# Patient Record
Sex: FEMALE | Race: WHITE
[De-identification: names, ages, dates, MRNs, and addresses within clinical notes are randomized per-mention and may not be internally consistent; named-entity substitution may affect disease eponyms.]

---

## 2017-01-18 ENCOUNTER — APPOINTMENT (OUTPATIENT)
Dept: PULMONOLOGY | Facility: CLINIC | Age: 72
End: 2017-01-18

## 2017-01-18 VITALS
HEART RATE: 58 BPM | HEIGHT: 63 IN | OXYGEN SATURATION: 96 % | SYSTOLIC BLOOD PRESSURE: 120 MMHG | TEMPERATURE: 96.5 F | DIASTOLIC BLOOD PRESSURE: 70 MMHG | BODY MASS INDEX: 19.84 KG/M2 | WEIGHT: 112 LBS

## 2017-01-19 RX ADMIN — OMALIZUMAB 0 MG: 202.5 INJECTION, SOLUTION SUBCUTANEOUS at 00:00

## 2017-02-13 ENCOUNTER — APPOINTMENT (OUTPATIENT)
Dept: PULMONOLOGY | Facility: CLINIC | Age: 72
End: 2017-02-13

## 2017-02-14 ENCOUNTER — APPOINTMENT (OUTPATIENT)
Dept: PULMONOLOGY | Facility: CLINIC | Age: 72
End: 2017-02-14

## 2017-02-14 VITALS
WEIGHT: 112 LBS | BODY MASS INDEX: 19.84 KG/M2 | HEIGHT: 63 IN | OXYGEN SATURATION: 96 % | TEMPERATURE: 97.5 F | HEART RATE: 60 BPM

## 2017-02-14 RX ADMIN — OMALIZUMAB 0 MG: 202.5 INJECTION, SOLUTION SUBCUTANEOUS at 00:00

## 2017-03-15 ENCOUNTER — APPOINTMENT (OUTPATIENT)
Dept: PULMONOLOGY | Facility: CLINIC | Age: 72
End: 2017-03-15

## 2017-03-15 VITALS
DIASTOLIC BLOOD PRESSURE: 80 MMHG | SYSTOLIC BLOOD PRESSURE: 120 MMHG | HEIGHT: 63 IN | TEMPERATURE: 97.5 F | OXYGEN SATURATION: 97 % | WEIGHT: 112 LBS | BODY MASS INDEX: 19.84 KG/M2 | HEART RATE: 57 BPM

## 2017-03-15 RX ORDER — CICLESONIDE 80 UG/1
80 AEROSOL, METERED RESPIRATORY (INHALATION)
Qty: 6.1 | Refills: 11 | Status: ACTIVE | COMMUNITY
Start: 2017-03-15 | End: 1900-01-01

## 2017-03-15 RX ORDER — AZELASTINE HYDROCHLORIDE AND FLUTICASONE PROPIONATE 137; 50 UG/1; UG/1
137-50 SPRAY, METERED NASAL
Qty: 1 | Refills: 5 | Status: ACTIVE | COMMUNITY
Start: 2017-03-15 | End: 1900-01-01

## 2017-03-15 RX ADMIN — OMALIZUMAB 0 MG: 202.5 INJECTION, SOLUTION SUBCUTANEOUS at 00:00

## 2017-03-17 ENCOUNTER — RX RENEWAL (OUTPATIENT)
Age: 72
End: 2017-03-17

## 2017-03-17 RX ORDER — MONTELUKAST 10 MG/1
10 TABLET, FILM COATED ORAL
Qty: 90 | Refills: 3 | Status: ACTIVE | COMMUNITY
Start: 2017-03-17 | End: 1900-01-01

## 2017-04-10 ENCOUNTER — APPOINTMENT (OUTPATIENT)
Dept: PULMONOLOGY | Facility: CLINIC | Age: 72
End: 2017-04-10

## 2017-04-10 VITALS
DIASTOLIC BLOOD PRESSURE: 90 MMHG | BODY MASS INDEX: 19.84 KG/M2 | OXYGEN SATURATION: 100 % | TEMPERATURE: 98.1 F | WEIGHT: 112 LBS | HEART RATE: 56 BPM | HEIGHT: 63 IN | SYSTOLIC BLOOD PRESSURE: 130 MMHG

## 2017-04-11 RX ADMIN — OMALIZUMAB 0 MG: 202.5 INJECTION, SOLUTION SUBCUTANEOUS at 00:00

## 2017-05-04 ENCOUNTER — APPOINTMENT (OUTPATIENT)
Dept: PULMONOLOGY | Facility: CLINIC | Age: 72
End: 2017-05-04

## 2017-05-04 VITALS
WEIGHT: 117 LBS | DIASTOLIC BLOOD PRESSURE: 64 MMHG | TEMPERATURE: 96.5 F | SYSTOLIC BLOOD PRESSURE: 110 MMHG | HEART RATE: 57 BPM | BODY MASS INDEX: 20.73 KG/M2 | HEIGHT: 63 IN | OXYGEN SATURATION: 98 %

## 2017-05-05 RX ADMIN — OMALIZUMAB 0 MG: 202.5 INJECTION, SOLUTION SUBCUTANEOUS at 00:00

## 2017-05-31 ENCOUNTER — APPOINTMENT (OUTPATIENT)
Dept: PULMONOLOGY | Facility: CLINIC | Age: 72
End: 2017-05-31

## 2017-06-01 RX ORDER — OMALIZUMAB 202.5 MG/1.4ML
150 INJECTION, SOLUTION SUBCUTANEOUS
Qty: 1 | Refills: 0 | Status: COMPLETED | OUTPATIENT
Start: 2017-06-01

## 2017-06-01 RX ADMIN — OMALIZUMAB 0 MG: 202.5 INJECTION, SOLUTION SUBCUTANEOUS at 00:00

## 2017-06-27 ENCOUNTER — APPOINTMENT (OUTPATIENT)
Dept: PULMONOLOGY | Facility: CLINIC | Age: 72
End: 2017-06-27

## 2017-06-27 VITALS
HEART RATE: 56 BPM | DIASTOLIC BLOOD PRESSURE: 78 MMHG | SYSTOLIC BLOOD PRESSURE: 118 MMHG | OXYGEN SATURATION: 97 % | BODY MASS INDEX: 20.73 KG/M2 | HEIGHT: 63 IN | TEMPERATURE: 97.8 F | WEIGHT: 117 LBS

## 2017-06-28 RX ORDER — OMALIZUMAB 202.5 MG/1.4ML
150 INJECTION, SOLUTION SUBCUTANEOUS
Qty: 1 | Refills: 0 | Status: COMPLETED | OUTPATIENT
Start: 2017-06-28

## 2017-06-28 RX ADMIN — OMALIZUMAB 0 MG: 202.5 INJECTION, SOLUTION SUBCUTANEOUS at 00:00

## 2017-10-05 ENCOUNTER — APPOINTMENT (OUTPATIENT)
Dept: PULMONOLOGY | Facility: CLINIC | Age: 72
End: 2017-10-05
Payer: MEDICARE

## 2017-10-05 VITALS
DIASTOLIC BLOOD PRESSURE: 80 MMHG | WEIGHT: 117 LBS | SYSTOLIC BLOOD PRESSURE: 127 MMHG | HEIGHT: 63 IN | BODY MASS INDEX: 20.73 KG/M2 | TEMPERATURE: 96.4 F

## 2017-10-05 PROCEDURE — 94010 BREATHING CAPACITY TEST: CPT

## 2017-10-05 PROCEDURE — 99214 OFFICE O/P EST MOD 30 MIN: CPT | Mod: 25

## 2021-01-20 ENCOUNTER — IMMUNIZATION (OUTPATIENT)
Dept: PHARMACY | Facility: CLINIC | Age: 76
End: 2021-01-20
Payer: MEDICARE

## 2021-01-20 DIAGNOSIS — Z23 NEED FOR VACCINATION: Primary | ICD-10-CM

## 2021-02-18 ENCOUNTER — IMMUNIZATION (OUTPATIENT)
Dept: PHARMACY | Facility: CLINIC | Age: 76
End: 2021-02-18
Payer: MEDICARE

## 2021-02-18 DIAGNOSIS — Z23 NEED FOR VACCINATION: Primary | ICD-10-CM

## 2021-03-18 ENCOUNTER — TELEPHONE (OUTPATIENT)
Dept: INFECTIOUS DISEASES | Facility: CLINIC | Age: 76
End: 2021-03-18

## 2021-04-05 ENCOUNTER — OFFICE VISIT (OUTPATIENT)
Dept: OBSTETRICS AND GYNECOLOGY | Facility: CLINIC | Age: 76
End: 2021-04-05
Attending: OBSTETRICS & GYNECOLOGY
Payer: MEDICARE

## 2021-04-05 ENCOUNTER — LAB VISIT (OUTPATIENT)
Dept: LAB | Facility: OTHER | Age: 76
End: 2021-04-05
Attending: OBSTETRICS & GYNECOLOGY
Payer: MEDICARE

## 2021-04-05 VITALS
SYSTOLIC BLOOD PRESSURE: 138 MMHG | DIASTOLIC BLOOD PRESSURE: 72 MMHG | HEIGHT: 64 IN | WEIGHT: 119.63 LBS | BODY MASS INDEX: 20.42 KG/M2

## 2021-04-05 DIAGNOSIS — Z13.21 ENCOUNTER FOR VITAMIN DEFICIENCY SCREENING: ICD-10-CM

## 2021-04-05 DIAGNOSIS — Z78.0 MENOPAUSE: ICD-10-CM

## 2021-04-05 DIAGNOSIS — Z01.419 ENCOUNTER FOR GYNECOLOGICAL EXAMINATION: ICD-10-CM

## 2021-04-05 DIAGNOSIS — Z78.0 MENOPAUSE: Primary | ICD-10-CM

## 2021-04-05 DIAGNOSIS — N39.3 STRESS INCONTINENCE OF URINE: ICD-10-CM

## 2021-04-05 LAB
25(OH)D3+25(OH)D2 SERPL-MCNC: 77 NG/ML (ref 30–96)
DHEA-S SERPL-MCNC: 10.8 UG/DL
ESTRADIOL SERPL-MCNC: 20 PG/ML
PROGEST SERPL-MCNC: 0.3 NG/ML
TESTOST SERPL-MCNC: 20 NG/DL (ref 5–73)
VIT B12 SERPL-MCNC: 1649 PG/ML (ref 210–950)

## 2021-04-05 PROCEDURE — 99999 PR PBB SHADOW E&M-EST. PATIENT-LVL IV: ICD-10-PCS | Mod: PBBFAC,,, | Performed by: OBSTETRICS & GYNECOLOGY

## 2021-04-05 PROCEDURE — 84402 ASSAY OF FREE TESTOSTERONE: CPT | Performed by: OBSTETRICS & GYNECOLOGY

## 2021-04-05 PROCEDURE — 82607 VITAMIN B-12: CPT | Mod: GA | Performed by: OBSTETRICS & GYNECOLOGY

## 2021-04-05 PROCEDURE — 82670 ASSAY OF TOTAL ESTRADIOL: CPT | Performed by: OBSTETRICS & GYNECOLOGY

## 2021-04-05 PROCEDURE — 99214 OFFICE O/P EST MOD 30 MIN: CPT | Mod: PBBFAC | Performed by: OBSTETRICS & GYNECOLOGY

## 2021-04-05 PROCEDURE — G0101 CA SCREEN;PELVIC/BREAST EXAM: HCPCS | Mod: PBBFAC

## 2021-04-05 PROCEDURE — 36415 COLL VENOUS BLD VENIPUNCTURE: CPT | Performed by: OBSTETRICS & GYNECOLOGY

## 2021-04-05 PROCEDURE — 82627 DEHYDROEPIANDROSTERONE: CPT | Performed by: OBSTETRICS & GYNECOLOGY

## 2021-04-05 PROCEDURE — G0101 CA SCREEN;PELVIC/BREAST EXAM: HCPCS | Mod: S$PBB,,, | Performed by: OBSTETRICS & GYNECOLOGY

## 2021-04-05 PROCEDURE — 82306 VITAMIN D 25 HYDROXY: CPT | Mod: GA | Performed by: OBSTETRICS & GYNECOLOGY

## 2021-04-05 PROCEDURE — 99999 PR PBB SHADOW E&M-EST. PATIENT-LVL IV: CPT | Mod: PBBFAC,,, | Performed by: OBSTETRICS & GYNECOLOGY

## 2021-04-05 PROCEDURE — 84144 ASSAY OF PROGESTERONE: CPT | Performed by: OBSTETRICS & GYNECOLOGY

## 2021-04-05 PROCEDURE — G0101 PR CA SCREEN;PELVIC/BREAST EXAM: ICD-10-PCS | Mod: S$PBB,,, | Performed by: OBSTETRICS & GYNECOLOGY

## 2021-04-05 PROCEDURE — 84403 ASSAY OF TOTAL TESTOSTERONE: CPT | Performed by: OBSTETRICS & GYNECOLOGY

## 2021-04-05 RX ORDER — TEMAZEPAM 15 MG/1
CAPSULE ORAL
COMMUNITY
End: 2021-07-22

## 2021-04-05 RX ORDER — VALACYCLOVIR HYDROCHLORIDE 1 G/1
TABLET, FILM COATED ORAL
COMMUNITY

## 2021-04-05 RX ORDER — METHIMAZOLE 10 MG/1
TABLET ORAL
COMMUNITY
End: 2021-04-05

## 2021-04-05 RX ORDER — ROSUVASTATIN CALCIUM 40 MG/1
TABLET, COATED ORAL
COMMUNITY
Start: 2013-01-01 | End: 2021-07-22 | Stop reason: SDUPTHER

## 2021-04-05 RX ORDER — ATORVASTATIN CALCIUM 40 MG/1
40 TABLET, FILM COATED ORAL
COMMUNITY
End: 2021-04-05

## 2021-04-05 RX ORDER — METOPROLOL SUCCINATE 50 MG/1
TABLET, EXTENDED RELEASE ORAL
COMMUNITY
End: 2021-07-22

## 2021-04-05 RX ORDER — HYDROCHLOROTHIAZIDE 25 MG/1
25 TABLET ORAL
COMMUNITY
End: 2021-07-22

## 2021-04-05 RX ORDER — LIFITEGRAST 50 MG/ML
SOLUTION/ DROPS OPHTHALMIC
COMMUNITY
Start: 2021-03-08

## 2021-04-05 RX ORDER — SULFAMETHOXAZOLE AND TRIMETHOPRIM 800; 160 MG/1; MG/1
1 TABLET ORAL 2 TIMES DAILY
COMMUNITY
End: 2021-09-27 | Stop reason: SDUPTHER

## 2021-04-05 RX ORDER — EPINEPHRINE 0.3 MG/.3ML
INJECTION SUBCUTANEOUS
COMMUNITY

## 2021-04-05 RX ORDER — METHIMAZOLE 10 MG/1
0.25 TABLET ORAL
COMMUNITY
End: 2021-04-05

## 2021-04-05 RX ORDER — CETIRIZINE HYDROCHLORIDE, PSEUDOEPHEDRINE HYDROCHLORIDE 5; 120 MG/1; MG/1
TABLET, FILM COATED, EXTENDED RELEASE ORAL
COMMUNITY

## 2021-04-05 RX ORDER — MINERAL OIL
180 ENEMA (ML) RECTAL
COMMUNITY
End: 2021-07-13

## 2021-04-05 RX ORDER — FLUOROMETHOLONE 1 MG/ML
SUSPENSION/ DROPS OPHTHALMIC
COMMUNITY
End: 2021-07-13

## 2021-04-05 RX ORDER — CONJUGATED ESTROGENS 0.62 MG/G
CREAM VAGINAL
COMMUNITY
Start: 2015-01-01 | End: 2021-10-01 | Stop reason: SDUPTHER

## 2021-04-05 RX ORDER — CHOLECALCIFEROL (VITAMIN D3) 50 MCG
2000 TABLET ORAL
COMMUNITY

## 2021-04-05 RX ORDER — POLYETHYLENE GLYCOL 3350 17 G/17G
17 POWDER, FOR SOLUTION ORAL DAILY PRN
COMMUNITY
End: 2022-05-17

## 2021-04-05 RX ORDER — METHIMAZOLE 5 MG/1
TABLET ORAL
COMMUNITY
Start: 2021-01-19 | End: 2021-04-05

## 2021-04-05 RX ORDER — DIAZEPAM 5 MG/1
5 TABLET ORAL EVERY 6 HOURS PRN
COMMUNITY
End: 2021-07-13

## 2021-04-05 RX ORDER — TRAMADOL HYDROCHLORIDE 50 MG/1
50 TABLET ORAL EVERY 6 HOURS PRN
COMMUNITY
End: 2021-07-13

## 2021-04-05 RX ORDER — NITROFURANTOIN 25; 75 MG/1; MG/1
100 CAPSULE ORAL EVERY 12 HOURS
COMMUNITY
Start: 2021-03-01 | End: 2021-07-22

## 2021-04-05 RX ORDER — ATENOLOL 25 MG/1
50 TABLET ORAL
COMMUNITY
End: 2021-04-05

## 2021-04-05 RX ORDER — ZOLPIDEM TARTRATE 6.25 MG/1
TABLET, FILM COATED, EXTENDED RELEASE ORAL
COMMUNITY
End: 2021-07-07 | Stop reason: SDUPTHER

## 2021-04-05 RX ORDER — ALPRAZOLAM 0.5 MG/1
TABLET ORAL
COMMUNITY
Start: 2015-01-01 | End: 2021-09-24 | Stop reason: SDUPTHER

## 2021-04-05 RX ORDER — LOTEPREDNOL ETABONATE 5 MG/G
GEL OPHTHALMIC
COMMUNITY
End: 2021-07-13

## 2021-04-05 RX ORDER — MONTELUKAST SODIUM 10 MG/1
10 TABLET ORAL
COMMUNITY
End: 2021-07-22

## 2021-04-05 RX ORDER — TRETINOIN 0.2 MG/G
CREAM TOPICAL
COMMUNITY
End: 2022-06-24 | Stop reason: SDUPTHER

## 2021-04-06 ENCOUNTER — TELEPHONE (OUTPATIENT)
Dept: UROGYNECOLOGY | Facility: CLINIC | Age: 76
End: 2021-04-06

## 2021-04-06 ENCOUNTER — TELEPHONE (OUTPATIENT)
Dept: OBSTETRICS AND GYNECOLOGY | Facility: CLINIC | Age: 76
End: 2021-04-06

## 2021-04-08 LAB — TESTOST FREE SERPL-MCNC: 0.4 PG/ML

## 2021-04-12 ENCOUNTER — HOSPITAL ENCOUNTER (OUTPATIENT)
Dept: RADIOLOGY | Facility: OTHER | Age: 76
Discharge: HOME | End: 2021-04-12
Attending: OBSTETRICS & GYNECOLOGY
Payer: MEDICARE

## 2021-04-12 DIAGNOSIS — E04.1 THYROID NODULE: Primary | ICD-10-CM

## 2021-04-12 DIAGNOSIS — Z78.0 MENOPAUSE: ICD-10-CM

## 2021-04-12 PROCEDURE — 77080 DEXA BONE DENSITY SPINE HIP: ICD-10-PCS | Mod: 26,,, | Performed by: INTERNAL MEDICINE

## 2021-04-12 PROCEDURE — 77080 DXA BONE DENSITY AXIAL: CPT | Mod: TC

## 2021-04-12 PROCEDURE — 77080 DXA BONE DENSITY AXIAL: CPT | Mod: 26,,, | Performed by: INTERNAL MEDICINE

## 2021-04-13 ENCOUNTER — HOSPITAL ENCOUNTER (OUTPATIENT)
Dept: RADIOLOGY | Facility: OTHER | Age: 76
Discharge: HOME | End: 2021-04-13
Attending: INTERNAL MEDICINE
Payer: MEDICARE

## 2021-04-13 DIAGNOSIS — E04.1 THYROID NODULE: ICD-10-CM

## 2021-04-13 PROCEDURE — 76536 US EXAM OF HEAD AND NECK: CPT | Mod: TC

## 2021-04-13 PROCEDURE — 76536 US THYROID: ICD-10-PCS | Mod: 26,,, | Performed by: RADIOLOGY

## 2021-04-13 PROCEDURE — 76536 US EXAM OF HEAD AND NECK: CPT | Mod: 26,,, | Performed by: RADIOLOGY

## 2021-05-25 DIAGNOSIS — M25.512 LEFT SHOULDER PAIN: Primary | ICD-10-CM

## 2021-05-26 ENCOUNTER — OFFICE VISIT (OUTPATIENT)
Dept: UROGYNECOLOGY | Facility: CLINIC | Age: 76
End: 2021-05-26
Attending: OBSTETRICS & GYNECOLOGY
Payer: MEDICARE

## 2021-05-26 ENCOUNTER — TELEPHONE (OUTPATIENT)
Dept: ORTHOPEDICS | Facility: CLINIC | Age: 76
End: 2021-05-26

## 2021-05-26 VITALS
HEART RATE: 59 BPM | HEIGHT: 64 IN | SYSTOLIC BLOOD PRESSURE: 132 MMHG | WEIGHT: 118.88 LBS | BODY MASS INDEX: 20.29 KG/M2 | DIASTOLIC BLOOD PRESSURE: 63 MMHG

## 2021-05-26 DIAGNOSIS — N39.8 VOIDING DYSFUNCTION: ICD-10-CM

## 2021-05-26 DIAGNOSIS — N81.6 RECTOCELE, FEMALE: ICD-10-CM

## 2021-05-26 DIAGNOSIS — N81.11 CYSTOCELE, MIDLINE: ICD-10-CM

## 2021-05-26 DIAGNOSIS — E05.00 GRAVES DISEASE: ICD-10-CM

## 2021-05-26 DIAGNOSIS — N39.3 STRESS INCONTINENCE OF URINE: ICD-10-CM

## 2021-05-26 DIAGNOSIS — N64.89 RADIAL SCAR OF LEFT BREAST: ICD-10-CM

## 2021-05-26 DIAGNOSIS — N95.2 VAGINAL ATROPHY: ICD-10-CM

## 2021-05-26 DIAGNOSIS — K59.09 CHRONIC CONSTIPATION: ICD-10-CM

## 2021-05-26 DIAGNOSIS — Z12.31 ENCOUNTER FOR SCREENING MAMMOGRAM FOR BREAST CANCER: Primary | ICD-10-CM

## 2021-05-26 DIAGNOSIS — Z12.11 COLON CANCER SCREENING: ICD-10-CM

## 2021-05-26 PROCEDURE — 51701 PR INSERTION OF NON-INDWELLING BLADDER CATHETERIZATION FOR RESIDUAL UR: ICD-10-PCS | Mod: S$PBB,,, | Performed by: OBSTETRICS & GYNECOLOGY

## 2021-05-26 PROCEDURE — 51701 INSERT BLADDER CATHETER: CPT | Mod: PBBFAC | Performed by: OBSTETRICS & GYNECOLOGY

## 2021-05-26 PROCEDURE — 99215 PR OFFICE/OUTPT VISIT, EST, LEVL V, 40-54 MIN: ICD-10-PCS | Mod: S$PBB,25,, | Performed by: OBSTETRICS & GYNECOLOGY

## 2021-05-26 PROCEDURE — 87086 URINE CULTURE/COLONY COUNT: CPT | Performed by: OBSTETRICS & GYNECOLOGY

## 2021-05-26 PROCEDURE — 99215 OFFICE O/P EST HI 40 MIN: CPT | Mod: S$PBB,25,, | Performed by: OBSTETRICS & GYNECOLOGY

## 2021-05-26 PROCEDURE — 99999 PR PBB SHADOW E&M-EST. PATIENT-LVL V: ICD-10-PCS | Mod: PBBFAC,,, | Performed by: OBSTETRICS & GYNECOLOGY

## 2021-05-26 PROCEDURE — 51701 INSERT BLADDER CATHETER: CPT | Mod: S$PBB,,, | Performed by: OBSTETRICS & GYNECOLOGY

## 2021-05-26 PROCEDURE — 99999 PR PBB SHADOW E&M-EST. PATIENT-LVL V: CPT | Mod: PBBFAC,,, | Performed by: OBSTETRICS & GYNECOLOGY

## 2021-05-26 PROCEDURE — 99215 OFFICE O/P EST HI 40 MIN: CPT | Mod: PBBFAC,25 | Performed by: OBSTETRICS & GYNECOLOGY

## 2021-05-27 ENCOUNTER — TELEPHONE (OUTPATIENT)
Dept: UROGYNECOLOGY | Facility: CLINIC | Age: 76
End: 2021-05-27

## 2021-05-28 LAB — BACTERIA UR CULT: NO GROWTH

## 2021-05-30 PROBLEM — K59.09 CHRONIC CONSTIPATION: Status: ACTIVE | Noted: 2021-05-30

## 2021-05-30 PROBLEM — N39.8 VOIDING DYSFUNCTION: Status: ACTIVE | Noted: 2021-05-30

## 2021-05-30 PROBLEM — N95.2 VAGINAL ATROPHY: Status: ACTIVE | Noted: 2021-05-30

## 2021-05-30 PROBLEM — N39.3 STRESS INCONTINENCE OF URINE: Status: ACTIVE | Noted: 2021-05-30

## 2021-05-30 PROBLEM — N81.6 RECTOCELE, FEMALE: Status: ACTIVE | Noted: 2021-05-30

## 2021-05-30 PROBLEM — N81.11 CYSTOCELE, MIDLINE: Status: ACTIVE | Noted: 2021-05-30

## 2021-06-02 ENCOUNTER — PATIENT MESSAGE (OUTPATIENT)
Dept: OBSTETRICS AND GYNECOLOGY | Facility: CLINIC | Age: 76
End: 2021-06-02

## 2021-06-10 ENCOUNTER — HOSPITAL ENCOUNTER (OUTPATIENT)
Dept: RADIOLOGY | Facility: OTHER | Age: 76
Discharge: HOME | End: 2021-06-10
Attending: OBSTETRICS & GYNECOLOGY
Payer: MEDICARE

## 2021-06-10 DIAGNOSIS — Z12.31 ENCOUNTER FOR SCREENING MAMMOGRAM FOR BREAST CANCER: ICD-10-CM

## 2021-06-10 DIAGNOSIS — N64.89 RADIAL SCAR OF LEFT BREAST: ICD-10-CM

## 2021-06-10 PROCEDURE — 77067 SCR MAMMO BI INCL CAD: CPT | Mod: TC

## 2021-06-10 PROCEDURE — 77063 MAMMO DIGITAL SCREENING BILAT WITH TOMO: ICD-10-PCS | Mod: 26,,, | Performed by: RADIOLOGY

## 2021-06-10 PROCEDURE — 77067 SCR MAMMO BI INCL CAD: CPT | Mod: 26,,, | Performed by: RADIOLOGY

## 2021-06-10 PROCEDURE — 76641 US BREAST LEFT COMPLETE: ICD-10-PCS | Mod: 26,LT,, | Performed by: RADIOLOGY

## 2021-06-10 PROCEDURE — 77067 MAMMO DIGITAL SCREENING BILAT WITH TOMO: ICD-10-PCS | Mod: 26,,, | Performed by: RADIOLOGY

## 2021-06-10 PROCEDURE — 77063 BREAST TOMOSYNTHESIS BI: CPT | Mod: 26,,, | Performed by: RADIOLOGY

## 2021-06-10 PROCEDURE — 76641 ULTRASOUND BREAST COMPLETE: CPT | Mod: TC,LT

## 2021-06-10 PROCEDURE — 76641 ULTRASOUND BREAST COMPLETE: CPT | Mod: 26,LT,, | Performed by: RADIOLOGY

## 2021-06-11 ENCOUNTER — TELEPHONE (OUTPATIENT)
Dept: UROGYNECOLOGY | Facility: CLINIC | Age: 76
End: 2021-06-11

## 2021-06-21 ENCOUNTER — OFFICE VISIT (OUTPATIENT)
Dept: OBSTETRICS AND GYNECOLOGY | Facility: CLINIC | Age: 76
End: 2021-06-21
Attending: OBSTETRICS & GYNECOLOGY
Payer: MEDICARE

## 2021-06-21 DIAGNOSIS — Z78.0 MENOPAUSE: Primary | ICD-10-CM

## 2021-06-21 PROCEDURE — 99214 OFFICE O/P EST MOD 30 MIN: CPT | Mod: 95,,, | Performed by: OBSTETRICS & GYNECOLOGY

## 2021-06-21 PROCEDURE — 99214 PR OFFICE/OUTPT VISIT, EST, LEVL IV, 30-39 MIN: ICD-10-PCS | Mod: 95,,, | Performed by: OBSTETRICS & GYNECOLOGY

## 2021-06-21 RX ORDER — PROGESTERONE 100 MG/1
CAPSULE ORAL
Qty: 90 CAPSULE | Refills: 3 | Status: SHIPPED | OUTPATIENT
Start: 2021-06-21 | End: 2021-09-27

## 2021-06-21 RX ORDER — ESTRADIOL 0.5 MG/.5G
1 GEL TOPICAL DAILY
Qty: 30 PACKET | Refills: 11 | Status: SHIPPED | OUTPATIENT
Start: 2021-06-21 | End: 2021-07-22

## 2021-06-22 ENCOUNTER — PATIENT MESSAGE (OUTPATIENT)
Dept: OBSTETRICS AND GYNECOLOGY | Facility: CLINIC | Age: 76
End: 2021-06-22

## 2021-06-22 ENCOUNTER — TELEPHONE (OUTPATIENT)
Dept: UROGYNECOLOGY | Facility: CLINIC | Age: 76
End: 2021-06-22

## 2021-06-22 DIAGNOSIS — N39.3 STRESS INCONTINENCE OF URINE: Primary | ICD-10-CM

## 2021-06-22 DIAGNOSIS — E05.00 GRAVES DISEASE: ICD-10-CM

## 2021-06-22 DIAGNOSIS — Z01.818 PRE-OP EVALUATION: ICD-10-CM

## 2021-06-22 DIAGNOSIS — N81.11 CYSTOCELE, MIDLINE: ICD-10-CM

## 2021-06-22 DIAGNOSIS — N81.6 RECTOCELE, FEMALE: ICD-10-CM

## 2021-06-22 DIAGNOSIS — K59.09 CHRONIC CONSTIPATION: ICD-10-CM

## 2021-06-25 ENCOUNTER — TELEPHONE (OUTPATIENT)
Dept: UROGYNECOLOGY | Facility: CLINIC | Age: 76
End: 2021-06-25

## 2021-06-28 ENCOUNTER — TELEPHONE (OUTPATIENT)
Dept: UROGYNECOLOGY | Facility: CLINIC | Age: 76
End: 2021-06-28

## 2021-06-29 ENCOUNTER — HOSPITAL ENCOUNTER (OUTPATIENT)
Dept: RADIOLOGY | Facility: OTHER | Age: 76
Discharge: HOME | End: 2021-06-29
Attending: PHYSICIAN ASSISTANT
Payer: MEDICARE

## 2021-06-29 DIAGNOSIS — N81.11 CYSTOCELE, MIDLINE: ICD-10-CM

## 2021-06-29 DIAGNOSIS — N81.6 RECTOCELE, FEMALE: ICD-10-CM

## 2021-06-29 DIAGNOSIS — N39.3 STRESS INCONTINENCE OF URINE: ICD-10-CM

## 2021-06-29 DIAGNOSIS — E05.00 GRAVES DISEASE: ICD-10-CM

## 2021-06-29 DIAGNOSIS — K59.09 CHRONIC CONSTIPATION: ICD-10-CM

## 2021-06-29 DIAGNOSIS — Z01.818 PRE-OP EVALUATION: ICD-10-CM

## 2021-06-29 PROCEDURE — 71046 X-RAY EXAM CHEST 2 VIEWS: CPT | Mod: TC,FY

## 2021-06-29 PROCEDURE — 71046 X-RAY EXAM CHEST 2 VIEWS: CPT | Mod: 26,,, | Performed by: RADIOLOGY

## 2021-06-29 PROCEDURE — 71046 XR CHEST PA AND LATERAL PRE-OP: ICD-10-PCS | Mod: 26,,, | Performed by: RADIOLOGY

## 2021-07-06 ENCOUNTER — TELEPHONE (OUTPATIENT)
Dept: INTERNAL MEDICINE | Facility: CLINIC | Age: 76
End: 2021-07-06

## 2021-07-07 ENCOUNTER — PATIENT MESSAGE (OUTPATIENT)
Dept: OBSTETRICS AND GYNECOLOGY | Facility: CLINIC | Age: 76
End: 2021-07-07

## 2021-07-07 ENCOUNTER — TELEPHONE (OUTPATIENT)
Dept: SPORTS MEDICINE | Facility: CLINIC | Age: 76
End: 2021-07-07

## 2021-07-07 DIAGNOSIS — G47.00 INSOMNIA, UNSPECIFIED TYPE: Primary | ICD-10-CM

## 2021-07-07 RX ORDER — ZOLPIDEM TARTRATE 6.25 MG/1
6.25 TABLET, FILM COATED, EXTENDED RELEASE ORAL NIGHTLY PRN
Qty: 30 TABLET | Refills: 0 | Status: SHIPPED | OUTPATIENT
Start: 2021-07-07 | End: 2021-07-22 | Stop reason: SDUPTHER

## 2021-07-08 ENCOUNTER — TELEPHONE (OUTPATIENT)
Dept: INTERNAL MEDICINE | Facility: CLINIC | Age: 76
End: 2021-07-08

## 2021-07-09 ENCOUNTER — PATIENT MESSAGE (OUTPATIENT)
Dept: SPORTS MEDICINE | Facility: CLINIC | Age: 76
End: 2021-07-09

## 2021-07-09 ENCOUNTER — TELEPHONE (OUTPATIENT)
Dept: SPORTS MEDICINE | Facility: CLINIC | Age: 76
End: 2021-07-09

## 2021-07-12 ENCOUNTER — HOSPITAL ENCOUNTER (OUTPATIENT)
Dept: RADIOLOGY | Facility: HOSPITAL | Age: 76
Discharge: HOME | End: 2021-07-12
Attending: PHYSICIAN ASSISTANT
Payer: MEDICARE

## 2021-07-12 ENCOUNTER — OFFICE VISIT (OUTPATIENT)
Dept: SPORTS MEDICINE | Facility: CLINIC | Age: 76
End: 2021-07-12
Payer: MEDICARE

## 2021-07-12 VITALS
HEIGHT: 63 IN | WEIGHT: 118 LBS | SYSTOLIC BLOOD PRESSURE: 138 MMHG | HEART RATE: 56 BPM | DIASTOLIC BLOOD PRESSURE: 74 MMHG | BODY MASS INDEX: 20.91 KG/M2

## 2021-07-12 DIAGNOSIS — M25.512 ACUTE PAIN OF LEFT SHOULDER: ICD-10-CM

## 2021-07-12 DIAGNOSIS — M25.812 SHOULDER IMPINGEMENT, LEFT: ICD-10-CM

## 2021-07-12 DIAGNOSIS — M75.102 ROTATOR CUFF SYNDROME, LEFT: Primary | ICD-10-CM

## 2021-07-12 PROCEDURE — 99213 OFFICE O/P EST LOW 20 MIN: CPT | Mod: PBBFAC,25 | Performed by: PHYSICIAN ASSISTANT

## 2021-07-12 PROCEDURE — 20610 PR DRAIN/INJECT LARGE JOINT/BURSA: ICD-10-PCS | Mod: S$PBB,LT,, | Performed by: PHYSICIAN ASSISTANT

## 2021-07-12 PROCEDURE — 99999 PR PBB SHADOW E&M-EST. PATIENT-LVL III: CPT | Mod: PBBFAC,,, | Performed by: PHYSICIAN ASSISTANT

## 2021-07-12 PROCEDURE — 20610 DRAIN/INJ JOINT/BURSA W/O US: CPT | Mod: PBBFAC | Performed by: PHYSICIAN ASSISTANT

## 2021-07-12 PROCEDURE — 99999 PR PBB SHADOW E&M-EST. PATIENT-LVL III: ICD-10-PCS | Mod: PBBFAC,,, | Performed by: PHYSICIAN ASSISTANT

## 2021-07-12 PROCEDURE — 73030 X-RAY EXAM OF SHOULDER: CPT | Mod: TC,LT

## 2021-07-12 PROCEDURE — 99204 OFFICE O/P NEW MOD 45 MIN: CPT | Mod: 25,S$PBB,, | Performed by: PHYSICIAN ASSISTANT

## 2021-07-12 PROCEDURE — 73030 XR SHOULDER COMPLETE 2 OR MORE VIEWS LEFT: ICD-10-PCS | Mod: 26,LT,, | Performed by: RADIOLOGY

## 2021-07-12 PROCEDURE — 20610 DRAIN/INJ JOINT/BURSA W/O US: CPT | Mod: S$PBB,LT,, | Performed by: PHYSICIAN ASSISTANT

## 2021-07-12 PROCEDURE — 73030 X-RAY EXAM OF SHOULDER: CPT | Mod: 26,LT,, | Performed by: RADIOLOGY

## 2021-07-12 PROCEDURE — 99204 PR OFFICE/OUTPT VISIT, NEW, LEVL IV, 45-59 MIN: ICD-10-PCS | Mod: 25,S$PBB,, | Performed by: PHYSICIAN ASSISTANT

## 2021-07-12 RX ORDER — TRIAMCINOLONE ACETONIDE 40 MG/ML
40 INJECTION, SUSPENSION INTRA-ARTICULAR; INTRAMUSCULAR
Status: COMPLETED | OUTPATIENT
Start: 2021-07-12 | End: 2021-07-12

## 2021-07-12 RX ORDER — BUPIVACAINE HYDROCHLORIDE 2.5 MG/ML
3 INJECTION, SOLUTION INFILTRATION; PERINEURAL
Status: COMPLETED | OUTPATIENT
Start: 2021-07-12 | End: 2021-07-12

## 2021-07-12 RX ADMIN — BUPIVACAINE HYDROCHLORIDE 7.5 MG: 2.5 INJECTION, SOLUTION INFILTRATION; PERINEURAL at 04:07

## 2021-07-12 RX ADMIN — TRIAMCINOLONE ACETONIDE 40 MG: 40 INJECTION, SUSPENSION INTRA-ARTICULAR; INTRAMUSCULAR at 04:07

## 2021-07-13 ENCOUNTER — OFFICE VISIT (OUTPATIENT)
Dept: PODIATRY | Facility: CLINIC | Age: 76
End: 2021-07-13
Payer: MEDICARE

## 2021-07-13 VITALS
HEART RATE: 73 BPM | BODY MASS INDEX: 20.9 KG/M2 | HEIGHT: 63 IN | DIASTOLIC BLOOD PRESSURE: 60 MMHG | SYSTOLIC BLOOD PRESSURE: 127 MMHG | WEIGHT: 117.94 LBS

## 2021-07-13 DIAGNOSIS — Z98.890 HISTORY OF FOOT SURGERY: Primary | ICD-10-CM

## 2021-07-13 DIAGNOSIS — L84 CORN OR CALLUS: ICD-10-CM

## 2021-07-13 DIAGNOSIS — L60.2 LONG TOENAIL: ICD-10-CM

## 2021-07-13 PROCEDURE — 99999 PR PBB SHADOW E&M-EST. PATIENT-LVL V: ICD-10-PCS | Mod: PBBFAC,,, | Performed by: PODIATRIST

## 2021-07-13 PROCEDURE — 99203 OFFICE O/P NEW LOW 30 MIN: CPT | Mod: S$PBB,,, | Performed by: PODIATRIST

## 2021-07-13 PROCEDURE — 99999 PR PBB SHADOW E&M-EST. PATIENT-LVL V: CPT | Mod: PBBFAC,,, | Performed by: PODIATRIST

## 2021-07-13 PROCEDURE — 99215 OFFICE O/P EST HI 40 MIN: CPT | Mod: PBBFAC,PN | Performed by: PODIATRIST

## 2021-07-13 PROCEDURE — 99203 PR OFFICE/OUTPT VISIT, NEW, LEVL III, 30-44 MIN: ICD-10-PCS | Mod: S$PBB,,, | Performed by: PODIATRIST

## 2021-07-13 RX ORDER — UREA 200 MG/G
1 CREAM TOPICAL DAILY
Qty: 75 G | Refills: 10 | Status: SHIPPED | OUTPATIENT
Start: 2021-07-13

## 2021-07-16 ENCOUNTER — CLINICAL SUPPORT (OUTPATIENT)
Dept: REHABILITATION | Facility: OTHER | Age: 76
End: 2021-07-16
Attending: OBSTETRICS & GYNECOLOGY
Payer: MEDICARE

## 2021-07-16 DIAGNOSIS — M75.102 ROTATOR CUFF SYNDROME, LEFT: ICD-10-CM

## 2021-07-16 DIAGNOSIS — M25.512 LEFT SHOULDER PAIN, UNSPECIFIED CHRONICITY: ICD-10-CM

## 2021-07-16 DIAGNOSIS — R29.898 SHOULDER WEAKNESS: ICD-10-CM

## 2021-07-16 DIAGNOSIS — M25.812 SHOULDER IMPINGEMENT, LEFT: ICD-10-CM

## 2021-07-16 PROCEDURE — 97161 PT EVAL LOW COMPLEX 20 MIN: CPT | Mod: PN

## 2021-07-22 ENCOUNTER — CLINICAL SUPPORT (OUTPATIENT)
Dept: REHABILITATION | Facility: OTHER | Age: 76
End: 2021-07-22
Attending: OBSTETRICS & GYNECOLOGY
Payer: MEDICARE

## 2021-07-22 ENCOUNTER — LAB VISIT (OUTPATIENT)
Dept: LAB | Facility: HOSPITAL | Age: 76
End: 2021-07-22
Attending: INTERNAL MEDICINE
Payer: MEDICARE

## 2021-07-22 ENCOUNTER — OFFICE VISIT (OUTPATIENT)
Dept: INTERNAL MEDICINE | Facility: CLINIC | Age: 76
End: 2021-07-22
Payer: MEDICARE

## 2021-07-22 VITALS
RESPIRATION RATE: 18 BRPM | WEIGHT: 115.06 LBS | HEART RATE: 59 BPM | SYSTOLIC BLOOD PRESSURE: 110 MMHG | DIASTOLIC BLOOD PRESSURE: 82 MMHG | BODY MASS INDEX: 20.39 KG/M2 | OXYGEN SATURATION: 97 % | HEIGHT: 63 IN | TEMPERATURE: 98 F

## 2021-07-22 DIAGNOSIS — M62.9 DISORDER OF MUSCLE: ICD-10-CM

## 2021-07-22 DIAGNOSIS — G47.00 INSOMNIA, UNSPECIFIED TYPE: ICD-10-CM

## 2021-07-22 DIAGNOSIS — D64.9 NORMOCYTIC ANEMIA: ICD-10-CM

## 2021-07-22 DIAGNOSIS — N39.3 STRESS INCONTINENCE OF URINE: ICD-10-CM

## 2021-07-22 DIAGNOSIS — Z00.00 ANNUAL PHYSICAL EXAM: Primary | ICD-10-CM

## 2021-07-22 DIAGNOSIS — Z80.0 FAMILY HISTORY OF COLON CANCER: ICD-10-CM

## 2021-07-22 DIAGNOSIS — N39.8 VOIDING DYSFUNCTION: ICD-10-CM

## 2021-07-22 DIAGNOSIS — N18.32 STAGE 3B CHRONIC KIDNEY DISEASE: ICD-10-CM

## 2021-07-22 DIAGNOSIS — N81.89 PELVIC FLOOR WEAKNESS: ICD-10-CM

## 2021-07-22 DIAGNOSIS — I10 ESSENTIAL HYPERTENSION: ICD-10-CM

## 2021-07-22 DIAGNOSIS — Z74.09 DECREASED FUNCTIONAL MOBILITY AND ENDURANCE: ICD-10-CM

## 2021-07-22 DIAGNOSIS — E05.00 GRAVES DISEASE: ICD-10-CM

## 2021-07-22 DIAGNOSIS — Z95.2 HISTORY OF TRANSCATHETER AORTIC VALVE REPLACEMENT (TAVR): ICD-10-CM

## 2021-07-22 DIAGNOSIS — E78.5 HYPERLIPIDEMIA, UNSPECIFIED HYPERLIPIDEMIA TYPE: ICD-10-CM

## 2021-07-22 DIAGNOSIS — G25.0 ESSENTIAL TREMOR: ICD-10-CM

## 2021-07-22 DIAGNOSIS — Z00.00 ANNUAL PHYSICAL EXAM: ICD-10-CM

## 2021-07-22 LAB
ALBUMIN SERPL BCP-MCNC: 4.1 G/DL (ref 3.5–5.2)
ALP SERPL-CCNC: 41 U/L (ref 55–135)
ALT SERPL W/O P-5'-P-CCNC: 16 U/L (ref 10–44)
ANION GAP SERPL CALC-SCNC: 8 MMOL/L (ref 8–16)
AST SERPL-CCNC: 19 U/L (ref 10–40)
BASOPHILS # BLD AUTO: 0.06 K/UL (ref 0–0.2)
BASOPHILS NFR BLD: 0.9 % (ref 0–1.9)
BILIRUB SERPL-MCNC: 0.5 MG/DL (ref 0.1–1)
BUN SERPL-MCNC: 21 MG/DL (ref 8–23)
CALCIUM SERPL-MCNC: 9.6 MG/DL (ref 8.7–10.5)
CHLORIDE SERPL-SCNC: 101 MMOL/L (ref 95–110)
CHOLEST SERPL-MCNC: 177 MG/DL (ref 120–199)
CHOLEST/HDLC SERPL: 2.4 {RATIO} (ref 2–5)
CO2 SERPL-SCNC: 25 MMOL/L (ref 23–29)
CREAT SERPL-MCNC: 1 MG/DL (ref 0.5–1.4)
DIFFERENTIAL METHOD: ABNORMAL
EOSINOPHIL # BLD AUTO: 0.1 K/UL (ref 0–0.5)
EOSINOPHIL NFR BLD: 1.6 % (ref 0–8)
ERYTHROCYTE [DISTWIDTH] IN BLOOD BY AUTOMATED COUNT: 14.1 % (ref 11.5–14.5)
EST. GFR  (AFRICAN AMERICAN): >60 ML/MIN/1.73 M^2
EST. GFR  (NON AFRICAN AMERICAN): 54.9 ML/MIN/1.73 M^2
FERRITIN SERPL-MCNC: 75 NG/ML (ref 20–300)
GLUCOSE SERPL-MCNC: 84 MG/DL (ref 70–110)
HCT VFR BLD AUTO: 37.2 % (ref 37–48.5)
HDLC SERPL-MCNC: 75 MG/DL (ref 40–75)
HDLC SERPL: 42.4 % (ref 20–50)
HGB BLD-MCNC: 11.9 G/DL (ref 12–16)
IMM GRANULOCYTES # BLD AUTO: 0.02 K/UL (ref 0–0.04)
IMM GRANULOCYTES NFR BLD AUTO: 0.3 % (ref 0–0.5)
IRON SERPL-MCNC: 73 UG/DL (ref 30–160)
LDLC SERPL CALC-MCNC: 90.6 MG/DL (ref 63–159)
LYMPHOCYTES # BLD AUTO: 1.1 K/UL (ref 1–4.8)
LYMPHOCYTES NFR BLD: 17.3 % (ref 18–48)
MCH RBC QN AUTO: 29.5 PG (ref 27–31)
MCHC RBC AUTO-ENTMCNC: 32 G/DL (ref 32–36)
MCV RBC AUTO: 92 FL (ref 82–98)
MONOCYTES # BLD AUTO: 0.8 K/UL (ref 0.3–1)
MONOCYTES NFR BLD: 12.5 % (ref 4–15)
NEUTROPHILS # BLD AUTO: 4.3 K/UL (ref 1.8–7.7)
NEUTROPHILS NFR BLD: 67.4 % (ref 38–73)
NONHDLC SERPL-MCNC: 102 MG/DL
NRBC BLD-RTO: 0 /100 WBC
PLATELET # BLD AUTO: 279 K/UL (ref 150–450)
PMV BLD AUTO: 9.6 FL (ref 9.2–12.9)
POTASSIUM SERPL-SCNC: 4.9 MMOL/L (ref 3.5–5.1)
PROT SERPL-MCNC: 7.2 G/DL (ref 6–8.4)
RBC # BLD AUTO: 4.04 M/UL (ref 4–5.4)
SATURATED IRON: 14 % (ref 20–50)
SODIUM SERPL-SCNC: 134 MMOL/L (ref 136–145)
TOTAL IRON BINDING CAPACITY: 524 UG/DL (ref 250–450)
TRANSFERRIN SERPL-MCNC: 354 MG/DL (ref 200–375)
TRIGL SERPL-MCNC: 57 MG/DL (ref 30–150)
WBC # BLD AUTO: 6.4 K/UL (ref 3.9–12.7)

## 2021-07-22 PROCEDURE — 80061 LIPID PANEL: CPT | Performed by: INTERNAL MEDICINE

## 2021-07-22 PROCEDURE — 36415 COLL VENOUS BLD VENIPUNCTURE: CPT | Mod: PO | Performed by: INTERNAL MEDICINE

## 2021-07-22 PROCEDURE — 99205 OFFICE O/P NEW HI 60 MIN: CPT | Mod: S$PBB,,, | Performed by: INTERNAL MEDICINE

## 2021-07-22 PROCEDURE — 99213 OFFICE O/P EST LOW 20 MIN: CPT | Mod: PBBFAC,PO | Performed by: INTERNAL MEDICINE

## 2021-07-22 PROCEDURE — 97110 THERAPEUTIC EXERCISES: CPT

## 2021-07-22 PROCEDURE — 82728 ASSAY OF FERRITIN: CPT | Performed by: INTERNAL MEDICINE

## 2021-07-22 PROCEDURE — 97162 PT EVAL MOD COMPLEX 30 MIN: CPT

## 2021-07-22 PROCEDURE — 80053 COMPREHEN METABOLIC PANEL: CPT | Performed by: INTERNAL MEDICINE

## 2021-07-22 PROCEDURE — 99999 PR PBB SHADOW E&M-EST. PATIENT-LVL III: ICD-10-PCS | Mod: PBBFAC,,, | Performed by: INTERNAL MEDICINE

## 2021-07-22 PROCEDURE — 99205 PR OFFICE/OUTPT VISIT, NEW, LEVL V, 60-74 MIN: ICD-10-PCS | Mod: S$PBB,,, | Performed by: INTERNAL MEDICINE

## 2021-07-22 PROCEDURE — 97530 THERAPEUTIC ACTIVITIES: CPT

## 2021-07-22 PROCEDURE — 83540 ASSAY OF IRON: CPT | Performed by: INTERNAL MEDICINE

## 2021-07-22 PROCEDURE — 99999 PR PBB SHADOW E&M-EST. PATIENT-LVL III: CPT | Mod: PBBFAC,,, | Performed by: INTERNAL MEDICINE

## 2021-07-22 PROCEDURE — 85025 COMPLETE CBC W/AUTO DIFF WBC: CPT | Performed by: INTERNAL MEDICINE

## 2021-07-22 RX ORDER — TRIAMTERENE AND HYDROCHLOROTHIAZIDE 37.5; 25 MG/1; MG/1
1 CAPSULE ORAL
COMMUNITY
End: 2021-10-29 | Stop reason: CLARIF

## 2021-07-22 RX ORDER — ZOLPIDEM TARTRATE 6.25 MG/1
6.25 TABLET, FILM COATED, EXTENDED RELEASE ORAL NIGHTLY PRN
Qty: 30 TABLET | Refills: 2 | Status: SHIPPED | OUTPATIENT
Start: 2021-07-22

## 2021-07-22 RX ORDER — ATENOLOL 100 MG/1
100 TABLET ORAL 2 TIMES DAILY
Qty: 60 TABLET | Refills: 11 | Status: SHIPPED | OUTPATIENT
Start: 2021-07-22 | End: 2022-07-28

## 2021-07-22 RX ORDER — ATENOLOL 50 MG/1
50 TABLET ORAL 2 TIMES DAILY
COMMUNITY
Start: 2021-04-05 | End: 2021-07-22

## 2021-07-22 RX ORDER — ROSUVASTATIN CALCIUM 40 MG/1
TABLET, COATED ORAL
Qty: 90 TABLET | Refills: 1 | Status: SHIPPED | OUTPATIENT
Start: 2021-07-22 | End: 2022-03-15

## 2021-07-23 ENCOUNTER — TELEPHONE (OUTPATIENT)
Dept: INTERNAL MEDICINE | Facility: CLINIC | Age: 76
End: 2021-07-23

## 2021-07-23 PROBLEM — Z74.09 DECREASED FUNCTIONAL MOBILITY AND ENDURANCE: Status: ACTIVE | Noted: 2021-07-23

## 2021-07-23 PROBLEM — M62.9 DISORDER OF MUSCLE: Status: ACTIVE | Noted: 2021-07-23

## 2021-07-23 PROBLEM — Z96.649 S/P HIP REPLACEMENT: Status: ACTIVE | Noted: 2017-03-28

## 2021-07-23 PROBLEM — M54.16 LUMBAR RADICULOPATHY: Status: ACTIVE | Noted: 2021-07-23

## 2021-07-23 PROBLEM — I10 ESSENTIAL HYPERTENSION: Status: ACTIVE | Noted: 2018-07-02

## 2021-07-23 PROBLEM — N81.89 PELVIC FLOOR WEAKNESS: Status: ACTIVE | Noted: 2021-07-23

## 2021-07-23 PROBLEM — G47.00 INSOMNIA: Status: ACTIVE | Noted: 2018-07-02

## 2021-07-23 PROBLEM — G25.0 ESSENTIAL TREMOR: Status: ACTIVE | Noted: 2018-02-28

## 2021-07-27 ENCOUNTER — CLINICAL SUPPORT (OUTPATIENT)
Dept: REHABILITATION | Facility: OTHER | Age: 76
End: 2021-07-27
Payer: MEDICARE

## 2021-07-27 DIAGNOSIS — M25.512 LEFT SHOULDER PAIN, UNSPECIFIED CHRONICITY: Primary | ICD-10-CM

## 2021-07-27 DIAGNOSIS — R29.898 SHOULDER WEAKNESS: ICD-10-CM

## 2021-07-27 PROCEDURE — 97110 THERAPEUTIC EXERCISES: CPT | Mod: PN

## 2021-07-29 ENCOUNTER — CLINICAL SUPPORT (OUTPATIENT)
Dept: REHABILITATION | Facility: OTHER | Age: 76
End: 2021-07-29
Payer: MEDICARE

## 2021-07-29 DIAGNOSIS — R29.898 SHOULDER WEAKNESS: ICD-10-CM

## 2021-07-29 DIAGNOSIS — M25.512 LEFT SHOULDER PAIN, UNSPECIFIED CHRONICITY: ICD-10-CM

## 2021-07-29 PROCEDURE — 97110 THERAPEUTIC EXERCISES: CPT | Mod: PN,CQ

## 2021-08-02 ENCOUNTER — CLINICAL SUPPORT (OUTPATIENT)
Dept: REHABILITATION | Facility: OTHER | Age: 76
End: 2021-08-02
Payer: MEDICARE

## 2021-08-02 DIAGNOSIS — M25.512 LEFT SHOULDER PAIN, UNSPECIFIED CHRONICITY: ICD-10-CM

## 2021-08-02 DIAGNOSIS — R29.898 SHOULDER WEAKNESS: ICD-10-CM

## 2021-08-02 PROCEDURE — 97110 THERAPEUTIC EXERCISES: CPT | Mod: PN

## 2021-08-06 ENCOUNTER — PATIENT MESSAGE (OUTPATIENT)
Dept: INTERNAL MEDICINE | Facility: CLINIC | Age: 76
End: 2021-08-06

## 2021-08-06 DIAGNOSIS — G47.00 INSOMNIA, UNSPECIFIED TYPE: ICD-10-CM

## 2021-08-09 ENCOUNTER — CLINICAL SUPPORT (OUTPATIENT)
Dept: REHABILITATION | Facility: OTHER | Age: 76
End: 2021-08-09
Payer: MEDICARE

## 2021-08-09 DIAGNOSIS — R29.898 SHOULDER WEAKNESS: ICD-10-CM

## 2021-08-09 DIAGNOSIS — M25.512 LEFT SHOULDER PAIN, UNSPECIFIED CHRONICITY: Primary | ICD-10-CM

## 2021-08-09 PROCEDURE — 97110 THERAPEUTIC EXERCISES: CPT | Mod: PN | Performed by: PHYSICAL THERAPIST

## 2021-08-10 ENCOUNTER — TELEPHONE (OUTPATIENT)
Dept: INTERNAL MEDICINE | Facility: CLINIC | Age: 76
End: 2021-08-10

## 2021-08-10 ENCOUNTER — HOSPITAL ENCOUNTER (OUTPATIENT)
Dept: CARDIOLOGY | Facility: HOSPITAL | Age: 76
Discharge: HOME | End: 2021-08-10
Attending: INTERNAL MEDICINE
Payer: MEDICARE

## 2021-08-10 VITALS
BODY MASS INDEX: 20.38 KG/M2 | HEIGHT: 63 IN | WEIGHT: 115 LBS | HEART RATE: 63 BPM | SYSTOLIC BLOOD PRESSURE: 140 MMHG | DIASTOLIC BLOOD PRESSURE: 70 MMHG

## 2021-08-10 DIAGNOSIS — Z95.2 HISTORY OF TRANSCATHETER AORTIC VALVE REPLACEMENT (TAVR): Primary | ICD-10-CM

## 2021-08-10 DIAGNOSIS — Z95.2 HISTORY OF TRANSCATHETER AORTIC VALVE REPLACEMENT (TAVR): ICD-10-CM

## 2021-08-10 LAB
ASCENDING AORTA: 2.95 CM
AV INDEX (PROSTH): 0.47
AV MEAN GRADIENT: 12 MMHG
AV PEAK GRADIENT: 25 MMHG
AV VALVE AREA: 1.66 CM2
AV VELOCITY RATIO: 0.48
BSA FOR ECHO PROCEDURE: 1.52 M2
CV ECHO LV RWT: 0.34 CM
DOP CALC AO PEAK VEL: 2.48 M/S
DOP CALC AO VTI: 57.63 CM
DOP CALC LVOT AREA: 3.5 CM2
DOP CALC LVOT DIAMETER: 2.12 CM
DOP CALC LVOT PEAK VEL: 1.19 M/S
DOP CALC LVOT STROKE VOLUME: 95.79 CM3
DOP CALC RVOT PEAK VEL: 0.81 M/S
DOP CALC RVOT VTI: 17.65 CM
DOP CALCLVOT PEAK VEL VTI: 27.15 CM
E WAVE DECELERATION TIME: 189.59 MSEC
E/A RATIO: 1.89
E/E' RATIO: 30.18 M/S
ECHO LV POSTERIOR WALL: 0.77 CM (ref 0.6–1.1)
EJECTION FRACTION: 60 %
FRACTIONAL SHORTENING: 57 % (ref 28–44)
HR MV ECHO: 56 BPM
INTERVENTRICULAR SEPTUM: 0.82 CM (ref 0.6–1.1)
IVRT: 85.63 MSEC
LA MAJOR: 5.07 CM
LA MINOR: 5.05 CM
LA WIDTH: 3.23 CM
LEFT ATRIUM SIZE: 3.53 CM
LEFT ATRIUM VOLUME INDEX MOD: 41.2 ML/M2
LEFT ATRIUM VOLUME INDEX: 32.1 ML/M2
LEFT ATRIUM VOLUME MOD: 63 CM3
LEFT ATRIUM VOLUME: 49.04 CM3
LEFT INTERNAL DIMENSION IN SYSTOLE: 1.97 CM (ref 2.1–4)
LEFT VENTRICLE DIASTOLIC VOLUME INDEX: 61.24 ML/M2
LEFT VENTRICLE DIASTOLIC VOLUME: 93.69 ML
LEFT VENTRICLE MASS INDEX: 74 G/M2
LEFT VENTRICLE SYSTOLIC VOLUME INDEX: 8 ML/M2
LEFT VENTRICLE SYSTOLIC VOLUME: 12.31 ML
LEFT VENTRICULAR INTERNAL DIMENSION IN DIASTOLE: 4.53 CM (ref 3.5–6)
LEFT VENTRICULAR MASS: 113.97 G
LV LATERAL E/E' RATIO: 23.71 M/S
LV SEPTAL E/E' RATIO: 41.5 M/S
MV A" WAVE DURATION": 18.27 MSEC
MV MEAN GRADIENT: 3 MMHG
MV PEAK A VEL: 0.88 M/S
MV PEAK E VEL: 1.66 M/S
MV PEAK GRADIENT: 10 MMHG
MV STENOSIS PRESSURE HALF TIME: 54.98 MS
MV VALVE AREA P 1/2 METHOD: 4 CM2
PISA TR MAX VEL: 3.15 M/S
PULM VEIN S/D RATIO: 1.15
PV MEAN GRADIENT: 2 MMHG
PV PEAK D VEL: 0.48 M/S
PV PEAK S VEL: 0.55 M/S
PV PEAK VELOCITY: 0.87 CM/S
RA MAJOR: 5.01 CM
RA PRESSURE: 3 MMHG
RA WIDTH: 3.47 CM
RIGHT ATRIAL AREA: 16 CM2
RIGHT VENTRICULAR END-DIASTOLIC DIMENSION: 3.29 CM
SINUS: 2.81 CM
STJ: 2.76 CM
TDI LATERAL: 0.07 M/S
TDI SEPTAL: 0.04 M/S
TDI: 0.06 M/S
TR MAX PG: 40 MMHG
TRICUSPID ANNULAR PLANE SYSTOLIC EXCURSION: 2.22 CM
TV REST PULMONARY ARTERY PRESSURE: 43 MMHG

## 2021-08-10 PROCEDURE — 93306 ECHO (CUPID ONLY): ICD-10-PCS | Mod: 26,,, | Performed by: INTERNAL MEDICINE

## 2021-08-10 PROCEDURE — 93306 TTE W/DOPPLER COMPLETE: CPT | Mod: 26,,, | Performed by: INTERNAL MEDICINE

## 2021-08-10 PROCEDURE — 93306 TTE W/DOPPLER COMPLETE: CPT

## 2021-08-13 ENCOUNTER — TELEPHONE (OUTPATIENT)
Dept: UROGYNECOLOGY | Facility: CLINIC | Age: 76
End: 2021-08-13

## 2021-08-13 ENCOUNTER — LAB VISIT (OUTPATIENT)
Dept: PRIMARY CARE CLINIC | Facility: OTHER | Age: 76
End: 2021-08-13
Payer: MEDICARE

## 2021-08-13 DIAGNOSIS — Z20.822 ENCOUNTER FOR LABORATORY TESTING FOR COVID-19 VIRUS: ICD-10-CM

## 2021-08-13 PROCEDURE — U0003 INFECTIOUS AGENT DETECTION BY NUCLEIC ACID (DNA OR RNA); SEVERE ACUTE RESPIRATORY SYNDROME CORONAVIRUS 2 (SARS-COV-2) (CORONAVIRUS DISEASE [COVID-19]), AMPLIFIED PROBE TECHNIQUE, MAKING USE OF HIGH THROUGHPUT TECHNOLOGIES AS DESCRIBED BY CMS-2020-01-R: HCPCS

## 2021-08-14 LAB
SARS-COV-2 RNA RESP QL NAA+PROBE: NOT DETECTED
SARS-COV-2- CYCLE NUMBER: -1

## 2021-08-15 ENCOUNTER — LAB VISIT (OUTPATIENT)
Dept: PRIMARY CARE CLINIC | Facility: OTHER | Age: 76
End: 2021-08-15
Payer: MEDICARE

## 2021-08-15 DIAGNOSIS — Z20.822 ENCOUNTER FOR LABORATORY TESTING FOR COVID-19 VIRUS: ICD-10-CM

## 2021-08-15 PROCEDURE — U0003 INFECTIOUS AGENT DETECTION BY NUCLEIC ACID (DNA OR RNA); SEVERE ACUTE RESPIRATORY SYNDROME CORONAVIRUS 2 (SARS-COV-2) (CORONAVIRUS DISEASE [COVID-19]), AMPLIFIED PROBE TECHNIQUE, MAKING USE OF HIGH THROUGHPUT TECHNOLOGIES AS DESCRIBED BY CMS-2020-01-R: HCPCS | Performed by: INTERNAL MEDICINE

## 2021-08-16 LAB
SARS-COV-2 RNA RESP QL NAA+PROBE: NOT DETECTED
SARS-COV-2- CYCLE NUMBER: -1

## 2021-08-17 ENCOUNTER — OFFICE VISIT (OUTPATIENT)
Dept: PODIATRY | Facility: CLINIC | Age: 76
End: 2021-08-17
Payer: MEDICARE

## 2021-08-17 ENCOUNTER — CLINICAL SUPPORT (OUTPATIENT)
Dept: REHABILITATION | Facility: OTHER | Age: 76
End: 2021-08-17
Payer: MEDICARE

## 2021-08-17 VITALS
BODY MASS INDEX: 20.39 KG/M2 | WEIGHT: 115.06 LBS | SYSTOLIC BLOOD PRESSURE: 142 MMHG | DIASTOLIC BLOOD PRESSURE: 60 MMHG | HEART RATE: 58 BPM | HEIGHT: 63 IN

## 2021-08-17 DIAGNOSIS — M25.512 LEFT SHOULDER PAIN, UNSPECIFIED CHRONICITY: Primary | ICD-10-CM

## 2021-08-17 DIAGNOSIS — Z98.890 HISTORY OF FOOT SURGERY: ICD-10-CM

## 2021-08-17 DIAGNOSIS — L60.2 LONG TOENAIL: Primary | ICD-10-CM

## 2021-08-17 DIAGNOSIS — R29.898 SHOULDER WEAKNESS: ICD-10-CM

## 2021-08-17 DIAGNOSIS — L84 CORN OR CALLUS: ICD-10-CM

## 2021-08-17 PROCEDURE — 99214 OFFICE O/P EST MOD 30 MIN: CPT | Mod: PBBFAC,PN | Performed by: PODIATRIST

## 2021-08-17 PROCEDURE — 97110 THERAPEUTIC EXERCISES: CPT | Mod: PN | Performed by: PHYSICAL THERAPIST

## 2021-08-17 PROCEDURE — 99213 OFFICE O/P EST LOW 20 MIN: CPT | Mod: S$PBB,,, | Performed by: PODIATRIST

## 2021-08-17 PROCEDURE — 99999 PR PBB SHADOW E&M-EST. PATIENT-LVL IV: ICD-10-PCS | Mod: PBBFAC,,, | Performed by: PODIATRIST

## 2021-08-17 PROCEDURE — 99213 PR OFFICE/OUTPT VISIT, EST, LEVL III, 20-29 MIN: ICD-10-PCS | Mod: S$PBB,,, | Performed by: PODIATRIST

## 2021-08-17 PROCEDURE — 99999 PR PBB SHADOW E&M-EST. PATIENT-LVL IV: CPT | Mod: PBBFAC,,, | Performed by: PODIATRIST

## 2021-08-19 ENCOUNTER — CLINICAL SUPPORT (OUTPATIENT)
Dept: REHABILITATION | Facility: OTHER | Age: 76
End: 2021-08-19
Payer: MEDICARE

## 2021-08-19 DIAGNOSIS — M25.512 LEFT SHOULDER PAIN, UNSPECIFIED CHRONICITY: Primary | ICD-10-CM

## 2021-08-19 DIAGNOSIS — R29.898 SHOULDER WEAKNESS: ICD-10-CM

## 2021-08-19 PROCEDURE — 97110 THERAPEUTIC EXERCISES: CPT | Mod: PN | Performed by: PHYSICAL THERAPIST

## 2021-08-25 ENCOUNTER — CLINICAL SUPPORT (OUTPATIENT)
Dept: REHABILITATION | Facility: OTHER | Age: 76
End: 2021-08-25
Payer: MEDICARE

## 2021-08-25 DIAGNOSIS — M25.512 LEFT SHOULDER PAIN, UNSPECIFIED CHRONICITY: ICD-10-CM

## 2021-08-25 DIAGNOSIS — R29.898 SHOULDER WEAKNESS: ICD-10-CM

## 2021-08-25 PROCEDURE — 97110 THERAPEUTIC EXERCISES: CPT | Mod: PN

## 2021-09-09 ENCOUNTER — DOCUMENTATION ONLY (OUTPATIENT)
Dept: REHABILITATION | Facility: OTHER | Age: 76
End: 2021-09-09

## 2021-09-09 PROBLEM — R29.898 SHOULDER WEAKNESS: Status: RESOLVED | Noted: 2021-07-16 | Resolved: 2021-09-09

## 2021-09-09 PROBLEM — M25.512 LEFT SHOULDER PAIN: Status: RESOLVED | Noted: 2021-07-16 | Resolved: 2021-09-09

## 2021-09-14 ENCOUNTER — IMMUNIZATION (OUTPATIENT)
Dept: INTERNAL MEDICINE | Facility: CLINIC | Age: 76
End: 2021-09-14
Payer: MEDICARE

## 2021-09-14 DIAGNOSIS — Z23 NEED FOR VACCINATION: Primary | ICD-10-CM

## 2021-09-14 PROCEDURE — 0013A COVID-19, MRNA, LNP-S, PF, 100 MCG/0.5 ML DOSE VACCINE: ICD-10-PCS | Mod: CV19,,, | Performed by: INTERNAL MEDICINE

## 2021-09-14 PROCEDURE — 91301 COVID-19, MRNA, LNP-S, PF, 100 MCG/0.5 ML DOSE VACCINE: CPT | Mod: ,,, | Performed by: INTERNAL MEDICINE

## 2021-09-14 PROCEDURE — 91301 COVID-19, MRNA, LNP-S, PF, 100 MCG/0.5 ML DOSE VACCINE: ICD-10-PCS | Mod: ,,, | Performed by: INTERNAL MEDICINE

## 2021-09-14 PROCEDURE — 0013A COVID-19, MRNA, LNP-S, PF, 100 MCG/0.5 ML DOSE VACCINE: CPT | Mod: CV19,,, | Performed by: INTERNAL MEDICINE

## 2021-09-16 ENCOUNTER — CLINICAL SUPPORT (OUTPATIENT)
Dept: REHABILITATION | Facility: OTHER | Age: 76
End: 2021-09-16
Attending: OBSTETRICS & GYNECOLOGY
Payer: MEDICARE

## 2021-09-16 DIAGNOSIS — M62.9 DISORDER OF MUSCLE: ICD-10-CM

## 2021-09-16 DIAGNOSIS — N81.89 PELVIC FLOOR WEAKNESS: Primary | ICD-10-CM

## 2021-09-16 DIAGNOSIS — Z74.09 DECREASED FUNCTIONAL MOBILITY AND ENDURANCE: ICD-10-CM

## 2021-09-16 PROCEDURE — 97110 THERAPEUTIC EXERCISES: CPT

## 2021-09-22 ENCOUNTER — PROCEDURE VISIT (OUTPATIENT)
Dept: UROGYNECOLOGY | Facility: CLINIC | Age: 76
End: 2021-09-22
Payer: MEDICARE

## 2021-09-22 ENCOUNTER — OFFICE VISIT (OUTPATIENT)
Dept: UROGYNECOLOGY | Facility: CLINIC | Age: 76
End: 2021-09-22
Payer: MEDICARE

## 2021-09-22 VITALS
BODY MASS INDEX: 20.32 KG/M2 | DIASTOLIC BLOOD PRESSURE: 78 MMHG | HEIGHT: 64 IN | SYSTOLIC BLOOD PRESSURE: 118 MMHG | WEIGHT: 119.06 LBS

## 2021-09-22 DIAGNOSIS — N39.8 VOIDING DYSFUNCTION: ICD-10-CM

## 2021-09-22 DIAGNOSIS — Z01.818 PRE-OP EXAM: ICD-10-CM

## 2021-09-22 DIAGNOSIS — N39.3 STRESS INCONTINENCE OF URINE: ICD-10-CM

## 2021-09-22 DIAGNOSIS — E05.00 GRAVES DISEASE: ICD-10-CM

## 2021-09-22 DIAGNOSIS — N81.11 CYSTOCELE, MIDLINE: Primary | ICD-10-CM

## 2021-09-22 DIAGNOSIS — Z01.818 PRE-OP EVALUATION: ICD-10-CM

## 2021-09-22 DIAGNOSIS — N81.11 CYSTOCELE, MIDLINE: ICD-10-CM

## 2021-09-22 DIAGNOSIS — N81.6 RECTOCELE, FEMALE: ICD-10-CM

## 2021-09-22 DIAGNOSIS — K59.09 CHRONIC CONSTIPATION: ICD-10-CM

## 2021-09-22 LAB
BILIRUB SERPL-MCNC: NORMAL MG/DL
BLOOD URINE, POC: NORMAL
COLOR, POC UA: YELLOW
GLUCOSE UR QL STRIP: NORMAL
KETONES UR QL STRIP: NORMAL
LEUKOCYTE ESTERASE URINE, POC: NORMAL
NITRITE, POC UA: NORMAL
PH, POC UA: 5
PROTEIN, POC: NORMAL
SPECIFIC GRAVITY, POC UA: 1.01
UROBILINOGEN, POC UA: NORMAL

## 2021-09-22 PROCEDURE — 52000 PR CYSTOURETHROSCOPY: ICD-10-PCS | Mod: S$PBB,59,, | Performed by: OBSTETRICS & GYNECOLOGY

## 2021-09-22 PROCEDURE — 51728 CYSTOMETROGRAM W/VP: CPT | Mod: 26,S$PBB,53, | Performed by: OBSTETRICS & GYNECOLOGY

## 2021-09-22 PROCEDURE — 51784 PR ANAL/URINARY MUSCLE STUDY: ICD-10-PCS | Mod: 26,S$PBB,51, | Performed by: OBSTETRICS & GYNECOLOGY

## 2021-09-22 PROCEDURE — 52000 CYSTOURETHROSCOPY: CPT | Mod: PBBFAC | Performed by: OBSTETRICS & GYNECOLOGY

## 2021-09-22 PROCEDURE — 51797 INTRAABDOMINAL PRESSURE TEST: CPT | Mod: PBBFAC | Performed by: OBSTETRICS & GYNECOLOGY

## 2021-09-22 PROCEDURE — 51784 ANAL/URINARY MUSCLE STUDY: CPT | Mod: PBBFAC | Performed by: OBSTETRICS & GYNECOLOGY

## 2021-09-22 PROCEDURE — 52000 CYSTOURETHROSCOPY: CPT | Mod: S$PBB,59,, | Performed by: OBSTETRICS & GYNECOLOGY

## 2021-09-22 PROCEDURE — 99499 NO LOS: ICD-10-PCS | Mod: S$PBB,,, | Performed by: PHYSICIAN ASSISTANT

## 2021-09-22 PROCEDURE — 51784 ANAL/URINARY MUSCLE STUDY: CPT | Mod: 26,S$PBB,51, | Performed by: OBSTETRICS & GYNECOLOGY

## 2021-09-22 PROCEDURE — 51728 PR COMPLEX CYSTOMETROGRAM VOIDING PRESSURE STUDIES: ICD-10-PCS | Mod: 26,S$PBB,53, | Performed by: OBSTETRICS & GYNECOLOGY

## 2021-09-22 PROCEDURE — 99499 UNLISTED E&M SERVICE: CPT | Mod: S$PBB,,, | Performed by: PHYSICIAN ASSISTANT

## 2021-09-22 PROCEDURE — 51797 PR VOIDING PRESS STUDY INTRA-ABDOMINAL VOID: ICD-10-PCS | Mod: 26,S$PBB,, | Performed by: OBSTETRICS & GYNECOLOGY

## 2021-09-22 PROCEDURE — 51797 INTRAABDOMINAL PRESSURE TEST: CPT | Mod: 26,S$PBB,, | Performed by: OBSTETRICS & GYNECOLOGY

## 2021-09-22 PROCEDURE — 51729 CYSTOMETROGRAM W/VP&UP: CPT | Mod: PBBFAC

## 2021-09-22 RX ORDER — LIDOCAINE HYDROCHLORIDE 20 MG/ML
JELLY TOPICAL ONCE
Status: COMPLETED | OUTPATIENT
Start: 2021-09-22 | End: 2021-09-22

## 2021-09-22 RX ADMIN — LIDOCAINE HYDROCHLORIDE 5 ML: 20 JELLY TOPICAL at 05:09

## 2021-09-23 ENCOUNTER — CLINICAL SUPPORT (OUTPATIENT)
Dept: REHABILITATION | Facility: OTHER | Age: 76
End: 2021-09-23
Attending: OBSTETRICS & GYNECOLOGY
Payer: MEDICARE

## 2021-09-23 DIAGNOSIS — N81.89 PELVIC FLOOR WEAKNESS: Primary | ICD-10-CM

## 2021-09-23 DIAGNOSIS — Z74.09 DECREASED FUNCTIONAL MOBILITY AND ENDURANCE: ICD-10-CM

## 2021-09-23 DIAGNOSIS — M62.9 DISORDER OF MUSCLE: ICD-10-CM

## 2021-09-23 PROCEDURE — 97112 NEUROMUSCULAR REEDUCATION: CPT

## 2021-09-23 PROCEDURE — 97140 MANUAL THERAPY 1/> REGIONS: CPT

## 2021-09-24 ENCOUNTER — PATIENT MESSAGE (OUTPATIENT)
Dept: INTERNAL MEDICINE | Facility: CLINIC | Age: 76
End: 2021-09-24

## 2021-09-24 RX ORDER — ALPRAZOLAM 0.5 MG/1
0.5 TABLET ORAL DAILY PRN
Qty: 30 TABLET | Refills: 0 | Status: SHIPPED | OUTPATIENT
Start: 2021-09-24

## 2021-09-26 ENCOUNTER — TELEPHONE (OUTPATIENT)
Dept: OBSTETRICS AND GYNECOLOGY | Facility: HOSPITAL | Age: 76
End: 2021-09-26

## 2021-09-26 ENCOUNTER — PATIENT MESSAGE (OUTPATIENT)
Dept: SURGERY | Facility: HOSPITAL | Age: 76
End: 2021-09-26

## 2021-09-26 DIAGNOSIS — R30.0 DYSURIA: Primary | ICD-10-CM

## 2021-09-27 ENCOUNTER — LAB VISIT (OUTPATIENT)
Dept: LAB | Facility: HOSPITAL | Age: 76
End: 2021-09-27
Attending: NURSE PRACTITIONER
Payer: MEDICARE

## 2021-09-27 ENCOUNTER — OFFICE VISIT (OUTPATIENT)
Dept: OBSTETRICS AND GYNECOLOGY | Facility: CLINIC | Age: 76
End: 2021-09-27
Attending: OBSTETRICS & GYNECOLOGY
Payer: MEDICARE

## 2021-09-27 ENCOUNTER — OFFICE VISIT (OUTPATIENT)
Dept: GASTROENTEROLOGY | Facility: CLINIC | Age: 76
End: 2021-09-27
Payer: MEDICARE

## 2021-09-27 VITALS
SYSTOLIC BLOOD PRESSURE: 140 MMHG | BODY MASS INDEX: 20.31 KG/M2 | HEART RATE: 60 BPM | HEIGHT: 64 IN | DIASTOLIC BLOOD PRESSURE: 78 MMHG | WEIGHT: 118.94 LBS

## 2021-09-27 VITALS
WEIGHT: 116.88 LBS | HEIGHT: 64 IN | BODY MASS INDEX: 19.96 KG/M2 | SYSTOLIC BLOOD PRESSURE: 130 MMHG | DIASTOLIC BLOOD PRESSURE: 70 MMHG

## 2021-09-27 DIAGNOSIS — R30.0 DYSURIA: Primary | ICD-10-CM

## 2021-09-27 DIAGNOSIS — Z80.0 FAMILY HISTORY OF COLON CANCER: ICD-10-CM

## 2021-09-27 DIAGNOSIS — Z78.0 MENOPAUSE: Primary | ICD-10-CM

## 2021-09-27 DIAGNOSIS — N94.19 DYSPAREUNIA DUE TO MEDICAL CONDITION IN FEMALE: ICD-10-CM

## 2021-09-27 DIAGNOSIS — R30.0 DYSURIA: ICD-10-CM

## 2021-09-27 LAB
BACTERIA #/AREA URNS HPF: ABNORMAL /HPF
BILIRUB UR QL STRIP: NEGATIVE
CLARITY UR: ABNORMAL
COLOR UR: YELLOW
GLUCOSE UR QL STRIP: NEGATIVE
HGB UR QL STRIP: NEGATIVE
KETONES UR QL STRIP: NEGATIVE
LEUKOCYTE ESTERASE UR QL STRIP: ABNORMAL
MICROSCOPIC COMMENT: ABNORMAL
NITRITE UR QL STRIP: POSITIVE
PH UR STRIP: 7 [PH] (ref 5–8)
PROT UR QL STRIP: NEGATIVE
RBC #/AREA URNS HPF: 15 /HPF (ref 0–4)
SP GR UR STRIP: 1.01 (ref 1–1.03)
SQUAMOUS #/AREA URNS HPF: 14 /HPF
URN SPEC COLLECT METH UR: ABNORMAL
UROBILINOGEN UR STRIP-ACNC: NEGATIVE EU/DL
WBC #/AREA URNS HPF: 29 /HPF (ref 0–5)

## 2021-09-27 PROCEDURE — 87088 URINE BACTERIA CULTURE: CPT | Performed by: NURSE PRACTITIONER

## 2021-09-27 PROCEDURE — 87186 SC STD MICRODIL/AGAR DIL: CPT | Performed by: NURSE PRACTITIONER

## 2021-09-27 PROCEDURE — 99214 OFFICE O/P EST MOD 30 MIN: CPT | Mod: PBBFAC | Performed by: INTERNAL MEDICINE

## 2021-09-27 PROCEDURE — 87086 URINE CULTURE/COLONY COUNT: CPT | Performed by: NURSE PRACTITIONER

## 2021-09-27 PROCEDURE — 99213 OFFICE O/P EST LOW 20 MIN: CPT | Mod: PBBFAC,27 | Performed by: OBSTETRICS & GYNECOLOGY

## 2021-09-27 PROCEDURE — 87077 CULTURE AEROBIC IDENTIFY: CPT | Performed by: NURSE PRACTITIONER

## 2021-09-27 PROCEDURE — 99999 PR PBB SHADOW E&M-EST. PATIENT-LVL IV: CPT | Mod: PBBFAC,,, | Performed by: INTERNAL MEDICINE

## 2021-09-27 PROCEDURE — 99203 OFFICE O/P NEW LOW 30 MIN: CPT | Mod: S$PBB,,, | Performed by: INTERNAL MEDICINE

## 2021-09-27 PROCEDURE — 99999 PR PBB SHADOW E&M-EST. PATIENT-LVL IV: ICD-10-PCS | Mod: PBBFAC,,, | Performed by: INTERNAL MEDICINE

## 2021-09-27 PROCEDURE — 99213 OFFICE O/P EST LOW 20 MIN: CPT | Mod: S$PBB,,, | Performed by: OBSTETRICS & GYNECOLOGY

## 2021-09-27 PROCEDURE — 81000 URINALYSIS NONAUTO W/SCOPE: CPT | Performed by: NURSE PRACTITIONER

## 2021-09-27 PROCEDURE — 99213 PR OFFICE/OUTPT VISIT, EST, LEVL III, 20-29 MIN: ICD-10-PCS | Mod: S$PBB,,, | Performed by: OBSTETRICS & GYNECOLOGY

## 2021-09-27 PROCEDURE — 99999 PR PBB SHADOW E&M-EST. PATIENT-LVL III: ICD-10-PCS | Mod: PBBFAC,,, | Performed by: OBSTETRICS & GYNECOLOGY

## 2021-09-27 PROCEDURE — 99999 PR PBB SHADOW E&M-EST. PATIENT-LVL III: CPT | Mod: PBBFAC,,, | Performed by: OBSTETRICS & GYNECOLOGY

## 2021-09-27 PROCEDURE — 99203 PR OFFICE/OUTPT VISIT, NEW, LEVL III, 30-44 MIN: ICD-10-PCS | Mod: S$PBB,,, | Performed by: INTERNAL MEDICINE

## 2021-09-27 RX ORDER — ASPIRIN 81 MG/1
81 TABLET ORAL DAILY
COMMUNITY

## 2021-09-27 RX ORDER — CONJUGATED ESTROGENS 0.62 MG/G
CREAM VAGINAL
Status: CANCELLED | OUTPATIENT
Start: 2021-09-27

## 2021-09-27 RX ORDER — NITROFURANTOIN 25; 75 MG/1; MG/1
100 CAPSULE ORAL 2 TIMES DAILY
Qty: 14 CAPSULE | Refills: 0 | Status: SHIPPED | OUTPATIENT
Start: 2021-09-27 | End: 2022-01-12 | Stop reason: SDUPTHER

## 2021-09-27 RX ORDER — PROGESTERONE 200 MG/1
CAPSULE ORAL
Qty: 90 CAPSULE | Refills: 3 | Status: SHIPPED | OUTPATIENT
Start: 2021-09-27 | End: 2022-05-17 | Stop reason: SDUPTHER

## 2021-09-28 ENCOUNTER — OFFICE VISIT (OUTPATIENT)
Dept: INTERNAL MEDICINE | Facility: CLINIC | Age: 76
End: 2021-09-28
Payer: MEDICARE

## 2021-09-28 ENCOUNTER — LAB VISIT (OUTPATIENT)
Dept: LAB | Facility: HOSPITAL | Age: 76
End: 2021-09-28
Attending: INTERNAL MEDICINE
Payer: MEDICARE

## 2021-09-28 ENCOUNTER — HOSPITAL ENCOUNTER (OUTPATIENT)
Dept: RADIOLOGY | Facility: HOSPITAL | Age: 76
Discharge: HOME | End: 2021-09-28
Attending: INTERNAL MEDICINE
Payer: MEDICARE

## 2021-09-28 VITALS
TEMPERATURE: 98 F | WEIGHT: 117.94 LBS | OXYGEN SATURATION: 96 % | HEIGHT: 64 IN | DIASTOLIC BLOOD PRESSURE: 80 MMHG | HEART RATE: 59 BPM | BODY MASS INDEX: 20.14 KG/M2 | SYSTOLIC BLOOD PRESSURE: 120 MMHG

## 2021-09-28 DIAGNOSIS — G25.0 ESSENTIAL TREMOR: ICD-10-CM

## 2021-09-28 DIAGNOSIS — Z01.818 PREOP EXAM FOR INTERNAL MEDICINE: ICD-10-CM

## 2021-09-28 DIAGNOSIS — N18.31 STAGE 3A CHRONIC KIDNEY DISEASE: ICD-10-CM

## 2021-09-28 DIAGNOSIS — I10 ESSENTIAL HYPERTENSION: ICD-10-CM

## 2021-09-28 DIAGNOSIS — N39.0 URINARY TRACT INFECTION WITHOUT HEMATURIA, SITE UNSPECIFIED: ICD-10-CM

## 2021-09-28 DIAGNOSIS — Z01.818 PREOP EXAM FOR INTERNAL MEDICINE: Primary | ICD-10-CM

## 2021-09-28 LAB
ALBUMIN SERPL BCP-MCNC: 4.2 G/DL (ref 3.5–5.2)
ALP SERPL-CCNC: 39 U/L (ref 55–135)
ALT SERPL W/O P-5'-P-CCNC: 21 U/L (ref 10–44)
ANION GAP SERPL CALC-SCNC: 11 MMOL/L (ref 8–16)
AST SERPL-CCNC: 24 U/L (ref 10–40)
BASOPHILS # BLD AUTO: 0.05 K/UL (ref 0–0.2)
BASOPHILS NFR BLD: 0.9 % (ref 0–1.9)
BILIRUB SERPL-MCNC: 0.3 MG/DL (ref 0.1–1)
BUN SERPL-MCNC: 22 MG/DL (ref 8–23)
CALCIUM SERPL-MCNC: 10.2 MG/DL (ref 8.7–10.5)
CHLORIDE SERPL-SCNC: 102 MMOL/L (ref 95–110)
CO2 SERPL-SCNC: 22 MMOL/L (ref 23–29)
CREAT SERPL-MCNC: 1.1 MG/DL (ref 0.5–1.4)
DIFFERENTIAL METHOD: ABNORMAL
EOSINOPHIL # BLD AUTO: 0.1 K/UL (ref 0–0.5)
EOSINOPHIL NFR BLD: 2.2 % (ref 0–8)
ERYTHROCYTE [DISTWIDTH] IN BLOOD BY AUTOMATED COUNT: 14.1 % (ref 11.5–14.5)
EST. GFR  (AFRICAN AMERICAN): 56.4 ML/MIN/1.73 M^2
EST. GFR  (NON AFRICAN AMERICAN): 48.9 ML/MIN/1.73 M^2
GLUCOSE SERPL-MCNC: 88 MG/DL (ref 70–110)
HCT VFR BLD AUTO: 35.8 % (ref 37–48.5)
HGB BLD-MCNC: 11.9 G/DL (ref 12–16)
IMM GRANULOCYTES # BLD AUTO: 0.01 K/UL (ref 0–0.04)
IMM GRANULOCYTES NFR BLD AUTO: 0.2 % (ref 0–0.5)
LYMPHOCYTES # BLD AUTO: 1.1 K/UL (ref 1–4.8)
LYMPHOCYTES NFR BLD: 18.8 % (ref 18–48)
MCH RBC QN AUTO: 31.2 PG (ref 27–31)
MCHC RBC AUTO-ENTMCNC: 33.2 G/DL (ref 32–36)
MCV RBC AUTO: 94 FL (ref 82–98)
MONOCYTES # BLD AUTO: 0.7 K/UL (ref 0.3–1)
MONOCYTES NFR BLD: 11.5 % (ref 4–15)
NEUTROPHILS # BLD AUTO: 3.9 K/UL (ref 1.8–7.7)
NEUTROPHILS NFR BLD: 66.4 % (ref 38–73)
NRBC BLD-RTO: 0 /100 WBC
PLATELET # BLD AUTO: 287 K/UL (ref 150–450)
PMV BLD AUTO: 9.5 FL (ref 9.2–12.9)
POTASSIUM SERPL-SCNC: 4.9 MMOL/L (ref 3.5–5.1)
PROT SERPL-MCNC: 7.3 G/DL (ref 6–8.4)
RBC # BLD AUTO: 3.82 M/UL (ref 4–5.4)
SODIUM SERPL-SCNC: 135 MMOL/L (ref 136–145)
TSH SERPL DL<=0.005 MIU/L-ACNC: 1.83 UIU/ML (ref 0.4–4)
WBC # BLD AUTO: 5.84 K/UL (ref 3.9–12.7)

## 2021-09-28 PROCEDURE — 99999 PR PBB SHADOW E&M-EST. PATIENT-LVL V: CPT | Mod: PBBFAC,,, | Performed by: INTERNAL MEDICINE

## 2021-09-28 PROCEDURE — 99214 OFFICE O/P EST MOD 30 MIN: CPT | Mod: S$PBB,,, | Performed by: INTERNAL MEDICINE

## 2021-09-28 PROCEDURE — 85025 COMPLETE CBC W/AUTO DIFF WBC: CPT | Performed by: INTERNAL MEDICINE

## 2021-09-28 PROCEDURE — 93010 ELECTROCARDIOGRAM REPORT: CPT | Mod: S$PBB,,, | Performed by: INTERNAL MEDICINE

## 2021-09-28 PROCEDURE — 71046 X-RAY EXAM CHEST 2 VIEWS: CPT | Mod: TC,PO

## 2021-09-28 PROCEDURE — 93005 ELECTROCARDIOGRAM TRACING: CPT | Mod: PBBFAC,PO | Performed by: INTERNAL MEDICINE

## 2021-09-28 PROCEDURE — 84443 ASSAY THYROID STIM HORMONE: CPT | Performed by: INTERNAL MEDICINE

## 2021-09-28 PROCEDURE — 99215 OFFICE O/P EST HI 40 MIN: CPT | Mod: PBBFAC,25,PO | Performed by: INTERNAL MEDICINE

## 2021-09-28 PROCEDURE — 71046 XR CHEST PA AND LATERAL: ICD-10-PCS | Mod: 26,,, | Performed by: RADIOLOGY

## 2021-09-28 PROCEDURE — 71046 X-RAY EXAM CHEST 2 VIEWS: CPT | Mod: 26,,, | Performed by: RADIOLOGY

## 2021-09-28 PROCEDURE — 99214 PR OFFICE/OUTPT VISIT, EST, LEVL IV, 30-39 MIN: ICD-10-PCS | Mod: S$PBB,,, | Performed by: INTERNAL MEDICINE

## 2021-09-28 PROCEDURE — 36415 COLL VENOUS BLD VENIPUNCTURE: CPT | Mod: PO | Performed by: INTERNAL MEDICINE

## 2021-09-28 PROCEDURE — 93010 EKG 12-LEAD: ICD-10-PCS | Mod: S$PBB,,, | Performed by: INTERNAL MEDICINE

## 2021-09-28 PROCEDURE — 80053 COMPREHEN METABOLIC PANEL: CPT | Performed by: INTERNAL MEDICINE

## 2021-09-28 PROCEDURE — 99999 PR PBB SHADOW E&M-EST. PATIENT-LVL V: ICD-10-PCS | Mod: PBBFAC,,, | Performed by: INTERNAL MEDICINE

## 2021-09-29 ENCOUNTER — TELEPHONE (OUTPATIENT)
Dept: UROGYNECOLOGY | Facility: CLINIC | Age: 76
End: 2021-09-29

## 2021-09-29 ENCOUNTER — PATIENT MESSAGE (OUTPATIENT)
Dept: UROGYNECOLOGY | Facility: CLINIC | Age: 76
End: 2021-09-29

## 2021-09-29 DIAGNOSIS — N39.0 ACUTE UTI: Primary | ICD-10-CM

## 2021-09-29 DIAGNOSIS — N30.00 ACUTE CYSTITIS WITHOUT HEMATURIA: Primary | ICD-10-CM

## 2021-09-29 LAB — BACTERIA UR CULT: ABNORMAL

## 2021-09-29 RX ORDER — CEFDINIR 300 MG/1
300 CAPSULE ORAL 2 TIMES DAILY
Qty: 14 CAPSULE | Refills: 0 | Status: SHIPPED | OUTPATIENT
Start: 2021-09-29 | End: 2021-10-06

## 2021-09-29 RX ORDER — LEVOFLOXACIN 500 MG/1
500 TABLET, FILM COATED ORAL DAILY
Qty: 3 TABLET | Refills: 0 | Status: SHIPPED | OUTPATIENT
Start: 2021-09-29 | End: 2021-10-01

## 2021-09-30 ENCOUNTER — CLINICAL SUPPORT (OUTPATIENT)
Dept: REHABILITATION | Facility: OTHER | Age: 76
End: 2021-09-30
Attending: OBSTETRICS & GYNECOLOGY
Payer: MEDICARE

## 2021-09-30 DIAGNOSIS — M62.9 DISORDER OF MUSCLE: ICD-10-CM

## 2021-09-30 DIAGNOSIS — M43.27 FUSION OF SPINE, LUMBOSACRAL REGION: ICD-10-CM

## 2021-09-30 DIAGNOSIS — N81.89 PELVIC FLOOR WEAKNESS: Primary | ICD-10-CM

## 2021-09-30 DIAGNOSIS — Z74.09 DECREASED FUNCTIONAL MOBILITY AND ENDURANCE: ICD-10-CM

## 2021-09-30 PROCEDURE — 97110 THERAPEUTIC EXERCISES: CPT

## 2021-09-30 PROCEDURE — 97112 NEUROMUSCULAR REEDUCATION: CPT

## 2021-10-01 ENCOUNTER — OFFICE VISIT (OUTPATIENT)
Dept: CARDIOLOGY | Facility: CLINIC | Age: 76
End: 2021-10-01
Payer: MEDICARE

## 2021-10-01 VITALS
BODY MASS INDEX: 19.57 KG/M2 | SYSTOLIC BLOOD PRESSURE: 144 MMHG | OXYGEN SATURATION: 99 % | DIASTOLIC BLOOD PRESSURE: 65 MMHG | HEART RATE: 56 BPM | WEIGHT: 114.63 LBS | HEIGHT: 64 IN

## 2021-10-01 DIAGNOSIS — I10 ESSENTIAL HYPERTENSION: ICD-10-CM

## 2021-10-01 DIAGNOSIS — G25.0 ESSENTIAL TREMOR: ICD-10-CM

## 2021-10-01 DIAGNOSIS — Z01.810 PREOPERATIVE CARDIOVASCULAR EXAMINATION: ICD-10-CM

## 2021-10-01 DIAGNOSIS — E05.00 GRAVES DISEASE: ICD-10-CM

## 2021-10-01 DIAGNOSIS — Z95.2 HISTORY OF TRANSCATHETER AORTIC VALVE REPLACEMENT (TAVR): Primary | ICD-10-CM

## 2021-10-01 PROCEDURE — 99214 PR OFFICE/OUTPT VISIT, EST, LEVL IV, 30-39 MIN: ICD-10-PCS | Mod: S$PBB,,, | Performed by: INTERNAL MEDICINE

## 2021-10-01 PROCEDURE — 99215 OFFICE O/P EST HI 40 MIN: CPT | Mod: PBBFAC | Performed by: INTERNAL MEDICINE

## 2021-10-01 PROCEDURE — 99214 OFFICE O/P EST MOD 30 MIN: CPT | Mod: S$PBB,,, | Performed by: INTERNAL MEDICINE

## 2021-10-01 PROCEDURE — 99999 PR PBB SHADOW E&M-EST. PATIENT-LVL V: ICD-10-PCS | Mod: PBBFAC,,, | Performed by: INTERNAL MEDICINE

## 2021-10-01 PROCEDURE — 99999 PR PBB SHADOW E&M-EST. PATIENT-LVL V: CPT | Mod: PBBFAC,,, | Performed by: INTERNAL MEDICINE

## 2021-10-03 ENCOUNTER — PATIENT MESSAGE (OUTPATIENT)
Dept: OBSTETRICS AND GYNECOLOGY | Facility: CLINIC | Age: 76
End: 2021-10-03

## 2021-10-06 ENCOUNTER — OFFICE VISIT (OUTPATIENT)
Dept: PODIATRY | Facility: CLINIC | Age: 76
End: 2021-10-06
Payer: MEDICARE

## 2021-10-06 ENCOUNTER — PATIENT MESSAGE (OUTPATIENT)
Dept: SURGERY | Facility: HOSPITAL | Age: 76
End: 2021-10-06

## 2021-10-06 VITALS — HEIGHT: 64 IN | BODY MASS INDEX: 19.46 KG/M2 | WEIGHT: 114 LBS

## 2021-10-06 DIAGNOSIS — L60.2 LONG TOENAIL: ICD-10-CM

## 2021-10-06 DIAGNOSIS — Z98.890 HISTORY OF FOOT SURGERY: ICD-10-CM

## 2021-10-06 DIAGNOSIS — L84 CORN OR CALLUS: Primary | ICD-10-CM

## 2021-10-06 PROCEDURE — 99999 PR PBB SHADOW E&M-EST. PATIENT-LVL IV: CPT | Mod: PBBFAC,,, | Performed by: PODIATRIST

## 2021-10-06 PROCEDURE — 99499 NO LOS: ICD-10-PCS | Mod: ,,, | Performed by: PODIATRIST

## 2021-10-06 PROCEDURE — 99999 PR PBB SHADOW E&M-EST. PATIENT-LVL IV: ICD-10-PCS | Mod: PBBFAC,,, | Performed by: PODIATRIST

## 2021-10-06 PROCEDURE — 99499 UNLISTED E&M SERVICE: CPT | Mod: ,,, | Performed by: PODIATRIST

## 2021-10-06 PROCEDURE — 17999 UNLISTD PX SKN MUC MEMB SUBQ: CPT | Mod: CSM,,, | Performed by: PODIATRIST

## 2021-10-06 PROCEDURE — 99214 OFFICE O/P EST MOD 30 MIN: CPT | Mod: PBBFAC,PN | Performed by: PODIATRIST

## 2021-10-06 PROCEDURE — 17999 PR NON-COVERED FOOT CARE: ICD-10-PCS | Mod: CSM,,, | Performed by: PODIATRIST

## 2021-10-07 ENCOUNTER — CLINICAL SUPPORT (OUTPATIENT)
Dept: REHABILITATION | Facility: OTHER | Age: 76
End: 2021-10-07
Attending: OBSTETRICS & GYNECOLOGY
Payer: MEDICARE

## 2021-10-07 ENCOUNTER — HOSPITAL ENCOUNTER (OUTPATIENT)
Dept: RADIOLOGY | Facility: OTHER | Age: 76
Discharge: HOME | End: 2021-10-07
Attending: ORTHOPAEDIC SURGERY
Payer: MEDICARE

## 2021-10-07 DIAGNOSIS — M62.9 DISORDER OF MUSCLE: ICD-10-CM

## 2021-10-07 DIAGNOSIS — M43.27 FUSION OF SPINE, LUMBOSACRAL REGION: ICD-10-CM

## 2021-10-07 DIAGNOSIS — N81.89 PELVIC FLOOR WEAKNESS: Primary | ICD-10-CM

## 2021-10-07 DIAGNOSIS — Z74.09 DECREASED FUNCTIONAL MOBILITY AND ENDURANCE: ICD-10-CM

## 2021-10-07 PROCEDURE — 72100 XR LUMBAR SPINE 2 OR 3 VIEWS: ICD-10-PCS | Mod: 26,,, | Performed by: RADIOLOGY

## 2021-10-07 PROCEDURE — 72100 X-RAY EXAM L-S SPINE 2/3 VWS: CPT | Mod: 26,,, | Performed by: RADIOLOGY

## 2021-10-07 PROCEDURE — 97110 THERAPEUTIC EXERCISES: CPT

## 2021-10-07 PROCEDURE — 72100 X-RAY EXAM L-S SPINE 2/3 VWS: CPT | Mod: TC,FY

## 2021-10-14 ENCOUNTER — TELEPHONE (OUTPATIENT)
Dept: UROGYNECOLOGY | Facility: CLINIC | Age: 76
End: 2021-10-14

## 2021-10-14 ENCOUNTER — PATIENT MESSAGE (OUTPATIENT)
Dept: SURGERY | Facility: OTHER | Age: 76
End: 2021-10-14
Payer: MEDICARE

## 2021-10-14 ENCOUNTER — LAB VISIT (OUTPATIENT)
Dept: LAB | Facility: OTHER | Age: 76
End: 2021-10-14
Attending: OBSTETRICS & GYNECOLOGY
Payer: MEDICARE

## 2021-10-14 ENCOUNTER — CLINICAL SUPPORT (OUTPATIENT)
Dept: REHABILITATION | Facility: OTHER | Age: 76
End: 2021-10-14
Attending: OBSTETRICS & GYNECOLOGY
Payer: MEDICARE

## 2021-10-14 DIAGNOSIS — R30.0 DYSURIA: ICD-10-CM

## 2021-10-14 DIAGNOSIS — R30.0 DYSURIA: Primary | ICD-10-CM

## 2021-10-14 DIAGNOSIS — N81.89 PELVIC FLOOR WEAKNESS: Primary | ICD-10-CM

## 2021-10-14 DIAGNOSIS — Z74.09 DECREASED FUNCTIONAL MOBILITY AND ENDURANCE: ICD-10-CM

## 2021-10-14 DIAGNOSIS — M62.9 DISORDER OF MUSCLE: ICD-10-CM

## 2021-10-14 LAB
BILIRUB UR QL STRIP: NEGATIVE
CLARITY UR: CLEAR
COLOR UR: YELLOW
GLUCOSE UR QL STRIP: NEGATIVE
HGB UR QL STRIP: NEGATIVE
KETONES UR QL STRIP: NEGATIVE
LEUKOCYTE ESTERASE UR QL STRIP: NEGATIVE
NITRITE UR QL STRIP: NEGATIVE
PH UR STRIP: 7 [PH] (ref 5–8)
PROT UR QL STRIP: NEGATIVE
SP GR UR STRIP: 1.02 (ref 1–1.03)
URN SPEC COLLECT METH UR: NORMAL
UROBILINOGEN UR STRIP-ACNC: NEGATIVE EU/DL

## 2021-10-14 PROCEDURE — 97110 THERAPEUTIC EXERCISES: CPT

## 2021-10-14 PROCEDURE — 87077 CULTURE AEROBIC IDENTIFY: CPT | Performed by: OBSTETRICS & GYNECOLOGY

## 2021-10-14 PROCEDURE — 87086 URINE CULTURE/COLONY COUNT: CPT | Performed by: OBSTETRICS & GYNECOLOGY

## 2021-10-14 PROCEDURE — 87186 SC STD MICRODIL/AGAR DIL: CPT | Performed by: OBSTETRICS & GYNECOLOGY

## 2021-10-14 PROCEDURE — 87088 URINE BACTERIA CULTURE: CPT | Performed by: OBSTETRICS & GYNECOLOGY

## 2021-10-14 PROCEDURE — 97112 NEUROMUSCULAR REEDUCATION: CPT

## 2021-10-14 PROCEDURE — 81003 URINALYSIS AUTO W/O SCOPE: CPT | Performed by: OBSTETRICS & GYNECOLOGY

## 2021-10-18 ENCOUNTER — TELEPHONE (OUTPATIENT)
Dept: UROGYNECOLOGY | Facility: CLINIC | Age: 76
End: 2021-10-18

## 2021-10-18 DIAGNOSIS — R82.71 BACTERIA IN URINE: Primary | ICD-10-CM

## 2021-10-18 LAB — BACTERIA UR CULT: ABNORMAL

## 2021-10-18 RX ORDER — NITROFURANTOIN (MACROCRYSTALS) 100 MG/1
CAPSULE ORAL
Qty: 30 CAPSULE | Refills: 0 | Status: SHIPPED | OUTPATIENT
Start: 2021-10-18 | End: 2022-02-04 | Stop reason: ALTCHOICE

## 2021-10-21 ENCOUNTER — CLINICAL SUPPORT (OUTPATIENT)
Dept: REHABILITATION | Facility: OTHER | Age: 76
End: 2021-10-21
Attending: INTERNAL MEDICINE
Payer: MEDICARE

## 2021-10-21 DIAGNOSIS — Z74.09 DECREASED FUNCTIONAL MOBILITY AND ENDURANCE: ICD-10-CM

## 2021-10-21 DIAGNOSIS — M62.9 DISORDER OF MUSCLE: ICD-10-CM

## 2021-10-21 DIAGNOSIS — N81.89 PELVIC FLOOR WEAKNESS: Primary | ICD-10-CM

## 2021-10-21 PROCEDURE — 97112 NEUROMUSCULAR REEDUCATION: CPT | Mod: KX

## 2021-10-21 PROCEDURE — 97530 THERAPEUTIC ACTIVITIES: CPT

## 2021-10-26 ENCOUNTER — PATIENT MESSAGE (OUTPATIENT)
Dept: SURGERY | Facility: OTHER | Age: 76
End: 2021-10-26
Payer: MEDICARE

## 2021-10-26 DIAGNOSIS — B37.9 YEAST INFECTION: Primary | ICD-10-CM

## 2021-10-26 RX ORDER — FLUCONAZOLE 150 MG/1
150 TABLET ORAL EVERY OTHER DAY
Qty: 3 TABLET | Refills: 0 | Status: ON HOLD | OUTPATIENT
Start: 2021-10-26 | End: 2021-11-01 | Stop reason: HOSPADM

## 2021-10-29 ENCOUNTER — TELEPHONE (OUTPATIENT)
Dept: UROGYNECOLOGY | Facility: CLINIC | Age: 76
End: 2021-10-29
Payer: MEDICARE

## 2021-10-29 ENCOUNTER — ANESTHESIA EVENT (OUTPATIENT)
Dept: SURGERY | Facility: OTHER | Age: 76
End: 2021-10-29
Payer: MEDICARE

## 2021-10-29 ENCOUNTER — HOSPITAL ENCOUNTER (OUTPATIENT)
Dept: PREADMISSION TESTING | Facility: OTHER | Age: 76
Discharge: HOME | End: 2021-10-29
Attending: OBSTETRICS & GYNECOLOGY
Payer: MEDICARE

## 2021-10-29 VITALS
HEIGHT: 64 IN | HEART RATE: 60 BPM | TEMPERATURE: 98 F | WEIGHT: 114 LBS | OXYGEN SATURATION: 98 % | SYSTOLIC BLOOD PRESSURE: 146 MMHG | DIASTOLIC BLOOD PRESSURE: 65 MMHG | BODY MASS INDEX: 19.46 KG/M2 | RESPIRATION RATE: 16 BRPM

## 2021-10-29 RX ORDER — SODIUM CHLORIDE, SODIUM LACTATE, POTASSIUM CHLORIDE, CALCIUM CHLORIDE 600; 310; 30; 20 MG/100ML; MG/100ML; MG/100ML; MG/100ML
INJECTION, SOLUTION INTRAVENOUS CONTINUOUS
Status: CANCELLED | OUTPATIENT
Start: 2021-10-29

## 2021-10-29 RX ORDER — ACETAMINOPHEN 500 MG
1000 TABLET ORAL
Status: CANCELLED | OUTPATIENT
Start: 2021-10-29 | End: 2021-10-29

## 2021-10-29 RX ORDER — LIDOCAINE HYDROCHLORIDE 10 MG/ML
0.5 INJECTION, SOLUTION EPIDURAL; INFILTRATION; INTRACAUDAL; PERINEURAL ONCE
Status: CANCELLED | OUTPATIENT
Start: 2021-10-29 | End: 2021-10-29

## 2021-11-01 ENCOUNTER — ANESTHESIA (OUTPATIENT)
Dept: SURGERY | Facility: OTHER | Age: 76
End: 2021-11-01
Payer: MEDICARE

## 2021-11-01 ENCOUNTER — HOSPITAL ENCOUNTER (OUTPATIENT)
Facility: OTHER | Age: 76
Discharge: HOME | End: 2021-11-01
Attending: OBSTETRICS & GYNECOLOGY | Admitting: OBSTETRICS & GYNECOLOGY
Payer: MEDICARE

## 2021-11-01 VITALS
OXYGEN SATURATION: 96 % | TEMPERATURE: 98 F | RESPIRATION RATE: 16 BRPM | DIASTOLIC BLOOD PRESSURE: 70 MMHG | BODY MASS INDEX: 19.46 KG/M2 | HEIGHT: 64 IN | WEIGHT: 114 LBS | HEART RATE: 66 BPM | SYSTOLIC BLOOD PRESSURE: 132 MMHG

## 2021-11-01 DIAGNOSIS — N81.6 RECTOCELE, FEMALE: ICD-10-CM

## 2021-11-01 DIAGNOSIS — Z98.890 STATUS POST ANTERIOR COLPORRHAPHY: Primary | ICD-10-CM

## 2021-11-01 DIAGNOSIS — N81.11 CYSTOCELE, MIDLINE: ICD-10-CM

## 2021-11-01 PROCEDURE — 57260 CMBN ANT PST COLPRHY: CPT | Mod: GC,,, | Performed by: OBSTETRICS & GYNECOLOGY

## 2021-11-01 PROCEDURE — 63600175 PHARM REV CODE 636 W HCPCS: Performed by: OBSTETRICS & GYNECOLOGY

## 2021-11-01 PROCEDURE — 71000033 HC RECOVERY, INTIAL HOUR: Performed by: OBSTETRICS & GYNECOLOGY

## 2021-11-01 PROCEDURE — 63600175 PHARM REV CODE 636 W HCPCS: Performed by: ANESTHESIOLOGY

## 2021-11-01 PROCEDURE — 37000009 HC ANESTHESIA EA ADD 15 MINS: Performed by: OBSTETRICS & GYNECOLOGY

## 2021-11-01 PROCEDURE — 25000003 PHARM REV CODE 250: Performed by: OBSTETRICS & GYNECOLOGY

## 2021-11-01 PROCEDURE — 25000003 PHARM REV CODE 250: Performed by: ANESTHESIOLOGY

## 2021-11-01 PROCEDURE — 57288 REPAIR BLADDER DEFECT: CPT | Mod: 51,GC,, | Performed by: OBSTETRICS & GYNECOLOGY

## 2021-11-01 PROCEDURE — 37000008 HC ANESTHESIA 1ST 15 MINUTES: Performed by: OBSTETRICS & GYNECOLOGY

## 2021-11-01 PROCEDURE — 63600175 PHARM REV CODE 636 W HCPCS: Performed by: REGISTERED NURSE

## 2021-11-01 PROCEDURE — 57288 PR SLING OPER STRES INCONTINENCE: ICD-10-PCS | Mod: 51,GC,, | Performed by: OBSTETRICS & GYNECOLOGY

## 2021-11-01 PROCEDURE — 25000003 PHARM REV CODE 250: Performed by: REGISTERED NURSE

## 2021-11-01 PROCEDURE — 71000039 HC RECOVERY, EACH ADD'L HOUR: Performed by: OBSTETRICS & GYNECOLOGY

## 2021-11-01 PROCEDURE — C1771 REP DEV, URINARY, W/SLING: HCPCS | Performed by: OBSTETRICS & GYNECOLOGY

## 2021-11-01 PROCEDURE — 71000015 HC POSTOP RECOV 1ST HR: Performed by: OBSTETRICS & GYNECOLOGY

## 2021-11-01 PROCEDURE — 57260 PR COMBINED ANT/POST COLPORRHAPHY: ICD-10-PCS | Mod: GC,,, | Performed by: OBSTETRICS & GYNECOLOGY

## 2021-11-01 PROCEDURE — P9045 ALBUMIN (HUMAN), 5%, 250 ML: HCPCS | Mod: JG | Performed by: REGISTERED NURSE

## 2021-11-01 PROCEDURE — 36000706: Performed by: OBSTETRICS & GYNECOLOGY

## 2021-11-01 PROCEDURE — 71000016 HC POSTOP RECOV ADDL HR: Performed by: OBSTETRICS & GYNECOLOGY

## 2021-11-01 PROCEDURE — 36000707: Performed by: OBSTETRICS & GYNECOLOGY

## 2021-11-01 DEVICE — SLING FIT ADVANTAGE: Type: IMPLANTABLE DEVICE | Site: URETHRA | Status: FUNCTIONAL

## 2021-11-01 RX ORDER — PROPOFOL 10 MG/ML
VIAL (ML) INTRAVENOUS
Status: DISCONTINUED | OUTPATIENT
Start: 2021-11-01 | End: 2021-11-01

## 2021-11-01 RX ORDER — CIPROFLOXACIN 2 MG/ML
400 INJECTION, SOLUTION INTRAVENOUS
Status: COMPLETED | OUTPATIENT
Start: 2021-11-01 | End: 2021-11-01

## 2021-11-01 RX ORDER — EPHEDRINE SULFATE 50 MG/ML
INJECTION, SOLUTION INTRAVENOUS
Status: DISCONTINUED | OUTPATIENT
Start: 2021-11-01 | End: 2021-11-01

## 2021-11-01 RX ORDER — SODIUM CHLORIDE 0.9 % (FLUSH) 0.9 %
3 SYRINGE (ML) INJECTION
Status: DISCONTINUED | OUTPATIENT
Start: 2021-11-01 | End: 2021-11-01 | Stop reason: HOSPADM

## 2021-11-01 RX ORDER — OXYCODONE HYDROCHLORIDE 5 MG/1
5 TABLET ORAL
Status: DISCONTINUED | OUTPATIENT
Start: 2021-11-01 | End: 2021-11-01 | Stop reason: HOSPADM

## 2021-11-01 RX ORDER — HYDROCODONE BITARTRATE AND ACETAMINOPHEN 5; 325 MG/1; MG/1
1 TABLET ORAL EVERY 4 HOURS PRN
Qty: 10 TABLET | Refills: 0 | Status: SHIPPED | OUTPATIENT
Start: 2021-11-01 | End: 2022-01-12

## 2021-11-01 RX ORDER — LIDOCAINE HYDROCHLORIDE AND EPINEPHRINE 10; 10 MG/ML; UG/ML
INJECTION, SOLUTION INFILTRATION; PERINEURAL
Status: DISCONTINUED | OUTPATIENT
Start: 2021-11-01 | End: 2021-11-01 | Stop reason: HOSPADM

## 2021-11-01 RX ORDER — CLINDAMYCIN PHOSPHATE 900 MG/50ML
900 INJECTION, SOLUTION INTRAVENOUS
Status: COMPLETED | OUTPATIENT
Start: 2021-11-01 | End: 2021-11-01

## 2021-11-01 RX ORDER — LIDOCAINE HYDROCHLORIDE 10 MG/ML
0.5 INJECTION, SOLUTION EPIDURAL; INFILTRATION; INTRACAUDAL; PERINEURAL ONCE
Status: DISCONTINUED | OUTPATIENT
Start: 2021-11-01 | End: 2021-11-01 | Stop reason: HOSPADM

## 2021-11-01 RX ORDER — PROCHLORPERAZINE EDISYLATE 5 MG/ML
5 INJECTION INTRAMUSCULAR; INTRAVENOUS EVERY 30 MIN PRN
Status: DISCONTINUED | OUTPATIENT
Start: 2021-11-01 | End: 2021-11-01 | Stop reason: HOSPADM

## 2021-11-01 RX ORDER — ONDANSETRON 2 MG/ML
INJECTION INTRAMUSCULAR; INTRAVENOUS
Status: DISCONTINUED | OUTPATIENT
Start: 2021-11-01 | End: 2021-11-01

## 2021-11-01 RX ORDER — ALBUMIN HUMAN 50 G/1000ML
SOLUTION INTRAVENOUS CONTINUOUS PRN
Status: DISCONTINUED | OUTPATIENT
Start: 2021-11-01 | End: 2021-11-01

## 2021-11-01 RX ORDER — MUPIROCIN 20 MG/G
OINTMENT TOPICAL
Status: DISCONTINUED | OUTPATIENT
Start: 2021-11-01 | End: 2021-11-01 | Stop reason: HOSPADM

## 2021-11-01 RX ORDER — HEPARIN SODIUM 5000 [USP'U]/ML
5000 INJECTION, SOLUTION INTRAVENOUS; SUBCUTANEOUS
Status: DISCONTINUED | OUTPATIENT
Start: 2021-11-01 | End: 2021-11-01 | Stop reason: HOSPADM

## 2021-11-01 RX ORDER — HYDROMORPHONE HYDROCHLORIDE 2 MG/ML
0.4 INJECTION, SOLUTION INTRAMUSCULAR; INTRAVENOUS; SUBCUTANEOUS EVERY 5 MIN PRN
Status: DISCONTINUED | OUTPATIENT
Start: 2021-11-01 | End: 2021-11-01 | Stop reason: HOSPADM

## 2021-11-01 RX ORDER — DOCUSATE SODIUM 100 MG/1
100 CAPSULE, LIQUID FILLED ORAL 2 TIMES DAILY
Qty: 60 CAPSULE | Refills: 1 | Status: SHIPPED | OUTPATIENT
Start: 2021-11-01 | End: 2022-11-01

## 2021-11-01 RX ORDER — ACETAMINOPHEN 500 MG
1000 TABLET ORAL
Status: COMPLETED | OUTPATIENT
Start: 2021-11-01 | End: 2021-11-01

## 2021-11-01 RX ORDER — SODIUM CHLORIDE, SODIUM LACTATE, POTASSIUM CHLORIDE, CALCIUM CHLORIDE 600; 310; 30; 20 MG/100ML; MG/100ML; MG/100ML; MG/100ML
INJECTION, SOLUTION INTRAVENOUS CONTINUOUS
Status: DISCONTINUED | OUTPATIENT
Start: 2021-11-01 | End: 2021-11-01 | Stop reason: HOSPADM

## 2021-11-01 RX ORDER — FENTANYL CITRATE 50 UG/ML
INJECTION, SOLUTION INTRAMUSCULAR; INTRAVENOUS
Status: DISCONTINUED | OUTPATIENT
Start: 2021-11-01 | End: 2021-11-01

## 2021-11-01 RX ORDER — MEPERIDINE HYDROCHLORIDE 25 MG/ML
12.5 INJECTION INTRAMUSCULAR; INTRAVENOUS; SUBCUTANEOUS ONCE AS NEEDED
Status: DISCONTINUED | OUTPATIENT
Start: 2021-11-01 | End: 2021-11-01 | Stop reason: HOSPADM

## 2021-11-01 RX ORDER — ROCURONIUM BROMIDE 10 MG/ML
INJECTION, SOLUTION INTRAVENOUS
Status: DISCONTINUED | OUTPATIENT
Start: 2021-11-01 | End: 2021-11-01

## 2021-11-01 RX ORDER — ONDANSETRON 2 MG/ML
4 INJECTION INTRAMUSCULAR; INTRAVENOUS ONCE
Status: COMPLETED | OUTPATIENT
Start: 2021-11-01 | End: 2021-11-01

## 2021-11-01 RX ORDER — LIDOCAINE HCL/PF 100 MG/5ML
SYRINGE (ML) INTRAVENOUS
Status: DISCONTINUED | OUTPATIENT
Start: 2021-11-01 | End: 2021-11-01

## 2021-11-01 RX ORDER — IBUPROFEN 600 MG/1
600 TABLET ORAL EVERY 6 HOURS PRN
Qty: 30 TABLET | Refills: 1 | Status: SHIPPED | OUTPATIENT
Start: 2021-11-01 | End: 2022-08-19 | Stop reason: CLARIF

## 2021-11-01 RX ORDER — DEXAMETHASONE SODIUM PHOSPHATE 4 MG/ML
INJECTION, SOLUTION INTRA-ARTICULAR; INTRALESIONAL; INTRAMUSCULAR; INTRAVENOUS; SOFT TISSUE
Status: DISCONTINUED | OUTPATIENT
Start: 2021-11-01 | End: 2021-11-01

## 2021-11-01 RX ADMIN — EPHEDRINE SULFATE 5 MG: 50 INJECTION INTRAVENOUS at 11:11

## 2021-11-01 RX ADMIN — ONDANSETRON HYDROCHLORIDE 4 MG: 2 INJECTION INTRAMUSCULAR; INTRAVENOUS at 12:11

## 2021-11-01 RX ADMIN — ROCURONIUM BROMIDE 50 MG: 10 SOLUTION INTRAVENOUS at 10:11

## 2021-11-01 RX ADMIN — SODIUM CHLORIDE, SODIUM LACTATE, POTASSIUM CHLORIDE, AND CALCIUM CHLORIDE: 600; 310; 30; 20 INJECTION, SOLUTION INTRAVENOUS at 09:11

## 2021-11-01 RX ADMIN — PROCHLORPERAZINE EDISYLATE 5 MG: 5 INJECTION INTRAMUSCULAR; INTRAVENOUS at 01:11

## 2021-11-01 RX ADMIN — SUGAMMADEX 100 MG: 100 INJECTION, SOLUTION INTRAVENOUS at 12:11

## 2021-11-01 RX ADMIN — ROCURONIUM BROMIDE 15 MG: 10 SOLUTION INTRAVENOUS at 11:11

## 2021-11-01 RX ADMIN — ONDANSETRON 4 MG: 2 INJECTION INTRAMUSCULAR; INTRAVENOUS at 01:11

## 2021-11-01 RX ADMIN — ACETAMINOPHEN 1000 MG: 500 TABLET, FILM COATED ORAL at 08:11

## 2021-11-01 RX ADMIN — PHENYLEPHRINE HYDROCHLORIDE 0.3 MCG/KG/MIN: 10 INJECTION INTRAVENOUS at 11:11

## 2021-11-01 RX ADMIN — PROPOFOL 200 MG: 10 INJECTION, EMULSION INTRAVENOUS at 10:11

## 2021-11-01 RX ADMIN — CLINDAMYCIN PHOSPHATE 900 MG: 18 INJECTION, SOLUTION INTRAVENOUS at 10:11

## 2021-11-01 RX ADMIN — DEXAMETHASONE SODIUM PHOSPHATE 4 MG: 4 INJECTION, SOLUTION INTRAMUSCULAR; INTRAVENOUS at 10:11

## 2021-11-01 RX ADMIN — HEPARIN SODIUM 5000 UNITS: 5000 INJECTION INTRAVENOUS; SUBCUTANEOUS at 09:11

## 2021-11-01 RX ADMIN — OXYCODONE 5 MG: 5 TABLET ORAL at 01:11

## 2021-11-01 RX ADMIN — FENTANYL CITRATE 100 MCG: 50 INJECTION, SOLUTION INTRAMUSCULAR; INTRAVENOUS at 10:11

## 2021-11-01 RX ADMIN — MUPIROCIN: 20 OINTMENT TOPICAL at 08:11

## 2021-11-01 RX ADMIN — EPHEDRINE SULFATE 10 MG: 50 INJECTION INTRAVENOUS at 10:11

## 2021-11-01 RX ADMIN — CARBOXYMETHYLCELLULOSE SODIUM 1 DROP: 2.5 SOLUTION/ DROPS OPHTHALMIC at 10:11

## 2021-11-01 RX ADMIN — CIPROFLOXACIN 400 MG: 2 INJECTION, SOLUTION INTRAVENOUS at 10:11

## 2021-11-01 RX ADMIN — ALBUMIN (HUMAN): 12.5 SOLUTION INTRAVENOUS at 10:11

## 2021-11-01 RX ADMIN — EPHEDRINE SULFATE 10 MG: 50 INJECTION INTRAVENOUS at 11:11

## 2021-11-01 RX ADMIN — LIDOCAINE HYDROCHLORIDE 80 MG: 20 INJECTION, SOLUTION INTRAVENOUS at 10:11

## 2021-11-09 ENCOUNTER — PATIENT MESSAGE (OUTPATIENT)
Dept: UROGYNECOLOGY | Facility: CLINIC | Age: 76
End: 2021-11-09
Payer: MEDICARE

## 2021-11-10 ENCOUNTER — PATIENT MESSAGE (OUTPATIENT)
Dept: UROGYNECOLOGY | Facility: CLINIC | Age: 76
End: 2021-11-10
Payer: MEDICARE

## 2021-12-02 ENCOUNTER — TELEPHONE (OUTPATIENT)
Dept: OBSTETRICS AND GYNECOLOGY | Facility: CLINIC | Age: 76
End: 2021-12-02
Payer: MEDICARE

## 2021-12-06 ENCOUNTER — PATIENT MESSAGE (OUTPATIENT)
Dept: ADMINISTRATIVE | Facility: HOSPITAL | Age: 76
End: 2021-12-06
Payer: MEDICARE

## 2021-12-06 ENCOUNTER — PATIENT OUTREACH (OUTPATIENT)
Dept: ADMINISTRATIVE | Facility: HOSPITAL | Age: 76
End: 2021-12-06
Payer: MEDICARE

## 2021-12-11 ENCOUNTER — PATIENT OUTREACH (OUTPATIENT)
Dept: ADMINISTRATIVE | Facility: OTHER | Age: 76
End: 2021-12-11
Payer: MEDICARE

## 2021-12-15 ENCOUNTER — OFFICE VISIT (OUTPATIENT)
Dept: ENDOCRINOLOGY | Facility: CLINIC | Age: 76
End: 2021-12-15
Payer: MEDICARE

## 2021-12-15 ENCOUNTER — OFFICE VISIT (OUTPATIENT)
Dept: UROGYNECOLOGY | Facility: CLINIC | Age: 76
End: 2021-12-15
Payer: MEDICARE

## 2021-12-15 VITALS
BODY MASS INDEX: 19.94 KG/M2 | DIASTOLIC BLOOD PRESSURE: 60 MMHG | HEIGHT: 64 IN | SYSTOLIC BLOOD PRESSURE: 122 MMHG | HEART RATE: 55 BPM | WEIGHT: 116.81 LBS

## 2021-12-15 VITALS
BODY MASS INDEX: 20.26 KG/M2 | SYSTOLIC BLOOD PRESSURE: 115 MMHG | WEIGHT: 118.69 LBS | HEIGHT: 64 IN | OXYGEN SATURATION: 99 % | DIASTOLIC BLOOD PRESSURE: 71 MMHG | HEART RATE: 64 BPM

## 2021-12-15 DIAGNOSIS — N95.2 VAGINAL ATROPHY: ICD-10-CM

## 2021-12-15 DIAGNOSIS — R33.9 INCOMPLETE BLADDER EMPTYING: Primary | ICD-10-CM

## 2021-12-15 DIAGNOSIS — N39.3 STRESS INCONTINENCE OF URINE: ICD-10-CM

## 2021-12-15 DIAGNOSIS — E05.00 GRAVES' DISEASE: ICD-10-CM

## 2021-12-15 DIAGNOSIS — N39.8 VOIDING DYSFUNCTION: ICD-10-CM

## 2021-12-15 PROCEDURE — 99203 OFFICE O/P NEW LOW 30 MIN: CPT | Mod: S$PBB,,, | Performed by: INTERNAL MEDICINE

## 2021-12-15 PROCEDURE — 51701 INSERT BLADDER CATHETER: CPT | Mod: S$PBB,78,, | Performed by: OBSTETRICS & GYNECOLOGY

## 2021-12-15 PROCEDURE — 99999 PR PBB SHADOW E&M-EST. PATIENT-LVL V: ICD-10-PCS | Mod: PBBFAC,,, | Performed by: OBSTETRICS & GYNECOLOGY

## 2021-12-15 PROCEDURE — 51701 PR INSERTION OF NON-INDWELLING BLADDER CATHETERIZATION FOR RESIDUAL UR: ICD-10-PCS | Mod: S$PBB,78,, | Performed by: OBSTETRICS & GYNECOLOGY

## 2021-12-15 PROCEDURE — 99024 POSTOP FOLLOW-UP VISIT: CPT | Mod: POP,,, | Performed by: OBSTETRICS & GYNECOLOGY

## 2021-12-15 PROCEDURE — 99999 PR PBB SHADOW E&M-EST. PATIENT-LVL V: CPT | Mod: PBBFAC,,, | Performed by: OBSTETRICS & GYNECOLOGY

## 2021-12-15 PROCEDURE — 51701 INSERT BLADDER CATHETER: CPT | Mod: PBBFAC | Performed by: OBSTETRICS & GYNECOLOGY

## 2021-12-15 PROCEDURE — 99999 PR PBB SHADOW E&M-EST. PATIENT-LVL IV: CPT | Mod: PBBFAC,,, | Performed by: INTERNAL MEDICINE

## 2021-12-15 PROCEDURE — 99215 OFFICE O/P EST HI 40 MIN: CPT | Mod: PBBFAC,25 | Performed by: OBSTETRICS & GYNECOLOGY

## 2021-12-15 PROCEDURE — 99203 PR OFFICE/OUTPT VISIT, NEW, LEVL III, 30-44 MIN: ICD-10-PCS | Mod: S$PBB,,, | Performed by: INTERNAL MEDICINE

## 2021-12-15 PROCEDURE — 99024 PR POST-OP FOLLOW-UP VISIT: ICD-10-PCS | Mod: POP,,, | Performed by: OBSTETRICS & GYNECOLOGY

## 2021-12-15 PROCEDURE — 99999 PR PBB SHADOW E&M-EST. PATIENT-LVL IV: ICD-10-PCS | Mod: PBBFAC,,, | Performed by: INTERNAL MEDICINE

## 2021-12-15 PROCEDURE — 99214 OFFICE O/P EST MOD 30 MIN: CPT | Mod: PBBFAC,27 | Performed by: INTERNAL MEDICINE

## 2021-12-15 RX ORDER — METHIMAZOLE 5 MG/1
TABLET ORAL
Qty: 90 TABLET | Refills: 1 | Status: SHIPPED | OUTPATIENT
Start: 2021-12-15 | End: 2022-08-19 | Stop reason: SDUPTHER

## 2021-12-19 ENCOUNTER — PATIENT MESSAGE (OUTPATIENT)
Dept: UROGYNECOLOGY | Facility: CLINIC | Age: 76
End: 2021-12-19
Payer: MEDICARE

## 2021-12-19 DIAGNOSIS — R30.0 DYSURIA: Primary | ICD-10-CM

## 2021-12-20 ENCOUNTER — LAB VISIT (OUTPATIENT)
Dept: LAB | Facility: OTHER | Age: 76
End: 2021-12-20
Attending: NURSE PRACTITIONER
Payer: MEDICARE

## 2021-12-20 ENCOUNTER — PATIENT MESSAGE (OUTPATIENT)
Dept: OBSTETRICS AND GYNECOLOGY | Facility: CLINIC | Age: 76
End: 2021-12-20
Payer: MEDICARE

## 2021-12-20 ENCOUNTER — PATIENT MESSAGE (OUTPATIENT)
Dept: GASTROENTEROLOGY | Facility: CLINIC | Age: 76
End: 2021-12-20
Payer: MEDICARE

## 2021-12-20 DIAGNOSIS — R30.0 DYSURIA: ICD-10-CM

## 2021-12-20 LAB
BACTERIA #/AREA URNS HPF: ABNORMAL /HPF
BILIRUB UR QL STRIP: NEGATIVE
CLARITY UR: CLEAR
COLOR UR: YELLOW
GLUCOSE UR QL STRIP: NEGATIVE
HGB UR QL STRIP: NEGATIVE
KETONES UR QL STRIP: NEGATIVE
LEUKOCYTE ESTERASE UR QL STRIP: ABNORMAL
MICROSCOPIC COMMENT: ABNORMAL
NITRITE UR QL STRIP: NEGATIVE
PH UR STRIP: >=9 [PH] (ref 5–8)
PROT UR QL STRIP: NEGATIVE
SP GR UR STRIP: <=1.005 (ref 1–1.03)
URN SPEC COLLECT METH UR: ABNORMAL
UROBILINOGEN UR STRIP-ACNC: NEGATIVE EU/DL
WBC #/AREA URNS HPF: 4 /HPF (ref 0–5)

## 2021-12-20 PROCEDURE — 87086 URINE CULTURE/COLONY COUNT: CPT | Performed by: NURSE PRACTITIONER

## 2021-12-20 PROCEDURE — 87077 CULTURE AEROBIC IDENTIFY: CPT | Performed by: NURSE PRACTITIONER

## 2021-12-20 PROCEDURE — 87088 URINE BACTERIA CULTURE: CPT | Performed by: NURSE PRACTITIONER

## 2021-12-20 PROCEDURE — 87186 SC STD MICRODIL/AGAR DIL: CPT | Performed by: NURSE PRACTITIONER

## 2021-12-20 PROCEDURE — 81000 URINALYSIS NONAUTO W/SCOPE: CPT | Performed by: NURSE PRACTITIONER

## 2021-12-20 PROCEDURE — 87184 SC STD DISK METHOD PER PLATE: CPT | Performed by: NURSE PRACTITIONER

## 2021-12-20 RX ORDER — NITROFURANTOIN 25; 75 MG/1; MG/1
100 CAPSULE ORAL 2 TIMES DAILY
Qty: 14 CAPSULE | Refills: 0 | Status: SHIPPED | OUTPATIENT
Start: 2021-12-20 | End: 2022-01-14 | Stop reason: ALTCHOICE

## 2021-12-22 ENCOUNTER — TELEPHONE (OUTPATIENT)
Dept: UROGYNECOLOGY | Facility: CLINIC | Age: 76
End: 2021-12-22
Payer: MEDICARE

## 2021-12-23 ENCOUNTER — PATIENT MESSAGE (OUTPATIENT)
Dept: UROGYNECOLOGY | Facility: CLINIC | Age: 76
End: 2021-12-23
Payer: MEDICARE

## 2021-12-23 DIAGNOSIS — N39.0 ACUTE UTI: Primary | ICD-10-CM

## 2021-12-23 RX ORDER — CEFDINIR 300 MG/1
300 CAPSULE ORAL 2 TIMES DAILY
Qty: 14 CAPSULE | Refills: 0 | Status: SHIPPED | OUTPATIENT
Start: 2021-12-23 | End: 2021-12-30

## 2021-12-24 LAB — BACTERIA UR CULT: ABNORMAL

## 2021-12-26 ENCOUNTER — PATIENT MESSAGE (OUTPATIENT)
Dept: UROGYNECOLOGY | Facility: CLINIC | Age: 76
End: 2021-12-26
Payer: MEDICARE

## 2021-12-27 ENCOUNTER — PATIENT MESSAGE (OUTPATIENT)
Dept: UROGYNECOLOGY | Facility: CLINIC | Age: 76
End: 2021-12-27
Payer: MEDICARE

## 2022-01-05 ENCOUNTER — LAB VISIT (OUTPATIENT)
Dept: LAB | Facility: OTHER | Age: 77
End: 2022-01-05
Attending: OBSTETRICS & GYNECOLOGY
Payer: MEDICARE

## 2022-01-05 ENCOUNTER — OFFICE VISIT (OUTPATIENT)
Dept: PODIATRY | Facility: CLINIC | Age: 77
End: 2022-01-05
Payer: MEDICARE

## 2022-01-05 VITALS — HEIGHT: 64 IN | WEIGHT: 118 LBS | BODY MASS INDEX: 20.14 KG/M2

## 2022-01-05 DIAGNOSIS — Z78.0 MENOPAUSE: ICD-10-CM

## 2022-01-05 DIAGNOSIS — L84 CORN OR CALLUS: ICD-10-CM

## 2022-01-05 DIAGNOSIS — M21.619 BUNION: ICD-10-CM

## 2022-01-05 DIAGNOSIS — M79.675 PAIN OF TOE OF LEFT FOOT: Primary | ICD-10-CM

## 2022-01-05 DIAGNOSIS — M77.41 METATARSALGIA OF BOTH FEET: ICD-10-CM

## 2022-01-05 DIAGNOSIS — M77.42 METATARSALGIA OF BOTH FEET: ICD-10-CM

## 2022-01-05 LAB — TESTOST SERPL-MCNC: 49 NG/DL (ref 5–73)

## 2022-01-05 PROCEDURE — 99213 PR OFFICE/OUTPT VISIT, EST, LEVL III, 20-29 MIN: ICD-10-PCS | Mod: 25,S$PBB,, | Performed by: PODIATRIST

## 2022-01-05 PROCEDURE — 84403 ASSAY OF TOTAL TESTOSTERONE: CPT | Performed by: OBSTETRICS & GYNECOLOGY

## 2022-01-05 PROCEDURE — 17999 PR NON-COVERED FOOT CARE: ICD-10-PCS | Mod: CSM,S$GLB,, | Performed by: PODIATRIST

## 2022-01-05 PROCEDURE — 99213 OFFICE O/P EST LOW 20 MIN: CPT | Mod: 25,S$PBB,, | Performed by: PODIATRIST

## 2022-01-05 PROCEDURE — 99999 PR PBB SHADOW E&M-EST. PATIENT-LVL IV: ICD-10-PCS | Mod: PBBFAC,,, | Performed by: PODIATRIST

## 2022-01-05 PROCEDURE — 17999 UNLISTD PX SKN MUC MEMB SUBQ: CPT | Mod: CSM,S$GLB,, | Performed by: PODIATRIST

## 2022-01-05 PROCEDURE — 84402 ASSAY OF FREE TESTOSTERONE: CPT | Performed by: OBSTETRICS & GYNECOLOGY

## 2022-01-05 PROCEDURE — 99214 OFFICE O/P EST MOD 30 MIN: CPT | Mod: PBBFAC,PN | Performed by: PODIATRIST

## 2022-01-05 PROCEDURE — 99999 PR PBB SHADOW E&M-EST. PATIENT-LVL IV: CPT | Mod: PBBFAC,,, | Performed by: PODIATRIST

## 2022-01-05 NOTE — PROGRESS NOTES
PODIATRY NOTE       PATIENT ID:  Sarah Alejandro is a 76 y.o. female.       CHIEF CONCERN:   Callouses               MEDICAL DECISION MAKING:          Problem List Items Addressed This Visit    None     Visit Diagnoses     Pain of toe of left foot    -  Primary    Relevant Medications    diclofenac sodium 2 % SoPk    Bunion        Metatarsalgia of both feet        Corn or callus                · I counseled the patient on the patient's conditions, their implications and medical management.    Shoe and activity modification as needed for relief.   Patient wants to avoid surgery for left toe pain.  She has seen Dr. Eaton (orthopedics) in the past.    Voltaren gel BID.    Separately, procedure brief for non covered service of callus trimming/debridement.  No known qualifying factors for callus debridement.                 HISTORY OF PRESENT ILLNESS:  Sarah Alejandro is a 76 y.o. female with concerns regarding: painful calluses sub metatarsal heads, chronic.     She has history of bunionectomy.  She has tenderness at the left hallux, at the IPJ.  She does not recall acute trauma to the area.  Does hurt sometimes even when nonweightbearing.          Patient Active Problem List   Diagnosis    Stress incontinence of urine    Voiding dysfunction    Rectocele, female    Chronic constipation    Vaginal atrophy    Graves' disease    Stage 3a chronic kidney disease    Hyperlipidemia    Aortic valve stenosis    Essential tremor    Essential hypertension    Insomnia    S/P TAVR (transcatheter aortic valve replacement)    Lumbar radiculopathy    S/P hip replacement    Pelvic floor weakness    Decreased functional mobility and endurance    Disorder of muscle    Preoperative cardiovascular examination    Status post anterior & posterior colporrhaphy w/ MUS 11/1    Incomplete bladder emptying           Current Outpatient Medications on File Prior to Visit   Medication Sig Dispense Refill     ALPRAZolam (XANAX) 0.5 MG tablet Take 1 tablet (0.5 mg total) by mouth daily as needed (severe anxiety). 30 tablet 0    ASCORBATE CALCIUM, VITAMIN C, ORAL Take 500 mg by mouth.      aspirin (ECOTRIN) 81 MG EC tablet Take 81 mg by mouth once daily.      atenoloL (TENORMIN) 100 MG tablet Take 1 tablet (100 mg total) by mouth 2 (two) times a day. 60 tablet 11    cetirizine-pseudoephedrine 5-120 mg Tb12 cetirizine 5 mg-pseudoephedrine  mg tablet,extended release,12hr   TK 1 T PO BID      cholecalciferol, vitamin D3, (VITAMIN D3) 50 mcg (2,000 unit) Tab Take 2,000 Units by mouth.      conjugated estrogens (PREMARIN) vaginal cream Place 0.5 g vaginally once daily. 1 applicator 5    docusate sodium (COLACE) 100 MG capsule Take 1 capsule (100 mg total) by mouth 2 (two) times daily. 60 capsule 1    EPINEPHrine (EPIPEN) 0.3 mg/0.3 mL AtIn epinephrine 0.3 mg/0.3 mL injection, auto-injector      HYDROcodone-acetaminophen (NORCO) 5-325 mg per tablet Take 1 tablet by mouth every 4 (four) hours as needed for Pain. 10 tablet 0    ibuprofen (ADVIL,MOTRIN) 600 MG tablet Take 1 tablet (600 mg total) by mouth every 6 (six) hours as needed for Pain. 30 tablet 1    MAGNESIUM CARBONATE ORAL Take 1 tablet by mouth.      methIMAzole (TAPAZOLE) 5 MG Tab Take half a tablet daily 90 tablet 1    nitrofurantoin (MACRODANTIN) 100 MG capsule 100 mg PO BID x 7 days, then 100 mg PO daily thereafter. 30 capsule 0    nitrofurantoin, macrocrystal-monohydrate, (MACROBID) 100 MG capsule Take 1 capsule (100 mg total) by mouth 2 (two) times daily. 14 capsule 0    nitrofurantoin, macrocrystal-monohydrate, (MACROBID) 100 MG capsule Take 1 capsule (100 mg total) by mouth 2 (two) times daily. 14 capsule 0    omalizumab (XOLAIR) 75 mg/0.5 mL injection       polyethylene glycol (GLYCOLAX) 17 gram/dose powder Take 17 g by mouth daily as needed.      progesterone (PROMETRIUM) 200 MG capsule Take 1 capsule by mouth 30-60 minutes before bed  "every night 90 capsule 3    rosuvastatin (CRESTOR) 40 MG Tab TAKE 1 TABLET BY MOUTH EVERY DAY 90 tablet 1    tretinoin, emollient, (RENOVA) 0.02 % Crea Renova 0.02 % topical cream   APPLY TO AFFECTED AREA EVERY DAY AT NIGHT *NOT COVERED*      urea 20 % Crea Apply 1 application topically once daily. To dry skin on the feet. 75 g 10    valACYclovir (VALTREX) 1000 MG tablet valacyclovir 1 gram tablet      XIIDRA 5 % Dpet       zolpidem (AMBIEN CR) 6.25 MG CR tablet Take 1 tablet (6.25 mg total) by mouth nightly as needed for Insomnia. 30 tablet 2     No current facility-administered medications on file prior to visit.           Review of patient's allergies indicates:   Allergen Reactions    Penicillins Rash and Hives               ROS:   General ROS: negative for - chills, fever or night sweats  Respiratory ROS: no cough, shortness of breath, or wheezing  Cardiovascular ROS: no chest pain or dyspnea on exertion  Musculoskeletal ROS: positive for - pain in foot - bilateral  Neurological ROS: negative for - impaired coordination/balance and numbness/tingling  Dermatological ROS: negative for pruritus and rash      EXAM:     Vitals:    01/05/22 1151   Weight: 53.5 kg (118 lb)   Height: 5' 4" (1.626 m)        General:  Alert and Oriented x 3;  No acute distress      Bilateral  Lower extremity exam:    Vascular:   Dorsalis Pedis:  present   Posterior Tibial:  present  Capillary refill time:  3 seconds  Temperature of toes cool to touch  Edema:  Trace and non-pitting       Neurological:     Sharp touch:  normal  Light touch: normal  Tinels Sign:  Absent  Mulders Click:   Absent        Dermatological:   Skin: thin, moist and appropriate for age; calluses sub metatarsal heads  Wounds/Ulcers:  Absent  Bruising:  Absent  Erythema:  Absent  Toenails are short and Polished.    Musculoskeletal:   Metatarsophalangeal range of motion:   diminished range of motion  Subtalar joint range of motion: full range of motion  Ankle " joint range of motion:  full range of motion  Bunions:  Present  Hammertoes: Present

## 2022-01-06 ENCOUNTER — PATIENT MESSAGE (OUTPATIENT)
Dept: ENDOSCOPY | Facility: HOSPITAL | Age: 77
End: 2022-01-06
Payer: MEDICARE

## 2022-01-07 LAB — TESTOST FREE SERPL-MCNC: <0.4 PG/ML

## 2022-01-11 ENCOUNTER — PATIENT OUTREACH (OUTPATIENT)
Dept: ADMINISTRATIVE | Facility: OTHER | Age: 77
End: 2022-01-11
Payer: MEDICARE

## 2022-01-12 ENCOUNTER — OFFICE VISIT (OUTPATIENT)
Dept: OBSTETRICS AND GYNECOLOGY | Facility: CLINIC | Age: 77
End: 2022-01-12
Attending: OBSTETRICS & GYNECOLOGY
Payer: MEDICARE

## 2022-01-12 ENCOUNTER — OFFICE VISIT (OUTPATIENT)
Dept: UROGYNECOLOGY | Facility: CLINIC | Age: 77
End: 2022-01-12
Payer: COMMERCIAL

## 2022-01-12 VITALS
DIASTOLIC BLOOD PRESSURE: 73 MMHG | HEART RATE: 54 BPM | WEIGHT: 119.06 LBS | SYSTOLIC BLOOD PRESSURE: 128 MMHG | BODY MASS INDEX: 20.32 KG/M2 | HEIGHT: 64 IN

## 2022-01-12 VITALS
DIASTOLIC BLOOD PRESSURE: 78 MMHG | BODY MASS INDEX: 20.32 KG/M2 | HEIGHT: 64 IN | WEIGHT: 119.06 LBS | SYSTOLIC BLOOD PRESSURE: 120 MMHG

## 2022-01-12 DIAGNOSIS — N95.2 VAGINAL ATROPHY: ICD-10-CM

## 2022-01-12 DIAGNOSIS — Z78.0 MENOPAUSE: Primary | ICD-10-CM

## 2022-01-12 DIAGNOSIS — Z98.890 POST-OPERATIVE STATE: Primary | ICD-10-CM

## 2022-01-12 DIAGNOSIS — R33.9 INCOMPLETE BLADDER EMPTYING: ICD-10-CM

## 2022-01-12 DIAGNOSIS — N39.8 VOIDING DYSFUNCTION: ICD-10-CM

## 2022-01-12 PROCEDURE — 99999 PR PBB SHADOW E&M-EST. PATIENT-LVL IV: CPT | Mod: PBBFAC,,, | Performed by: OBSTETRICS & GYNECOLOGY

## 2022-01-12 PROCEDURE — 1101F PR PT FALLS ASSESS DOC 0-1 FALLS W/OUT INJ PAST YR: ICD-10-PCS | Mod: CPTII,S$GLB,, | Performed by: NURSE PRACTITIONER

## 2022-01-12 PROCEDURE — 99024 PR POST-OP FOLLOW-UP VISIT: ICD-10-PCS | Mod: S$GLB,,, | Performed by: NURSE PRACTITIONER

## 2022-01-12 PROCEDURE — 99214 OFFICE O/P EST MOD 30 MIN: CPT | Mod: PBBFAC | Performed by: OBSTETRICS & GYNECOLOGY

## 2022-01-12 PROCEDURE — 99999 PR PBB SHADOW E&M-EST. PATIENT-LVL III: ICD-10-PCS | Mod: PBBFAC,,, | Performed by: NURSE PRACTITIONER

## 2022-01-12 PROCEDURE — 87086 URINE CULTURE/COLONY COUNT: CPT | Performed by: NURSE PRACTITIONER

## 2022-01-12 PROCEDURE — 99214 PR OFFICE/OUTPT VISIT, EST, LEVL IV, 30-39 MIN: ICD-10-PCS | Mod: 24,S$PBB,, | Performed by: OBSTETRICS & GYNECOLOGY

## 2022-01-12 PROCEDURE — 99024 POSTOP FOLLOW-UP VISIT: CPT | Mod: S$GLB,,, | Performed by: NURSE PRACTITIONER

## 2022-01-12 PROCEDURE — 1126F AMNT PAIN NOTED NONE PRSNT: CPT | Mod: CPTII,S$GLB,, | Performed by: NURSE PRACTITIONER

## 2022-01-12 PROCEDURE — 3288F FALL RISK ASSESSMENT DOCD: CPT | Mod: CPTII,S$GLB,, | Performed by: NURSE PRACTITIONER

## 2022-01-12 PROCEDURE — 1101F PT FALLS ASSESS-DOCD LE1/YR: CPT | Mod: CPTII,S$GLB,, | Performed by: NURSE PRACTITIONER

## 2022-01-12 PROCEDURE — 1159F PR MEDICATION LIST DOCUMENTED IN MEDICAL RECORD: ICD-10-PCS | Mod: CPTII,S$GLB,, | Performed by: NURSE PRACTITIONER

## 2022-01-12 PROCEDURE — 1126F PR PAIN SEVERITY QUANTIFIED, NO PAIN PRESENT: ICD-10-PCS | Mod: CPTII,S$GLB,, | Performed by: NURSE PRACTITIONER

## 2022-01-12 PROCEDURE — 3078F PR MOST RECENT DIASTOLIC BLOOD PRESSURE < 80 MM HG: ICD-10-PCS | Mod: CPTII,S$GLB,, | Performed by: NURSE PRACTITIONER

## 2022-01-12 PROCEDURE — 99999 PR PBB SHADOW E&M-EST. PATIENT-LVL III: CPT | Mod: PBBFAC,,, | Performed by: NURSE PRACTITIONER

## 2022-01-12 PROCEDURE — 1160F RVW MEDS BY RX/DR IN RCRD: CPT | Mod: CPTII,S$GLB,, | Performed by: NURSE PRACTITIONER

## 2022-01-12 PROCEDURE — 99214 OFFICE O/P EST MOD 30 MIN: CPT | Mod: 24,S$PBB,, | Performed by: OBSTETRICS & GYNECOLOGY

## 2022-01-12 PROCEDURE — 1159F MED LIST DOCD IN RCRD: CPT | Mod: CPTII,S$GLB,, | Performed by: NURSE PRACTITIONER

## 2022-01-12 PROCEDURE — 3074F SYST BP LT 130 MM HG: CPT | Mod: CPTII,S$GLB,, | Performed by: NURSE PRACTITIONER

## 2022-01-12 PROCEDURE — 3074F PR MOST RECENT SYSTOLIC BLOOD PRESSURE < 130 MM HG: ICD-10-PCS | Mod: CPTII,S$GLB,, | Performed by: NURSE PRACTITIONER

## 2022-01-12 PROCEDURE — 3288F PR FALLS RISK ASSESSMENT DOCUMENTED: ICD-10-PCS | Mod: CPTII,S$GLB,, | Performed by: NURSE PRACTITIONER

## 2022-01-12 PROCEDURE — 1160F PR REVIEW ALL MEDS BY PRESCRIBER/CLIN PHARMACIST DOCUMENTED: ICD-10-PCS | Mod: CPTII,S$GLB,, | Performed by: NURSE PRACTITIONER

## 2022-01-12 PROCEDURE — 99999 PR PBB SHADOW E&M-EST. PATIENT-LVL IV: ICD-10-PCS | Mod: PBBFAC,,, | Performed by: OBSTETRICS & GYNECOLOGY

## 2022-01-12 PROCEDURE — 3078F DIAST BP <80 MM HG: CPT | Mod: CPTII,S$GLB,, | Performed by: NURSE PRACTITIONER

## 2022-01-12 RX ORDER — SULFAMETHOXAZOLE AND TRIMETHOPRIM 800; 160 MG/1; MG/1
1 TABLET ORAL DAILY
COMMUNITY
Start: 2022-01-07 | End: 2022-02-04 | Stop reason: ALTCHOICE

## 2022-01-12 NOTE — PROGRESS NOTES
"  Urogyn follow up  01/12/2022    [facility] - UROGYNECOLOGY  [address] [id number]    Sarah Alejandro  [id number]  1945      Sarah Alejandro is a 76 y.o. here for a postop check.     Date of Operation: 11/01/2021     Title of Operation:  1)  Anterior repair  2)  Placement of retropubic tension-free midurethral sling, Advantage Fit ([employer])  3)  Posterior repair with perineorrhaphy  4)  Cystourethroscopy     Indications for Surgery:  1)  UI:  (+) QUYNH (walking)  > (--) UUI. Using poise impressa: 2-4 PPD, mostly dry.   (+) pads:2-4/day, usually moderate wetness and 1/ not common at night usually variable wetness.  Daytime frequency: Q 3 hours.  Nocturia: Yes: 1/night.   (--) dysuria,  (--) hematuria,  (--) frequent UTIs.  (--) complete bladder emptying. Pushes, Tries maneuvering. Using bladder gaurds which work well.      Gyn in [place] got recurrent UTI in 2015, some passed blood. In 2015 started on Bactrim. Seen Urologist for Cystoscopy detected no abnormalities. Urethra was "too skinny, was clogging". Went every three weeks for 6-8 months, for a stretching. No longer doing stretching urethra.      2)  POP:  Absent.(--) vaginal bleeding. (--) vaginal discharge. (+) sexually active.  (--) dyspareunia.   (+)  Vaginal dryness.  (+) vaginal estrogen use: 2x/week.   --POP-Q:  Aa -1; Ba -1; C -6; Ap 0; Bp 0 (moderate); D -7.  Genital hiatus 3, perineal body 2, total vaginal length 10-11 (standing).      3)  BM:  (+) constipation/straining.  (--) chronic diarrhea. (--) hematochezia.  (--) fecal incontinence.  (--) fecal smearing/urgency.  (--) complete evacuation.   Antibiotics caused diarrhea, takes Miralax every three-four days to try and completely empty bowels. Have always have trouble with constipation. Hemorrhoids, causing slight bleeding.      Preoperative Diagnosis:  Stress incontinence of urine    Graves disease    Voiding dysfunction    Chronic constipation  "   Cystocele, midline    Rectocele, female    Vaginal atrophy                Concern for major voiding dysfunction              Urodynamic stress incontinence              Decreased urethral coaptation           Postoperative Diagnosis:  Stress incontinence of urine    Graves disease    Voiding dysfunction    Chronic constipation    Cystocele, midline    Rectocele, female    Vaginal atrophy                Concern for major voiding dysfunction              Urodynamic stress incontinence              Decreased urethral coaptation    Issues include:  Patient Active Problem List   Diagnosis    Stress incontinence of urine    Voiding dysfunction    Rectocele, female    Chronic constipation    Vaginal atrophy    Graves' disease    Stage 3a chronic kidney disease    Hyperlipidemia    Aortic valve stenosis    Essential tremor    Essential hypertension    Insomnia    S/P TAVR (transcatheter aortic valve replacement)    Lumbar radiculopathy    S/P hip replacement    Pelvic floor weakness    Decreased functional mobility and endurance    Disorder of muscle    Preoperative cardiovascular examination    Status post anterior & posterior colporrhaphy w/ MUS 11/1    Incomplete bladder emptying     Well-woman:  Pap test: 2/21([facility]), normal per report.  History of abnormal paps: Yes - Around 2013, HPV positive, Colposcopy done clear after three years.  History of STIs:  No  Mammogram: Date of last: 6/10/2021 normal.   Colonoscopy: Date of last: 2018/2019.  Result:  Wasn't clean enough.  Repeat due: 2021.     DEXA:  Date of last: April 12th [facility]  Result: normal. Repeat per GYN.     12/15/2021   light yellow discharge; no VB; no pain; no s/sx POP  Bladder issues: since about 3 weeks postop, has some PV urgency with small 2nd amount; will gently press urethra post-void and release small 2nd volume; only happens 1st AM or if sitting for long time, better later in day;  no baseline U/F; no dysuria; no QUYNH  or obvious UUI  Bowel issues: easier with stool softener BID + miralax nightly; no pain  PVR was 240 mL20 min after voiding  Was taught cic    Changes since last visit:  Has not started pelvic floor PT.  Denies vaginal pain or bleeding. Using vaginal estrogen cream  Feels like she is emptying her bladder well-- PVR was <100 most occasions when she was performing cic at home.  Denies UI.   Denies constipation or straining.       Medications:    Current Outpatient Medications:     ALPRAZolam (XANAX) 0.5 MG tablet, Take 1 tablet (0.5 mg total) by mouth daily as needed (severe anxiety)., Disp: 30 tablet, Rfl: 0    ASCORBATE CALCIUM, VITAMIN C, ORAL, Take 500 mg by mouth., Disp: , Rfl:     aspirin (ECOTRIN) 81 MG EC tablet, Take 81 mg by mouth once daily., Disp: , Rfl:     atenoloL (TENORMIN) 100 MG tablet, Take 1 tablet (100 mg total) by mouth 2 (two) times a day., Disp: 60 tablet, Rfl: 11    cetirizine-pseudoephedrine 5-120 mg Tb12, cetirizine 5 mg-pseudoephedrine  mg tablet,extended release,12hr  TK 1 T PO BID, Disp: , Rfl:     cholecalciferol, vitamin D3, (VITAMIN D3) 50 mcg (2,000 unit) Tab, Take 2,000 Units by mouth., Disp: , Rfl:     CMPD testosterone proprionate 2% in vanicream, Apply topically. [facility], Disp: , Rfl:     COMPOUND HORMONE REPLACEMENT, Take by mouth once daily. ESTROGEN CREAM -- [facility], Disp: , Rfl:     conjugated estrogens (PREMARIN) vaginal cream, Place 0.5 g vaginally once daily., Disp: 1 applicator, Rfl: 5    docusate sodium (COLACE) 100 MG capsule, Take 1 capsule (100 mg total) by mouth 2 (two) times daily., Disp: 60 capsule, Rfl: 1    EPINEPHrine (EPIPEN) 0.3 mg/0.3 mL AtIn, epinephrine 0.3 mg/0.3 mL injection, auto-injector, Disp: , Rfl:     ibuprofen (ADVIL,MOTRIN) 600 MG tablet, Take 1 tablet (600 mg total) by mouth every 6 (six) hours as needed for Pain., Disp: 30 tablet, Rfl: 1    MAGNESIUM CARBONATE ORAL, Take 1 tablet by mouth., Disp: , Rfl:  "    methIMAzole (TAPAZOLE) 5 MG Tab, Take half a tablet daily, Disp: 90 tablet, Rfl: 1    nitrofurantoin (MACRODANTIN) 100 MG capsule, 100 mg PO BID x 7 days, then 100 mg PO daily thereafter., Disp: 30 capsule, Rfl: 0    nitrofurantoin, macrocrystal-monohydrate, (MACROBID) 100 MG capsule, Take 1 capsule (100 mg total) by mouth 2 (two) times daily., Disp: 14 capsule, Rfl: 0    omalizumab (XOLAIR) 75 mg/0.5 mL injection, , Disp: , Rfl:     PENNSAID 20 mg/gram /actuation(2 %) sopm, APPLY 1 APPLICATION TOPICALLY 2 (TWO) TIMES DAILY AS NEEDED.::NOT COVERED::, Disp: 112 g, Rfl: 10    polyethylene glycol (GLYCOLAX) 17 gram/dose powder, Take 17 g by mouth daily as needed., Disp: , Rfl:     progesterone (PROMETRIUM) 200 MG capsule, Take 1 capsule by mouth 30-60 minutes before bed every night, Disp: 90 capsule, Rfl: 3    rosuvastatin (CRESTOR) 40 MG Tab, TAKE 1 TABLET BY MOUTH EVERY DAY, Disp: 90 tablet, Rfl: 1    sulfamethoxazole-trimethoprim 800-160mg (BACTRIM DS) 800-160 mg Tab, Take 1 tablet by mouth once daily., Disp: , Rfl:     tretinoin, emollient, (RENOVA) 0.02 % Crea, Renova 0.02 % topical cream  APPLY TO AFFECTED AREA EVERY DAY AT NIGHT *NOT COVERED*, Disp: , Rfl:     urea 20 % Crea, Apply 1 application topically once daily. To dry skin on the feet., Disp: 75 g, Rfl: 10    valACYclovir (VALTREX) 1000 MG tablet, valacyclovir 1 gram tablet, Disp: , Rfl:     XIIDRA 5 % Dpet, , Disp: , Rfl:     zolpidem (AMBIEN CR) 6.25 MG CR tablet, Take 1 tablet (6.25 mg total) by mouth nightly as needed for Insomnia., Disp: 30 tablet, Rfl: 2    ROS:  As per HPI.      Exam  /73   Pulse (!) 54   Ht 5' 4" (1.626 m)   Wt 54 kg (119 lb 0.8 oz)   BMI 20.43 kg/m²   General: alert and oriented, no acute distress  Respiratory: normal respiratory effort  Abd: soft, non-tender, non-distended    Pelvic  Ext. Genitalia: normal external genitalia. Normal bartholins and skenes glands  Vagina: + mild atrophy. Normal vaginal " mucosa without lesions. No discharge noted.   Non-tender bladder base without palpable mass. Sling incision and A&P incisions still healing, NT. No mesh visible/palpable.   Cervix: no lesions  Uterus:  uterus is normal size, shape, consistency and nontender   Urethra: no masses or tenderness  Urethral meatus: no lesions, caruncle or prolapse.    POP-Q: deferred.  No POP with valsalva.     kegel 1/5-- valsalva first    PVR  310-- has trouble relaxing in restroom      Impression  1. Post-operative state     2. Incomplete bladder emptying  Urine culture   3. Vaginal atrophy     4. Voiding dysfunction       We reviewed the above issues and discussed options for short-term versus long-term management of her problems.   Plan:   1. Postop state: well healed. No lifting > 50 pounds without help   2. History of constipation:  a. Try to minimize straining.   b. Continue daily stool softener 2x/day + daily miralax  3. Incomplete bladder emptying:  a. DO NOT SQUEEZE OR DO ANY KEGELS/STRENGTHENING EXERCISES.   b. RELAX WHEN YOU URINATE--PULL OUT ONLY RELAXATION PT EXERCISES AND PRACTICE.   c. Urine C&S sent to make sure  No UTI.   4. Will repeat PVR next week without you giving a urine specimen.   5. Perform cic twice daily and record volumes      40 minutes were spent in face to face time with this patient  90 % of this time was spent in counseling and/or coordination of care     Anne-Marie Pack NP  [facility]  Division of Female Pelvic Medicine and Reconstructive Surgery  Department of Obstetrics & Gynecology

## 2022-01-12 NOTE — PATIENT INSTRUCTIONS
1. Postop state: well healed. No lifting > 50 pounds without help   2. History of constipation:  a. Try to minimize straining.   b. Continue daily stool softener 2x/day + daily miralax  3. Incomplete bladder emptying:  a. DO NOT SQUEEZE OR DO ANY KEGELS/STRENGTHENING EXERCISES.   b. RELAX WHEN YOU URINATE--PULL OUT ONLY RELAXATION PT EXERCISES AND PRACTICE.   c. Urine C&S sent to make sure  No UTI.   4. Will repeat PVR next week without you giving a urine specimen.   5. Perform cic twice daily and record volumes

## 2022-01-12 NOTE — PROGRESS NOTES
Subjective:      Sarah Alejandro is a 76 y.o. female who is here for follow-up of hormone replacement therapy.  At her last visit on 9/27/21, she was on biest cream estriol/estradiol; 4 mg/4mg/ml 1/4 ML once daily  The patient was sleeping better and hot flashes were better at night.  She had the following side effects: none.  Patient denied post-menopausal vaginal bleeding. The patient is sexually active.     PLAN on 9/27/21:  Increase:  Biest cream estriol/estradiol; 4 mg/4mg/ml 1/4 ml to BID  Progesterone to 200 mg orally QPM  Continue:  Testosterone cream 2%.     She is currently taking the above.  No night sweats, sleep issues, or hot flashes.  She had the following side effects: none.  Patient denies post-menopausal vaginal bleeding. The patient is sexually active.     Lab Visit on 01/05/2022   Component Date Value Ref Range Status    Testosterone, Free 01/05/2022 <0.4  pg/mL Final    Testosterone, Total 01/05/2022 49  5 - 73 ng/dL Final   Lab Visit on 12/20/2021   Component Date Value Ref Range Status    Urine Culture, Routine 12/20/2021 *  Final                    Value:PROTEUS MIRABILIS  >100,000 cfu/ml  No other significant isolate      Specimen UA 12/20/2021 Urine, Unspecified   Final    Color, UA 12/20/2021 Yellow  Yellow, Straw, Katie Final    Appearance, UA 12/20/2021 Clear  Clear Final    pH, UA 12/20/2021 >=9.0  5.0 - 8.0 Final    Specific Gravity, UA 12/20/2021 <=1.005* 1.005 - 1.030 Final    Protein, UA 12/20/2021 Negative  Negative Final    Glucose, UA 12/20/2021 Negative  Negative Final    Ketones, UA 12/20/2021 Negative  Negative Final    Bilirubin (UA) 12/20/2021 Negative  Negative Final    Occult Blood UA 12/20/2021 Negative  Negative Final    Nitrite, UA 12/20/2021 Negative  Negative Final    Urobilinogen, UA 12/20/2021 Negative  <2.0 EU/dL Final    Leukocytes, UA 12/20/2021 2+* Negative Final    WBC, UA 12/20/2021 4  0 - 5 /hpf Final    Bacteria 12/20/2021 Many*  None-Occ /hpf Final    Microscopic Comment 2021 SEE COMMENT   Final   Lab Visit on 10/14/2021   Component Date Value Ref Range Status    Urine Culture, Routine 10/14/2021 *  Final                    Value:ENTEROCOCCUS FAECALIS  10,000 - 49,999 cfu/ml      Specimen UA 10/14/2021 Urine, Clean Catch   Final    Color, UA 10/14/2021 Yellow  Yellow, Straw, Katie Final    Appearance, UA 10/14/2021 Clear  Clear Final    pH, UA 10/14/2021 7.0  5.0 - 8.0 Final    Specific White, UA 10/14/2021 1.020  1.005 - 1.030 Final    Protein, UA 10/14/2021 Negative  Negative Final    Glucose, UA 10/14/2021 Negative  Negative Final    Ketones, UA 10/14/2021 Negative  Negative Final    Bilirubin (UA) 10/14/2021 Negative  Negative Final    Occult Blood UA 10/14/2021 Negative  Negative Final    Nitrite, UA 10/14/2021 Negative  Negative Final    Urobilinogen, UA 10/14/2021 Negative  <2.0 EU/dL Final    Leukocytes, UA 10/14/2021 Negative  Negative Final       Past Medical History:   Diagnosis Date    Abnormal Pap smear of cervix     Graves disease     History of back surgery     Hormone replacement therapy (HRT)     Hyperlipidemia     Hypertension     Infection of spine     Menopause     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     Graves disease     Past Surgical History:   Procedure Laterality Date    BACK SURGERY      CARDIAC VALVE REPLACEMENT  2016    - aortic valve stenosis    HIP REPLACEMENT ARTHROPLASTY Left 2016    INSERTION OF MIDURETHRAL SLING N/A 2021    Procedure: SLING, MIDURETHRAL;  Surgeon: Hodan Goldberg MD;  Location: [facility];  Service: OB/GYN;  Laterality: N/A;    LUMBAR FUSION       Social History     Tobacco Use    Smoking status: Former Smoker     Quit date:      Years since quittin.0    Smokeless tobacco: Never Used   Substance Use Topics    Alcohol use: Not Currently     Alcohol/week: 2.0 standard drinks     Types: 1 Glasses of wine, 1 Shots of liquor  per week     Comment: weekends     Drug use: Never     Family History   Problem Relation Age of Onset    Colon cancer Father     Leukemia Mother     Hypothyroidism Sister     Breast cancer Neg Hx     Ovarian cancer Neg Hx     Stroke Neg Hx     Diabetes Neg Hx      OB History    Para Term  AB Living   2 1 1   1     SAB IAB Ectopic Multiple Live Births                  # Outcome Date GA Lbr Paolo/2nd Weight Sex Delivery Anes PTL Lv   2 Term 10/22/66   3.062 kg (6 lb 12 oz) M Vag-Spont      1 AB               Obstetric Comments   Menarche 11       Current Outpatient Medications:     ALPRAZolam (XANAX) 0.5 MG tablet, Take 1 tablet (0.5 mg total) by mouth daily as needed (severe anxiety)., Disp: 30 tablet, Rfl: 0    ASCORBATE CALCIUM, VITAMIN C, ORAL, Take 500 mg by mouth., Disp: , Rfl:     aspirin (ECOTRIN) 81 MG EC tablet, Take 81 mg by mouth once daily., Disp: , Rfl:     atenoloL (TENORMIN) 100 MG tablet, Take 1 tablet (100 mg total) by mouth 2 (two) times a day., Disp: 60 tablet, Rfl: 11    cetirizine-pseudoephedrine 5-120 mg Tb12, cetirizine 5 mg-pseudoephedrine  mg tablet,extended release,12hr  TK 1 T PO BID, Disp: , Rfl:     cholecalciferol, vitamin D3, (VITAMIN D3) 50 mcg (2,000 unit) Tab, Take 2,000 Units by mouth., Disp: , Rfl:     CMPD testosterone proprionate 2% in vanicream, Apply topically. [facility], Disp: , Rfl:     COMPOUND HORMONE REPLACEMENT, Take by mouth once daily. ESTROGEN CREAM -- [facility], Disp: , Rfl:     conjugated estrogens (PREMARIN) vaginal cream, Place 0.5 g vaginally once daily., Disp: 1 applicator, Rfl: 5    docusate sodium (COLACE) 100 MG capsule, Take 1 capsule (100 mg total) by mouth 2 (two) times daily., Disp: 60 capsule, Rfl: 1    MAGNESIUM CARBONATE ORAL, Take 1 tablet by mouth., Disp: , Rfl:     methIMAzole (TAPAZOLE) 5 MG Tab, Take half a tablet daily, Disp: 90 tablet, Rfl: 1    nitrofurantoin (MACRODANTIN) 100 MG capsule,  "100 mg PO BID x 7 days, then 100 mg PO daily thereafter., Disp: 30 capsule, Rfl: 0    omalizumab (XOLAIR) 75 mg/0.5 mL injection, , Disp: , Rfl:     PENNSAID 20 mg/gram /actuation(2 %) sopm, APPLY 1 APPLICATION TOPICALLY 2 (TWO) TIMES DAILY AS NEEDED.::NOT COVERED::, Disp: 112 g, Rfl: 10    polyethylene glycol (GLYCOLAX) 17 gram/dose powder, Take 17 g by mouth daily as needed., Disp: , Rfl:     progesterone (PROMETRIUM) 200 MG capsule, Take 1 capsule by mouth 30-60 minutes before bed every night, Disp: 90 capsule, Rfl: 3    rosuvastatin (CRESTOR) 40 MG Tab, TAKE 1 TABLET BY MOUTH EVERY DAY, Disp: 90 tablet, Rfl: 1    tretinoin, emollient, (RENOVA) 0.02 % Crea, Renova 0.02 % topical cream  APPLY TO AFFECTED AREA EVERY DAY AT NIGHT *NOT COVERED*, Disp: , Rfl:     urea 20 % Crea, Apply 1 application topically once daily. To dry skin on the feet., Disp: 75 g, Rfl: 10    valACYclovir (VALTREX) 1000 MG tablet, valacyclovir 1 gram tablet, Disp: , Rfl:     XIIDRA 5 % Dpet, , Disp: , Rfl:     zolpidem (AMBIEN CR) 6.25 MG CR tablet, Take 1 tablet (6.25 mg total) by mouth nightly as needed for Insomnia., Disp: 30 tablet, Rfl: 2    EPINEPHrine (EPIPEN) 0.3 mg/0.3 mL AtIn, epinephrine 0.3 mg/0.3 mL injection, auto-injector, Disp: , Rfl:     ibuprofen (ADVIL,MOTRIN) 600 MG tablet, Take 1 tablet (600 mg total) by mouth every 6 (six) hours as needed for Pain. (Patient not taking: Reported on 1/12/2022), Disp: 30 tablet, Rfl: 1    nitrofurantoin, macrocrystal-monohydrate, (MACROBID) 100 MG capsule, Take 1 capsule (100 mg total) by mouth 2 (two) times daily. (Patient not taking: Reported on 1/12/2022), Disp: 14 capsule, Rfl: 0    sulfamethoxazole-trimethoprim 800-160mg (BACTRIM DS) 800-160 mg Tab, Take 1 tablet by mouth once daily., Disp: , Rfl:     Vitals:    01/12/22 0839   BP: 120/78   Weight: 54 kg (119 lb 0.8 oz)   Height: 5' 4" (1.626 m)   PainSc: 0-No pain     Body mass index is 20.43 kg/m².       Assessment:    " Menopause  -     Testosterone, Free; Future; Expected date: 01/12/2022  -     Testosterone; Future; Expected date: 01/12/2022  -     Estradiol; Future; Expected date: 01/12/2022        Plan:   Risks and benefits of hormone replacement therapy were discussed.  Hormone replacement therapy options, including bioidentical versus non-bioidentical hormones, as well as alternatives discussed.  I reviewed the patient's last free and total testosterone levels.    Continue:  Biest cream estriol/estradiol; 4 mg/4mg/ml 1/4 ml to BID  Progesterone to 200 mg orally QPM  Testosterone cream 2%  Levels drawn at 4 PM in next few months  Follow up in 3 months.  Instructed patient to call if she experiences any side effects or has any questions.

## 2022-01-13 LAB — BACTERIA UR CULT: NO GROWTH

## 2022-01-14 ENCOUNTER — LAB VISIT (OUTPATIENT)
Dept: LAB | Facility: OTHER | Age: 77
End: 2022-01-14
Attending: NURSE PRACTITIONER
Payer: MEDICARE

## 2022-01-14 ENCOUNTER — PATIENT MESSAGE (OUTPATIENT)
Dept: UROGYNECOLOGY | Facility: CLINIC | Age: 77
End: 2022-01-14
Payer: MEDICARE

## 2022-01-14 DIAGNOSIS — R30.0 DYSURIA: Primary | ICD-10-CM

## 2022-01-14 DIAGNOSIS — R30.0 DYSURIA: ICD-10-CM

## 2022-01-14 LAB
BACTERIA #/AREA URNS HPF: ABNORMAL /HPF
BILIRUB UR QL STRIP: NEGATIVE
CLARITY UR: CLEAR
COLOR UR: YELLOW
GLUCOSE UR QL STRIP: NEGATIVE
HGB UR QL STRIP: ABNORMAL
HYALINE CASTS #/AREA URNS LPF: 9 /LPF
KETONES UR QL STRIP: NEGATIVE
LEUKOCYTE ESTERASE UR QL STRIP: ABNORMAL
MICROSCOPIC COMMENT: ABNORMAL
NITRITE UR QL STRIP: NEGATIVE
PH UR STRIP: 7 [PH] (ref 5–8)
PROT UR QL STRIP: ABNORMAL
RBC #/AREA URNS HPF: 13 /HPF (ref 0–4)
SP GR UR STRIP: >=1.03 (ref 1–1.03)
SQUAMOUS #/AREA URNS HPF: 5 /HPF
URN SPEC COLLECT METH UR: ABNORMAL
UROBILINOGEN UR STRIP-ACNC: 1 EU/DL
WBC #/AREA URNS HPF: 60 /HPF (ref 0–5)
WBC CLUMPS URNS QL MICRO: ABNORMAL
YEAST URNS QL MICRO: ABNORMAL

## 2022-01-14 PROCEDURE — 87088 URINE BACTERIA CULTURE: CPT | Performed by: NURSE PRACTITIONER

## 2022-01-14 PROCEDURE — 81000 URINALYSIS NONAUTO W/SCOPE: CPT | Performed by: NURSE PRACTITIONER

## 2022-01-14 PROCEDURE — 87077 CULTURE AEROBIC IDENTIFY: CPT | Performed by: NURSE PRACTITIONER

## 2022-01-14 PROCEDURE — 87186 SC STD MICRODIL/AGAR DIL: CPT | Performed by: NURSE PRACTITIONER

## 2022-01-14 PROCEDURE — 87086 URINE CULTURE/COLONY COUNT: CPT | Performed by: NURSE PRACTITIONER

## 2022-01-14 RX ORDER — NITROFURANTOIN 25; 75 MG/1; MG/1
100 CAPSULE ORAL 2 TIMES DAILY
Qty: 14 CAPSULE | Refills: 0 | Status: SHIPPED | OUTPATIENT
Start: 2022-01-14 | End: 2022-02-04 | Stop reason: ALTCHOICE

## 2022-01-17 LAB — BACTERIA UR CULT: ABNORMAL

## 2022-01-20 ENCOUNTER — CLINICAL SUPPORT (OUTPATIENT)
Dept: REHABILITATION | Facility: OTHER | Age: 77
End: 2022-01-20
Attending: INTERNAL MEDICINE
Payer: MEDICARE

## 2022-01-20 ENCOUNTER — LAB VISIT (OUTPATIENT)
Dept: LAB | Facility: OTHER | Age: 77
End: 2022-01-20
Attending: INTERNAL MEDICINE
Payer: MEDICARE

## 2022-01-20 DIAGNOSIS — E05.00 GRAVES' DISEASE: ICD-10-CM

## 2022-01-20 DIAGNOSIS — Z01.818 PRE-OP TESTING: ICD-10-CM

## 2022-01-20 DIAGNOSIS — Z12.11 SPECIAL SCREENING FOR MALIGNANT NEOPLASMS, COLON: Primary | ICD-10-CM

## 2022-01-20 DIAGNOSIS — M62.89 PELVIC FLOOR DYSFUNCTION: ICD-10-CM

## 2022-01-20 DIAGNOSIS — M62.89 PELVIC FLOOR TENSION: ICD-10-CM

## 2022-01-20 LAB — TSH SERPL DL<=0.005 MIU/L-ACNC: 2.5 UIU/ML (ref 0.4–4)

## 2022-01-20 PROCEDURE — 36415 COLL VENOUS BLD VENIPUNCTURE: CPT | Performed by: INTERNAL MEDICINE

## 2022-01-20 PROCEDURE — 84443 ASSAY THYROID STIM HORMONE: CPT | Performed by: INTERNAL MEDICINE

## 2022-01-20 PROCEDURE — 97162 PT EVAL MOD COMPLEX 30 MIN: CPT

## 2022-01-20 PROCEDURE — 97112 NEUROMUSCULAR REEDUCATION: CPT

## 2022-01-20 RX ORDER — POLYETHYLENE GLYCOL 3350, SODIUM SULFATE ANHYDROUS, SODIUM BICARBONATE, SODIUM CHLORIDE, POTASSIUM CHLORIDE 236; 22.74; 6.74; 5.86; 2.97 G/4L; G/4L; G/4L; G/4L; G/4L
4 POWDER, FOR SOLUTION ORAL ONCE
Qty: 4000 ML | Refills: 0 | Status: SHIPPED | OUTPATIENT
Start: 2022-01-20 | End: 2022-01-20

## 2022-01-20 NOTE — PATIENT INSTRUCTIONS
Home Exercise Program: 1/20/2022       DOUBLE VOIDING - to be done after the initial urine stream has ended to try to get a second urine stream started    1. Sit comfortably on toilet.  2. Do a body scan - make sure your legs, buttocks, and abdominals are relaxed.  3. Take a couple deep, slow breaths to encourage your pelvic floor muscles to DROP (ie. Diaphragmatic Breathing).  4. Tap on your bladder.  5. Change your pelvic position: lean forward, rock your pelvis forward and backward, stand up then sit back down.   6. Wait at least 2 minutes to see if a second urine stream begins.     Do not stop your urine stream or push/strain!    You do not have to do all of these things every single time. Find which ones work best for you.           Pelvic Floor Relaxation Breathing  1. Pretend to blow out trick-birthday candles. As you blow out the candles, you will feel your abdominal wall tighten and your pelvic floor gently bulge/push out. Make sure you are not bearing down by holding your breath but instead continue to exhale and breathe out. You want to keep your air flowing.      2. Gently push out your pelvic floor. Try to breathe out as you do so. Perform 5 pushes 2-3x/day.     3. Perform a quick kegel and quickly relax. Try to leg go of the contraction as quickly as possible. Perform 3 squeeze and quick releases 5x in a row 1-2x/day.     4. Perform diaphragmatic or belly breathing. See if you can feel gentle descent of pelvic floor muscles as you inhale.      Use a mirror to help visualize your pelvic floor motion if needed. You could also try placing your palm against your pelvic floor. With relaxation you should feel the muscles drop down and increase pressure against your hand.

## 2022-01-20 NOTE — PLAN OF CARE
[facility]   Pelvic Health Physical Therapy Initial Evaluation     Date: 1/20/2022   Name: Sarah Alejandro  Clinic Number: [id number]    Therapy Diagnosis:   Encounter Diagnoses   Name Primary?    Pelvic floor tension     Pelvic floor dysfunction      Physician: Hodan Goldberg MD    Physician Orders: PT Eval and Treat   Medical Diagnosis from Referral: N39.3 (ICD-10-CM) - Stress incontinence of urine N39.8 (ICD-10-CM) - Voiding dysfunction  Evaluation Date: 1/20/2022  Authorization Period Expiration: 6/1/2022  Plan of Care Expiration: 3/20/2022  Progress Note Due: 2/20/2022  Visit # / Visits authorized: 1/ 20   FOTO: Issued Visit #: 1/3    Precautions: universal Back surg (2017) from sacrum to T10 from scoliosis and infection osteomyelitis, RTC injuey on the R side    Time In: 9:00  Time Out: 10:00  Total Appointment Time (timed & untimed codes): 60 minutes    SUBJECTIVE     Date of onset/History of current condition - Cinda reports: what the MD wants to do is relaxation techniques and no kegels. Last week when she came to see Anne-Marie they josseline aout 300 ml. Every times she uses the catheter she gets a UTI. Double voids to get everything out and with the catather is it 100 ml or less. They feel everything with the surgery is good, the sling is good, it is not too tight and that it can be solved with PT. Will double void and then use cathater. Biggest problem is in the morning, in the afternoon she feels more empty. She is not feeling like she does not have any emotional stress. Bowel movements, hardly pushing, especially after surgery she was conscious about not pushing. On additaonal colase 2x/day. BM every 2-3 day, it is fast. There may be times where she has to push at the start BM. Since the surgery she cannot put if off for long, no leaking on the way to the bathroom (unless UTI)    History since last visit: slight yellow discharge; no VB; no pain; no s/sx POP  Bladder  issues: since about 3 weeks postop, has some PV urgency with small 2nd amount; will gently press urethra post-void and release small 2nd volume; only happens 1st AM or if sitting for long time, better later in day;  no baseline U/F; no dysuria; no QUYNH or obvious UUI  Bowel issues: easier with stool softener BID + miralax nightly; no pain    Impression  1. Incomplete bladder emptying    2. s/p MUS, A&P, cysto     3. Voiding dysfunction    4. Vaginal atrophy           OB/GYN History:   Past Ob History     1   Largest infant weight: 6lbs 12oz  no FAVD. yes episiotomy.  Sexually active? Yes  Pain with vaginal exams, intercourse or tampon use? No  Pleasure with sex? Yes  Ability to achieve orgasm? Yes       Bladder/Bowel History: urinary incontinence, constipation/straining for movement and straining or pushing to empty bladder   Frequency of urination:   Daytime: 3-4           Nighttime: 1   Difficulty initiating urine stream: No   Urine stream: depends, most of the time it is medium to strong, much stronger with cathater   Complete emptying: No   Bladder leakage: No   Frequency of incidents: infrequent    Amount leaked (urine): few drops   Urinary Urgency: Yes, Able to delay the urge for at least 1-15 minute(s).   Frequency of bowel movements: once every 2-3 days   Difficulty initiating BM: No   Quality/Shape of BM: Novice Stool Chart 3-4 last week 1   Does Patient Feel Empty after BM? Depends if she has rebolledo or not   Fiber Supplements or Laxative Use? Yes, takes colase and pill for gas and bloating   Colon leakage: No   Still wears pads but they typically are not wet    Pain: No pain at this time, testosterone rx by dr chinchilla externally and estrogen cream internally      Prior Therapy: Yes, prior to surgery  Social History:  lives with their spouse  Current exercise: Goes to the gym every day, elliptical walking, and ab/core work  Occupation: Was working remotely, still working remotely as  software consulting and accounting  Prior Level of Function: I with ADLs  Current Level of Function: I with ADLs    Types of fluid intake:  Water, diet cola, wine, vodka water  Habitus: thin  Abuse/Neglect: No     Patients goals: Be able to void without cathater     Medical History: Cinda  has a past medical history of Abnormal Pap smear of cervix, Graves disease, History of back surgery, Hormone replacement therapy (HRT), Hyperlipidemia, Hypertension, Infection of spine, Menopause (1996), and 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     Surgical History: Sarah Alejandro  has a past surgical history that includes Cardiac valve replacement (05/2016); Hip replacement arthroplasty (Left, 2016); Back surgery; Lumbar fusion; and Insertion of midurethral sling (N/A, 11/1/2021).    Medications: Sarah has a current medication list which includes the following prescription(s): alprazolam, ascorbate calcium, aspirin, atenolol, cetirizine-pseudoephedrine, cholecalciferol (vitamin d3), testosterone, COMPOUND HORMONE REPLACEMENT, conjugated estrogens, docusate sodium, epinephrine, ibuprofen, magnesium carbonate, methimazole, nitrofurantoin, nitrofurantoin (macrocrystal-monohydrate), omalizumab, pennsaid, polyethylene glycol, progesterone, rosuvastatin, sulfamethoxazole-trimethoprim 800-160mg, renova, urea, valacyclovir, xiidra, and zolpidem.    Allergies:   Review of patient's allergies indicates:   Allergen Reactions    Penicillins Rash and Hives        OBJECTIVE     See EMR under MEDIA for written consent provided 1/20/2022  Chaperone: consents signed    ORTHO SCREEN  Posture in sitting: WNL  Posture in standing:  R shoulder elevated, L hip elevated, C curve to the L  Pelvic alignment: no sign of deviations noted in supine   SI Joint Palpation: Denies tenderness to SI joint palpation bilaterally.    Adductor Palpation: WNL    ABDOMINAL WALL ASSESSMENT  Palpation: denies tenderness      BREATHING MECHANICS  ASSESSMENT   Thorax Assessment During Quiet Respiration: WNL excursion of ribcage and WNL excursion of abdominal wall  Thorax Assessment During Deep Respiration: Asymmetrical     VAGINAL PELVIC FLOOR EXAM    EXTERNAL ASSESSMENT  Introitus: WNL  Skin condition: redness noted  Scarring: episiotomy scar noted   Sensation: WNL   Pain: none  Voluntary contraction: visible lift  Voluntary relaxation: visible drop  Involuntary contraction: visible lift  Bearing down: visible lift  Perineal descent: absent  Comments:       INTERNAL ASSESSMENT  Pain: none   Sensation: able to localized pressure appropriately   Vaginal vault: roomy   Muscle Bulk: atrophy   Muscle Power: 1/5  Muscle Endurance: NT  # Reps To Fatigue: NT    Fast Contractions in 10 seconds: NT     Quality of contraction: slow rise and slow relaxation   Specificity: WNL     Comments: decreased relaxation, sometimes is bautista when she does not realize it        Limitation/Restriction for FOTO Urinary Retention Survey    Therapist reviewed FOTO scores for Sarah Alejandro on 1/20/2022.   FOTO documents entered into [employer] - see Media section.    Limitation Score: 33%       TREATMENT     Total Treatment time (time-based codes) separate from Evaluation: 10 minutes       Neuromuscular Re-education to improve Coordination, Control and Down training for 10 minutes including:   pelvic floor relaxation/bulging training and diaphragmatic breathing        PATIENT EDUCATION AND HOME EXERCISES     Education provided:   general anatomy/physiology of urinary/ bowel  system and benefits of treatment were discussed with the patient. Additionally, bladder irritants, anatomy/physiology of pelvic floor, bladder retraining, diaphragmatic breathing, double voiding techniques, proper bearing down techniques, fluid intake/dietary modifications and behavior modifications were reviewed.     Written Home Exercises provided: yes.  Exercises were reviewed and Cinda was able to  demonstrate them prior to the end of the session. Cinda demonstrated fair  understanding of the education provided. See EMR under Patient Instructions for exercises provided during therapy sessions.    ASSESSMENT     Sarah is a 76 y.o. female referred to outpatient Physical Therapy with a medical diagnosis of Stress incontinence of urine, Voiding dysfunction. Pt presents with difficulty voiding primarily in the morning and since surgery has to catheterize herself. She feels somewhat better and more empty in the PM. With assessment she has some difficulty with relaxation and is squeezing at times she thinks she is relaxing. Plan to work on relaxation    Patient prognosis is Good.   Patient will benefit from skilled outpatient Physical Therapy to address the deficits stated above and in the chart below, provide patient/family education, and to maximize patient's level of independence.     Plan of care discussed with patient: Yes  Patient's spiritual, cultural and educational needs considered and patient is agreeable to the plan of care and goals as stated below:     Anticipated Barriers for therapy: none  Medical Necessity is demonstrated by the following:     History  Co-morbidities and personal factors that may impact the plan of care Co-morbidities   Graves disease, History of back surgery, Hyperlipidemia, and Hypertension, Cardiac valve replacement (05/2016); Hip replacement arthroplasty (Left, 2016);      Personal Factors  age  social background       moderate   Examination  Body structures and functions, activity limitations and participation restrictions that may impact the plan of care Body Regions/Systems/Functions:  altered posture, pelvic asymmetry, poor knowledge of body mechanics and posture, poor trunk stability, decreased pelvic muscle strength, decreased endurance of the pelvic muscles, decreased phasic ability of the pelvic muscles, poor quality of pelvic muscle contraction, poor coordination of pelvic  floor muscles during ADL's leading to urinary or fecal leakage, dysfunctional voiding and dysfunctional defecation      Activity Limitations:  full bladder emptying and incontinence with ADLs     Participation Restrictions:  all ADLs/iADLs uninterrupted by urinary incontinence/urgency/frequency, social activities with friends/family, relationship with spouse/partner, regularly having a comfortable BM and exercise restrictions due to incontinence      Activity limitations:   Learning and applying knowledge  No deficits     General Tasks and Commands  No deficits     Communication  No deficits     Mobility  No deficits     Self care  No deficits     Domestic Life  No deficits     Interactions/Relationships  No deficits     Life Areas  No deficits     Community and Social Life  No deficits          moderate   Clinical Presentation stable and uncomplicated low   Decision Making/ Complexity Score: moderate      Goals:    Short Term Goals: 4 weeks   I with initial HEP  Pt will verbalize improved awareness of PFM activity as palpated by PT in order to improve activity involvement with HEP.  Pt will be independent with double voiding techniques 100% of the time to ensure full bladder emptying and decrease pt's risk of infection.   Pt to demonstrate proper positioning on commode with breathing techniques to increase relaxation to enable pt to feel empty after urination.      Long Term Goals: 8 weeks     Pt to be discharged with home plan for carry over after discharge.   Pt able to urinate completely without the use of cathater    PLAN     Plan of care Certification: 1/20/2022 to 3/20/2022.    Outpatient Physical Therapy 1 time(s) every 1-2 week(s) for 8 weeks to include the following interventions: Electrical Stimulation NEMS/TENS, Manual Therapy, Moist Heat/ Ice, Neuromuscular Re-ed, Patient Education, Self Care, Therapeutic Activities, Therapeutic Exercise and Ultrasound.     Mabel Bland, PT      I CERTIFY THE NEED FOR  THESE SERVICES FURNISHED UNDER THIS PLAN OF TREATMENT AND WHILE UNDER MY CARE   Physician's comments:     Physician's Signature: ___________________________________________________

## 2022-01-27 ENCOUNTER — CLINICAL SUPPORT (OUTPATIENT)
Dept: REHABILITATION | Facility: OTHER | Age: 77
End: 2022-01-27
Attending: OBSTETRICS & GYNECOLOGY
Payer: MEDICARE

## 2022-01-27 DIAGNOSIS — M62.89 PELVIC FLOOR TENSION: Primary | ICD-10-CM

## 2022-01-27 PROCEDURE — 97112 NEUROMUSCULAR REEDUCATION: CPT

## 2022-01-27 PROCEDURE — 97110 THERAPEUTIC EXERCISES: CPT

## 2022-01-27 NOTE — PROGRESS NOTES
Pelvic Health Physical Therapy   Treatment Note     Name: Sarah Alejandro  Clinic Number: [id number]    Therapy Diagnosis:   Encounter Diagnosis   Name Primary?    Pelvic floor tension Yes     Physician: Hodan Goldberg MD    Visit Date: 1/27/2022    Physician Orders: PT Eval and Treat   Medical Diagnosis from Referral: N39.3 (ICD-10-CM) - Stress incontinence of urine N39.8 (ICD-10-CM) - Voiding dysfunction  Evaluation Date: 1/20/2022  Authorization Period Expiration: 6/1/2022  Plan of Care Expiration: 3/20/2022  Progress Note Due: 2/20/2022  Visit # / Visits authorized: 1/ 20   FOTO: Issued Visit #: 1/3  Cancelled Visits: **  No Show Visits: **    Precautions: universal Back surg (2017) from sacrum to T10 from scoliosis and infection osteomyelitis, RTC injuey on the R side     Time In: 9:05  Time Out: 9:50  Total Appointment Time (timed & untimed codes): 45 minutes    Subjective     Pt reports: No suprises this week. She did not have a lot of time this morning. She feels like she is emptying most mornings. She will do douple void, go to the bathroom, walk around and do something and go the the bathroom again. She felt yesterday she was going every 2-3 hours. Had 4-5 glasses of water and 2 cups of diet coke. No longer using cathater. She would like next week to try and see how much more she voids after uninating. Anne-Marie wants her to come back in 3 months. States when she is taking the antiboitics she can empty better. The only leaking at night if she waits too long. She had more problem holding urine in after the surgery      She was compliant with home exercise program.  Response to previous treatment: good  Functional change: Not using cathater    Pain: na/10  Location:  none     Objective     Cinda received therapeutic exercises to develop  ROM for 30 minutes including:   Knees to chest  Butterfly  Piriformis  Cross body/glute med  Standing hip flexor stretch  Supported deep squat      Cinda participated  in neuromuscular re-education activities to develop Coordination, Control and Down training for 10 minutes including:   Sitting breathing with feet supported   Education on down training      Home Exercises Provided and Patient Education Provided     Education provided:   - anatomy/physiology of pelvic floor, posture/body mechanices, diaphragmatic breathing, double voiding techniques and proper bearing down techniques  Discussed progression of plan of care with patient; educated pt in activity modification; reviewed HEP with pt. Pt demonstrated and verbalized understanding of all instruction and was not provided with a handout of HEP (see Patient Instructions).      Written Home Exercises Provided: Patient instructed to cont prior HEP.  Exercises were reviewed and Cinda was able to demonstrate them prior to the end of the session.  Cinda demonstrated good  understanding of the education provided.     See EMR under Patient Instructions for exercises provided 1/27/2022.    Assessment     Cinda was able to complete exercises with the addition of stretches to increase mobility at the pelvic floor, encouraged to continue with breathing with stretches and worked into deep squat to promote lengthening of the pelvic floor  Cinda Is progressing well towards her goals.   Pt prognosis is Good.     Pt will continue to benefit from skilled outpatient physical therapy to address the deficits listed in the problem list box on initial evaluation, provide pt/family education and to maximize pt's level of independence in the home and community environment.     Pt's spiritual, cultural and educational needs considered and pt agreeable to plan of care and goals.     Anticipated barriers to physical therapy: none    Goals:     Short Term Goals: 4 weeks   I with initial HEP  Pt will verbalize improved awareness of PFM activity as palpated by PT in order to improve activity involvement with HEP.  Pt will be independent with double voiding  techniques 100% of the time to ensure full bladder emptying and decrease pt's risk of infection.   Pt to demonstrate proper positioning on commode with breathing techniques to increase relaxation to enable pt to feel empty after urination.        Long Term Goals: 8 weeks      Pt to be discharged with home plan for carry over after discharge.   Pt able to urinate completely without the use of cathater       Plan     Plan of care Certification: 1/20/2022 to 3/20/2022.     Outpatient Physical Therapy 1 time(s) every 1-2 week(s) for 8 weeks     Mabel Bland, PT

## 2022-02-03 ENCOUNTER — PATIENT MESSAGE (OUTPATIENT)
Dept: UROGYNECOLOGY | Facility: CLINIC | Age: 77
End: 2022-02-03
Payer: MEDICARE

## 2022-02-03 DIAGNOSIS — R30.0 DYSURIA: Primary | ICD-10-CM

## 2022-02-04 ENCOUNTER — PATIENT MESSAGE (OUTPATIENT)
Dept: ENDOCRINOLOGY | Facility: CLINIC | Age: 77
End: 2022-02-04
Payer: MEDICARE

## 2022-02-04 ENCOUNTER — LAB VISIT (OUTPATIENT)
Dept: LAB | Facility: OTHER | Age: 77
End: 2022-02-04
Attending: INTERNAL MEDICINE
Payer: MEDICARE

## 2022-02-04 DIAGNOSIS — E05.00 GRAVES' DISEASE: ICD-10-CM

## 2022-02-04 DIAGNOSIS — R30.0 DYSURIA: Primary | ICD-10-CM

## 2022-02-04 DIAGNOSIS — E05.00 GRAVES' DISEASE: Primary | ICD-10-CM

## 2022-02-04 LAB — TSH SERPL DL<=0.005 MIU/L-ACNC: 3.26 UIU/ML (ref 0.4–4)

## 2022-02-04 PROCEDURE — 36415 COLL VENOUS BLD VENIPUNCTURE: CPT | Performed by: INTERNAL MEDICINE

## 2022-02-04 PROCEDURE — 84443 ASSAY THYROID STIM HORMONE: CPT | Performed by: INTERNAL MEDICINE

## 2022-02-04 RX ORDER — NITROFURANTOIN 25; 75 MG/1; MG/1
100 CAPSULE ORAL 2 TIMES DAILY
Qty: 14 CAPSULE | Refills: 0 | Status: SHIPPED | OUTPATIENT
Start: 2022-02-04 | End: 2022-04-20

## 2022-02-04 NOTE — TELEPHONE ENCOUNTER
Graves disease   MMI 2.5 mg daily     Results for ANOOP NEELY (MRN [id number]) as of 2/4/2022 12:15   Ref. Range 9/28/2021 12:07   Sodium Latest Ref Range: 136 - 145 mmol/L 135 (L)   Potassium Latest Ref Range: 3.5 - 5.1 mmol/L 4.9   Chloride Latest Ref Range: 95 - 110 mmol/L 102   CO2 Latest Ref Range: 23 - 29 mmol/L 22 (L)   Anion Gap Latest Ref Range: 8 - 16 mmol/L 11   BUN Latest Ref Range: 8 - 23 mg/dL 22   Creatinine Latest Ref Range: 0.5 - 1.4 mg/dL 1.1   eGFR if non African American Latest Ref Range: >60 mL/min/1.73 m^2 48.9 (A)   eGFR if African American Latest Ref Range: >60 mL/min/1.73 m^2 56.4 (A)   Glucose Latest Ref Range: 70 - 110 mg/dL 88   Calcium Latest Ref Range: 8.7 - 10.5 mg/dL 10.2   Alkaline Phosphatase Latest Ref Range: 55 - 135 U/L 39 (L)   PROTEIN TOTAL Latest Ref Range: 6.0 - 8.4 g/dL 7.3   Albumin Latest Ref Range: 3.5 - 5.2 g/dL 4.2   BILIRUBIN TOTAL Latest Ref Range: 0.1 - 1.0 mg/dL 0.3   AST Latest Ref Range: 10 - 40 U/L 24   ALT Latest Ref Range: 10 - 44 U/L 21   Results for ANOOP NEELY (MRN [id number]) as of 2/4/2022 12:15   Ref. Range 1/20/2022 10:45   TSH Latest Ref Range: 0.400 - 4.000 uIU/mL 2.500

## 2022-02-07 ENCOUNTER — PATIENT MESSAGE (OUTPATIENT)
Dept: UROGYNECOLOGY | Facility: CLINIC | Age: 77
End: 2022-02-07
Payer: MEDICARE

## 2022-02-08 DIAGNOSIS — E05.00 GRAVES' DISEASE: Primary | ICD-10-CM

## 2022-02-10 ENCOUNTER — CLINICAL SUPPORT (OUTPATIENT)
Dept: REHABILITATION | Facility: OTHER | Age: 77
End: 2022-02-10
Attending: OBSTETRICS & GYNECOLOGY
Payer: MEDICARE

## 2022-02-10 DIAGNOSIS — M62.89 PELVIC FLOOR TENSION: Primary | ICD-10-CM

## 2022-02-10 PROCEDURE — 97110 THERAPEUTIC EXERCISES: CPT

## 2022-02-10 PROCEDURE — 97112 NEUROMUSCULAR REEDUCATION: CPT

## 2022-02-10 NOTE — PROGRESS NOTES
Pelvic Health Physical Therapy   Treatment Note     Name: Sarah Alejandro  Clinic Number: [id number]    Therapy Diagnosis:   Encounter Diagnosis   Name Primary?    Pelvic floor tension Yes     Physician: Hodan Goldberg MD    Visit Date: 2/10/2022    Physician Orders: PT Eval and Treat   Medical Diagnosis from Referral: N39.3 (ICD-10-CM) - Stress incontinence of urine N39.8 (ICD-10-CM) - Voiding dysfunction  Evaluation Date: 1/20/2022  Authorization Period Expiration: 6/1/2022  Plan of Care Expiration: 3/20/2022  Progress Note Due: 2/20/2022  Visit # / Visits authorized: 1/ 20   FOTO: Issued Visit #: 1/3 (next visit)  Cancelled Visits: **  No Show Visits: **    Precautions: universal Back surg (2017) from sacrum to T10 from scoliosis and infection osteomyelitis, RTC injuey on the R side     Time In: 9:00  Time Out: 9:50  Total Appointment Time (timed & untimed codes): 50 minutes    Subjective     Pt reports: She thinks she is doing a lot better, the week after she was here, that Monday, she wanted to see if she was emptying properly, she urinated and then used the cathater she had less than 100 but more than 50 in the hat. What she has found is that in the morning she goes to the bathroom quite a bit. If she goes at 4 am, she will go again at 6. She is not having a problem at night any more, and is being cautious by wearing depend at night and usually wakes up 100% dry. Increased frequency between 9 am and 1 pm about every 2 hours, then after that it is about every 2.5-3 hrs. When she has the urge to go, she has to go, where before she might have held it for a bit longer. Feels like things are continually changing. She walks and uses elliptical and not leaking. If she leaks at all on the pad it is more because she has got up fro the toilet too fast and there is a drip  ---------  No suprises this week. She did not have a lot of time this morning. She feels like she is emptying most mornings. She will do  "douple void, go to the bathroom, walk around and do something and go the the bathroom again. She felt yesterday she was going every 2-3 hours. Had 4-5 glasses of water and 2 cups of diet coke. No longer using cathater. She would like next week to try and see how much more she voids after uninating. Anne-Marie wants her to come back in 3 months. States when she is taking the antiboitics she can empty better. The only leaking at night if she waits too long. She had more problem holding urine in after the surgery      She was compliant with home exercise program.  Response to previous treatment: good  Functional change: Not using cathater    Pain: na/10  Location:  none     Objective     Cinda received therapeutic exercises to develop  ROM for 30 minutes including:     Piriformis 30"x2  Supported deep squat  Squat on ball  Hip opener, toe touch  Cat cow  Standing hip flexor stretch    Not performed 2/10/2022 :    Knees to chest  Butterfly  Cross body/glute med        Cinda participated in neuromuscular re-education activities to develop Coordination, Control and Down training for 15 minutes including:   Sitting breathing with feet supported   Education on down training  Guided medication 10 min breathing    Home Exercises Provided and Patient Education Provided     Education provided:   - anatomy/physiology of pelvic floor, posture/body mechanices, diaphragmatic breathing, double voiding techniques and proper bearing down techniques  Discussed progression of plan of care with patient; educated pt in activity modification; reviewed HEP with pt. Pt demonstrated and verbalized understanding of all instruction and was not provided with a handout of HEP (see Patient Instructions).      Written Home Exercises Provided: Patient instructed to cont prior HEP.  Exercises were reviewed and Cinda was able to demonstrate them prior to the end of the session.  Cinda demonstrated good  understanding of the education provided.     See EMR " under Patient Instructions for exercises provided 1/27/2022.    Assessment     Cinda was able to resume exercises with the addition of stretches to increase mobility at the pelvic floor, encouraged to continue with breathing with stretches and continued deep squat to promote lengthening of the pelvic floor, not able to get into deep squat, will do better with happy baby. She reports she can feel relaxed when twisting to the L and when doing the squats and the lunge.    Cinda Is progressing well towards her goals.   Pt prognosis is Good.     Pt will continue to benefit from skilled outpatient physical therapy to address the deficits listed in the problem list box on initial evaluation, provide pt/family education and to maximize pt's level of independence in the home and community environment.     Pt's spiritual, cultural and educational needs considered and pt agreeable to plan of care and goals.     Anticipated barriers to physical therapy: none    Goals:     Short Term Goals: 4 weeks   I with initial HEP  Pt will verbalize improved awareness of PFM activity as palpated by PT in order to improve activity involvement with HEP.  Pt will be independent with double voiding techniques 100% of the time to ensure full bladder emptying and decrease pt's risk of infection.   Pt to demonstrate proper positioning on commode with breathing techniques to increase relaxation to enable pt to feel empty after urination.        Long Term Goals: 8 weeks      Pt to be discharged with home plan for carry over after discharge.   Pt able to urinate completely without the use of cathater       Plan     Plan of care Certification: 1/20/2022 to 3/20/2022.     Outpatient Physical Therapy 1 time(s) every 1-2 week(s) for 8 weeks     Mabel Bland, PT

## 2022-02-16 ENCOUNTER — OFFICE VISIT (OUTPATIENT)
Dept: PODIATRY | Facility: CLINIC | Age: 77
End: 2022-02-16
Payer: MEDICARE

## 2022-02-16 VITALS — WEIGHT: 119 LBS | BODY MASS INDEX: 20.32 KG/M2 | HEIGHT: 64 IN

## 2022-02-16 DIAGNOSIS — B35.1 DERMATOPHYTOSIS OF NAIL: Primary | ICD-10-CM

## 2022-02-16 DIAGNOSIS — M77.41 METATARSALGIA OF BOTH FEET: ICD-10-CM

## 2022-02-16 DIAGNOSIS — M20.42 HAMMER TOE OF LEFT FOOT: ICD-10-CM

## 2022-02-16 DIAGNOSIS — M77.42 METATARSALGIA OF BOTH FEET: ICD-10-CM

## 2022-02-16 PROCEDURE — 99999 PR PBB SHADOW E&M-EST. PATIENT-LVL III: ICD-10-PCS | Mod: PBBFAC,,, | Performed by: PODIATRIST

## 2022-02-16 PROCEDURE — 99213 OFFICE O/P EST LOW 20 MIN: CPT | Mod: S$PBB,,, | Performed by: PODIATRIST

## 2022-02-16 PROCEDURE — 99213 PR OFFICE/OUTPT VISIT, EST, LEVL III, 20-29 MIN: ICD-10-PCS | Mod: S$PBB,,, | Performed by: PODIATRIST

## 2022-02-16 PROCEDURE — 99999 PR PBB SHADOW E&M-EST. PATIENT-LVL III: CPT | Mod: PBBFAC,,, | Performed by: PODIATRIST

## 2022-02-16 PROCEDURE — 99213 OFFICE O/P EST LOW 20 MIN: CPT | Mod: PBBFAC,PN | Performed by: PODIATRIST

## 2022-02-16 RX ORDER — CICLOPIROX 7.7 MG/G
GEL TOPICAL DAILY
Qty: 30 G | Refills: 1 | Status: SHIPPED | OUTPATIENT
Start: 2022-02-16

## 2022-02-16 NOTE — PROGRESS NOTES
PODIATRY NOTE       PATIENT ID:  Sarah Alejandro is a 76 y.o. female.       CHIEF CONCERN:   Proc B               MEDICAL DECISION MAKING:          Problem List Items Addressed This Visit    None     Visit Diagnoses     Dermatophytosis of nail    -  Primary    Relevant Medications    ciclopirox 0.77 % Gel    Metatarsalgia of both feet        Hammer toe of left foot                · I counseled the patient on the patient's conditions, their implications and medical management.    Shoe and activity modification as needed for relief.   Patient wants to avoid surgery for left toe pain.  She has seen Dr. Etaon (orthopedics) in the past.    Meds as ordered.  Neosporin to the left 2nd toe and bandaid.  Avoid compression around toe.  Check daily.    Continue urea cream daily.     General nail care measures for abnormal nails include:   Keeping nails trimmed short   Avoiding trauma    Avoiding contact irritants    Keeping nails dry (avoiding wet work)   Avoiding all nail cosmetics                 HISTORY OF PRESENT ILLNESS:  Sarah Alejandro is a 76 y.o. female with concerns regarding: painful calluses sub metatarsal heads, chronic.     She has history of bunionectomy.  She has tenderness at the left hallux, at the IPJ.  She does not recall acute trauma to the area.  Does hurt sometimes even when nonweightbearing.   She has a left 2nd hammertoe.  She is using a bandaid to cover the toe.           Patient Active Problem List   Diagnosis    Stress incontinence of urine    Voiding dysfunction    Rectocele, female    Chronic constipation    Vaginal atrophy    Graves' disease    Stage 3a chronic kidney disease    Hyperlipidemia    Aortic valve stenosis    Essential tremor    Essential hypertension    Insomnia    S/P TAVR (transcatheter aortic valve replacement)    Lumbar radiculopathy    S/P hip replacement    Pelvic floor weakness    Decreased functional mobility and endurance    Disorder of  muscle    Preoperative cardiovascular examination    Status post anterior & posterior colporrhaphy w/ MUS 11/1    Incomplete bladder emptying    Pelvic floor tension    Pelvic floor dysfunction           Current Outpatient Medications on File Prior to Visit   Medication Sig Dispense Refill    ALPRAZolam (XANAX) 0.5 MG tablet Take 1 tablet (0.5 mg total) by mouth daily as needed (severe anxiety). 30 tablet 0    ASCORBATE CALCIUM, VITAMIN C, ORAL Take 500 mg by mouth.      aspirin (ECOTRIN) 81 MG EC tablet Take 81 mg by mouth once daily.      atenoloL (TENORMIN) 100 MG tablet Take 1 tablet (100 mg total) by mouth 2 (two) times a day. 60 tablet 11    cetirizine-pseudoephedrine 5-120 mg Tb12 cetirizine 5 mg-pseudoephedrine  mg tablet,extended release,12hr   TK 1 T PO BID      cholecalciferol, vitamin D3, (VITAMIN D3) 50 mcg (2,000 unit) Tab Take 2,000 Units by mouth.      CMPD testosterone proprionate 2% in vanicream Apply topically. [facility]      COMPOUND HORMONE REPLACEMENT Take by mouth once daily. ESTROGEN CREAM -- [facility]      conjugated estrogens (PREMARIN) vaginal cream Place 0.5 g vaginally once daily. 1 applicator 5    docusate sodium (COLACE) 100 MG capsule Take 1 capsule (100 mg total) by mouth 2 (two) times daily. 60 capsule 1    EPINEPHrine (EPIPEN) 0.3 mg/0.3 mL AtIn epinephrine 0.3 mg/0.3 mL injection, auto-injector      ibuprofen (ADVIL,MOTRIN) 600 MG tablet Take 1 tablet (600 mg total) by mouth every 6 (six) hours as needed for Pain. 30 tablet 1    MAGNESIUM CARBONATE ORAL Take 1 tablet by mouth.      methIMAzole (TAPAZOLE) 5 MG Tab Take half a tablet daily 90 tablet 1    nitrofurantoin, macrocrystal-monohydrate, (MACROBID) 100 MG capsule Take 1 capsule (100 mg total) by mouth 2 (two) times daily. 14 capsule 0    omalizumab (XOLAIR) 75 mg/0.5 mL injection       PENNSAID 20 mg/gram /actuation(2 %) sopm APPLY 1 APPLICATION TOPICALLY 2 (TWO) TIMES DAILY AS  "NEEDED.::NOT COVERED:: 112 g 10    polyethylene glycol (GLYCOLAX) 17 gram/dose powder Take 17 g by mouth daily as needed.      progesterone (PROMETRIUM) 200 MG capsule Take 1 capsule by mouth 30-60 minutes before bed every night 90 capsule 3    rosuvastatin (CRESTOR) 40 MG Tab TAKE 1 TABLET BY MOUTH EVERY DAY 90 tablet 1    tretinoin, emollient, (RENOVA) 0.02 % Crea Renova 0.02 % topical cream   APPLY TO AFFECTED AREA EVERY DAY AT NIGHT *NOT COVERED*      urea 20 % Crea Apply 1 application topically once daily. To dry skin on the feet. 75 g 10    valACYclovir (VALTREX) 1000 MG tablet valacyclovir 1 gram tablet      XIIDRA 5 % Dpet       zolpidem (AMBIEN CR) 6.25 MG CR tablet Take 1 tablet (6.25 mg total) by mouth nightly as needed for Insomnia. 30 tablet 2     No current facility-administered medications on file prior to visit.           Review of patient's allergies indicates:   Allergen Reactions    Penicillins Rash and Hives               ROS:   General ROS: negative for - chills, fever or night sweats  Respiratory ROS: no cough, shortness of breath, or wheezing  Cardiovascular ROS: no chest pain or dyspnea on exertion  Musculoskeletal ROS: positive for - pain in foot - bilateral  Neurological ROS: negative for - impaired coordination/balance and numbness/tingling  Dermatological ROS: negative for pruritus and rash      EXAM:     Vitals:    02/16/22 1009   Weight: 54 kg (119 lb)   Height: 5' 4" (1.626 m)        General:  Alert and Oriented x 3;  No acute distress      Bilateral  Lower extremity exam:    Vascular:   Dorsalis Pedis:  present   Posterior Tibial:  present  Capillary refill time:  3 seconds  Temperature of toes cool to touch  Edema:  Trace and non-pitting       Neurological:     Sharp touch:  normal  Light touch: normal  Tinels Sign:  Absent  Mulders Click:   Absent        Dermatological:   Skin: thin, moist and appropriate for age; calluses sub metatarsal heads  Wounds/Ulcers:  " Absent  Bruising:  Absent  Erythema:  Absent  Toenails are elongated and dystrophic.  Mycotic left hallux nail.    There is a superficial sore in the sulcus of the left 2nd toe due to bandaid irritation.       Musculoskeletal:   Metatarsophalangeal range of motion:   diminished range of motion  Subtalar joint range of motion: full range of motion  Ankle joint range of motion:  full range of motion  Bunions:  Present  Hammertoes: Present

## 2022-02-24 ENCOUNTER — CLINICAL SUPPORT (OUTPATIENT)
Dept: REHABILITATION | Facility: OTHER | Age: 77
End: 2022-02-24
Attending: OBSTETRICS & GYNECOLOGY
Payer: MEDICARE

## 2022-02-24 DIAGNOSIS — M62.89 PELVIC FLOOR TENSION: Primary | ICD-10-CM

## 2022-02-24 PROCEDURE — 97112 NEUROMUSCULAR REEDUCATION: CPT

## 2022-02-24 PROCEDURE — 97110 THERAPEUTIC EXERCISES: CPT

## 2022-02-24 NOTE — PROGRESS NOTES
Pelvic Health Physical Therapy   Treatment Note     Name: Sarah Alejandro  Clinic Number: [id number]    Therapy Diagnosis:   Encounter Diagnosis   Name Primary?    Pelvic floor tension Yes     Physician: Hodan Goldberg MD    Visit Date: 2/24/2022    Physician Orders: PT Eval and Treat   Medical Diagnosis from Referral: N39.3 (ICD-10-CM) - Stress incontinence of urine N39.8 (ICD-10-CM) - Voiding dysfunction  Evaluation Date: 1/20/2022  Authorization Period Expiration: 6/1/2022  Plan of Care Expiration: 3/20/2022  Progress Note Due: 3/24/2022  Visit # / Visits authorized: 4/ 20   FOTO: Issued Visit #: 2/3  Cancelled Visits: **  No Show Visits: **    Precautions: universal Back surg (2017) from sacrum to T10 from scoliosis and infection osteomyelitis, RTC injuey on the R side     Time In: 11:05  Time Out: 12:00  Total Appointment Time (timed & untimed codes): 55 minutes    Subjective     Pt reports: Some days she feels like she is completely empty and other days she doesn't. On the days she doesn't feel empty she will just get up until she has to urge again. There are days where she gets up goes to the RR and goes to the gym and then emptys and those seem most successful. Will leak if she hasn't gone to the bathroom in a while or when into a deep squat. Prior to surgery she didn't leak at night, now that she has the sling, she either doesn't urinate at much or waking up, she is going to the bathroom 2-3 hours. She has been doing the exercises at the gym. In therapy lat visit she   -----  She thinks she is doing a lot better, the week after she was here, that Monday, she wanted to see if she was emptying properly, she urinated and then used the cathater she had less than 100 but more than 50 in the hat. What she has found is that in the morning she goes to the bathroom quite a bit. If she goes at 4 am, she will go again at 6. She is not having a problem at night any more, and is being cautious by wearing depend  "at night and usually wakes up 100% dry. Increased frequency between 9 am and 1 pm about every 2 hours, then after that it is about every 2.5-3 hrs. When she has the urge to go, she has to go, where before she might have held it for a bit longer. Feels like things are continually changing. She walks and uses elliptical and not leaking. If she leaks at all on the pad it is more because she has got up fro the toilet too fast and there is a drip  ---------  No suprises this week. She did not have a lot of time this morning. She feels like she is emptying most mornings. She will do douple void, go to the bathroom, walk around and do something and go the the bathroom again. She felt yesterday she was going every 2-3 hours. Had 4-5 glasses of water and 2 cups of diet coke. No longer using cathater. She would like next week to try and see how much more she voids after uninating. Anne-Marie wants her to come back in 3 months. States when she is taking the antiboitics she can empty better. The only leaking at night if she waits too long. She had more problem holding urine in after the surgery      She was compliant with home exercise program.  Response to previous treatment: good  Functional change: Not using cathater    Pain: na/10  Location:  none     Objective     Cinda received therapeutic exercises to develop  ROM for 40 minutes including:     Piriformis 30"x2  Butterfly  Bethanie pose    quadruped    hip extension 10x2   Hydrant 10x2  Clam with RTB 10x2  Bridge with RTB 10x2      Not performed 2/24/2022 :  Supported deep squat  Squat on ball  Hip opener, toe touch  Cat cow  Standing hip flexor stretch  Knees to chest  Cross body/glute med        Cinda participated in neuromuscular re-education activities to develop Coordination, Control and Down training for 15 minutes including:   Sitting breathing with feet supported   Guided medication 10 min breathing    Home Exercises Provided and Patient Education Provided     Education " provided:   - anatomy/physiology of pelvic floor, posture/body mechanices, diaphragmatic breathing, double voiding techniques and proper bearing down techniques  Discussed progression of plan of care with patient; educated pt in activity modification; reviewed HEP with pt. Pt demonstrated and verbalized understanding of all instruction and was not provided with a handout of HEP (see Patient Instructions).      Written Home Exercises Provided: Patient instructed to cont prior HEP.  Exercises were reviewed and Cinda was able to demonstrate them prior to the end of the session.  Cinda demonstrated good  understanding of the education provided.     See EMR under Patient Instructions for exercises provided 1/27/2022.    Assessment     Cinda was able to complete therapy this visit without complaints of leaking. Completed some strengthening around the hips and glutes, for stability but instructed not to kegel. However because of reporting leaking with squat (did not complete today) she was instructed to kegel with squats when at the gym to prevent leaking. Plan on internal assessment next visit.    Cinda Is progressing well towards her goals.   Pt prognosis is Good.     Pt will continue to benefit from skilled outpatient physical therapy to address the deficits listed in the problem list box on initial evaluation, provide pt/family education and to maximize pt's level of independence in the home and community environment.     Pt's spiritual, cultural and educational needs considered and pt agreeable to plan of care and goals.     Anticipated barriers to physical therapy: none    Goals:     Short Term Goals: 4 weeks   I with initial HEP  Pt will verbalize improved awareness of PFM activity as palpated by PT in order to improve activity involvement with HEP.  Pt will be independent with double voiding techniques 100% of the time to ensure full bladder emptying and decrease pt's risk of infection.   Pt to demonstrate proper  positioning on commode with breathing techniques to increase relaxation to enable pt to feel empty after urination.        Long Term Goals: 8 weeks      Pt to be discharged with home plan for carry over after discharge.   Pt able to urinate completely without the use of cathater       Plan     Plan of care Certification: 1/20/2022 to 3/20/2022.     Outpatient Physical Therapy 1 time(s) every 1-2 week(s) for 8 weeks     Mabel Bland, PT

## 2022-03-08 DIAGNOSIS — Z12.11 SCREEN FOR COLON CANCER: Primary | ICD-10-CM

## 2022-03-08 RX ORDER — POLYETHYLENE GLYCOL 3350, SODIUM SULFATE ANHYDROUS, SODIUM BICARBONATE, SODIUM CHLORIDE, POTASSIUM CHLORIDE 236; 22.74; 6.74; 5.86; 2.97 G/4L; G/4L; G/4L; G/4L; G/4L
8 POWDER, FOR SOLUTION ORAL ONCE
Qty: 8000 ML | Refills: 0 | Status: SHIPPED | OUTPATIENT
Start: 2022-03-08 | End: 2022-03-08

## 2022-03-11 DIAGNOSIS — E78.5 HYPERLIPIDEMIA, UNSPECIFIED HYPERLIPIDEMIA TYPE: ICD-10-CM

## 2022-03-11 NOTE — TELEPHONE ENCOUNTER
No new care gaps identified.  Powered by [employer]. Reference number: [id number].   3/11/2022 12:13:34 AM CST

## 2022-03-15 RX ORDER — ROSUVASTATIN CALCIUM 40 MG/1
TABLET, COATED ORAL
Qty: 90 TABLET | Refills: 1 | Status: SHIPPED | OUTPATIENT
Start: 2022-03-15 | End: 2022-08-31

## 2022-03-15 NOTE — TELEPHONE ENCOUNTER
Refill Authorization Note   Sarah DrummondAgustinaJacky  is requesting a refill authorization.  Brief Assessment and Rationale for Refill:  Approve     Medication Therapy Plan:       Medication Reconciliation Completed: No   Comments:   --->Care Gap information included below if applicable.   Orders Placed This Encounter    rosuvastatin (CRESTOR) 40 MG Tab      Requested Prescriptions   Signed Prescriptions Disp Refills    rosuvastatin (CRESTOR) 40 MG Tab 90 tablet 1     Sig: TAKE 1 TABLET BY MOUTH EVERY DAY       Cardiovascular:  Antilipid - Statins Passed - 3/15/2022  4:00 PM        Passed - Patient is at least 18 years old        Passed - Valid encounter within last 15 months     Recent Visits  Date Type Provider Dept   09/28/21 Office Visit Latrice Davidson MD [facility] Internal Medicine   Showing recent visits within past 720 days and meeting all other requirements  Future Appointments  No visits were found meeting these conditions.  Showing future appointments within next 150 days and meeting all other requirements      Future Appointments              In 2 days Mabel Bland, PT [facility]    In 2 weeks Mabel Bland PT [facility]    In 1 month Mabel Bland PT [facility]    In 1 month Anne-Marie Pack, NP [facility], [facility]    In 1 month Merary Roblero DPM [place] [facility], [place]    In 1 month COVID TESTING, Yarsani PRE-ADMIT [facility] ([place]    In 2 months Aleja Boudreaux MD [facility] - [address]    In 4 months LAB, [facility]                Passed - ALT is 131 or below and within 360 days     ALT   Date Value Ref Range Status   09/28/2021 21 10 - 44 U/L Final   07/22/2021 16 10 - 44 U/L Final   06/10/2021 19 10 - 44 U/L Final              Passed - AST is 119 or below and within 360 days     AST   Date Value Ref Range Status   09/28/2021 24 10 - 40 U/L Final    07/22/2021 19 10 - 40 U/L Final   06/10/2021 22 10 - 40 U/L Final              Passed - Total Cholesterol within 360 days     Lab Results   Component Value Date    CHOL 177 07/22/2021              Passed - LDL within 360 days     LDL Cholesterol   Date Value Ref Range Status   07/22/2021 90.6 63.0 - 159.0 mg/dL Final     Comment:     The National Cholesterol Education Program (NCEP) has set the  following guidelines (reference values) for LDL Cholesterol:  Optimal.......................<130 mg/dL  Borderline High...............130-159 mg/dL  High..........................160-189 mg/dL  Very High.....................>190 mg/dL              Passed - HDL within 360 days     HDL   Date Value Ref Range Status   07/22/2021 75 40 - 75 mg/dL Final     Comment:     The National Cholesterol Education Program (NCEP) has set the  following guidelines (reference values) for HDL Cholesterol:  Low...............<40 mg/dL  Optimal...........>60 mg/dL              Passed - Triglycerides within 360 days     Lab Results   Component Value Date    TRIG 57 07/22/2021                  Appointments  past 12m or future 3m with PCP    Date Provider   Last Visit   9/28/2021 Latrice Davidson MD   Next Visit   Visit date not found Latrice Davidson MD   ED visits in past 90 days: 0     Note composed:4:05 PM 03/15/2022

## 2022-03-17 ENCOUNTER — CLINICAL SUPPORT (OUTPATIENT)
Dept: REHABILITATION | Facility: OTHER | Age: 77
End: 2022-03-17
Attending: NURSE PRACTITIONER
Payer: MEDICARE

## 2022-03-17 DIAGNOSIS — M62.89 PELVIC FLOOR TENSION: Primary | ICD-10-CM

## 2022-03-17 PROCEDURE — 97110 THERAPEUTIC EXERCISES: CPT

## 2022-03-17 PROCEDURE — 97112 NEUROMUSCULAR REEDUCATION: CPT

## 2022-03-17 NOTE — PROGRESS NOTES
Pelvic Health Physical Therapy   Treatment Note     Name: Sarah Alejandro  Clinic Number: [id number]    Therapy Diagnosis:   Encounter Diagnosis   Name Primary?    Pelvic floor tension Yes     Physician: Hodan Goldberg MD    Visit Date: 3/17/2022    Physician Orders: PT Eval and Treat   Medical Diagnosis from Referral: N39.3 (ICD-10-CM) - Stress incontinence of urine N39.8 (ICD-10-CM) - Voiding dysfunction  Evaluation Date: 1/20/2022  Authorization Period Expiration: 12/17./2022  Plan of Care Expiration: 3/20/2022 (sent)  Progress Note Due: 3/24/2022  Visit # / Visits authorized: 5/ 20   FOTO: Issued Visit #: 2/3  Cancelled Visits: **  No Show Visits: **    Precautions: universal Back surg (2017) from sacrum to T10 from scoliosis and infection osteomyelitis, RTC injuey on the R side     Time In: 8:10  Time Out: 8:55  Total Appointment Time (timed & untimed codes): 45 minutes    Subjective     Pt reports: The emptying is feeling good 90-95% of the time. In the morning the stream is pretty strong. The only time she thinks there is somewhat a problem is when she has been sitting for a while completing a task and when she goes to the bathroom it is a little difficult to start. After the surgery she was having issues waking up multiple times a night and when she would wake up in the morning it would be a major leak. Since then she wears a depend at night but still does not leak. She does not really wear a pad anymore, she has liners. She does not know how much she is retaining and does not want to test because she does not want to get an UTI. Sometimes at night she will have a couple of drops on the liner. Bowel movements are back to the way they were. Feels she does not have a problem, every 2-3 days for bowels. Once in a while she had to push  --------  Some days she feels like she is completely empty and other days she doesn't. On the days she doesn't feel empty she will just get up until she has to urge  again. There are days where she gets up goes to the RR and goes to the gym and then emptys and those seem most successful. Will leak if she hasn't gone to the bathroom in a while or when into a deep squat. Prior to surgery she didn't leak at night, now that she has the sling, she either doesn't urinate at much or waking up, she is going to the bathroom 2-3 hours. She has been doing the exercises at the gym. In therapy lat visit she   -----  She thinks she is doing a lot better, the week after she was here, that Monday, she wanted to see if she was emptying properly, she urinated and then used the cathater she had less than 100 but more than 50 in the hat. What she has found is that in the morning she goes to the bathroom quite a bit. If she goes at 4 am, she will go again at 6. She is not having a problem at night any more, and is being cautious by wearing depend at night and usually wakes up 100% dry. Increased frequency between 9 am and 1 pm about every 2 hours, then after that it is about every 2.5-3 hrs. When she has the urge to go, she has to go, where before she might have held it for a bit longer. Feels like things are continually changing. She walks and uses elliptical and not leaking. If she leaks at all on the pad it is more because she has got up fro the toilet too fast and there is a drip  ---------  No suprises this week. She did not have a lot of time this morning. She feels like she is emptying most mornings. She will do douple void, go to the bathroom, walk around and do something and go the the bathroom again. She felt yesterday she was going every 2-3 hours. Had 4-5 glasses of water and 2 cups of diet coke. No longer using cathater. She would like next week to try and see how much more she voids after uninating. Anne-Marie wants her to come back in 3 months. States when she is taking the antiboitics she can empty better. The only leaking at night if she waits too long. She had more problem holding  "urine in after the surgery      She was compliant with home exercise program.  Response to previous treatment: good  Functional change: Not using cathater    Pain: na/10  Location:  none     Objective     Cinda received therapeutic exercises to develop  ROM for 10 minutes including:     Piriformis 30"x2  Butterfly 1 min  Bethanie pose I min  Supported squat      Not performed 3/17/2022 :  Supported deep squat  Squat on ball  Hip opener, toe touch  Cat cow  Standing hip flexor stretch  Knees to chest  Cross body/glute med  quadruped    hip extension 10x2   Hydrant 10x2  Clam with RTB 10x2  Bridge with RTB 10x2      Cinda participated in neuromuscular re-education activities to develop Coordination, Control and Down training for 30 minutes including:   Urge control, and relaxation training for urine and bowel  Education on bowel maneuver, positioning, and breathing.    Home Exercises Provided and Patient Education Provided     Education provided:   - anatomy/physiology of pelvic floor, posture/body mechanices, diaphragmatic breathing, double voiding techniques and proper bearing down techniques  Discussed progression of plan of care with patient; educated pt in activity modification; reviewed HEP with pt. Pt demonstrated and verbalized understanding of all instruction and was not provided with a handout of HEP (see Patient Instructions).      Written Home Exercises Provided: Patient instructed to cont prior HEP.  Exercises were reviewed and Cinda was able to demonstrate them prior to the end of the session.  Cinda demonstrated good  understanding of the education provided.     See EMR under Patient Instructions for exercises provided 1/27/2022.    Assessment     Cinda presents to therapy with decreased complaints of urinary leakage and feeling of retention. Reports that there are times where she goes to the bathroom and there is not a lot that comes out but that is usually when she working on her schedule and feels like she " has not gone to urinate in a while. She is wearing depends at night and liners in the day but has not had any accidents since after the initial surgery. Discussed bowel habits and to continue to keep to stool soft and try to limit pushing, discussed breathing techniques to poke out the belly to help relax the pelvic floor. She is still exercising and reports that she is not feeling leaking with the squats, in the gym or in clinic. Plan to D/C next visit.      Cinda Is progressing well towards her goals.   Pt prognosis is Good.     Pt will continue to benefit from skilled outpatient physical therapy to address the deficits listed in the problem list box on initial evaluation, provide pt/family education and to maximize pt's level of independence in the home and community environment.     Pt's spiritual, cultural and educational needs considered and pt agreeable to plan of care and goals.     Anticipated barriers to physical therapy: none    Goals:     Short Term Goals: 4 weeks   I with initial HEP (MET)  Pt will verbalize improved awareness of PFM activity as palpated by PT in order to improve activity involvement with HEP. (MET)  Pt will be independent with double voiding techniques 100% of the time to ensure full bladder emptying and decrease pt's risk of infection. (MET)  Pt to demonstrate proper positioning on commode with breathing techniques to increase relaxation to enable pt to feel empty after urination. (MET)        Long Term Goals: 8 weeks      Pt to be discharged with home plan for carry over after discharge.   Pt able to urinate completely without the use of cathter (partially met)       Plan     Plan of care Certification: 3/17/2022 to 4/14/2022.     Outpatient Physical Therapy 1 time(s) every 1-2 week(s) for 4 weeks     Mabel Bland, PT

## 2022-03-17 NOTE — PLAN OF CARE
Pelvic Health Physical Therapy   Treatment Note     Name: Sarah Alejandro  Clinic Number: [id number]    Therapy Diagnosis:   Encounter Diagnosis   Name Primary?    Pelvic floor tension Yes     Physician: Hodan Goldberg MD    Visit Date: 3/17/2022    Physician Orders: PT Eval and Treat   Medical Diagnosis from Referral: N39.3 (ICD-10-CM) - Stress incontinence of urine N39.8 (ICD-10-CM) - Voiding dysfunction  Evaluation Date: 1/20/2022  Authorization Period Expiration: 12/17./2022  Plan of Care Expiration: 3/20/2022 (sent)  Progress Note Due: 3/24/2022  Visit # / Visits authorized: 5/ 20   FOTO: Issued Visit #: 2/3  Cancelled Visits: **  No Show Visits: **    Precautions: universal Back surg (2017) from sacrum to T10 from scoliosis and infection osteomyelitis, RTC injuey on the R side     Time In: 8:10  Time Out: 8:55  Total Appointment Time (timed & untimed codes): 45 minutes    Subjective     Pt reports: The emptying is feeling good 90-95% of the time. In the morning the stream is pretty strong. The only time she thinks there is somewhat a problem is when she has been sitting for a while completing a task and when she goes to the bathroom it is a little difficult to start. After the surgery she was having issues waking up multiple times a night and when she would wake up in the morning it would be a major leak. Since then she wears a depend at night but still does not leak. She does not really wear a pad anymore, she has liners. She does not know how much she is retaining and does not want to test because she does not want to get an UTI. Sometimes at night she will have a couple of drops on the liner. Bowel movements are back to the way they were. Feels she does not have a problem, every 2-3 days for bowels. Once in a while she had to push  --------  Some days she feels like she is completely empty and other days she doesn't. On the days she doesn't feel empty she will just get up until she has to urge  again. There are days where she gets up goes to the RR and goes to the gym and then emptys and those seem most successful. Will leak if she hasn't gone to the bathroom in a while or when into a deep squat. Prior to surgery she didn't leak at night, now that she has the sling, she either doesn't urinate at much or waking up, she is going to the bathroom 2-3 hours. She has been doing the exercises at the gym. In therapy lat visit she   -----  She thinks she is doing a lot better, the week after she was here, that Monday, she wanted to see if she was emptying properly, she urinated and then used the cathater she had less than 100 but more than 50 in the hat. What she has found is that in the morning she goes to the bathroom quite a bit. If she goes at 4 am, she will go again at 6. She is not having a problem at night any more, and is being cautious by wearing depend at night and usually wakes up 100% dry. Increased frequency between 9 am and 1 pm about every 2 hours, then after that it is about every 2.5-3 hrs. When she has the urge to go, she has to go, where before she might have held it for a bit longer. Feels like things are continually changing. She walks and uses elliptical and not leaking. If she leaks at all on the pad it is more because she has got up fro the toilet too fast and there is a drip  ---------  No suprises this week. She did not have a lot of time this morning. She feels like she is emptying most mornings. She will do douple void, go to the bathroom, walk around and do something and go the the bathroom again. She felt yesterday she was going every 2-3 hours. Had 4-5 glasses of water and 2 cups of diet coke. No longer using cathater. She would like next week to try and see how much more she voids after uninating. Anne-Marie wants her to come back in 3 months. States when she is taking the antiboitics she can empty better. The only leaking at night if she waits too long. She had more problem holding  "urine in after the surgery      She was compliant with home exercise program.  Response to previous treatment: good  Functional change: Not using cathater    Pain: na/10  Location:  none     Objective     Cinda received therapeutic exercises to develop  ROM for 10 minutes including:     Piriformis 30"x2  Butterfly 1 min  Bethanie pose I min  Supported squat      Not performed 3/17/2022 :  Supported deep squat  Squat on ball  Hip opener, toe touch  Cat cow  Standing hip flexor stretch  Knees to chest  Cross body/glute med  quadruped    hip extension 10x2   Hydrant 10x2  Clam with RTB 10x2  Bridge with RTB 10x2      Cinda participated in neuromuscular re-education activities to develop Coordination, Control and Down training for 30 minutes including:   Urge control, and relaxation training for urine and bowel  Education on bowel maneuver, positioning, and breathing.    Home Exercises Provided and Patient Education Provided     Education provided:   - anatomy/physiology of pelvic floor, posture/body mechanices, diaphragmatic breathing, double voiding techniques and proper bearing down techniques  Discussed progression of plan of care with patient; educated pt in activity modification; reviewed HEP with pt. Pt demonstrated and verbalized understanding of all instruction and was not provided with a handout of HEP (see Patient Instructions).      Written Home Exercises Provided: Patient instructed to cont prior HEP.  Exercises were reviewed and Cinda was able to demonstrate them prior to the end of the session.  Cinda demonstrated good  understanding of the education provided.     See EMR under Patient Instructions for exercises provided 1/27/2022.    Assessment     Cinda presents to therapy with decreased complaints of urinary leakage and feeling of retention. Reports that there are times where she goes to the bathroom and there is not a lot that comes out but that is usually when she working on her schedule and feels like she " has not gone to urinate in a while. She is wearing depends at night and liners in the day but has not had any accidents since after the initial surgery. Discussed bowel habits and to continue to keep to stool soft and try to limit pushing, discussed breathing techniques to poke out the belly to help relax the pelvic floor. She is still exercising and reports that she is not feeling leaking with the squats, in the gym or in clinic. Plan to D/C next visit.      Cinda Is progressing well towards her goals.   Pt prognosis is Good.     Pt will continue to benefit from skilled outpatient physical therapy to address the deficits listed in the problem list box on initial evaluation, provide pt/family education and to maximize pt's level of independence in the home and community environment.     Pt's spiritual, cultural and educational needs considered and pt agreeable to plan of care and goals.     Anticipated barriers to physical therapy: none    Goals:     Short Term Goals: 4 weeks   I with initial HEP (MET)  Pt will verbalize improved awareness of PFM activity as palpated by PT in order to improve activity involvement with HEP. (MET)  Pt will be independent with double voiding techniques 100% of the time to ensure full bladder emptying and decrease pt's risk of infection. (MET)  Pt to demonstrate proper positioning on commode with breathing techniques to increase relaxation to enable pt to feel empty after urination. (MET)        Long Term Goals: 8 weeks      Pt to be discharged with home plan for carry over after discharge.   Pt able to urinate completely without the use of cathter (partially met)       Plan     Plan of care Certification: 3/17/2022 to 4/14/2022.     Outpatient Physical Therapy 1 time(s) every 1-2 week(s) for 4 weeks     Mabel Bland, PT

## 2022-03-31 ENCOUNTER — IMMUNIZATION (OUTPATIENT)
Dept: PHARMACY | Facility: CLINIC | Age: 77
End: 2022-03-31
Payer: MEDICARE

## 2022-03-31 ENCOUNTER — CLINICAL SUPPORT (OUTPATIENT)
Dept: REHABILITATION | Facility: OTHER | Age: 77
End: 2022-03-31
Attending: OBSTETRICS & GYNECOLOGY
Payer: MEDICARE

## 2022-03-31 DIAGNOSIS — M62.89 PELVIC FLOOR TENSION: Primary | ICD-10-CM

## 2022-03-31 DIAGNOSIS — Z23 NEED FOR VACCINATION: Primary | ICD-10-CM

## 2022-03-31 PROCEDURE — 97110 THERAPEUTIC EXERCISES: CPT

## 2022-03-31 PROCEDURE — 97112 NEUROMUSCULAR REEDUCATION: CPT

## 2022-03-31 NOTE — PROGRESS NOTES
"  Pelvic Health Physical Therapy   Treatment Note     Name: Sarah Alejandro  Clinic Number: [id number]    Therapy Diagnosis:   Encounter Diagnosis   Name Primary?    Pelvic floor tension Yes     Physician: Hodan Goldberg MD    Visit Date: 3/31/2022    Physician Orders: PT Eval and Treat   Medical Diagnosis from Referral: N39.3 (ICD-10-CM) - Stress incontinence of urine N39.8 (ICD-10-CM) - Voiding dysfunction  Evaluation Date: 1/20/2022  Authorization Period Expiration: 12/17./2022  Plan of Care Expiration: 12/31/2022  Progress Note Due: 3/24/2022  Visit # / Visits authorized: 6/ 20   FOTO: Issued Visit #: 3/3  Cancelled Visits: **  No Show Visits: **    Precautions: universal Back surg (2017) from sacrum to T10 from scoliosis and infection osteomyelitis, RTC injuey on the R side     Time In: 10:05  Time Out: 11:00  Total Appointment Time (timed & untimed codes): 55 minutes    Subjective     Pt reports: Reprots she has not has any problems, she thinks she is doing better. The only time she feels like she is not totally emptying is the first urination after sleeping, most of the time she can double void and it is better. Not having any leakage during the day. She almost does not need to wear a liner. She had not leaked at night in the past few weeks, will make sure to empty the bladder before falling asleep. She has been doing the stretches at the club/gym and all the exercises. This morning she did the bridges, ab work and stretches and not having any problems    She was compliant with home exercise program.  Response to previous treatment: good  Functional change: Not using cathater    Pain: na/10  Location:  none     Objective     Cinda received therapeutic exercises to develop  ROM for 45 minutes including:     Muscle re assessment of pelvic floor    Piriformis 30"x2  Butterfly 1 min  Bethanie pose I min    Clam with RTB 10x  Bridge with RTB 10x  Cat cow  quadruped    hip extension 10x    Not performed " 3/31/2022 :  Supported deep squat  Squat on ball  Hip opener, toe touch  Standing hip flexor stretch  Knees to chest  Cross body/glute med  Hydrant 10x2  Supported squat    Cinda participated in neuromuscular re-education activities to develop Coordination, Control and Down training for 10 minutes including:   Urge control, and relaxation training for urine and bowel  Education on bowel maneuver, positioning, and breathing.    Home Exercises Provided and Patient Education Provided     Education provided:   - anatomy/physiology of pelvic floor, posture/body mechanices, diaphragmatic breathing, double voiding techniques and proper bearing down techniques  Discussed progression of plan of care with patient; educated pt in activity modification; reviewed HEP with pt. Pt demonstrated and verbalized understanding of all instruction and was not provided with a handout of HEP (see Patient Instructions).      Written Home Exercises Provided: Patient instructed to cont prior HEP.  Exercises were reviewed and Cinda was able to demonstrate them prior to the end of the session.  Cinda demonstrated good  understanding of the education provided.     See EMR under Patient Instructions for exercises provided 1/27/2022.    Assessment     Cinda presents to therapy with no complaints of urinary leakage at night and continues to have feelings of full bladder emptying. She is managing bladder by emptying before bed and only has to wake up one time a night. No longer wearing liners and denies stress incontinence. Internal assessment for muscle control, pt instructed to kegel before coughing and sneezing to protest press ure and advised to breath out of the mouth with bearing down for bowel movements for pressure management. Pt to d/c with exercises.      Cinda Is progressing well towards her goals.   Pt prognosis is Good.     Pt will continue to benefit from skilled outpatient physical therapy to address the deficits listed in the problem list  box on initial evaluation, provide pt/family education and to maximize pt's level of independence in the home and community environment.     Pt's spiritual, cultural and educational needs considered and pt agreeable to plan of care and goals.     Anticipated barriers to physical therapy: none    Goals:     Short Term Goals: 4 weeks   I with initial HEP (MET)  Pt will verbalize improved awareness of PFM activity as palpated by PT in order to improve activity involvement with HEP. (MET)  Pt will be independent with double voiding techniques 100% of the time to ensure full bladder emptying and decrease pt's risk of infection. (MET)  Pt to demonstrate proper positioning on commode with breathing techniques to increase relaxation to enable pt to feel empty after urination. (MET)        Long Term Goals: 8 weeks      Pt to be discharged with home plan for carry over after discharge. (MET  Pt able to urinate completely without the use of cathter (MET)       Plan     D/C to HEP    Mabel Bland, PT

## 2022-04-19 ENCOUNTER — PATIENT OUTREACH (OUTPATIENT)
Dept: ADMINISTRATIVE | Facility: OTHER | Age: 77
End: 2022-04-19
Payer: MEDICARE

## 2022-04-20 ENCOUNTER — OFFICE VISIT (OUTPATIENT)
Dept: UROGYNECOLOGY | Facility: CLINIC | Age: 77
End: 2022-04-20
Payer: MEDICARE

## 2022-04-20 VITALS
BODY MASS INDEX: 20.7 KG/M2 | HEIGHT: 64 IN | SYSTOLIC BLOOD PRESSURE: 120 MMHG | WEIGHT: 121.25 LBS | DIASTOLIC BLOOD PRESSURE: 60 MMHG

## 2022-04-20 DIAGNOSIS — N95.2 VAGINAL ATROPHY: ICD-10-CM

## 2022-04-20 DIAGNOSIS — N39.0 FREQUENT UTI: Primary | ICD-10-CM

## 2022-04-20 PROCEDURE — 51701 INSERT BLADDER CATHETER: CPT | Mod: S$PBB,,, | Performed by: NURSE PRACTITIONER

## 2022-04-20 PROCEDURE — 99213 OFFICE O/P EST LOW 20 MIN: CPT | Mod: 25,S$PBB,, | Performed by: NURSE PRACTITIONER

## 2022-04-20 PROCEDURE — 51701 PR INSERTION OF NON-INDWELLING BLADDER CATHETERIZATION FOR RESIDUAL UR: ICD-10-PCS | Mod: S$PBB,,, | Performed by: NURSE PRACTITIONER

## 2022-04-20 PROCEDURE — 99999 PR PBB SHADOW E&M-EST. PATIENT-LVL V: CPT | Mod: PBBFAC,,, | Performed by: NURSE PRACTITIONER

## 2022-04-20 PROCEDURE — 51701 INSERT BLADDER CATHETER: CPT | Mod: PBBFAC | Performed by: NURSE PRACTITIONER

## 2022-04-20 PROCEDURE — 99215 OFFICE O/P EST HI 40 MIN: CPT | Mod: PBBFAC | Performed by: NURSE PRACTITIONER

## 2022-04-20 PROCEDURE — 99999 PR PBB SHADOW E&M-EST. PATIENT-LVL V: ICD-10-PCS | Mod: PBBFAC,,, | Performed by: NURSE PRACTITIONER

## 2022-04-20 PROCEDURE — 99213 PR OFFICE/OUTPT VISIT, EST, LEVL III, 20-29 MIN: ICD-10-PCS | Mod: 25,S$PBB,, | Performed by: NURSE PRACTITIONER

## 2022-04-20 RX ORDER — NITROFURANTOIN 25; 75 MG/1; MG/1
100 CAPSULE ORAL DAILY PRN
Qty: 30 CAPSULE | Refills: 0 | Status: SHIPPED | OUTPATIENT
Start: 2022-04-20 | End: 2023-08-07

## 2022-04-20 NOTE — PATIENT INSTRUCTIONS
Postop state: well healed. No lifting > 50 pounds without help   History of constipation:  Try to minimize straining.   Continue daily stool softener 2x/day + daily miralax  Incomplete bladder emptying:  DO NOT SQUEEZE OR DO ANY KEGELS/STRENGTHENING EXERCISES.   RELAX WHEN YOU URINATE--PULL OUT ONLY RELAXATION PT EXERCISES AND PRACTICE.   Urine C&S sent to make sure  No UTI.   Standing orders placed for urine culture/ urinalysis  If you have symptoms of uti, please go to nearest [facility] lab to bring a specimen   Perform self catheterization if you are not able to empty your bladder--if you do need to self cath, let us know  If you have to self cath, take one macrobid 100 mg   RTC 11/2022 for follow up

## 2022-04-20 NOTE — PROGRESS NOTES
"    Urogyn follow up  04/20/2022    [facility] - UROGYNECOLOGY  [address] [address]    Sarah Alejandro  [id number]  1945      Sarah Alejandro is a 76 y.o. here for a follow up of incomplete bladder emptying     Date of Operation: 11/01/2021     Title of Operation:  1)  Anterior repair  2)  Placement of retropubic tension-free midurethral sling, Advantage Fit ([employer]))  3)  Posterior repair with perineorrhaphy  4)  Cystourethroscopy     Indications for Surgery:  1)  UI:  (+) QUYNH (walking)  > (--) UUI. Using poise impressa: 2-4 PPD, mostly dry.   (+) pads:2-4/day, usually moderate wetness and 1/ not common at night usually variable wetness.  Daytime frequency: Q 3 hours.  Nocturia: Yes: 1/night.   (--) dysuria,  (--) hematuria,  (--) frequent UTIs.  (--) complete bladder emptying. Pushes, Tries maneuvering. Using bladder gaurds which work well.      Gyn in [place] got recurrent UTI in 2015, some passed blood. In 2015 started on Bactrim. Seen Urologist for Cystoscopy detected no abnormalities. Urethra was "too skinny, was clogging". Went every three weeks for 6-8 months, for a stretching. No longer doing stretching urethra.      2)  POP:  Absent.(--) vaginal bleeding. (--) vaginal discharge. (+) sexually active.  (--) dyspareunia.   (+)  Vaginal dryness.  (+) vaginal estrogen use: 2x/week.   --POP-Q:  Aa -1; Ba -1; C -6; Ap 0; Bp 0 (moderate); D -7.  Genital hiatus 3, perineal body 2, total vaginal length 10-11 (standing).      3)  BM:  (+) constipation/straining.  (--) chronic diarrhea. (--) hematochezia.  (--) fecal incontinence.  (--) fecal smearing/urgency.  (--) complete evacuation.   Antibiotics caused diarrhea, takes Miralax every three-four days to try and completely empty bowels. Have always have trouble with constipation. Hemorrhoids, causing slight bleeding.      Preoperative Diagnosis:  Stress incontinence of urine    Graves disease    Voiding dysfunction  "   Chronic constipation    Cystocele, midline    Rectocele, female    Vaginal atrophy                Concern for major voiding dysfunction              Urodynamic stress incontinence              Decreased urethral coaptation           Postoperative Diagnosis:  Stress incontinence of urine    Graves disease    Voiding dysfunction    Chronic constipation    Cystocele, midline    Rectocele, female    Vaginal atrophy                Concern for major voiding dysfunction              Urodynamic stress incontinence              Decreased urethral coaptation    Issues include:  Patient Active Problem List   Diagnosis    Stress incontinence of urine    Voiding dysfunction    Rectocele, female    Chronic constipation    Vaginal atrophy    Graves' disease    Stage 3a chronic kidney disease    Hyperlipidemia    Aortic valve stenosis    Essential tremor    Essential hypertension    Insomnia    S/P TAVR (transcatheter aortic valve replacement)    Lumbar radiculopathy    S/P hip replacement    Pelvic floor weakness    Decreased functional mobility and endurance    Disorder of muscle    Preoperative cardiovascular examination    Status post anterior & posterior colporrhaphy w/ MUS 11/1    Incomplete bladder emptying    Pelvic floor tension    Pelvic floor dysfunction     Well-woman:  Pap test: 2/21([facility] [place]), normal per report.  History of abnormal paps: Yes - Around 2013, HPV positive, Colposcopy done clear after three years.  History of STIs:  No  Mammogram: Date of last: 6/10/2021 normal.   Colonoscopy: Date of last: 2018/2019.  Result:  Wasn't clean enough.  Repeat due: 2021.     DEXA:  Date of last: April 12th [facility]  Result: normal. Repeat per GYN.     12/15/2021   light yellow discharge; no VB; no pain; no s/sx POP  Bladder issues: since about 3 weeks postop, has some PV urgency with small 2nd amount; will gently press urethra post-void and release small 2nd volume; only happens 1st AM or if  sitting for long time, better later in day;  no baseline U/F; no dysuria; no QUYNH or obvious UUI  Bowel issues: easier with stool softener BID + miralax nightly; no pain  PVR was 240 mL20 min after voiding  Was taught cic    Changes since last visit:  Has gone to pelvic floor PT  Feels like she is emptying her bladder well.  Denies UI.   Denies constipation or straining.       Medications:    Current Outpatient Medications:     ALPRAZolam (XANAX) 0.5 MG tablet, Take 1 tablet (0.5 mg total) by mouth daily as needed (severe anxiety)., Disp: 30 tablet, Rfl: 0    ASCORBATE CALCIUM, VITAMIN C, ORAL, Take 500 mg by mouth., Disp: , Rfl:     aspirin (ECOTRIN) 81 MG EC tablet, Take 81 mg by mouth once daily., Disp: , Rfl:     atenoloL (TENORMIN) 100 MG tablet, Take 1 tablet (100 mg total) by mouth 2 (two) times a day., Disp: 60 tablet, Rfl: 11    cetirizine-pseudoephedrine 5-120 mg Tb12, cetirizine 5 mg-pseudoephedrine  mg tablet,extended release,12hr  TK 1 T PO BID, Disp: , Rfl:     cholecalciferol, vitamin D3, (VITAMIN D3) 50 mcg (2,000 unit) Tab, Take 2,000 Units by mouth., Disp: , Rfl:     ciclopirox 0.77 % Gel, Apply topically once daily. To thickened discolored toenails., Disp: 30 g, Rfl: 1    CMPD testosterone proprionate 2% in vanicream, Apply topically. [facility], Disp: , Rfl:     COMPOUND HORMONE REPLACEMENT, Take by mouth once daily. ESTROGEN CREAM -- [facility], Disp: , Rfl:     conjugated estrogens (PREMARIN) vaginal cream, Place 0.5 g vaginally once daily., Disp: 1 applicator, Rfl: 5    docusate sodium (COLACE) 100 MG capsule, Take 1 capsule (100 mg total) by mouth 2 (two) times daily., Disp: 60 capsule, Rfl: 1    EPINEPHrine (EPIPEN) 0.3 mg/0.3 mL AtIn, epinephrine 0.3 mg/0.3 mL injection, auto-injector, Disp: , Rfl:     ibuprofen (ADVIL,MOTRIN) 600 MG tablet, Take 1 tablet (600 mg total) by mouth every 6 (six) hours as needed for Pain., Disp: 30 tablet, Rfl: 1    MAGNESIUM  "CARBONATE ORAL, Take 1 tablet by mouth., Disp: , Rfl:     methIMAzole (TAPAZOLE) 5 MG Tab, Take half a tablet daily, Disp: 90 tablet, Rfl: 1    omalizumab (XOLAIR) 75 mg/0.5 mL injection, , Disp: , Rfl:     PENNSAID 20 mg/gram /actuation(2 %) sopm, APPLY 1 APPLICATION TOPICALLY 2 (TWO) TIMES DAILY AS NEEDED.::NOT COVERED::, Disp: 112 g, Rfl: 10    polyethylene glycol (GLYCOLAX) 17 gram/dose powder, Take 17 g by mouth daily as needed., Disp: , Rfl:     progesterone (PROMETRIUM) 200 MG capsule, Take 1 capsule by mouth 30-60 minutes before bed every night, Disp: 90 capsule, Rfl: 3    rosuvastatin (CRESTOR) 40 MG Tab, TAKE 1 TABLET BY MOUTH EVERY DAY, Disp: 90 tablet, Rfl: 1    tretinoin, emollient, (RENOVA) 0.02 % Crea, Renova 0.02 % topical cream  APPLY TO AFFECTED AREA EVERY DAY AT NIGHT *NOT COVERED*, Disp: , Rfl:     urea 20 % Crea, Apply 1 application topically once daily. To dry skin on the feet., Disp: 75 g, Rfl: 10    valACYclovir (VALTREX) 1000 MG tablet, valacyclovir 1 gram tablet, Disp: , Rfl:     XIIDRA 5 % Dpet, , Disp: , Rfl:     zolpidem (AMBIEN CR) 6.25 MG CR tablet, Take 1 tablet (6.25 mg total) by mouth nightly as needed for Insomnia., Disp: 30 tablet, Rfl: 2    nitrofurantoin, macrocrystal-monohydrate, (MACROBID) 100 MG capsule, Take 1 capsule (100 mg total) by mouth daily as needed (self cath)., Disp: 30 capsule, Rfl: 0    ROS:  As per HPI.      Exam  /60 (BP Location: Right arm, Patient Position: Sitting, BP Method: Medium (Manual))   Ht 5' 4" (1.626 m)   Wt 55 kg (121 lb 4.1 oz)   BMI 20.81 kg/m²   General: alert and oriented, no acute distress  Respiratory: normal respiratory effort  Abd: soft, non-tender, non-distended    Pelvic  Ext. Genitalia: normal external genitalia. Normal bartholins and skenes glands  Vagina: + mild atrophy. Normal vaginal mucosa without lesions. No discharge noted.   Non-tender bladder base without palpable mass. Sling path nontender.  Sling incision " and A&P incisions still healing, NT. No mesh visible/palpable.   Cervix: no lesions  Uterus:  uterus is normal size, shape, consistency and nontender   Urethra: no masses or tenderness  Urethral meatus: no lesions, caruncle or prolapse.    POP-Q: deferred.  No POP with valsalva.     kegel 2/5    PVR  150 mL      Impression  1. Frequent UTI  Urine culture    Urinalysis    nitrofurantoin, macrocrystal-monohydrate, (MACROBID) 100 MG capsule   2. Vaginal atrophy       We reviewed the above issues and discussed options for short-term versus long-term management of her problems.   Plan:   1. Postop state: well healed. No lifting > 50 pounds without help   2. History of constipation:  a. Try to minimize straining.   b. Continue daily stool softener 2x/day + daily miralax  3. Incomplete bladder emptying:  a. DO NOT SQUEEZE OR DO ANY KEGELS/STRENGTHENING EXERCISES.   b. RELAX WHEN YOU URINATE--PULL OUT ONLY RELAXATION PT EXERCISES AND PRACTICE.   c. Urine C&S sent to make sure  No UTI.   4. Standing orders placed for urine culture/ urinalysis  5. If you have symptoms of uti, please go to nearest [facility] lab to bring a specimen   6. Perform self catheterization if you are not able to empty your bladder--if you do need to self cath, let us know  7. If you have to self cath, take one macrobid 100 mg   8. [facility] 11/2022 for follow up    I spent a total of 20 minutes on the day of the visit.  This includes face to face time and non-face to face time preparing to see the patient (eg, review of tests), obtaining and/or reviewing separately obtained history, documenting clinical information in the electronic or other health record, independently interpreting results and communicating results to the patient/family/caregiver, or care coordinator.       Anne-Marie Pack NP  [facility]  Division of Female Pelvic Medicine and Reconstructive Surgery  Department of Obstetrics & Gynecology

## 2022-04-27 ENCOUNTER — OFFICE VISIT (OUTPATIENT)
Dept: PODIATRY | Facility: CLINIC | Age: 77
End: 2022-04-27
Payer: MEDICARE

## 2022-04-27 VITALS
DIASTOLIC BLOOD PRESSURE: 75 MMHG | WEIGHT: 121 LBS | HEIGHT: 64 IN | BODY MASS INDEX: 20.66 KG/M2 | SYSTOLIC BLOOD PRESSURE: 137 MMHG | HEART RATE: 58 BPM

## 2022-04-27 DIAGNOSIS — Z98.890 HISTORY OF FOOT SURGERY: ICD-10-CM

## 2022-04-27 DIAGNOSIS — M77.41 METATARSALGIA OF BOTH FEET: ICD-10-CM

## 2022-04-27 DIAGNOSIS — L84 CORN OR CALLUS: ICD-10-CM

## 2022-04-27 DIAGNOSIS — M77.42 METATARSALGIA OF BOTH FEET: ICD-10-CM

## 2022-04-27 DIAGNOSIS — Z95.2 S/P TAVR (TRANSCATHETER AORTIC VALVE REPLACEMENT): ICD-10-CM

## 2022-04-27 DIAGNOSIS — N18.31 STAGE 3A CHRONIC KIDNEY DISEASE: Primary | ICD-10-CM

## 2022-04-27 PROCEDURE — 11056 ROUTINE FOOT CARE: ICD-10-PCS | Mod: Q9,S$PBB,, | Performed by: PODIATRIST

## 2022-04-27 PROCEDURE — 99213 PR OFFICE/OUTPT VISIT, EST, LEVL III, 20-29 MIN: ICD-10-PCS | Mod: 25,S$PBB,, | Performed by: PODIATRIST

## 2022-04-27 PROCEDURE — 99999 PR PBB SHADOW E&M-EST. PATIENT-LVL V: ICD-10-PCS | Mod: PBBFAC,,, | Performed by: PODIATRIST

## 2022-04-27 PROCEDURE — 11056 PARNG/CUTG B9 HYPRKR LES 2-4: CPT | Mod: Q9,PBBFAC,PN | Performed by: PODIATRIST

## 2022-04-27 PROCEDURE — 99213 OFFICE O/P EST LOW 20 MIN: CPT | Mod: 25,S$PBB,, | Performed by: PODIATRIST

## 2022-04-27 PROCEDURE — 99215 OFFICE O/P EST HI 40 MIN: CPT | Mod: PBBFAC,PN | Performed by: PODIATRIST

## 2022-04-27 PROCEDURE — 99999 PR PBB SHADOW E&M-EST. PATIENT-LVL V: CPT | Mod: PBBFAC,,, | Performed by: PODIATRIST

## 2022-04-27 NOTE — PROGRESS NOTES
Routine Foot Care    Date/Time: 4/27/2022 10:00 AM  Performed by: Merary Roblero DPM  Authorized by: Merary Roblero DPM     Consent Done?:  Yes (Verbal)  Hyperkeratotic Skin Lesions?: Yes    Number of trimmed lesions:  3  Location(s):  Right 2nd Metatarsal Head, Right 3rd Metatarsal Head and Left 2nd Metatarsal Head    Nail Care Type:  Trim (no nails trimmed)  Patient tolerance:  Patient tolerated the procedure well with no immediate complications     With patient's permission, the toenails mentioned above were reduced and debrided using a nail nipper, removing offending nail and debris.   Utilizing a #15 scalpel, I trimmed the corns and calluses at the above mentioned location.      The patient will continue to monitor the areas daily, inspect the feet, wear protective shoe gear when ambulatory, and moisturizer to maintain skin integrity.        PCP:  Latrice Davidson MD  Last encounter:   9/28/2021;  3/11/2022    Modifier:  Q9

## 2022-04-27 NOTE — PROGRESS NOTES
PODIATRY NOTE       PATIENT ID:  Sarah Alejandro is a 76 y.o. female.       CHIEF CONCERN:   Follow-up (Foot Exam/PCP Latrice Davidson MD/Endo 12/15/21)               MEDICAL DECISION MAKING:          Problem List Items Addressed This Visit     S/P TAVR (transcatheter aortic valve replacement)    Stage 3a chronic kidney disease      Other Visit Diagnoses     Metatarsalgia of both feet    -  Primary    Corn or callus        History of foot surgery                · I counseled the patient on the patient's conditions, their implications and medical management.    Shoe and activity modification as needed for relief.  Continue urea cream daily to calluses.   Metatarsal pads and offloading pads provided to try.   General nail care measures for abnormal nails include:   Keeping nails trimmed short   Avoiding trauma    Avoiding contact irritants    Keeping nails dry (avoiding wet work)   Avoiding all nail cosmetics                 HISTORY OF PRESENT ILLNESS:  Sarah Alejandro is a 76 y.o. female with concerns regarding: painful calluses sub metatarsal heads, chronic.     She has history of bunionectomy.        Patient Active Problem List   Diagnosis    Stress incontinence of urine    Voiding dysfunction    Rectocele, female    Chronic constipation    Vaginal atrophy    Graves' disease    Stage 3a chronic kidney disease    Hyperlipidemia    Aortic valve stenosis    Essential tremor    Essential hypertension    Insomnia    S/P TAVR (transcatheter aortic valve replacement)    Lumbar radiculopathy    S/P hip replacement    Pelvic floor weakness    Decreased functional mobility and endurance    Disorder of muscle    Preoperative cardiovascular examination    Status post anterior & posterior colporrhaphy w/ MUS 11/1    Incomplete bladder emptying    Pelvic floor tension    Pelvic floor dysfunction           Current Outpatient Medications on File Prior to Visit   Medication Sig Dispense  Refill    ALPRAZolam (XANAX) 0.5 MG tablet Take 1 tablet (0.5 mg total) by mouth daily as needed (severe anxiety). 30 tablet 0    ASCORBATE CALCIUM, VITAMIN C, ORAL Take 500 mg by mouth.      aspirin (ECOTRIN) 81 MG EC tablet Take 81 mg by mouth once daily.      atenoloL (TENORMIN) 100 MG tablet Take 1 tablet (100 mg total) by mouth 2 (two) times a day. 60 tablet 11    cetirizine-pseudoephedrine 5-120 mg Tb12 cetirizine 5 mg-pseudoephedrine  mg tablet,extended release,12hr   TK 1 T PO BID      cholecalciferol, vitamin D3, (VITAMIN D3) 50 mcg (2,000 unit) Tab Take 2,000 Units by mouth.      ciclopirox 0.77 % Gel Apply topically once daily. To thickened discolored toenails. 30 g 1    CMPD testosterone proprionate 2% in vanicream Apply topically. [facility]      COMPOUND HORMONE REPLACEMENT Take by mouth once daily. ESTROGEN CREAM -- [facility]      conjugated estrogens (PREMARIN) vaginal cream Place 0.5 g vaginally once daily. 1 applicator 5    docusate sodium (COLACE) 100 MG capsule Take 1 capsule (100 mg total) by mouth 2 (two) times daily. 60 capsule 1    EPINEPHrine (EPIPEN) 0.3 mg/0.3 mL AtIn epinephrine 0.3 mg/0.3 mL injection, auto-injector      ibuprofen (ADVIL,MOTRIN) 600 MG tablet Take 1 tablet (600 mg total) by mouth every 6 (six) hours as needed for Pain. 30 tablet 1    MAGNESIUM CARBONATE ORAL Take 1 tablet by mouth.      methIMAzole (TAPAZOLE) 5 MG Tab Take half a tablet daily 90 tablet 1    nitrofurantoin, macrocrystal-monohydrate, (MACROBID) 100 MG capsule Take 1 capsule (100 mg total) by mouth daily as needed (self cath). 30 capsule 0    omalizumab (XOLAIR) 75 mg/0.5 mL injection       PENNSAID 20 mg/gram /actuation(2 %) sopm APPLY 1 APPLICATION TOPICALLY 2 (TWO) TIMES DAILY AS NEEDED.::NOT COVERED:: 112 g 10    polyethylene glycol (GLYCOLAX) 17 gram/dose powder Take 17 g by mouth daily as needed.      progesterone (PROMETRIUM) 200 MG capsule Take 1 capsule by  "mouth 30-60 minutes before bed every night 90 capsule 3    rosuvastatin (CRESTOR) 40 MG Tab TAKE 1 TABLET BY MOUTH EVERY DAY 90 tablet 1    tretinoin, emollient, (RENOVA) 0.02 % Crea Renova 0.02 % topical cream   APPLY TO AFFECTED AREA EVERY DAY AT NIGHT *NOT COVERED*      urea 20 % Crea Apply 1 application topically once daily. To dry skin on the feet. 75 g 10    valACYclovir (VALTREX) 1000 MG tablet valacyclovir 1 gram tablet      XIIDRA 5 % Dpet       zolpidem (AMBIEN CR) 6.25 MG CR tablet Take 1 tablet (6.25 mg total) by mouth nightly as needed for Insomnia. 30 tablet 2     No current facility-administered medications on file prior to visit.           Review of patient's allergies indicates:   Allergen Reactions    Penicillins Rash and Hives               ROS:   General ROS: negative for - chills, fever or night sweats  Respiratory ROS: no cough, shortness of breath, or wheezing  Cardiovascular ROS: no chest pain or dyspnea on exertion  Musculoskeletal ROS: positive for - pain in foot - bilateral  Neurological ROS: negative for - impaired coordination/balance and numbness/tingling  Dermatological ROS: negative for pruritus and rash      EXAM:     Vitals:    04/27/22 1001   BP: 137/75   Pulse: (!) 58   Weight: 54.9 kg (121 lb)   Height: 5' 4" (1.626 m)        General:  Alert and Oriented x 3;  No acute distress      Bilateral  Lower extremity exam:    Vascular:   Dorsalis Pedis:  present   Posterior Tibial:  present  Capillary refill time:  3 seconds  Temperature of toes cool to touch  Edema:  Trace and non-pitting       Neurological:     Sharp touch:  normal  Light touch: normal  Tinels Sign:  Absent  Mulders Click:   Absent        Dermatological:   Skin: thin, moist and appropriate for age; calluses sub metatarsal heads  Wounds/Ulcers:  Absent  Bruising:  Absent  Erythema:  Absent  Toenails are short and polished.  right 4th toenail thickened and incurvated without paronychia.         Musculoskeletal: "   Metatarsophalangeal range of motion:   diminished range of motion  Subtalar joint range of motion: full range of motion  Ankle joint range of motion:  full range of motion  Bunions:  Present  Hammertoes: Present

## 2022-05-06 ENCOUNTER — PES CALL (OUTPATIENT)
Dept: ADMINISTRATIVE | Facility: CLINIC | Age: 77
End: 2022-05-06
Payer: MEDICARE

## 2022-05-17 ENCOUNTER — OFFICE VISIT (OUTPATIENT)
Dept: OBSTETRICS AND GYNECOLOGY | Facility: CLINIC | Age: 77
End: 2022-05-17
Attending: OBSTETRICS & GYNECOLOGY
Payer: MEDICARE

## 2022-05-17 VITALS
HEIGHT: 64 IN | DIASTOLIC BLOOD PRESSURE: 62 MMHG | SYSTOLIC BLOOD PRESSURE: 108 MMHG | BODY MASS INDEX: 19.57 KG/M2 | WEIGHT: 114.63 LBS

## 2022-05-17 DIAGNOSIS — Z01.419 ENCOUNTER FOR GYNECOLOGICAL EXAMINATION: ICD-10-CM

## 2022-05-17 DIAGNOSIS — Z78.0 MENOPAUSE: Primary | ICD-10-CM

## 2022-05-17 PROCEDURE — 99999 PR PBB SHADOW E&M-EST. PATIENT-LVL IV: ICD-10-PCS | Mod: PBBFAC,,, | Performed by: OBSTETRICS & GYNECOLOGY

## 2022-05-17 PROCEDURE — G0101 CA SCREEN;PELVIC/BREAST EXAM: HCPCS | Mod: S$PBB,,, | Performed by: OBSTETRICS & GYNECOLOGY

## 2022-05-17 PROCEDURE — 99999 PR PBB SHADOW E&M-EST. PATIENT-LVL IV: CPT | Mod: PBBFAC,,, | Performed by: OBSTETRICS & GYNECOLOGY

## 2022-05-17 PROCEDURE — 99214 OFFICE O/P EST MOD 30 MIN: CPT | Mod: PBBFAC,25 | Performed by: OBSTETRICS & GYNECOLOGY

## 2022-05-17 PROCEDURE — G0101 CA SCREEN;PELVIC/BREAST EXAM: HCPCS | Mod: PBBFAC | Performed by: OBSTETRICS & GYNECOLOGY

## 2022-05-17 PROCEDURE — G0101 PR CA SCREEN;PELVIC/BREAST EXAM: ICD-10-PCS | Mod: S$PBB,,, | Performed by: OBSTETRICS & GYNECOLOGY

## 2022-05-17 RX ORDER — PROGESTERONE 200 MG/1
CAPSULE ORAL
Qty: 90 CAPSULE | Refills: 1 | Status: SHIPPED | OUTPATIENT
Start: 2022-05-17 | End: 2023-02-10 | Stop reason: SDUPTHER

## 2022-05-17 RX ORDER — POLYETHYLENE GLYCOL 3350, SODIUM SULFATE ANHYDROUS, SODIUM BICARBONATE, SODIUM CHLORIDE, POTASSIUM CHLORIDE 236; 22.74; 6.74; 5.86; 2.97 G/4L; G/4L; G/4L; G/4L; G/4L
POWDER, FOR SOLUTION ORAL
COMMUNITY
Start: 2022-05-03 | End: 2022-09-27

## 2022-05-17 RX ORDER — SULFAMETHOXAZOLE AND TRIMETHOPRIM 800; 160 MG/1; MG/1
1 TABLET ORAL DAILY
Status: ON HOLD | COMMUNITY
Start: 2022-05-16 | End: 2022-08-23 | Stop reason: HOSPADM

## 2022-05-17 NOTE — PROGRESS NOTES
Subjective:       Patient ID: Sarah Alejandro is a 76 y.o. female.    Chief Complaint:  Well Woman (Pap unknown date, Normal  --  mmg 6-10-21, birads 2  --  dexa 21, normal --  cscope 2018, Normal (3yrs) )      History of Present Illness.  Sarah Alejandro is a 76 y.o. female.  When I saw her 2022, she said she had no night sweats, sleep issues, or hot flashes.  She had the following side effects: none.  Patient denied post-menopausal vaginal bleeding. The patient is sexually active.     Today, she has no breast or urinary symptoms.  She has no postcoital bleeding, pelvic pain or vaginal discharge.      Current HRT Regimen:  Biest cream estriol/estradiol; 4 mg/4mg/ml 1/4 ml to BID  Progesterone to 200 mg orally QPM  Testosterone cream 2%    GYN & OB History  No LMP recorded (lmp unknown). Patient is postmenopausal.   Pap: No result found  Mammogram: 6/10/21 Birads 2  Colonoscopy:  Normal  DEXA: 21 Normal  PCP:  Dr. 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     Graves disease     Past Surgical History:   Procedure Laterality Date    BACK SURGERY      CARDIAC VALVE REPLACEMENT  2016    - aortic valve stenosis    HIP REPLACEMENT ARTHROPLASTY Left 2016    INSERTION OF MIDURETHRAL SLING N/A 2021    Procedure: SLING, MIDURETHRAL;  Surgeon: Hodan Goldberg MD;  Location: [facility];  Service: OB/GYN;  Laterality:  N/A;    LUMBAR FUSION       Family History   Problem Relation Age of Onset    Colon cancer Father     Leukemia Mother     Hypothyroidism Sister     Breast cancer Neg Hx     Ovarian cancer Neg Hx     Stroke Neg Hx     Diabetes Neg Hx      Social History     Tobacco Use    Smoking status: Former Smoker     Quit date:      Years since quittin.3    Smokeless tobacco: Never Used   Substance Use Topics    Alcohol use: Not Currently     Alcohol/week: 2.0 standard drinks     Types: 1 Glasses of wine, 1 Shots of liquor per week     Comment: weekends     Drug use: Never       Current Outpatient Medications:     ALPRAZolam (XANAX) 0.5 MG tablet, Take 1 tablet (0.5 mg total) by mouth daily as needed (severe anxiety)., Disp: 30 tablet, Rfl: 0    ASCORBATE CALCIUM, VITAMIN C, ORAL, Take 500 mg by mouth., Disp: , Rfl:     aspirin (ECOTRIN) 81 MG EC tablet, Take 81 mg by mouth once daily., Disp: , Rfl:     atenoloL (TENORMIN) 100 MG tablet, Take 1 tablet (100 mg total) by mouth 2 (two) times a day., Disp: 60 tablet, Rfl: 11    cetirizine-pseudoephedrine 5-120 mg Tb12, cetirizine 5 mg-pseudoephedrine  mg tablet,extended release,12hr  TK 1 T PO BID, Disp: , Rfl:     cholecalciferol, vitamin D3, (VITAMIN D3) 50 mcg (2,000 unit) Tab, Take 2,000 Units by mouth., Disp: , Rfl:     ciclopirox 0.77 % Gel, Apply topically once daily. To thickened discolored toenails., Disp: 30 g, Rfl: 1    CMPD testosterone proprionate 2% in vanicream, Apply topically. [facility], Disp: , Rfl:     COMPOUND HORMONE REPLACEMENT, Take by mouth once daily. ESTROGEN CREAM -- [facility], Disp: , Rfl:     conjugated estrogens (PREMARIN) vaginal cream, Place 0.5 g vaginally once daily., Disp: 1 applicator, Rfl: 5    docusate sodium (COLACE) 100 MG capsule, Take 1 capsule (100 mg total) by mouth 2 (two) times daily., Disp: 60 capsule, Rfl: 1    EPINEPHrine (EPIPEN) 0.3 mg/0.3 mL AtIn, epinephrine 0.3 mg/0.3 mL  injection, auto-injector, Disp: , Rfl:     MAGNESIUM CARBONATE ORAL, Take 1 tablet by mouth., Disp: , Rfl:     methIMAzole (TAPAZOLE) 5 MG Tab, Take half a tablet daily, Disp: 90 tablet, Rfl: 1    nitrofurantoin, macrocrystal-monohydrate, (MACROBID) 100 MG capsule, Take 1 capsule (100 mg total) by mouth daily as needed (self cath)., Disp: 30 capsule, Rfl: 0    omalizumab (XOLAIR) 75 mg/0.5 mL injection, , Disp: , Rfl:     PENNSAID 20 mg/gram /actuation(2 %) sopm, APPLY 1 APPLICATION TOPICALLY 2 (TWO) TIMES DAILY AS NEEDED.::NOT COVERED::, Disp: 112 g, Rfl: 10    rosuvastatin (CRESTOR) 40 MG Tab, TAKE 1 TABLET BY MOUTH EVERY DAY, Disp: 90 tablet, Rfl: 1    sulfamethoxazole-trimethoprim 800-160mg (BACTRIM DS) 800-160 mg Tab, Take 1 tablet by mouth once daily., Disp: , Rfl:     tretinoin, emollient, (RENOVA) 0.02 % Crea, Renova 0.02 % topical cream  APPLY TO AFFECTED AREA EVERY DAY AT NIGHT *NOT COVERED*, Disp: , Rfl:     urea 20 % Crea, Apply 1 application topically once daily. To dry skin on the feet., Disp: 75 g, Rfl: 10    valACYclovir (VALTREX) 1000 MG tablet, valacyclovir 1 gram tablet, Disp: , Rfl:     XIIDRA 5 % Dpet, , Disp: , Rfl:     zolpidem (AMBIEN CR) 6.25 MG CR tablet, Take 1 tablet (6.25 mg total) by mouth nightly as needed for Insomnia., Disp: 30 tablet, Rfl: 2    ibuprofen (ADVIL,MOTRIN) 600 MG tablet, Take 1 tablet (600 mg total) by mouth every 6 (six) hours as needed for Pain. (Patient not taking: Reported on 5/17/2022), Disp: 30 tablet, Rfl: 1    polyethylene glycol (GOLYTELY,NULYTELY) 236-22.74-6.74 -5.86 gram suspension, Take by mouth., Disp: , Rfl:     progesterone (PROMETRIUM) 200 MG capsule, Take 1 capsule by mouth 30-60 minutes before bed every night, Disp: 90 capsule, Rfl: 1    Review of patient's allergies indicates:   Allergen Reactions    Penicillins Rash and Hives       Review of Systems  Review of Systems   Constitutional: Negative for fatigue.   HENT: Negative for  "trouble swallowing.    Eyes: Negative for visual disturbance.   Respiratory: Negative for cough and shortness of breath.    Cardiovascular: Negative for chest pain.   Gastrointestinal: Negative for abdominal distention, abdominal pain, blood in stool, nausea and vomiting.   Genitourinary: Negative for difficulty urinating, dyspareunia, dysuria, flank pain, frequency, hematuria, pelvic pain, urgency, vaginal bleeding, vaginal discharge and vaginal pain.   Musculoskeletal: Negative for arthralgias.   Skin: Negative for rash.   Neurological: Negative for dizziness and headaches.   Psychiatric/Behavioral: Negative for sleep disturbance. The patient is not nervous/anxious.         Objective:     Vitals:    05/17/22 1144   BP: 108/62   Weight: 52 kg (114 lb 10.2 oz)   Height: 5' 4" (1.626 m)   PainSc: 0-No pain     Body mass index is 19.68 kg/m².    Physical Exam:   Constitutional: She is oriented to person, place, and time. Vital signs are normal. She appears well-developed and well-nourished.    HENT:   Head: Normocephalic.     Neck: No thyromegaly present.     Pulmonary/Chest: Right breast exhibits no mass, no nipple discharge, no skin change, no tenderness and no swelling. Left breast exhibits no mass, no nipple discharge, no skin change, no tenderness and no swelling. Breasts are symmetrical.        Abdominal: Soft. Bowel sounds are normal. She exhibits no distension. There is no abdominal tenderness.     Genitourinary:    Vagina and uterus normal.      Pelvic exam was performed with patient supine.   There is no rash, tenderness, lesion or injury on the right labia. There is no rash, tenderness, lesion or injury on the left labia. Cervix is normal. Right adnexum displays no mass, no tenderness and no fullness. Left adnexum displays no mass, no tenderness and no fullness. No erythema or  no vaginal discharge in the vagina. Cervix exhibits no motion tenderness and no discharge.           Musculoskeletal: Normal range " of motion.      Lymphadenopathy:        Right: No supraclavicular adenopathy present.        Left: No supraclavicular adenopathy present.    Neurological: She is alert and oriented to person, place, and time.    Skin: Skin is warm and dry.    Psychiatric: She has a normal mood and affect.        Assessment/ Plan:     Menopause  -     progesterone (PROMETRIUM) 200 MG capsule; Take 1 capsule by mouth 30-60 minutes before bed every night  Dispense: 90 capsule; Refill: 1    Encounter for gynecological examination        Continue:  Biest cream estriol/estradiol; 4 mg/4mg/ml 1/4 ml to BID  Progesterone to 200 mg orally QPM  Testosterone cream 2%  Self breast exam and mammography discussed  Routine colonoscopy discussed.  Diet and exercise discussed.  Recommend routine bone mineral density testing.  Yearly influenza vaccination discussed.  Follow-up with me in 6 months

## 2022-05-26 ENCOUNTER — PATIENT MESSAGE (OUTPATIENT)
Dept: INTERNAL MEDICINE | Facility: CLINIC | Age: 77
End: 2022-05-26
Payer: MEDICARE

## 2022-05-26 DIAGNOSIS — D64.9 NORMOCYTIC ANEMIA: ICD-10-CM

## 2022-05-26 DIAGNOSIS — N18.31 STAGE 3A CHRONIC KIDNEY DISEASE: ICD-10-CM

## 2022-05-26 DIAGNOSIS — I10 ESSENTIAL HYPERTENSION: ICD-10-CM

## 2022-05-26 DIAGNOSIS — E05.00 GRAVES' DISEASE: ICD-10-CM

## 2022-05-26 DIAGNOSIS — Z00.00 ANNUAL PHYSICAL EXAM: Primary | ICD-10-CM

## 2022-05-26 DIAGNOSIS — R53.83 FATIGUE, UNSPECIFIED TYPE: ICD-10-CM

## 2022-05-26 DIAGNOSIS — G25.0 ESSENTIAL TREMOR: ICD-10-CM

## 2022-05-26 DIAGNOSIS — E78.5 HYPERLIPIDEMIA, UNSPECIFIED HYPERLIPIDEMIA TYPE: ICD-10-CM

## 2022-05-31 ENCOUNTER — LAB VISIT (OUTPATIENT)
Dept: LAB | Facility: OTHER | Age: 77
End: 2022-05-31
Attending: INTERNAL MEDICINE
Payer: MEDICARE

## 2022-05-31 DIAGNOSIS — E78.5 HYPERLIPIDEMIA, UNSPECIFIED HYPERLIPIDEMIA TYPE: ICD-10-CM

## 2022-05-31 DIAGNOSIS — N18.31 STAGE 3A CHRONIC KIDNEY DISEASE: ICD-10-CM

## 2022-05-31 DIAGNOSIS — G25.0 ESSENTIAL TREMOR: ICD-10-CM

## 2022-05-31 DIAGNOSIS — D64.9 NORMOCYTIC ANEMIA: ICD-10-CM

## 2022-05-31 DIAGNOSIS — I10 ESSENTIAL HYPERTENSION: ICD-10-CM

## 2022-05-31 DIAGNOSIS — R53.83 FATIGUE, UNSPECIFIED TYPE: ICD-10-CM

## 2022-05-31 DIAGNOSIS — Z00.00 ANNUAL PHYSICAL EXAM: ICD-10-CM

## 2022-05-31 DIAGNOSIS — E05.00 GRAVES' DISEASE: ICD-10-CM

## 2022-05-31 LAB
25(OH)D3+25(OH)D2 SERPL-MCNC: 69 NG/ML (ref 30–96)
ALBUMIN SERPL BCP-MCNC: 3.9 G/DL (ref 3.5–5.2)
ALP SERPL-CCNC: 39 U/L (ref 55–135)
ALT SERPL W/O P-5'-P-CCNC: 15 U/L (ref 10–44)
ANION GAP SERPL CALC-SCNC: 13 MMOL/L (ref 8–16)
AST SERPL-CCNC: 23 U/L (ref 10–40)
BASOPHILS # BLD AUTO: 0.04 K/UL (ref 0–0.2)
BASOPHILS NFR BLD: 0.8 % (ref 0–1.9)
BILIRUB SERPL-MCNC: 0.4 MG/DL (ref 0.1–1)
BUN SERPL-MCNC: 22 MG/DL (ref 8–23)
CALCIUM SERPL-MCNC: 9.1 MG/DL (ref 8.7–10.5)
CHLORIDE SERPL-SCNC: 105 MMOL/L (ref 95–110)
CHOLEST SERPL-MCNC: 167 MG/DL (ref 120–199)
CHOLEST/HDLC SERPL: 2.2 {RATIO} (ref 2–5)
CO2 SERPL-SCNC: 20 MMOL/L (ref 23–29)
CREAT SERPL-MCNC: 0.9 MG/DL (ref 0.5–1.4)
DIFFERENTIAL METHOD: ABNORMAL
EOSINOPHIL # BLD AUTO: 0.2 K/UL (ref 0–0.5)
EOSINOPHIL NFR BLD: 4.5 % (ref 0–8)
ERYTHROCYTE [DISTWIDTH] IN BLOOD BY AUTOMATED COUNT: 13.5 % (ref 11.5–14.5)
EST. GFR  (AFRICAN AMERICAN): >60 ML/MIN/1.73 M^2
EST. GFR  (NON AFRICAN AMERICAN): >60 ML/MIN/1.73 M^2
ESTIMATED AVG GLUCOSE: 105 MG/DL (ref 68–131)
FERRITIN SERPL-MCNC: 80 NG/ML (ref 20–300)
GLUCOSE SERPL-MCNC: 95 MG/DL (ref 70–110)
HBA1C MFR BLD: 5.3 % (ref 4–5.6)
HCT VFR BLD AUTO: 37.2 % (ref 37–48.5)
HDLC SERPL-MCNC: 76 MG/DL (ref 40–75)
HDLC SERPL: 45.5 % (ref 20–50)
HGB BLD-MCNC: 12.3 G/DL (ref 12–16)
IMM GRANULOCYTES # BLD AUTO: 0.02 K/UL (ref 0–0.04)
IMM GRANULOCYTES NFR BLD AUTO: 0.4 % (ref 0–0.5)
IRON SERPL-MCNC: 72 UG/DL (ref 30–160)
LDLC SERPL CALC-MCNC: 78.4 MG/DL (ref 63–159)
LYMPHOCYTES # BLD AUTO: 0.7 K/UL (ref 1–4.8)
LYMPHOCYTES NFR BLD: 13.8 % (ref 18–48)
MCH RBC QN AUTO: 31.3 PG (ref 27–31)
MCHC RBC AUTO-ENTMCNC: 33.1 G/DL (ref 32–36)
MCV RBC AUTO: 95 FL (ref 82–98)
MONOCYTES # BLD AUTO: 0.5 K/UL (ref 0.3–1)
MONOCYTES NFR BLD: 9.3 % (ref 4–15)
NEUTROPHILS # BLD AUTO: 3.6 K/UL (ref 1.8–7.7)
NEUTROPHILS NFR BLD: 71.2 % (ref 38–73)
NONHDLC SERPL-MCNC: 91 MG/DL
NRBC BLD-RTO: 0 /100 WBC
PLATELET # BLD AUTO: 263 K/UL (ref 150–450)
PMV BLD AUTO: 9.3 FL (ref 9.2–12.9)
POTASSIUM SERPL-SCNC: 4 MMOL/L (ref 3.5–5.1)
PROT SERPL-MCNC: 7 G/DL (ref 6–8.4)
RBC # BLD AUTO: 3.93 M/UL (ref 4–5.4)
SATURATED IRON: 16 % (ref 20–50)
SODIUM SERPL-SCNC: 138 MMOL/L (ref 136–145)
T4 FREE SERPL-MCNC: 0.91 NG/DL (ref 0.71–1.51)
TOTAL IRON BINDING CAPACITY: 441 UG/DL (ref 250–450)
TRANSFERRIN SERPL-MCNC: 298 MG/DL (ref 200–375)
TRIGL SERPL-MCNC: 63 MG/DL (ref 30–150)
TSH SERPL DL<=0.005 MIU/L-ACNC: 2.26 UIU/ML (ref 0.4–4)
WBC # BLD AUTO: 5.08 K/UL (ref 3.9–12.7)

## 2022-05-31 PROCEDURE — 84439 ASSAY OF FREE THYROXINE: CPT | Performed by: INTERNAL MEDICINE

## 2022-05-31 PROCEDURE — 83036 HEMOGLOBIN GLYCOSYLATED A1C: CPT | Performed by: INTERNAL MEDICINE

## 2022-05-31 PROCEDURE — 36415 COLL VENOUS BLD VENIPUNCTURE: CPT | Performed by: INTERNAL MEDICINE

## 2022-05-31 PROCEDURE — 84443 ASSAY THYROID STIM HORMONE: CPT | Performed by: INTERNAL MEDICINE

## 2022-05-31 PROCEDURE — 85025 COMPLETE CBC W/AUTO DIFF WBC: CPT | Performed by: INTERNAL MEDICINE

## 2022-05-31 PROCEDURE — 84466 ASSAY OF TRANSFERRIN: CPT | Performed by: INTERNAL MEDICINE

## 2022-05-31 PROCEDURE — 82306 VITAMIN D 25 HYDROXY: CPT | Performed by: INTERNAL MEDICINE

## 2022-05-31 PROCEDURE — 80061 LIPID PANEL: CPT | Performed by: INTERNAL MEDICINE

## 2022-05-31 PROCEDURE — 82728 ASSAY OF FERRITIN: CPT | Performed by: INTERNAL MEDICINE

## 2022-05-31 PROCEDURE — 80053 COMPREHEN METABOLIC PANEL: CPT | Performed by: INTERNAL MEDICINE

## 2022-06-05 ENCOUNTER — PATIENT MESSAGE (OUTPATIENT)
Dept: UROGYNECOLOGY | Facility: CLINIC | Age: 77
End: 2022-06-05
Payer: MEDICARE

## 2022-06-05 DIAGNOSIS — Z12.31 ENCOUNTER FOR SCREENING MAMMOGRAM FOR BREAST CANCER: Primary | ICD-10-CM

## 2022-06-06 ENCOUNTER — PATIENT MESSAGE (OUTPATIENT)
Dept: UROGYNECOLOGY | Facility: CLINIC | Age: 77
End: 2022-06-06
Payer: MEDICARE

## 2022-06-08 ENCOUNTER — OFFICE VISIT (OUTPATIENT)
Dept: PODIATRY | Facility: CLINIC | Age: 77
End: 2022-06-08
Payer: MEDICARE

## 2022-06-08 VITALS — BODY MASS INDEX: 19.57 KG/M2 | WEIGHT: 114.63 LBS | HEIGHT: 64 IN

## 2022-06-08 DIAGNOSIS — N18.31 STAGE 3A CHRONIC KIDNEY DISEASE: ICD-10-CM

## 2022-06-08 DIAGNOSIS — M77.42 METATARSALGIA OF BOTH FEET: ICD-10-CM

## 2022-06-08 DIAGNOSIS — L84 CORN OR CALLUS: Primary | ICD-10-CM

## 2022-06-08 DIAGNOSIS — M77.41 METATARSALGIA OF BOTH FEET: ICD-10-CM

## 2022-06-08 DIAGNOSIS — B35.1 DERMATOPHYTOSIS OF NAIL: ICD-10-CM

## 2022-06-08 PROCEDURE — 11056 ROUTINE FOOT CARE: ICD-10-PCS | Mod: Q9,S$PBB,, | Performed by: PODIATRIST

## 2022-06-08 PROCEDURE — 99213 OFFICE O/P EST LOW 20 MIN: CPT | Mod: PBBFAC,PN | Performed by: PODIATRIST

## 2022-06-08 PROCEDURE — 99999 PR PBB SHADOW E&M-EST. PATIENT-LVL III: CPT | Mod: PBBFAC,,, | Performed by: PODIATRIST

## 2022-06-08 PROCEDURE — 99499 UNLISTED E&M SERVICE: CPT | Mod: S$PBB,,, | Performed by: PODIATRIST

## 2022-06-08 PROCEDURE — 11056 PARNG/CUTG B9 HYPRKR LES 2-4: CPT | Mod: Q9,S$PBB,, | Performed by: PODIATRIST

## 2022-06-08 PROCEDURE — 11720 DEBRIDE NAIL 1-5: CPT | Mod: Q9,PBBFAC,PN | Performed by: PODIATRIST

## 2022-06-08 PROCEDURE — 11720 ROUTINE FOOT CARE: ICD-10-PCS | Mod: Q9,59,S$PBB, | Performed by: PODIATRIST

## 2022-06-08 PROCEDURE — 99999 PR PBB SHADOW E&M-EST. PATIENT-LVL III: ICD-10-PCS | Mod: PBBFAC,,, | Performed by: PODIATRIST

## 2022-06-08 PROCEDURE — 99499 NO LOS: ICD-10-PCS | Mod: S$PBB,,, | Performed by: PODIATRIST

## 2022-06-08 NOTE — PROGRESS NOTES
PODIATRY NOTE       PATIENT ID:  Sarah Alejandro is a 76 y.o. female.       CHIEF CONCERN:   Nail Care         MEDICAL DECISION MAKING:          Problem List Items Addressed This Visit     Stage 3a chronic kidney disease      Other Visit Diagnoses     Corn or callus    -  Primary    Dermatophytosis of nail        Metatarsalgia of both feet                · I counseled the patient on the patient's conditions, their implications and medical management.    Shoe and activity modification as needed for relief.  Continue urea cream daily to calluses.   Offloading pads provided to try.     Routine Foot Care    Date/Time: 6/8/2022 10:00 AM  Performed by: Merary Roblero DPM  Authorized by: Merary Roblero DPM     Consent Done?:  Yes (Verbal)  Hyperkeratotic Skin Lesions?: Yes    Number of trimmed lesions:  3  Location(s):  Right 2nd Metatarsal Head, Right 3rd Metatarsal Head and Left 2nd Metatarsal Head    Nail Care Type:  Debride(Right 3rd Toe, Left 2nd Toe, Left 3rd Toe and Left 4th Toe)  Patient tolerance:  Patient tolerated the procedure well with no immediate complications     With patient's permission, the toenails mentioned above were reduced and debrided using a nail nipper, removing offending nail and debris.   Utilizing a #15 scalpel, I trimmed the corns and calluses at the above mentioned location.      The patient will continue to monitor the areas daily, inspect the feet, wear protective shoe gear when ambulatory, and moisturizer to maintain skin integrity.        PCP:  Latrice Davidson MD  Upcoming appointment on 6/24/2022          HISTORY OF PRESENT ILLNESS:  Sarah Alejandro is a 76 y.o. female with concerns regarding: painful calluses sub metatarsal heads, chronic.     She has history of bunionectomy.        Patient Active Problem List   Diagnosis    Stress incontinence of urine    Voiding dysfunction    Rectocele, female    Chronic constipation    Vaginal atrophy    Graves' disease    Stage  3a chronic kidney disease    Hyperlipidemia    Aortic valve stenosis    Essential tremor    Essential hypertension    Insomnia    S/P TAVR (transcatheter aortic valve replacement)    Lumbar radiculopathy    S/P hip replacement    Pelvic floor weakness    Decreased functional mobility and endurance    Disorder of muscle    Preoperative cardiovascular examination    Status post anterior & posterior colporrhaphy w/ MUS 11/1    Incomplete bladder emptying    Pelvic floor tension    Pelvic floor dysfunction           Current Outpatient Medications on File Prior to Visit   Medication Sig Dispense Refill    ALPRAZolam (XANAX) 0.5 MG tablet Take 1 tablet (0.5 mg total) by mouth daily as needed (severe anxiety). 30 tablet 0    ASCORBATE CALCIUM, VITAMIN C, ORAL Take 500 mg by mouth.      aspirin (ECOTRIN) 81 MG EC tablet Take 81 mg by mouth once daily.      atenoloL (TENORMIN) 100 MG tablet Take 1 tablet (100 mg total) by mouth 2 (two) times a day. 60 tablet 11    cetirizine-pseudoephedrine 5-120 mg Tb12 cetirizine 5 mg-pseudoephedrine  mg tablet,extended release,12hr   TK 1 T PO BID      cholecalciferol, vitamin D3, (VITAMIN D3) 50 mcg (2,000 unit) Tab Take 2,000 Units by mouth.      ciclopirox 0.77 % Gel Apply topically once daily. To thickened discolored toenails. 30 g 1    CMPD testosterone proprionate 2% in vanicream Apply topically. [facility]      COMPOUND HORMONE REPLACEMENT Take by mouth once daily. ESTROGEN CREAM -- [facility]      conjugated estrogens (PREMARIN) vaginal cream Place 0.5 g vaginally once daily. 1 applicator 5    docusate sodium (COLACE) 100 MG capsule Take 1 capsule (100 mg total) by mouth 2 (two) times daily. 60 capsule 1    EPINEPHrine (EPIPEN) 0.3 mg/0.3 mL AtIn epinephrine 0.3 mg/0.3 mL injection, auto-injector      ibuprofen (ADVIL,MOTRIN) 600 MG tablet Take 1 tablet (600 mg total) by mouth every 6 (six) hours as needed for Pain. 30 tablet 1     MAGNESIUM CARBONATE ORAL Take 1 tablet by mouth.      methIMAzole (TAPAZOLE) 5 MG Tab Take half a tablet daily 90 tablet 1    nitrofurantoin, macrocrystal-monohydrate, (MACROBID) 100 MG capsule Take 1 capsule (100 mg total) by mouth daily as needed (self cath). 30 capsule 0    omalizumab (XOLAIR) 75 mg/0.5 mL injection       PENNSAID 20 mg/gram /actuation(2 %) sopm APPLY 1 APPLICATION TOPICALLY 2 (TWO) TIMES DAILY AS NEEDED.::NOT COVERED:: 112 g 10    polyethylene glycol (GOLYTELY,NULYTELY) 236-22.74-6.74 -5.86 gram suspension Take by mouth.      progesterone (PROMETRIUM) 200 MG capsule Take 1 capsule by mouth 30-60 minutes before bed every night 90 capsule 1    rosuvastatin (CRESTOR) 40 MG Tab TAKE 1 TABLET BY MOUTH EVERY DAY 90 tablet 1    sulfamethoxazole-trimethoprim 800-160mg (BACTRIM DS) 800-160 mg Tab Take 1 tablet by mouth once daily.      tretinoin, emollient, (RENOVA) 0.02 % Crea Renova 0.02 % topical cream   APPLY TO AFFECTED AREA EVERY DAY AT NIGHT *NOT COVERED*      urea 20 % Crea Apply 1 application topically once daily. To dry skin on the feet. 75 g 10    valACYclovir (VALTREX) 1000 MG tablet valacyclovir 1 gram tablet      XIIDRA 5 % Dpet       zolpidem (AMBIEN CR) 6.25 MG CR tablet Take 1 tablet (6.25 mg total) by mouth nightly as needed for Insomnia. 30 tablet 2     No current facility-administered medications on file prior to visit.           Review of patient's allergies indicates:   Allergen Reactions    Penicillins Rash and Hives               ROS:   General ROS: negative for - chills, fever or night sweats  Respiratory ROS: no cough, shortness of breath, or wheezing  Cardiovascular ROS: no chest pain or dyspnea on exertion  Musculoskeletal ROS: positive for - pain in foot - bilateral  Neurological ROS: negative for - impaired coordination/balance and numbness/tingling  Dermatological ROS: negative for pruritus and rash      EXAM:     Vitals:    06/08/22 0957   Weight: 52 kg  "(114 lb 10.2 oz)   Height: 5' 4" (1.626 m)        General:  Alert and Oriented x 3;  No acute distress      Bilateral  Lower extremity exam:    Vascular:   Dorsalis Pedis:  present   Posterior Tibial:  present  Capillary refill time:  3 seconds  Temperature of toes cool to touch  Edema:  Trace and non-pitting       Neurological:     Sharp touch:  normal  Light touch: normal  Tinels Sign:  Absent  Mulders Click:   Absent        Dermatological:   Skin: thin, moist and appropriate for age; calluses sub metatarsal heads  Wounds/Ulcers:  Absent  Bruising:  Absent  Erythema:  Absent  Toenails are short and polished.  right 4th toenail thickened and incurvated without paronychia.         Musculoskeletal:   Metatarsophalangeal range of motion:   diminished range of motion  Subtalar joint range of motion: full range of motion  Ankle joint range of motion:  full range of motion  Bunions:  Present  Hammertoes: Present          "

## 2022-06-24 ENCOUNTER — OFFICE VISIT (OUTPATIENT)
Dept: INTERNAL MEDICINE | Facility: CLINIC | Age: 77
End: 2022-06-24
Payer: MEDICARE

## 2022-06-24 VITALS
BODY MASS INDEX: 20.28 KG/M2 | HEIGHT: 64 IN | SYSTOLIC BLOOD PRESSURE: 120 MMHG | RESPIRATION RATE: 16 BRPM | WEIGHT: 118.81 LBS | HEART RATE: 64 BPM | DIASTOLIC BLOOD PRESSURE: 66 MMHG

## 2022-06-24 DIAGNOSIS — G25.0 ESSENTIAL TREMOR: ICD-10-CM

## 2022-06-24 DIAGNOSIS — Z00.00 ANNUAL PHYSICAL EXAM: Primary | ICD-10-CM

## 2022-06-24 DIAGNOSIS — I10 ESSENTIAL HYPERTENSION: ICD-10-CM

## 2022-06-24 DIAGNOSIS — E78.5 HYPERLIPIDEMIA, UNSPECIFIED HYPERLIPIDEMIA TYPE: ICD-10-CM

## 2022-06-24 PROCEDURE — 99999 PR PBB SHADOW E&M-EST. PATIENT-LVL III: CPT | Mod: PBBFAC,,, | Performed by: INTERNAL MEDICINE

## 2022-06-24 PROCEDURE — 99213 OFFICE O/P EST LOW 20 MIN: CPT | Mod: PBBFAC,PO | Performed by: INTERNAL MEDICINE

## 2022-06-24 PROCEDURE — 99215 PR OFFICE/OUTPT VISIT, EST, LEVL V, 40-54 MIN: ICD-10-PCS | Mod: S$PBB,,, | Performed by: INTERNAL MEDICINE

## 2022-06-24 PROCEDURE — 99215 OFFICE O/P EST HI 40 MIN: CPT | Mod: S$PBB,,, | Performed by: INTERNAL MEDICINE

## 2022-06-24 PROCEDURE — 99999 PR PBB SHADOW E&M-EST. PATIENT-LVL III: ICD-10-PCS | Mod: PBBFAC,,, | Performed by: INTERNAL MEDICINE

## 2022-06-24 RX ORDER — TRETINOIN 0.2 MG/G
CREAM TOPICAL
Qty: 40 G | Refills: 0 | Status: SHIPPED | OUTPATIENT
Start: 2022-06-24 | End: 2022-06-26

## 2022-06-24 RX ORDER — PROPRANOLOL HYDROCHLORIDE 120 MG/1
240 CAPSULE, EXTENDED RELEASE ORAL DAILY
Qty: 60 CAPSULE | Refills: 3 | Status: SHIPPED | OUTPATIENT
Start: 2022-06-24 | End: 2022-10-03

## 2022-06-24 RX ORDER — TRETINOIN 0.2 MG/G
CREAM TOPICAL
Qty: 40 G | Refills: 0 | Status: SHIPPED | OUTPATIENT
Start: 2022-06-24 | End: 2022-06-24 | Stop reason: SDUPTHER

## 2022-06-24 NOTE — PROGRESS NOTES
Subjective:       Patient ID: Sarah Alejandro is a 76 y.o. female.    Chief Complaint: Annual Exam    HPI    76 y.o. 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    Health Maintenance/ Screening:   Labs: reviewed  Breast Cancer: Mammogram 6/21   Bone Density: DEXA  2021     Vaccines: reviewed, utd, moderna      Health Maintenance Topics with due status: Not Due       Topic Last Completion Date    TETANUS VACCINE 01/01/2014    DEXA Scan 04/12/2021    Influenza Vaccine 10/06/2021    Lipid Panel 05/31/2022       Health Maintenance Due   Topic Date Due    Hepatitis C Screening  Never done    Shingles Vaccine (3 of 3) 10/27/2018       Health Maintenance Due   Topic Date Due    Hepatitis C Screening  Never done    Shingles Vaccine (3 of 3) 10/27/2018         Past Medical History:   Diagnosis Date    Abnormal Pap smear of cervix     Graves disease     History of back surgery     Hormone replacement therapy (HRT)     Hyperlipidemia     Hypertension     Infection of spine     Menopause 1996    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     Graves disease     Past Surgical History:   Procedure Laterality Date    BACK SURGERY      CARDIAC VALVE REPLACEMENT  05/2016    - aortic valve stenosis    HIP REPLACEMENT ARTHROPLASTY Left 2016    INSERTION OF MIDURETHRAL SLING N/A 11/1/2021    Procedure: SLING, MIDURETHRAL;  Surgeon: Hodan Goldberg MD;  Location: [facility];  Service: OB/GYN;  Laterality: N/A;    LUMBAR FUSION       Family History   Problem  Relation Age of Onset    Colon cancer Father     Leukemia Mother     Hypothyroidism Sister     Breast cancer Neg Hx     Ovarian cancer Neg Hx     Stroke Neg Hx     Diabetes Neg Hx      Social History     Socioeconomic History    Marital status:    Tobacco Use    Smoking status: Former Smoker     Quit date:      Years since quittin.5    Smokeless tobacco: Never Used   Substance and Sexual Activity    Alcohol use: Not Currently     Alcohol/week: 2.0 standard drinks     Types: 1 Glasses of wine, 1 Shots of liquor per week     Comment: weekends     Drug use: Never    Sexual activity: Yes     Partners: Male     Birth control/protection: Post-menopausal     Comment:       Review of patient's allergies indicates:   Allergen Reactions    Penicillins Rash and Hives       Current Outpatient Medications:     ALPRAZolam (XANAX) 0.5 MG tablet, Take 1 tablet (0.5 mg total) by mouth daily as needed (severe anxiety)., Disp: 30 tablet, Rfl: 0    ASCORBATE CALCIUM, VITAMIN C, ORAL, Take 500 mg by mouth., Disp: , Rfl:     aspirin (ECOTRIN) 81 MG EC tablet, Take 81 mg by mouth once daily., Disp: , Rfl:     atenoloL (TENORMIN) 100 MG tablet, Take 1 tablet (100 mg total) by mouth 2 (two) times a day., Disp: 60 tablet, Rfl: 11    cetirizine-pseudoephedrine 5-120 mg Tb12, cetirizine 5 mg-pseudoephedrine  mg tablet,extended release,12hr  TK 1 T PO BID, Disp: , Rfl:     cholecalciferol, vitamin D3, (VITAMIN D3) 50 mcg (2,000 unit) Tab, Take 2,000 Units by mouth., Disp: , Rfl:     ciclopirox 0.77 % Gel, Apply topically once daily. To thickened discolored toenails., Disp: 30 g, Rfl: 1    CMPD testosterone proprionate 2% in vanicream, Apply topically. [facility], Disp: , Rfl:     COMPOUND HORMONE REPLACEMENT, Take by mouth once daily. ESTROGEN CREAM -- [facility], Disp: , Rfl:     conjugated estrogens (PREMARIN) vaginal cream, Place 0.5 g vaginally once daily., Disp: 1  applicator, Rfl: 5    docusate sodium (COLACE) 100 MG capsule, Take 1 capsule (100 mg total) by mouth 2 (two) times daily., Disp: 60 capsule, Rfl: 1    EPINEPHrine (EPIPEN) 0.3 mg/0.3 mL AtIn, epinephrine 0.3 mg/0.3 mL injection, auto-injector, Disp: , Rfl:     ibuprofen (ADVIL,MOTRIN) 600 MG tablet, Take 1 tablet (600 mg total) by mouth every 6 (six) hours as needed for Pain., Disp: 30 tablet, Rfl: 1    MAGNESIUM CARBONATE ORAL, Take 1 tablet by mouth., Disp: , Rfl:     methIMAzole (TAPAZOLE) 5 MG Tab, Take half a tablet daily, Disp: 90 tablet, Rfl: 1    nitrofurantoin, macrocrystal-monohydrate, (MACROBID) 100 MG capsule, Take 1 capsule (100 mg total) by mouth daily as needed (self cath)., Disp: 30 capsule, Rfl: 0    omalizumab (XOLAIR) 75 mg/0.5 mL injection, , Disp: , Rfl:     PENNSAID 20 mg/gram /actuation(2 %) sopm, APPLY 1 APPLICATION TOPICALLY 2 (TWO) TIMES DAILY AS NEEDED.::NOT COVERED::, Disp: 112 g, Rfl: 10    polyethylene glycol (GOLYTELY,NULYTELY) 236-22.74-6.74 -5.86 gram suspension, Take by mouth., Disp: , Rfl:     progesterone (PROMETRIUM) 200 MG capsule, Take 1 capsule by mouth 30-60 minutes before bed every night, Disp: 90 capsule, Rfl: 1    rosuvastatin (CRESTOR) 40 MG Tab, TAKE 1 TABLET BY MOUTH EVERY DAY, Disp: 90 tablet, Rfl: 1    sulfamethoxazole-trimethoprim 800-160mg (BACTRIM DS) 800-160 mg Tab, Take 1 tablet by mouth once daily., Disp: , Rfl:     urea 20 % Crea, Apply 1 application topically once daily. To dry skin on the feet., Disp: 75 g, Rfl: 10    valACYclovir (VALTREX) 1000 MG tablet, valacyclovir 1 gram tablet, Disp: , Rfl:     XIIDRA 5 % Dpet, , Disp: , Rfl:     zolpidem (AMBIEN CR) 6.25 MG CR tablet, Take 1 tablet (6.25 mg total) by mouth nightly as needed for Insomnia., Disp: 30 tablet, Rfl: 2    propranoloL (INDERAL LA) 120 MG 24 hr capsule, Take 2 capsules (240 mg total) by mouth once daily., Disp: 60 capsule, Rfl: 3    RENOVA 0.02 % Crea, RENOVA 0.02 % TOPICAL  "CREAM APPLY TO AFFECTED AREA EVERY DAY AT NIGHT, Disp: 40 g, Rfl: 0      Review of Systems   Constitutional: Positive for fatigue. Negative for fever.   Respiratory: Negative for cough and shortness of breath.    Cardiovascular: Negative for chest pain and leg swelling.   Gastrointestinal: Negative for blood in stool.   Genitourinary: Negative for dysuria.   Musculoskeletal: Negative for gait problem.   Skin: Negative for pallor.   Neurological: Positive for tremors.       Objective:        Vitals:    06/24/22 1143   BP: 120/66   BP Location: Right arm   Patient Position: Sitting   BP Method: Medium (Manual)   Pulse: 64   Resp: 16   Weight: 53.9 kg (118 lb 13.3 oz)   Height: 5' 3.5" (1.613 m)       Body mass index is 20.72 kg/m².    Physical Exam  Constitutional:       General: She is not in acute distress.     Appearance: She is well-developed. She is not diaphoretic.   HENT:      Head: Normocephalic and atraumatic.      Right Ear: External ear normal.      Left Ear: External ear normal.   Eyes:      Conjunctiva/sclera: Conjunctivae normal.   Cardiovascular:      Rate and Rhythm: Normal rate and regular rhythm.   Pulmonary:      Effort: Pulmonary effort is normal.      Breath sounds: Normal breath sounds.   Abdominal:      General: Bowel sounds are normal. There is no distension.      Palpations: Abdomen is soft.   Musculoskeletal:      Cervical back: Neck supple.      Right lower leg: No edema.      Left lower leg: No edema.   Skin:     General: Skin is warm and dry.      Nails: There is no clubbing.   Neurological:      Mental Status: She is alert. Mental status is at baseline.      Motor: Tremor present.      Gait: Gait normal.   Psychiatric:         Behavior: Behavior normal.         Judgment: Judgment normal.         Assessment:     1. Annual physical exam    2. Hyperlipidemia, unspecified hyperlipidemia type    3. Essential hypertension    4. Essential tremor           Plan:         1. Annual physical exam  - " labs reviewed w/ pt  - immunizations reviewed  -  reviewed, updated    2. Hyperlipidemia, unspecified hyperlipidemia type  - cont statin    3. Essential hypertension  - controlled, cont current med    4. Essential tremor  - requests to resume propranolol in place of atenolol given previously better control of tremor. Refill last rx dose which she tolerated without issues- has old rx bottle with her. Recommend Neuro eval and rec's.  - propranoloL (INDERAL LA) 120 MG 24 hr capsule; Take 2 capsules (240 mg total) by mouth once daily.  Dispense: 60 capsule; Refill: 3  - Ambulatory referral/consult to Neurology; Future      Unless there are intervening problems, Follow up in about 1 year (around 6/24/2023), or 6 mo - 1 yr prn med refills, for annual.    40 minutes total time spent on the encounter, which includes face to face time and non-face to face time preparing to see the patient (eg, review of tests), Obtaining and/or reviewing separately obtained history, Documenting clinical information in the electronic or other health record, Independently interpreting results (not separately reported) and communicating results to the patient/family/caregiver, or Care coordination (not separately reported).       Patient note was created using MModal Dictation.  Any errors in syntax or even information may not have been identified and edited on initial review prior to signing this note.

## 2022-06-26 RX ORDER — TRETINOIN 0.2 MG/G
CREAM TOPICAL
Qty: 40 G | Refills: 0 | Status: SHIPPED | OUTPATIENT
Start: 2022-06-26

## 2022-07-13 ENCOUNTER — OFFICE VISIT (OUTPATIENT)
Dept: PODIATRY | Facility: CLINIC | Age: 77
End: 2022-07-13
Payer: MEDICARE

## 2022-07-13 VITALS
DIASTOLIC BLOOD PRESSURE: 76 MMHG | SYSTOLIC BLOOD PRESSURE: 132 MMHG | HEIGHT: 64 IN | WEIGHT: 118.81 LBS | BODY MASS INDEX: 20.28 KG/M2

## 2022-07-13 DIAGNOSIS — L84 CORN OR CALLUS: Primary | ICD-10-CM

## 2022-07-13 DIAGNOSIS — Z98.890 HISTORY OF FOOT SURGERY: ICD-10-CM

## 2022-07-13 DIAGNOSIS — M77.41 METATARSALGIA OF BOTH FEET: ICD-10-CM

## 2022-07-13 DIAGNOSIS — M77.42 METATARSALGIA OF BOTH FEET: ICD-10-CM

## 2022-07-13 DIAGNOSIS — N18.31 STAGE 3A CHRONIC KIDNEY DISEASE: ICD-10-CM

## 2022-07-13 PROCEDURE — 99999 PR PBB SHADOW E&M-EST. PATIENT-LVL II: ICD-10-PCS | Mod: PBBFAC,,, | Performed by: PODIATRIST

## 2022-07-13 PROCEDURE — 17999 UNLISTD PX SKN MUC MEMB SUBQ: CPT | Mod: CSM,S$GLB,, | Performed by: PODIATRIST

## 2022-07-13 PROCEDURE — 99213 PR OFFICE/OUTPT VISIT, EST, LEVL III, 20-29 MIN: ICD-10-PCS | Mod: S$PBB,,, | Performed by: PODIATRIST

## 2022-07-13 PROCEDURE — 99212 OFFICE O/P EST SF 10 MIN: CPT | Mod: PBBFAC,PN | Performed by: PODIATRIST

## 2022-07-13 PROCEDURE — 99213 OFFICE O/P EST LOW 20 MIN: CPT | Mod: S$PBB,,, | Performed by: PODIATRIST

## 2022-07-13 PROCEDURE — 17999 PR NON-COVERED FOOT CARE: ICD-10-PCS | Mod: CSM,S$GLB,, | Performed by: PODIATRIST

## 2022-07-13 PROCEDURE — 99999 PR PBB SHADOW E&M-EST. PATIENT-LVL II: CPT | Mod: PBBFAC,,, | Performed by: PODIATRIST

## 2022-07-13 NOTE — PROGRESS NOTES
"      PODIATRY NOTE       PATIENT ID:  Sarah Alejandro is a 76 y.o. female.       CHIEF CONCERN:   Callouses         MEDICAL DECISION MAKING:          Problem List Items Addressed This Visit     Stage 3a chronic kidney disease      Other Visit Diagnoses     Corn or callus    -  Primary    Metatarsalgia of both feet        History of foot surgery                · I counseled the patient on the patient's conditions, their implications and medical management.    Shoe and activity modification as needed for relief.  Continue urea cream daily to calluses.   Offloading pads provided to try.   Patient seen for "foot care" five weeks ago.  Service of callus trimming generally covered less than 61 days.  Procedure brief today.         PCP:  Latrice Davidson MD  Last encounter:  Callouses             HISTORY OF PRESENT ILLNESS:  Sarah Alejandro is a 76 y.o. female with concerns regarding: painful calluses sub metatarsal heads, chronic.     She has history of bunionectomy.        Patient Active Problem List   Diagnosis    Stress incontinence of urine    Voiding dysfunction    Rectocele, female    Chronic constipation    Vaginal atrophy    Graves' disease    Stage 3a chronic kidney disease    Hyperlipidemia    Aortic valve stenosis    Essential tremor    Essential hypertension    Insomnia    S/P TAVR (transcatheter aortic valve replacement)    Lumbar radiculopathy    S/P hip replacement    Pelvic floor weakness    Decreased functional mobility and endurance    Disorder of muscle    Preoperative cardiovascular examination    Status post anterior & posterior colporrhaphy w/ MUS 11/1    Incomplete bladder emptying    Pelvic floor tension    Pelvic floor dysfunction           Current Outpatient Medications on File Prior to Visit   Medication Sig Dispense Refill    ALPRAZolam (XANAX) 0.5 MG tablet Take 1 tablet (0.5 mg total) by mouth daily as needed (severe anxiety). 30 tablet 0    ASCORBATE " CALCIUM, VITAMIN C, ORAL Take 500 mg by mouth.      aspirin (ECOTRIN) 81 MG EC tablet Take 81 mg by mouth once daily.      atenoloL (TENORMIN) 100 MG tablet Take 1 tablet (100 mg total) by mouth 2 (two) times a day. 60 tablet 11    cetirizine-pseudoephedrine 5-120 mg Tb12 cetirizine 5 mg-pseudoephedrine  mg tablet,extended release,12hr   TK 1 T PO BID      cholecalciferol, vitamin D3, (VITAMIN D3) 50 mcg (2,000 unit) Tab Take 2,000 Units by mouth.      ciclopirox 0.77 % Gel Apply topically once daily. To thickened discolored toenails. 30 g 1    CMPD testosterone proprionate 2% in vanicream Apply topically. [facility]      COMPOUND HORMONE REPLACEMENT Take by mouth once daily. ESTROGEN CREAM -- [facility]      conjugated estrogens (PREMARIN) vaginal cream Place 0.5 g vaginally once daily. 1 applicator 5    docusate sodium (COLACE) 100 MG capsule Take 1 capsule (100 mg total) by mouth 2 (two) times daily. 60 capsule 1    EPINEPHrine (EPIPEN) 0.3 mg/0.3 mL AtIn epinephrine 0.3 mg/0.3 mL injection, auto-injector      ibuprofen (ADVIL,MOTRIN) 600 MG tablet Take 1 tablet (600 mg total) by mouth every 6 (six) hours as needed for Pain. 30 tablet 1    MAGNESIUM CARBONATE ORAL Take 1 tablet by mouth.      methIMAzole (TAPAZOLE) 5 MG Tab Take half a tablet daily 90 tablet 1    nitrofurantoin, macrocrystal-monohydrate, (MACROBID) 100 MG capsule Take 1 capsule (100 mg total) by mouth daily as needed (self cath). 30 capsule 0    omalizumab (XOLAIR) 75 mg/0.5 mL injection       PENNSAID 20 mg/gram /actuation(2 %) sopm APPLY 1 APPLICATION TOPICALLY 2 (TWO) TIMES DAILY AS NEEDED.::NOT COVERED:: 112 g 10    polyethylene glycol (GOLYTELY,NULYTELY) 236-22.74-6.74 -5.86 gram suspension Take by mouth.      progesterone (PROMETRIUM) 200 MG capsule Take 1 capsule by mouth 30-60 minutes before bed every night 90 capsule 1    propranoloL (INDERAL LA) 120 MG 24 hr capsule Take 2 capsules (240 mg total)  "by mouth once daily. 60 capsule 3    RENOVA 0.02 % Crea RENOVA 0.02 % TOPICAL CREAM APPLY TO AFFECTED AREA EVERY DAY AT NIGHT 40 g 0    rosuvastatin (CRESTOR) 40 MG Tab TAKE 1 TABLET BY MOUTH EVERY DAY 90 tablet 1    sulfamethoxazole-trimethoprim 800-160mg (BACTRIM DS) 800-160 mg Tab Take 1 tablet by mouth once daily.      urea 20 % Crea Apply 1 application topically once daily. To dry skin on the feet. 75 g 10    valACYclovir (VALTREX) 1000 MG tablet valacyclovir 1 gram tablet      XIIDRA 5 % Dpet       zolpidem (AMBIEN CR) 6.25 MG CR tablet Take 1 tablet (6.25 mg total) by mouth nightly as needed for Insomnia. 30 tablet 2     No current facility-administered medications on file prior to visit.           Review of patient's allergies indicates:   Allergen Reactions    Penicillins Rash and Hives               ROS:   General ROS: negative for - chills, fever or night sweats  Respiratory ROS: no cough, shortness of breath, or wheezing  Cardiovascular ROS: no chest pain or dyspnea on exertion  Musculoskeletal ROS: positive for - pain in foot - bilateral  Neurological ROS: negative for - impaired coordination/balance and numbness/tingling  Dermatological ROS: negative for pruritus and rash      EXAM:     Vitals:    07/13/22 1012   BP: 132/76   Weight: 53.9 kg (118 lb 13.3 oz)   Height: 5' 3.5" (1.613 m)        General:  Alert and Oriented x 3;  No acute distress      Bilateral  Lower extremity exam:    Vascular:   Dorsalis Pedis:  present   Posterior Tibial:  present  Capillary refill time:  3 seconds  Temperature of toes cool to touch  Edema:  Trace and non-pitting       Neurological:     Sharp touch:  normal  Light touch: normal  Tinels Sign:  Absent  Mulders Click:   Absent        Dermatological:   Skin: thin, moist and appropriate for age; calluses sub metatarsal heads  Wounds/Ulcers:  Absent  Bruising:  Absent  Erythema:  Absent  Toenails are short and polished.  right 4th toenail thickened and incurvated " without paronychia.         Musculoskeletal:   Metatarsophalangeal range of motion:   diminished range of motion  Subtalar joint range of motion: full range of motion  Ankle joint range of motion:  full range of motion  Bunions:  Present  Hammertoes: Present

## 2022-07-19 ENCOUNTER — HOSPITAL ENCOUNTER (OUTPATIENT)
Dept: RADIOLOGY | Facility: OTHER | Age: 77
Discharge: HOME | End: 2022-07-19
Attending: OTOLARYNGOLOGY
Payer: MEDICARE

## 2022-07-19 DIAGNOSIS — J32.9 CHRONIC SINUSITIS: ICD-10-CM

## 2022-07-19 PROCEDURE — 70486 CT MAXILLOFACIAL W/O DYE: CPT | Mod: 26,,, | Performed by: RADIOLOGY

## 2022-07-19 PROCEDURE — 70486 CT MAXILLOFACIAL W/O DYE: CPT | Mod: TC

## 2022-07-19 PROCEDURE — 70486 CT MEDTRONIC SINUSES WITHOUT: ICD-10-PCS | Mod: 26,,, | Performed by: RADIOLOGY

## 2022-07-25 ENCOUNTER — HOSPITAL ENCOUNTER (OUTPATIENT)
Dept: RADIOLOGY | Facility: OTHER | Age: 77
Discharge: HOME | End: 2022-07-25
Attending: OBSTETRICS & GYNECOLOGY
Payer: MEDICARE

## 2022-07-25 DIAGNOSIS — Z12.31 ENCOUNTER FOR SCREENING MAMMOGRAM FOR BREAST CANCER: ICD-10-CM

## 2022-07-25 PROCEDURE — 77063 MAMMO DIGITAL SCREENING BILAT WITH TOMO: ICD-10-PCS | Mod: 26,,, | Performed by: RADIOLOGY

## 2022-07-25 PROCEDURE — 77067 SCR MAMMO BI INCL CAD: CPT | Mod: TC

## 2022-07-25 PROCEDURE — 77067 MAMMO DIGITAL SCREENING BILAT WITH TOMO: ICD-10-PCS | Mod: 26,,, | Performed by: RADIOLOGY

## 2022-07-25 PROCEDURE — 77063 BREAST TOMOSYNTHESIS BI: CPT | Mod: 26,,, | Performed by: RADIOLOGY

## 2022-07-25 PROCEDURE — 77067 SCR MAMMO BI INCL CAD: CPT | Mod: 26,,, | Performed by: RADIOLOGY

## 2022-08-07 ENCOUNTER — PATIENT MESSAGE (OUTPATIENT)
Dept: UROGYNECOLOGY | Facility: CLINIC | Age: 77
End: 2022-08-07
Payer: MEDICARE

## 2022-08-08 ENCOUNTER — LAB VISIT (OUTPATIENT)
Dept: LAB | Facility: OTHER | Age: 77
End: 2022-08-08
Attending: INTERNAL MEDICINE
Payer: MEDICARE

## 2022-08-08 DIAGNOSIS — E05.00 GRAVES' DISEASE: ICD-10-CM

## 2022-08-08 LAB — TSH SERPL DL<=0.005 MIU/L-ACNC: 1.82 UIU/ML (ref 0.4–4)

## 2022-08-08 PROCEDURE — 36415 COLL VENOUS BLD VENIPUNCTURE: CPT | Performed by: INTERNAL MEDICINE

## 2022-08-08 PROCEDURE — 84443 ASSAY THYROID STIM HORMONE: CPT | Performed by: INTERNAL MEDICINE

## 2022-08-17 ENCOUNTER — OFFICE VISIT (OUTPATIENT)
Dept: PODIATRY | Facility: CLINIC | Age: 77
End: 2022-08-17
Payer: MEDICARE

## 2022-08-17 VITALS
HEIGHT: 64 IN | SYSTOLIC BLOOD PRESSURE: 132 MMHG | DIASTOLIC BLOOD PRESSURE: 62 MMHG | HEART RATE: 55 BPM | BODY MASS INDEX: 20.14 KG/M2 | WEIGHT: 118 LBS

## 2022-08-17 DIAGNOSIS — N18.31 STAGE 3A CHRONIC KIDNEY DISEASE: ICD-10-CM

## 2022-08-17 DIAGNOSIS — L84 CORN OR CALLUS: Primary | ICD-10-CM

## 2022-08-17 DIAGNOSIS — B35.1 DERMATOPHYTOSIS OF NAIL: ICD-10-CM

## 2022-08-17 DIAGNOSIS — Z98.890 HISTORY OF FOOT SURGERY: ICD-10-CM

## 2022-08-17 PROCEDURE — 99999 PR PBB SHADOW E&M-EST. PATIENT-LVL V: CPT | Mod: PBBFAC,,, | Performed by: PODIATRIST

## 2022-08-17 PROCEDURE — 99499 NO LOS: ICD-10-PCS | Mod: S$PBB,,, | Performed by: PODIATRIST

## 2022-08-17 PROCEDURE — 11056 PARNG/CUTG B9 HYPRKR LES 2-4: CPT | Mod: Q9,S$PBB,, | Performed by: PODIATRIST

## 2022-08-17 PROCEDURE — 11056 ROUTINE FOOT CARE: ICD-10-PCS | Mod: Q9,S$PBB,, | Performed by: PODIATRIST

## 2022-08-17 PROCEDURE — 11720 ROUTINE FOOT CARE: ICD-10-PCS | Mod: Q9,59,S$PBB, | Performed by: PODIATRIST

## 2022-08-17 PROCEDURE — 99499 UNLISTED E&M SERVICE: CPT | Mod: S$PBB,,, | Performed by: PODIATRIST

## 2022-08-17 PROCEDURE — 11720 DEBRIDE NAIL 1-5: CPT | Mod: Q9,PBBFAC,PN | Performed by: PODIATRIST

## 2022-08-17 PROCEDURE — 99999 PR PBB SHADOW E&M-EST. PATIENT-LVL V: ICD-10-PCS | Mod: PBBFAC,,, | Performed by: PODIATRIST

## 2022-08-17 PROCEDURE — 99215 OFFICE O/P EST HI 40 MIN: CPT | Mod: PBBFAC,PN | Performed by: PODIATRIST

## 2022-08-17 RX ORDER — AZITHROMYCIN 250 MG/1
TABLET, FILM COATED ORAL
COMMUNITY
Start: 2022-07-12 | End: 2022-08-19 | Stop reason: CLARIF

## 2022-08-17 RX ORDER — HYDRALAZINE HYDROCHLORIDE 25 MG/1
TABLET, FILM COATED ORAL
COMMUNITY
End: 2022-08-19 | Stop reason: CLARIF

## 2022-08-17 NOTE — PROGRESS NOTES
PODIATRY NOTE       PATIENT ID:  Sarah Alejandro is a 76 y.o. female.       CHIEF CONCERN:   Foot Problem (Trim calluses)         MEDICAL DECISION MAKING:          Problem List Items Addressed This Visit     Stage 3a chronic kidney disease      Other Visit Diagnoses     Corn or callus    -  Primary    Dermatophytosis of nail        History of foot surgery                · I counseled the patient on the patient's conditions, their implications and medical management.    Shoe and activity modification as needed for relief.  Continue urea cream daily to calluses.       Routine Foot Care    Date/Time: 8/17/2022 10:00 AM  Performed by: Merary Roblero DPM  Authorized by: Merary Roblero DPM     Consent Done?:  Yes (Verbal)  Hyperkeratotic Skin Lesions?: Yes    Number of trimmed lesions:  2  Location(s):  Left Plantar and Right Plantar    Nail Care Type:  Debride  Patient tolerance:  Patient tolerated the procedure well with no immediate complications     With patient's permission, the toenails mentioned above were reduced and debrided using a nail nipper, removing offending nail and debris.   Utilizing a #15 scalpel, I trimmed the corns and calluses at the above mentioned location.      The patient will continue to monitor the areas daily, inspect the feet, wear protective shoe gear when ambulatory, and moisturizer to maintain skin integrity.          PCP:  Latrice Davidson MD  Last encounter:  6/24/2022           HISTORY OF PRESENT ILLNESS:  Sarah Alejandro is a 76 y.o. female with concerns regarding: painful calluses sub metatarsal heads, chronic.     She has history of bunionectomy.            Patient Active Problem List   Diagnosis    Stress incontinence of urine    Voiding dysfunction    Rectocele, female    Chronic constipation    Vaginal atrophy    Graves' disease    Stage 3a chronic kidney disease    Hyperlipidemia    Aortic valve stenosis    Essential tremor    Essential hypertension     Insomnia    S/P TAVR (transcatheter aortic valve replacement)    Lumbar radiculopathy    S/P hip replacement    Pelvic floor weakness    Decreased functional mobility and endurance    Disorder of muscle    Preoperative cardiovascular examination    Status post anterior & posterior colporrhaphy w/ MUS 11/1    Incomplete bladder emptying    Pelvic floor tension    Pelvic floor dysfunction           Current Outpatient Medications on File Prior to Visit   Medication Sig Dispense Refill    ALPRAZolam (XANAX) 0.5 MG tablet Take 1 tablet (0.5 mg total) by mouth daily as needed (severe anxiety). 30 tablet 0    ASCORBATE CALCIUM, VITAMIN C, ORAL Take 500 mg by mouth.      aspirin (ECOTRIN) 81 MG EC tablet Take 81 mg by mouth once daily.      atenoloL (TENORMIN) 100 MG tablet TAKE 1 TABLET BY MOUTH TWICE A  tablet 0    azithromycin (Z-BEATRIZ) 250 MG tablet TAKE 2 TABLETS BY MOUTH TODAY, THEN TAKE 1 TABLET DAILY FOR 4 DAYS      cetirizine-pseudoephedrine 5-120 mg Tb12 cetirizine 5 mg-pseudoephedrine  mg tablet,extended release,12hr   TK 1 T PO BID      cholecalciferol, vitamin D3, (VITAMIN D3) 50 mcg (2,000 unit) Tab Take 2,000 Units by mouth.      ciclopirox 0.77 % Gel Apply topically once daily. To thickened discolored toenails. 30 g 1    CMPD testosterone proprionate 2% in vanicream Apply topically. [facility]      COMPOUND HORMONE REPLACEMENT Take by mouth once daily. ESTROGEN CREAM -- [facility]      conjugated estrogens (PREMARIN) vaginal cream Place 0.5 g vaginally once daily. 1 applicator 5    docusate sodium (COLACE) 100 MG capsule Take 1 capsule (100 mg total) by mouth 2 (two) times daily. 60 capsule 1    EPINEPHrine (EPIPEN) 0.3 mg/0.3 mL AtIn epinephrine 0.3 mg/0.3 mL injection, auto-injector      hydrALAZINE (APRESOLINE) 25 MG tablet 1 tablet with food      ibuprofen (ADVIL,MOTRIN) 600 MG tablet Take 1 tablet (600 mg total) by mouth every 6 (six) hours as needed  for Pain. 30 tablet 1    MAGNESIUM CARBONATE ORAL Take 1 tablet by mouth.      methIMAzole (TAPAZOLE) 5 MG Tab Take half a tablet daily 90 tablet 1    nitrofurantoin, macrocrystal-monohydrate, (MACROBID) 100 MG capsule Take 1 capsule (100 mg total) by mouth daily as needed (self cath). 30 capsule 0    omalizumab (XOLAIR) 75 mg/0.5 mL injection       PENNSAID 20 mg/gram /actuation(2 %) sopm APPLY 1 APPLICATION TOPICALLY 2 (TWO) TIMES DAILY AS NEEDED.::NOT COVERED:: 112 g 10    polyethylene glycol (GOLYTELY,NULYTELY) 236-22.74-6.74 -5.86 gram suspension Take by mouth.      progesterone (PROMETRIUM) 200 MG capsule Take 1 capsule by mouth 30-60 minutes before bed every night 90 capsule 1    propranoloL (INDERAL LA) 120 MG 24 hr capsule Take 2 capsules (240 mg total) by mouth once daily. 60 capsule 3    RENOVA 0.02 % Crea RENOVA 0.02 % TOPICAL CREAM APPLY TO AFFECTED AREA EVERY DAY AT NIGHT 40 g 0    rosuvastatin (CRESTOR) 40 MG Tab TAKE 1 TABLET BY MOUTH EVERY DAY 90 tablet 1    sulfamethoxazole-trimethoprim 800-160mg (BACTRIM DS) 800-160 mg Tab Take 1 tablet by mouth once daily.      urea 20 % Crea Apply 1 application topically once daily. To dry skin on the feet. 75 g 10    valACYclovir (VALTREX) 1000 MG tablet valacyclovir 1 gram tablet      XIIDRA 5 % Dpet       zolpidem (AMBIEN CR) 6.25 MG CR tablet Take 1 tablet (6.25 mg total) by mouth nightly as needed for Insomnia. 30 tablet 2     No current facility-administered medications on file prior to visit.           Review of patient's allergies indicates:   Allergen Reactions    Penicillins Rash and Hives               ROS:   General ROS: negative for - chills, fever or night sweats  Respiratory ROS: no cough, shortness of breath, or wheezing  Cardiovascular ROS: no chest pain or dyspnea on exertion  Musculoskeletal ROS: positive for - pain in foot - bilateral  Neurological ROS: negative for - impaired coordination/balance and  "numbness/tingling  Dermatological ROS: negative for pruritus and rash      EXAM:     Vitals:    08/17/22 1004   BP: 132/62   Pulse: (!) 55   Weight: 53.5 kg (118 lb)   Height: 5' 3.5" (1.613 m)        General:  Alert and Oriented x 3;  No acute distress      Bilateral  Lower extremity exam:    Vascular:   Dorsalis Pedis:  present   Posterior Tibial:  present  Capillary refill time:  3 seconds  Temperature of toes cool to touch  Edema:  Trace and non-pitting       Neurological:     Sharp touch:  normal  Light touch: normal  Tinels Sign:  Absent  Mulders Click:   Absent        Dermatological:   Skin: thin, moist and appropriate for age; calluses sub metatarsal heads  Wounds/Ulcers:  Absent  Bruising:  Absent  Erythema:  Absent  Toenails are short and polished.  right 4th toenail thickened and incurvated without paronychia.         Musculoskeletal:   Metatarsophalangeal range of motion:   diminished range of motion  Subtalar joint range of motion: full range of motion  Ankle joint range of motion:  full range of motion  Bunions:  Present  Hammertoes: Present          "

## 2022-08-18 ENCOUNTER — HOSPITAL ENCOUNTER (OUTPATIENT)
Dept: RADIOLOGY | Facility: OTHER | Age: 77
Discharge: HOME | End: 2022-08-18
Attending: OBSTETRICS & GYNECOLOGY
Payer: MEDICARE

## 2022-08-18 DIAGNOSIS — R92.8 ABNORMAL MAMMOGRAM: ICD-10-CM

## 2022-08-18 PROCEDURE — 77065 MAMMO DIGITAL DIAGNOSTIC RIGHT WITH TOMO: ICD-10-PCS | Mod: 26,RT,, | Performed by: RADIOLOGY

## 2022-08-18 PROCEDURE — 77065 DX MAMMO INCL CAD UNI: CPT | Mod: TC,RT

## 2022-08-18 PROCEDURE — 77061 BREAST TOMOSYNTHESIS UNI: CPT | Mod: 26,RT,, | Performed by: RADIOLOGY

## 2022-08-18 PROCEDURE — 77065 DX MAMMO INCL CAD UNI: CPT | Mod: 26,RT,, | Performed by: RADIOLOGY

## 2022-08-18 PROCEDURE — 77061 MAMMO DIGITAL DIAGNOSTIC RIGHT WITH TOMO: ICD-10-PCS | Mod: 26,RT,, | Performed by: RADIOLOGY

## 2022-08-19 ENCOUNTER — ANESTHESIA EVENT (OUTPATIENT)
Dept: SURGERY | Facility: OTHER | Age: 77
End: 2022-08-19
Payer: MEDICARE

## 2022-08-19 ENCOUNTER — HOSPITAL ENCOUNTER (OUTPATIENT)
Dept: PREADMISSION TESTING | Facility: OTHER | Age: 77
Discharge: HOME | End: 2022-08-19
Attending: OTOLARYNGOLOGY
Payer: MEDICARE

## 2022-08-19 ENCOUNTER — PATIENT MESSAGE (OUTPATIENT)
Dept: ENDOCRINOLOGY | Facility: CLINIC | Age: 77
End: 2022-08-19
Payer: MEDICARE

## 2022-08-19 VITALS
BODY MASS INDEX: 20.91 KG/M2 | OXYGEN SATURATION: 100 % | HEART RATE: 58 BPM | RESPIRATION RATE: 16 BRPM | DIASTOLIC BLOOD PRESSURE: 62 MMHG | HEIGHT: 63 IN | TEMPERATURE: 98 F | SYSTOLIC BLOOD PRESSURE: 128 MMHG | WEIGHT: 118 LBS

## 2022-08-19 DIAGNOSIS — E05.00 GRAVES' DISEASE: ICD-10-CM

## 2022-08-19 RX ORDER — SODIUM CHLORIDE, SODIUM LACTATE, POTASSIUM CHLORIDE, CALCIUM CHLORIDE 600; 310; 30; 20 MG/100ML; MG/100ML; MG/100ML; MG/100ML
INJECTION, SOLUTION INTRAVENOUS CONTINUOUS
Status: CANCELLED | OUTPATIENT
Start: 2022-08-19

## 2022-08-19 RX ORDER — ACETAMINOPHEN 500 MG
1000 TABLET ORAL
Status: CANCELLED | OUTPATIENT
Start: 2022-08-19 | End: 2022-08-19

## 2022-08-19 RX ORDER — LIDOCAINE HYDROCHLORIDE 10 MG/ML
0.5 INJECTION, SOLUTION EPIDURAL; INFILTRATION; INTRACAUDAL; PERINEURAL ONCE
Status: CANCELLED | OUTPATIENT
Start: 2022-08-19 | End: 2022-08-19

## 2022-08-19 RX ORDER — METHIMAZOLE 5 MG/1
TABLET ORAL
Qty: 90 TABLET | Refills: 3 | Status: SHIPPED | OUTPATIENT
Start: 2022-08-19 | End: 2023-09-06

## 2022-08-19 NOTE — DISCHARGE INSTRUCTIONS
Information to Prepare you for your Surgery    PRE-ADMIT TESTING -  [phone]    [address] [facility]          Your surgery has been scheduled at [facility]. We are pleased to have the opportunity to serve you. For Further Information please call [phone].    On the day of surgery please report to the Information Desk on the 1st floor.    CONTACT YOUR PHYSICIAN'S OFFICE THE DAY PRIOR TO YOUR SURGERY TO OBTAIN YOUR ARRIVAL TIME.     The evening before surgery do not eat anything after 9 p.m. ( this includes hard candy, chewing gum and mints).  You may only have GATORADE, POWERADE AND WATER  from 9 p.m. until you leave your home.   DO NOT DRINK ANY LIQUIDS ON THE WAY TO THE HOSPITAL.      Why does your anesthesiologist allow you to drink Gatorade/Powerade before surgery?  Gatorade/Powerade helps to increase your comfort before surgery and to decrease your nausea after surgery. The carbohydrates in Gatorade/Powerade help reduce your body's stress response to surgery.  If you are a diabetic-drink only water prior to surgery.      Current Visitor policy(12/27/2021) - Patients may have 2 visitors pre and post procedure. Only 2 visitors will be allowed in the Surgical building with the patient.     SPECIAL MEDICATION INSTRUCTIONS: TAKE medications checked off by the Anesthesiologist on your Medication List.    Surgery Patients:  If you take ASPIRIN - Your PHYSICIAN/SURGEON will need to inform you IF/OR when you need to stop taking aspirin prior to your surgery.     Do Not take any medications containing IBUPROFEN.    Do Not Wear any make-up (especially eye make-up) to surgery. Please remove any false eyelashes or eyelash extensions. If you arrive the day of surgery with makeup/eyelashes on you will be required to remove prior to surgery. (There is a risk of corneal abrasions if eye makeup/eyelash extensions are not removed)      Leave all valuables at home.   Do Not wear any  jewelry or watches, including any metal in body piercings. Jewelry must be removed prior to coming to the hospital.  There is a possibility that rings that are unable to be removed may be cut off if they are on the surgical extremity.    Please remove all hair extensions, wigs, clips and any other metal accessories/ ornaments from your hair.  These items may pose a flammable/fire risk in Surgery and must be removed.    Do not shave your surgical area at least 5 days prior to your surgery. The surgical prep will be performed at the hospital according to Infection Control regulations.    Contact Lens must be removed before surgery. Either do not wear the contact lens or bring a case and solution for storage.  Please bring a container for eyeglasses or dentures as required.  Bring any paperwork your physician has provided, such as consent forms,  history and physicals, doctor's orders, etc.   Bring comfortable clothes that are loose fitting to wear upon discharge. Take into consideration the type of surgery being performed.  Maintain your diet as advised per your physician the day prior to surgery.      Adequate rest the night before surgery is advised.   Park in the Parking lot behind the hospital or in the [employer] [facility] across the street from the parking lot. Parking is complimentary.  If you will be discharged the same day as your procedure, please arrange for a responsible adult to drive you home or to accompany you if traveling by taxi.   YOU WILL NOT BE PERMITTED TO DRIVE OR TO LEAVE THE HOSPITAL ALONE AFTER SURGERY.   If you are being discharged the same day, it is strongly recommended that you arrange for someone to remain with you for the first 24 hrs following your surgery.    The Surgeon will speak to your family/visitor after your surgery regarding the outcome of your surgery and post op care.  The Surgeon may speak to you after your surgery, but there is a possibility you may not remember the  details.  Please check with your family members regarding the conversation with the Surgeon.    We strongly recommend whoever is bringing you home be present for discharge instructions.  This will ensure a thorough understanding for your post op home care.    ALL CHILDREN MUST ALWAYS BE ACCOMPANIED BY AN ADULT.    Visitors-Refer to current Visitor policy handouts.    Thank you for your cooperation.  The Staff of [facility].            Bathing Instructions with Hibiclens    Shower the evening before and morning of your procedure with Hibiclens:  Wash your face with water and your regular face wash/soap  Apply Hibiclens directly on your skin or on a wet washcloth and wash gently. When showering: Move away from the shower stream when applying Hibiclens to avoid rinsing off too soon.  Rinse thoroughly with warm water  Do not dilute Hibiclens        Dry off as usual, do not use any deodorant, powder, body lotions, perfume, after shave or cologne.                                      Information to Prepare you for your Surgery    PRE-ADMIT TESTING -  [phone]    [address] [place] [address]          Your surgery has been scheduled at [facility]. We are pleased to have the opportunity to serve you. For Further Information please call [phone].    On the day of surgery please report to the Information Desk on the 1st floor.    CONTACT YOUR PHYSICIAN'S OFFICE THE DAY PRIOR TO YOUR SURGERY TO OBTAIN YOUR ARRIVAL TIME.     The evening before surgery do not eat anything after 9 p.m. ( this includes hard candy, chewing gum and mints).  You may only have GATORADE, POWERADE AND WATER  from 9 p.m. until you leave your home.   DO NOT DRINK ANY LIQUIDS ON THE WAY TO THE HOSPITAL.      Why does your anesthesiologist allow you to drink Gatorade/Powerade before surgery?  Gatorade/Powerade helps to increase your comfort before surgery and to decrease your nausea after surgery. The  carbohydrates in Gatorade/Powerade help reduce your body's stress response to surgery.  If you are a diabetic-drink only water prior to surgery.      Current Visitor policy(12/27/2021) - Patients may have 2 visitors pre and post procedure. Only 2 visitors will be allowed in the Surgical building with the patient.     SPECIAL MEDICATION INSTRUCTIONS: TAKE medications checked off by the Anesthesiologist on your Medication List.    Angiogram Patients: Take medications as instructed by your physician, including aspirin.     Surgery Patients:    If you take ASPIRIN - Your PHYSICIAN/SURGEON will need to inform you IF/OR when you need to stop taking aspirin prior to your surgery.     Do Not take any medications containing IBUPROFEN.    Do Not Wear any make-up (especially eye make-up) to surgery. Please remove any false eyelashes or eyelash extensions. If you arrive the day of surgery with makeup/eyelashes on you will be required to remove prior to surgery. (There is a risk of corneal abrasions if eye makeup/eyelash extensions are not removed)      Leave all valuables at home.   Do Not wear any jewelry or watches, including any metal in body piercings. Jewelry must be removed prior to coming to the hospital.  There is a possibility that rings that are unable to be removed may be cut off if they are on the surgical extremity.    Please remove all hair extensions, wigs, clips and any other metal accessories/ ornaments from your hair.  These items may pose a flammable/fire risk in Surgery and must be removed.    Do not shave your surgical area at least 5 days prior to your surgery. The surgical prep will be performed at the hospital according to Infection Control regulations.    Contact Lens must be removed before surgery. Either do not wear the contact lens or bring a case and solution for storage.  Please bring a container for eyeglasses or dentures as required.  Bring any paperwork your physician has provided, such as  consent forms,  history and physicals, doctor's orders, etc.   Bring comfortable clothes that are loose fitting to wear upon discharge. Take into consideration the type of surgery being performed.  Maintain your diet as advised per your physician the day prior to surgery.      Adequate rest the night before surgery is advised.   Park in the Parking lot behind the hospital or in the [employer] [facility] across the street from the parking lot. Parking is complimentary.  If you will be discharged the same day as your procedure, please arrange for a responsible adult to drive you home or to accompany you if traveling by taxi.   YOU WILL NOT BE PERMITTED TO DRIVE OR TO LEAVE THE HOSPITAL ALONE AFTER SURGERY.   If you are being discharged the same day, it is strongly recommended that you arrange for someone to remain with you for the first 24 hrs following your surgery.    The Surgeon will speak to your family/visitor after your surgery regarding the outcome of your surgery and post op care.  The Surgeon may speak to you after your surgery, but there is a possibility you may not remember the details.  Please check with your family members regarding the conversation with the Surgeon.    We strongly recommend whoever is bringing you home be present for discharge instructions.  This will ensure a thorough understanding for your post op home care.    ALL CHILDREN MUST ALWAYS BE ACCOMPANIED BY AN ADULT.    Visitors-Refer to current Visitor policy handouts.    Thank you for your cooperation.  The Staff of [facility].            Bathing Instructions with Hibiclens    Shower the evening before and morning of your procedure with Hibiclens:  Wash your face with water and your regular face wash/soap  Apply Hibiclens directly on your skin or on a wet washcloth and wash gently. When showering: Move away from the shower stream when applying Hibiclens to avoid rinsing off too soon.  Rinse thoroughly with warm  water  Do not dilute Hibiclens        Dry off as usual, do not use any deodorant, powder, body lotions, perfume, after shave or cologne.

## 2022-08-19 NOTE — ANESTHESIA PREPROCEDURE EVALUATION
08/19/2022  Sarah Alejandro is a 76 y.o., female.    Pre-op Assessment    I have reviewed the Patient Summary Reports.    I have reviewed the Nursing Notes. I have reviewed the NPO Status.   I have reviewed the Medications.     Review of Systems  Anesthesia Hx:  No problems with previous Anesthesia  History of prior surgery of interest to airway management or planning: heart surgery. Previous anesthesia: General 11/21 AP repair with general anesthesia.  Airway issues documented on chart review include mask, easy, GETA, videolaryngoscope used  Denies Family Hx of Anesthesia complications.   Denies Personal Hx of Anesthesia complications.   Social:  Former Smoker    Hematology/Oncology:  Hematology Normal   Oncology Normal     EENT/Dental:   chronic allergic rhinitis hoarseness   Cardiovascular:   Exercise tolerance: good Hypertension Valvular problems/Murmurs, AS hyperlipidemia ECG has been reviewed. 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   Endocrine:   Hyperthyroidism Graves disease   Dermatological:  Skin Normal        Physical Exam  General:  Well nourished      Airway/Jaw/Neck:  Airway Findings: Mouth Opening: Normal   Tongue: Normal   General Airway Assessment: Adult Mallampati: II      Dental:  Dental Findings: Upper front caps, Lower front caps, Molar caps          Mental Status:  Mental Status Findings:  Cooperative, Alert and Oriented         Anesthesia Plan  Type of Anesthesia,  risks & benefits discussed:  Anesthesia Type:  Gen ETT    Patient's Preference:   Plan Factors:          Intra-op Monitoring Plan: Standard ASA Monitors  Intra-op Monitoring Plan Comments:   Post Op Pain Control Plan: per primary service following discharge from PACU and multimodal analgesia  Post Op Pain Control Plan Comments:     Induction:   IV  Beta Blocker:         Informed Consent: Informed consent signed with the Patient and all parties understand the risks and agree with anesthesia plan.  All questions answered.  Anesthesia consent signed with patient.  ASA Score: 3     Day of Surgery Review of History & Physical:        Anesthesia Plan Notes: labs and EKG in [facility]        Ready For Surgery From Anesthesia Perspective.           Physical Exam  General: Well nourished    Airway:  Mallampati: II   Mouth Opening: Normal  Tongue: Normal    Dental:  Upper front caps, Lower front caps, Molar caps          Anesthesia Plan  Type of Anesthesia, risks & benefits discussed:    Anesthesia Type: Gen ETT  Intra-op Monitoring Plan: Standard ASA Monitors  Post Op Pain Control Plan: per primary service following discharge from PACU and multimodal analgesia  Induction:  IV  Airway Plan: Video  Informed Consent: Informed consent signed with the Patient and all parties understand the risks and agree with anesthesia plan.  All questions answered.   ASA Score: 3  Anesthesia Plan Notes: labs and EKG in [facility]    Ready For Surgery From Anesthesia Perspective.       .

## 2022-08-23 ENCOUNTER — HOSPITAL ENCOUNTER (OUTPATIENT)
Facility: OTHER | Age: 77
Discharge: HOME | End: 2022-08-23
Attending: OTOLARYNGOLOGY
Payer: MEDICARE

## 2022-08-23 ENCOUNTER — ANESTHESIA (OUTPATIENT)
Dept: SURGERY | Facility: OTHER | Age: 77
End: 2022-08-23
Payer: MEDICARE

## 2022-08-23 DIAGNOSIS — J32.4 CHRONIC PANSINUSITIS: Primary | ICD-10-CM

## 2022-08-23 LAB
GRAM STN SPEC: NORMAL

## 2022-08-23 PROCEDURE — 63600175 PHARM REV CODE 636 W HCPCS: Performed by: STUDENT IN AN ORGANIZED HEALTH CARE EDUCATION/TRAINING PROGRAM

## 2022-08-23 PROCEDURE — 88311 DECALCIFY TISSUE: CPT | Performed by: PATHOLOGY

## 2022-08-23 PROCEDURE — 25000003 PHARM REV CODE 250: Performed by: OTOLARYNGOLOGY

## 2022-08-23 PROCEDURE — 63600175 PHARM REV CODE 636 W HCPCS: Performed by: ANESTHESIOLOGY

## 2022-08-23 PROCEDURE — 25000003 PHARM REV CODE 250: Performed by: ANESTHESIOLOGY

## 2022-08-23 PROCEDURE — C1726 CATH, BAL DIL, NON-VASCULAR: HCPCS | Performed by: OTOLARYNGOLOGY

## 2022-08-23 PROCEDURE — 63600175 PHARM REV CODE 636 W HCPCS: Performed by: OTOLARYNGOLOGY

## 2022-08-23 PROCEDURE — 87186 SC STD MICRODIL/AGAR DIL: CPT | Performed by: OTOLARYNGOLOGY

## 2022-08-23 PROCEDURE — C1894 INTRO/SHEATH, NON-LASER: HCPCS | Performed by: OTOLARYNGOLOGY

## 2022-08-23 PROCEDURE — 71000015 HC POSTOP RECOV 1ST HR: Performed by: OTOLARYNGOLOGY

## 2022-08-23 PROCEDURE — 27201423 OPTIME MED/SURG SUP & DEVICES STERILE SUPPLY: Performed by: OTOLARYNGOLOGY

## 2022-08-23 PROCEDURE — 87075 CULTR BACTERIA EXCEPT BLOOD: CPT | Performed by: OTOLARYNGOLOGY

## 2022-08-23 PROCEDURE — 71000033 HC RECOVERY, INTIAL HOUR: Performed by: OTOLARYNGOLOGY

## 2022-08-23 PROCEDURE — 87116 MYCOBACTERIA CULTURE: CPT | Performed by: OTOLARYNGOLOGY

## 2022-08-23 PROCEDURE — 87205 SMEAR GRAM STAIN: CPT | Performed by: OTOLARYNGOLOGY

## 2022-08-23 PROCEDURE — 88305 TISSUE EXAM BY PATHOLOGIST: CPT | Performed by: PATHOLOGY

## 2022-08-23 PROCEDURE — 87077 CULTURE AEROBIC IDENTIFY: CPT | Performed by: OTOLARYNGOLOGY

## 2022-08-23 PROCEDURE — 37000009 HC ANESTHESIA EA ADD 15 MINS: Performed by: OTOLARYNGOLOGY

## 2022-08-23 PROCEDURE — 25000003 PHARM REV CODE 250: Performed by: STUDENT IN AN ORGANIZED HEALTH CARE EDUCATION/TRAINING PROGRAM

## 2022-08-23 PROCEDURE — 36000710: Performed by: OTOLARYNGOLOGY

## 2022-08-23 PROCEDURE — 88311 DECALCIFY TISSUE: CPT | Mod: 26,,, | Performed by: PATHOLOGY

## 2022-08-23 PROCEDURE — 88305 TISSUE EXAM BY PATHOLOGIST: ICD-10-PCS | Mod: 26,,, | Performed by: PATHOLOGY

## 2022-08-23 PROCEDURE — 88305 TISSUE EXAM BY PATHOLOGIST: CPT | Mod: 26,,, | Performed by: PATHOLOGY

## 2022-08-23 PROCEDURE — 87070 CULTURE OTHR SPECIMN AEROBIC: CPT | Performed by: OTOLARYNGOLOGY

## 2022-08-23 PROCEDURE — 71000016 HC POSTOP RECOV ADDL HR: Performed by: OTOLARYNGOLOGY

## 2022-08-23 PROCEDURE — 87102 FUNGUS ISOLATION CULTURE: CPT | Performed by: OTOLARYNGOLOGY

## 2022-08-23 PROCEDURE — 87206 SMEAR FLUORESCENT/ACID STAI: CPT | Performed by: OTOLARYNGOLOGY

## 2022-08-23 PROCEDURE — 88311 PR  DECALCIFY TISSUE: ICD-10-PCS | Mod: 26,,, | Performed by: PATHOLOGY

## 2022-08-23 PROCEDURE — 37000008 HC ANESTHESIA 1ST 15 MINUTES: Performed by: OTOLARYNGOLOGY

## 2022-08-23 PROCEDURE — 36000711: Performed by: OTOLARYNGOLOGY

## 2022-08-23 RX ORDER — PROCHLORPERAZINE EDISYLATE 5 MG/ML
5 INJECTION INTRAMUSCULAR; INTRAVENOUS EVERY 30 MIN PRN
Status: DISCONTINUED | OUTPATIENT
Start: 2022-08-23 | End: 2022-08-23 | Stop reason: HOSPADM

## 2022-08-23 RX ORDER — ONDANSETRON 8 MG/1
8 TABLET, ORALLY DISINTEGRATING ORAL EVERY 8 HOURS PRN
Qty: 10 TABLET | Refills: 1 | Status: SHIPPED | OUTPATIENT
Start: 2022-08-23 | End: 2023-04-12

## 2022-08-23 RX ORDER — SODIUM CHLORIDE, SODIUM LACTATE, POTASSIUM CHLORIDE, CALCIUM CHLORIDE 600; 310; 30; 20 MG/100ML; MG/100ML; MG/100ML; MG/100ML
INJECTION, SOLUTION INTRAVENOUS CONTINUOUS
Status: DISCONTINUED | OUTPATIENT
Start: 2022-08-23 | End: 2022-08-23 | Stop reason: HOSPADM

## 2022-08-23 RX ORDER — ACETAMINOPHEN 325 MG/1
650 TABLET ORAL EVERY 4 HOURS PRN
Status: CANCELLED | OUTPATIENT
Start: 2022-08-23

## 2022-08-23 RX ORDER — HYDROCODONE BITARTRATE AND ACETAMINOPHEN 5; 325 MG/1; MG/1
1 TABLET ORAL EVERY 4 HOURS PRN
Status: CANCELLED | OUTPATIENT
Start: 2022-08-23

## 2022-08-23 RX ORDER — PROPOFOL 10 MG/ML
VIAL (ML) INTRAVENOUS
Status: DISCONTINUED | OUTPATIENT
Start: 2022-08-23 | End: 2022-08-23

## 2022-08-23 RX ORDER — MEPERIDINE HYDROCHLORIDE 25 MG/ML
12.5 INJECTION INTRAMUSCULAR; INTRAVENOUS; SUBCUTANEOUS ONCE AS NEEDED
Status: DISCONTINUED | OUTPATIENT
Start: 2022-08-23 | End: 2022-08-23 | Stop reason: HOSPADM

## 2022-08-23 RX ORDER — DEXAMETHASONE SODIUM PHOSPHATE 4 MG/ML
INJECTION, SOLUTION INTRA-ARTICULAR; INTRALESIONAL; INTRAMUSCULAR; INTRAVENOUS; SOFT TISSUE
Status: DISCONTINUED | OUTPATIENT
Start: 2022-08-23 | End: 2022-08-23

## 2022-08-23 RX ORDER — OXYCODONE HYDROCHLORIDE 5 MG/1
5 TABLET ORAL
Status: DISCONTINUED | OUTPATIENT
Start: 2022-08-23 | End: 2022-08-23 | Stop reason: HOSPADM

## 2022-08-23 RX ORDER — EPINEPHRINE 1 MG/ML
INJECTION, SOLUTION INTRACARDIAC; INTRAMUSCULAR; INTRAVENOUS; SUBCUTANEOUS
Status: DISCONTINUED | OUTPATIENT
Start: 2022-08-23 | End: 2022-08-23 | Stop reason: HOSPADM

## 2022-08-23 RX ORDER — FENTANYL CITRATE 50 UG/ML
INJECTION, SOLUTION INTRAMUSCULAR; INTRAVENOUS
Status: DISCONTINUED | OUTPATIENT
Start: 2022-08-23 | End: 2022-08-23

## 2022-08-23 RX ORDER — ACETAMINOPHEN 500 MG
1000 TABLET ORAL
Status: COMPLETED | OUTPATIENT
Start: 2022-08-23 | End: 2022-08-23

## 2022-08-23 RX ORDER — CLINDAMYCIN PHOSPHATE 600 MG/50ML
600 INJECTION, SOLUTION INTRAVENOUS
Status: COMPLETED | OUTPATIENT
Start: 2022-08-23 | End: 2022-08-23

## 2022-08-23 RX ORDER — HYDROMORPHONE HYDROCHLORIDE 2 MG/ML
0.4 INJECTION, SOLUTION INTRAMUSCULAR; INTRAVENOUS; SUBCUTANEOUS EVERY 5 MIN PRN
Status: DISCONTINUED | OUTPATIENT
Start: 2022-08-23 | End: 2022-08-23 | Stop reason: HOSPADM

## 2022-08-23 RX ORDER — LIDOCAINE HYDROCHLORIDE 20 MG/ML
INJECTION INTRAVENOUS
Status: DISCONTINUED | OUTPATIENT
Start: 2022-08-23 | End: 2022-08-23

## 2022-08-23 RX ORDER — GLUCAGON 1 MG
1 KIT INJECTION
Status: DISCONTINUED | OUTPATIENT
Start: 2022-08-23 | End: 2022-08-23 | Stop reason: HOSPADM

## 2022-08-23 RX ORDER — ONDANSETRON 8 MG/1
8 TABLET, ORALLY DISINTEGRATING ORAL EVERY 8 HOURS PRN
Status: CANCELLED | OUTPATIENT
Start: 2022-08-23

## 2022-08-23 RX ORDER — IBUPROFEN 200 MG
16 TABLET ORAL
Status: DISCONTINUED | OUTPATIENT
Start: 2022-08-23 | End: 2022-08-23 | Stop reason: HOSPADM

## 2022-08-23 RX ORDER — DEXAMETHASONE SODIUM PHOSPHATE 4 MG/ML
4 INJECTION, SOLUTION INTRA-ARTICULAR; INTRALESIONAL; INTRAMUSCULAR; INTRAVENOUS; SOFT TISSUE
Status: ACTIVE | OUTPATIENT
Start: 2022-08-23 | End: 2022-08-23

## 2022-08-23 RX ORDER — IBUPROFEN 200 MG
24 TABLET ORAL
Status: DISCONTINUED | OUTPATIENT
Start: 2022-08-23 | End: 2022-08-23 | Stop reason: HOSPADM

## 2022-08-23 RX ORDER — OXYCODONE HYDROCHLORIDE 5 MG/1
10 TABLET ORAL EVERY 4 HOURS PRN
Status: CANCELLED | OUTPATIENT
Start: 2022-08-23

## 2022-08-23 RX ORDER — DOXYCYCLINE 100 MG/1
100 CAPSULE ORAL EVERY 12 HOURS
Qty: 20 CAPSULE | Refills: 0 | Status: SHIPPED | OUTPATIENT
Start: 2022-08-23 | End: 2022-09-02

## 2022-08-23 RX ORDER — ONDANSETRON 2 MG/ML
INJECTION INTRAMUSCULAR; INTRAVENOUS
Status: DISCONTINUED | OUTPATIENT
Start: 2022-08-23 | End: 2022-08-23

## 2022-08-23 RX ORDER — LIDOCAINE HYDROCHLORIDE 10 MG/ML
0.5 INJECTION, SOLUTION EPIDURAL; INFILTRATION; INTRACAUDAL; PERINEURAL ONCE
Status: DISCONTINUED | OUTPATIENT
Start: 2022-08-23 | End: 2022-08-23 | Stop reason: HOSPADM

## 2022-08-23 RX ORDER — LIDOCAINE HYDROCHLORIDE AND EPINEPHRINE 10; 10 MG/ML; UG/ML
INJECTION, SOLUTION INFILTRATION; PERINEURAL
Status: DISCONTINUED | OUTPATIENT
Start: 2022-08-23 | End: 2022-08-23 | Stop reason: HOSPADM

## 2022-08-23 RX ORDER — DIPHENHYDRAMINE HYDROCHLORIDE 50 MG/ML
25 INJECTION INTRAMUSCULAR; INTRAVENOUS EVERY 6 HOURS PRN
Status: DISCONTINUED | OUTPATIENT
Start: 2022-08-23 | End: 2022-08-23 | Stop reason: HOSPADM

## 2022-08-23 RX ORDER — HYDROCODONE BITARTRATE AND ACETAMINOPHEN 5; 325 MG/1; MG/1
1 TABLET ORAL EVERY 6 HOURS PRN
Qty: 15 TABLET | Refills: 0 | Status: SHIPPED | OUTPATIENT
Start: 2022-08-23 | End: 2022-08-30

## 2022-08-23 RX ORDER — SODIUM CHLORIDE 0.9 % (FLUSH) 0.9 %
3 SYRINGE (ML) INJECTION
Status: DISCONTINUED | OUTPATIENT
Start: 2022-08-23 | End: 2022-08-23 | Stop reason: HOSPADM

## 2022-08-23 RX ORDER — ROCURONIUM BROMIDE 10 MG/ML
INJECTION, SOLUTION INTRAVENOUS
Status: DISCONTINUED | OUTPATIENT
Start: 2022-08-23 | End: 2022-08-23

## 2022-08-23 RX ADMIN — SODIUM CHLORIDE, SODIUM LACTATE, POTASSIUM CHLORIDE, AND CALCIUM CHLORIDE: .6; .31; .03; .02 INJECTION, SOLUTION INTRAVENOUS at 10:08

## 2022-08-23 RX ADMIN — ROCURONIUM BROMIDE 10 MG: 10 INJECTION, SOLUTION INTRAVENOUS at 11:08

## 2022-08-23 RX ADMIN — ONDANSETRON HYDROCHLORIDE 4 MG: 2 INJECTION INTRAMUSCULAR; INTRAVENOUS at 11:08

## 2022-08-23 RX ADMIN — DEXAMETHASONE SODIUM PHOSPHATE 8 MG: 4 INJECTION, SOLUTION INTRAMUSCULAR; INTRAVENOUS at 11:08

## 2022-08-23 RX ADMIN — FENTANYL CITRATE 50 MCG: 50 INJECTION, SOLUTION INTRAMUSCULAR; INTRAVENOUS at 10:08

## 2022-08-23 RX ADMIN — CLINDAMYCIN PHOSPHATE 600 MG: 12 INJECTION, SOLUTION INTRAVENOUS at 10:08

## 2022-08-23 RX ADMIN — ROCURONIUM BROMIDE 40 MG: 10 INJECTION, SOLUTION INTRAVENOUS at 10:08

## 2022-08-23 RX ADMIN — ACETAMINOPHEN 1000 MG: 500 TABLET, FILM COATED ORAL at 09:08

## 2022-08-23 RX ADMIN — PROPOFOL 200 MG: 10 INJECTION, EMULSION INTRAVENOUS at 10:08

## 2022-08-23 RX ADMIN — FENTANYL CITRATE 50 MCG: 50 INJECTION, SOLUTION INTRAMUSCULAR; INTRAVENOUS at 11:08

## 2022-08-23 RX ADMIN — GLYCOPYRROLATE 0.2 MG: 0.2 INJECTION, SOLUTION INTRAMUSCULAR; INTRAVITREAL at 10:08

## 2022-08-23 RX ADMIN — LIDOCAINE HYDROCHLORIDE 50 MG: 20 INJECTION, SOLUTION INTRAVENOUS at 10:08

## 2022-08-23 RX ADMIN — SUGAMMADEX 200 MG: 100 INJECTION, SOLUTION INTRAVENOUS at 11:08

## 2022-08-23 RX ADMIN — OXYCODONE 5 MG: 5 TABLET ORAL at 12:08

## 2022-08-23 NOTE — TRANSFER OF CARE
"Anesthesia Transfer of Care Note    Patient: Sarah Alejandro    Procedure(s) Performed: Procedure(s) (LRB):  FESS, USING COMPUTER-ASSISTED NAVIGATION - MAXILLARY, ETHMOIDS, FRONTAL (N/A)  ENDOSCOPY, NOSE - SPHENOIDOTOMY (N/A)  SINUPLASTY, USING BALLOON  frontal ans sphenoid (Bilateral)    Patient location: PACU    Anesthesia Type: general    Transport from OR: Transported from OR on 6-10 L/min O2 by face mask with adequate spontaneous ventilation    Post pain: adequate analgesia    Post assessment: no apparent anesthetic complications and tolerated procedure well    Post vital signs: stable    Level of consciousness: awake and alert    Nausea/Vomiting: no nausea/vomiting    Complications: none    Transfer of care protocol was followed      Last vitals:   Visit Vitals  BP (!) 163/72 (BP Location: Right arm, Patient Position: Lying)   Pulse 63   Temp 36.4 °C (97.5 °F) (Temporal)   Resp 16   Ht 5' 3" (1.6 m)   Wt 53.5 kg (118 lb)   LMP  (LMP Unknown)   SpO2 100%   Breastfeeding No   BMI 20.90 kg/m²     "

## 2022-08-23 NOTE — PATIENT INSTRUCTIONS
No nose blowing, only open mouth sneezing, no heavy lifting or strenuous activity for 10 days.  Sleep with head elevated.  Use saline nasal spray 3 to 4 times a day x1 day, then start NeilMed sinus rinse 2 times per day until seen.

## 2022-08-23 NOTE — PLAN OF CARE
Sarah Alejandro has met all discharge criteria from Phase II. Vital Signs are stable, ambulating  without difficulty. Discharge instructions given, patient verbalized understanding. Discharged from facility via wheelchair in stable condition.

## 2022-08-23 NOTE — ANESTHESIA PROCEDURE NOTES
Intubation    Date/Time: 8/23/2022 10:56 AM  Performed by: Pollo Marrufo CRNA  Authorized by: Salty Bernabe MD     Intubation:     Induction:  Intravenous    Intubated:  Postinduction    Mask Ventilation:  Easy with oral airway    Attempts:  1    Attempted By:  CRNA    Method of Intubation:  Video laryngoscopy    Blade:  Lorenzo 3    Laryngeal View Grade: Grade I - full view of cords      Difficult Airway Encountered?: No      Complications:  None    Airway Device:  Oral endotracheal tube    Airway Device Size:  7.5    Style/Cuff Inflation:  Cuffed (inflated to minimal occlusive pressure)    Tube secured:  21    Secured at:  The teeth    Placement Verified By:  Capnometry and Revisualization with laryngoscopy    Complicating Factors:  None    Findings Post-Intubation:  BS equal bilateral and atraumatic/condition of teeth unchanged

## 2022-08-23 NOTE — DISCHARGE INSTRUCTIONS
Anesthesia: After Your Surgery  Youve just had surgery. During surgery, you received medication called anesthesia to keep you comfortable and pain-free. After surgery, you may experience some pain or nausea. This is common. Here are some tips for feeling better and recovering after surgery.    Going home  Your doctor or nurse will show you how to take care of yourself when you go home. He or she will also answer your questions. Have an adult family member or friend drive you home. For the first 24 hours after your surgery:  Do not drive or use heavy equipment.  Do not make important decisions or sign legal documents.  Avoid alcohol.  Have someone stay with you, if needed. He or she can watch for problems and help keep you safe.  Take your time getting up from a seated or lying position. You may experience dizziness for 24 hours  Be sure to keep all follow-up appointments with your doctor. And rest after your procedure for as long as your doctor tells you to.    Coping with pain  If you have pain after surgery, pain medication will help you feel better. Take it as directed, before pain becomes severe. Also, ask your doctor or pharmacist about other ways to control pain, such as with heat, ice, and relaxation. And follow any other instructions your surgeon or nurse gives you.    URINARY RETENTION  Should you experience a decrease in your urine output or are unable to urinate following surgery, this can be due to the medications given during surgery.  We recommend you going to the nearest Emergency Department.    Tips for taking pain medication  To get the best relief possible, remember these points:  Pain medications can upset your stomach. Taking them with a little food may help.  Most pain relievers taken by mouth need at least 20 to 30 minutes to take effect.  Taking medication on a schedule can help you remember to take it. Try to time your medication so that you can take it before beginning an activity, such  as dressing, walking, or sitting down for dinner.  Constipation is a common side effect of pain medications. Contact your doctor before taking any medications like laxatives or stool softeners to help relieve constipation. Also ask about any dietary restrictions, because drinking lots of fluids and eating foods like fruits and vegetables that are high in fiber can also help. Remember, dont take laxatives unless your surgeon has prescribed them.  Mixing alcohol and pain medication can cause dizziness and slow your breathing. It can even be fatal. Dont drink alcohol while taking pain medication.  Pain medication can slow your reflexes. Dont drive or operate machinery while taking pain medication.  If your health care provider tells you to take acetaminophen to help relieve your pain, ask him or her how much you are supposed to take each day. (Acetaminophen is the generic name for Tylenol and other brand-name pain relievers.) Acetaminophen or other pain relievers may interact with your prescription medicines or other over-the-counter (OTC) drugs. Some prescription medications contain acetaminophen along with other active ingredients. Using both prescription and OTC acetaminophen for pain can cause you to overdose. The FDA recommends that you read the labels on your OTC medications carefully. This will help you to clearly understand the list of active ingredients, dosing instructions, and any warnings. It may also help you avoid taking too much acetaminophen. If you have questions or don't understand the information, ask your pharmacist or health care provider to explain it to you before you take the OTC medication.    Managing nausea  Some people have an upset stomach after surgery. This is often due to anesthesia, pain, pain medications, or the stress of surgery. The following tips will help you manage nausea and get good nutrition as you recover. If you were on a special diet before surgery, ask your doctor if you  should follow it during recovery. These tips may help:  Dont push yourself to eat. Your body will tell you when to eat and how much.  Start off with clear liquids and soup. They are easier to digest.  Progress to semi-solid foods (mashed potatoes, applesauce, and gelatin) as you feel ready.  Slowly move to solid foods. Dont eat fatty, rich, or spicy foods at first.  Dont force yourself to have three large meals a day. Instead, eat smaller amounts more often.  Take pain medications with a small amount of solid food, such as crackers or toast to avoid nausea.      Call your surgeon if    You feel too sleepy, dizzy, or groggy (medication may be too strong).  You have side effects like nausea, vomiting, or skin changes (rash, itching, or hives).   © 8759-2977 [employer]. [address] [place] [address]. All rights reserved. This information is not intended as a substitute for professional medical care. Always follow your healthcare professional's instructions.               Dr. Gleason and Dr. Ridley's  POST-OP INSTRUCTIONS   NASAL AND SINUS SURGERY PATIENTS    DO'S   *Sleep with your head elevated for the first few days, either on extra pillows or in a recliner chair.    *Take medications as prescribed   *Change drip pad under nose (if present) as needed.   *Use a cool-mist humidifier at night for the first week to moisturize the air    DON'TS   * No nose blowing for one week! Nose blowing soon after surgery can cause nosebleeds.   * No straining, bending over, or heavy lifting for at least one week   * No aspirin or non-steroidal anti-inflammatory agents (such as Advil, Motrin, Nuprin, etc.) for at least one week after surgery since they can aggravate bleeding.  Tylenol ( Acetaminophen) is best after surgery.   * No eyeglasses should be worn after surgery for 2-3 weeks if any cosmetic nasal surgery has been performed.    * Please don't take any medications unless prescribed by one of  your physicians.      GENERAL INFORMATION  Your doctor will ask you to return to the office within 1-3 days after surgery for an examination. Nasal splints or packs if present may be removed at that time. Also, most patients will need to have additional visits weekly for several weeks to assess healing and for nasal cleaning.  Slight bloody drainage is normal for the first few days after surgery and may occur for several weeks during the healing process.  However, if you have severe or persistent bleeding, please contact your doctor immediately.  Also please notify your doctor if you experience any of the following: sudden or severe facial swelling, any new vision problems, severe pain or fever, or severe nausea and vomiting.

## 2022-08-23 NOTE — BRIEF OP NOTE
[facility] - Surgery ([place])  Brief Operative Note    Surgery Date: 8/23/2022     Surgeon(s) and Role:     * Samantha Ridley MD - Primary    Assisting Surgeon: None    Pre-op Diagnosis:  Chronic pansinusitis [J32.4]    Post-op Diagnosis:  Post-Op Diagnosis Codes:     * Chronic pansinusitis [J32.4]    Procedure(s) (LRB):  FESS, USING COMPUTER-ASSISTED NAVIGATION - MAXILLARY, ETHMOIDS, FRONTAL (N/A)  ENDOSCOPY, NOSE - SPHENOIDOTOMY (N/A)  SINUPLASTY, USING BALLOON  frontal ans sphenoid (Bilateral)    Anesthesia: General    Operative Findings: Polypoid tissue bilateral middle meatus, purulent secretions and allergic fungal debris bilateral maxillary sinuses and left sphenoid sinuses     Estimated Blood Loss: * No values recorded between 8/23/2022 11:00 AM and 8/23/2022 12:07 PM *         Specimens:   Specimen (24h ago, onward)             Start     Ordered    08/23/22 1159  Specimen to Pathology, Surgery ENT  Once        Comments: 1. Sinus shavings     References:    Click here for ordering Quick Tip   Question Answer Comment   Procedure Type: ENT    Specimen Class: Routine/Screening        08/23/22 1158                  Discharge Note    OUTCOME: Patient tolerated treatment/procedure well without complication and is now ready for discharge.    DISPOSITION: Home or Self Care    FINAL DIAGNOSIS:  Chronic pansinusitis    FOLLOWUP: In clinic    DISCHARGE INSTRUCTIONS:    Discharge Procedure Orders   Diet general   Order Comments: Advance diet as tolerated.     Lifting restrictions     Other restrictions (specify):   Order Comments: No nose blowing     Change dressing (specify)   Order Comments: Dressing change: Change moustache dressing as needed.  May remove in 24 hours.     Call MD for:  temperature >100.4     Call MD for:  persistent nausea and vomiting     Call MD for:  severe uncontrolled pain     Call MD for:  difficulty breathing, headache or visual disturbances

## 2022-08-23 NOTE — OP NOTE
[facility] Surgery ([place])  Brief Operative Note    Surgery Date: 8/23/2022     Surgeon(s) and Role:     * Samantha Ridley MD - Primary    Assisting Surgeon: None    Pre-op Diagnosis:  Chronic pansinusitis [J32.4]    Post-op Diagnosis:  Post-Op Diagnosis Codes:     * Chronic pansinusitis [J32.4]    Procedure performed:  Bilateral navigational functional endoscopic sinus surgery to include bilateral frontal balloon sinuplasty, bilateral revision maxillary antrostomies with maxillary sinus hydro debridement, bilateral sphenoid sinusotomies    Procedure note in detail:  The patient was placed in a supine position.  General anesthesia was induced and the patient was intubated per anesthesia with no difficulty.  The patient was then draped.  The navigation system was then registered and confirmed.  First, the right nasal cavity was inspected using a 0 degree endoscope.  The patient had copious amounts of purulent secretions and allergic fungal debris within the right maxillary sinus.  Cultures were sent from the secretions.  The maxillary sinus was copiously irrigated and suctioned.  Hydro debridement was required to remove all the debris from the maxillary sinus.  The patient was also noted to have polypoid tissue in the middle meatus which was removed using a micro debrider.  Next, the frontal sinus outflow tract was cannulated using a balloon catheter.  This was confirmed under navigation.  The frontal sinus outflow duct was then dilated.  Then the natural sphenoid os was palpated and confirmed using navigation.  The sphenoid os was dilated using a balloon catheter.  The nasal cavity was copiously irrigated and hemostasis was achieved.  Next, attention was turned to the left nasal cavity.  Again the patient was noted to have purulent secretions and allergic fungal debris within the maxillary sinus.  This also required hydro debridement.  The maxillary antrostomy was widened under image guidance.  Polyps from  within the middle meatus were removed using a micro debrider.  Next the frontal sinus outflow duct was cannulated using a balloon catheter.  This was confirmed using navigation.  The balloon was then dilated.  Next the natural sphenoid os was palpated and confirmed using navigation.  The sphenoid os was dilated using a balloon catheter.  The natural opening was then widened using Blakesley forceps and a micro debrider.  The patient was also noted to have purulent secretions allergic fungal debris within the left sphenoid sinus which was removed using irrigation and suction.  The nasal cavities were then both copiously irrigated.  Hemostasis was achieved.  Sinus foam was placed into the middle meatus bilaterally.  The patient was then awakened from general anesthesia in satisfactory condition and brought to recovery room.    Anesthesia: General    Operative Findings:  The patient was noted to have purulent secretions and allergic fungal debris within the maxillary sinuses bilaterally and the left sphenoid sinus.  The patient was noted to have bilateral polypoid tissue within the middle meatus.    Estimated Blood Loss: * No values recorded between 8/23/2022 11:00 AM and 8/23/2022 12:07 PM *    Complications:  None    Condition postoperatively:  Stable         Specimens:   Specimen (24h ago, onward)             Start     Ordered    08/23/22 1152  Specimen to Pathology, Surgery ENT  Once        Comments: 1. Sinus shavings     References:    Click here for ordering Quick Tip   Question Answer Comment   Procedure Type: ENT    Specimen Class: Routine/Screening        08/23/22 9253

## 2022-08-23 NOTE — ANESTHESIA POSTPROCEDURE EVALUATION
Anesthesia Post Evaluation    Patient: Sarah Alejandro    Procedure(s) Performed: Procedure(s) (LRB):  FESS, USING COMPUTER-ASSISTED NAVIGATION - MAXILLARY, ETHMOIDS, FRONTAL (N/A)  ENDOSCOPY, NOSE - SPHENOIDOTOMY (N/A)  SINUPLASTY, USING BALLOON  frontal ans sphenoid (Bilateral)    Final Anesthesia Type: general      Patient location during evaluation: PACU  Patient participation: Yes- Able to Participate  Level of consciousness: awake and alert  Post-procedure vital signs: reviewed and stable  Pain management: adequate  Airway patency: patent    PONV status at discharge: No PONV  Anesthetic complications: no      Cardiovascular status: blood pressure returned to baseline  Respiratory status: unassisted  Hydration status: euvolemic  Follow-up not needed.          Vitals Value Taken Time   /77 08/23/22 1305   Temp 36.7 °C (98 °F) 08/23/22 1305   Pulse 56 08/23/22 1305   Resp 15 08/23/22 1305   SpO2 98 % 08/23/22 1305         Event Time   Out of Recovery 13:00:20         Pain/Vanessa Score: Pain Rating Prior to Med Admin: 0 (8/23/2022 12:37 PM)  Vanessa Score: 10 (8/23/2022  1:05 PM)

## 2022-08-24 VITALS
OXYGEN SATURATION: 99 % | DIASTOLIC BLOOD PRESSURE: 82 MMHG | HEART RATE: 59 BPM | HEIGHT: 63 IN | TEMPERATURE: 98 F | BODY MASS INDEX: 20.91 KG/M2 | WEIGHT: 118 LBS | SYSTOLIC BLOOD PRESSURE: 157 MMHG | RESPIRATION RATE: 16 BRPM

## 2022-08-25 LAB
BACTERIA SPEC AEROBE CULT: ABNORMAL
BACTERIA SPEC AEROBE CULT: ABNORMAL

## 2022-08-26 LAB
BACTERIA SPEC AEROBE CULT: ABNORMAL

## 2022-08-29 ENCOUNTER — HOSPITAL ENCOUNTER (OUTPATIENT)
Dept: RADIOLOGY | Facility: OTHER | Age: 77
Discharge: HOME | End: 2022-08-29
Attending: OBSTETRICS & GYNECOLOGY
Payer: MEDICARE

## 2022-08-29 DIAGNOSIS — R92.8 ABNORMAL MAMMOGRAM: Primary | ICD-10-CM

## 2022-08-29 LAB — BACTERIA SPEC ANAEROBE CULT: NORMAL

## 2022-08-29 PROCEDURE — 88342 IMHCHEM/IMCYTCHM 1ST ANTB: CPT | Performed by: STUDENT IN AN ORGANIZED HEALTH CARE EDUCATION/TRAINING PROGRAM

## 2022-08-29 PROCEDURE — 88341 IMHCHEM/IMCYTCHM EA ADD ANTB: CPT | Performed by: STUDENT IN AN ORGANIZED HEALTH CARE EDUCATION/TRAINING PROGRAM

## 2022-08-29 PROCEDURE — 88341 IMHCHEM/IMCYTCHM EA ADD ANTB: CPT | Mod: 26,,, | Performed by: STUDENT IN AN ORGANIZED HEALTH CARE EDUCATION/TRAINING PROGRAM

## 2022-08-29 PROCEDURE — 19081 BX BREAST 1ST LESION STRTCTC: CPT | Mod: RT,,, | Performed by: RADIOLOGY

## 2022-08-29 PROCEDURE — 88305 TISSUE EXAM BY PATHOLOGIST: CPT | Performed by: STUDENT IN AN ORGANIZED HEALTH CARE EDUCATION/TRAINING PROGRAM

## 2022-08-29 PROCEDURE — 19081 MAMMO BREAST STEREOTACTIC BREAST BIOPSY RIGHT: ICD-10-PCS | Mod: RT,,, | Performed by: RADIOLOGY

## 2022-08-29 PROCEDURE — 88305 TISSUE EXAM BY PATHOLOGIST: ICD-10-PCS | Mod: 26,,, | Performed by: STUDENT IN AN ORGANIZED HEALTH CARE EDUCATION/TRAINING PROGRAM

## 2022-08-29 PROCEDURE — 25000003 PHARM REV CODE 250: Performed by: RADIOLOGY

## 2022-08-29 PROCEDURE — A4648 IMPLANTABLE TISSUE MARKER: HCPCS

## 2022-08-29 PROCEDURE — 88341 PR IHC OR ICC EACH ADD'L SINGLE ANTIBODY  STAINPR: ICD-10-PCS | Mod: 26,,, | Performed by: STUDENT IN AN ORGANIZED HEALTH CARE EDUCATION/TRAINING PROGRAM

## 2022-08-29 PROCEDURE — 88342 CHG IMMUNOCYTOCHEMISTRY: ICD-10-PCS | Mod: 26,,, | Performed by: STUDENT IN AN ORGANIZED HEALTH CARE EDUCATION/TRAINING PROGRAM

## 2022-08-29 PROCEDURE — 88342 IMHCHEM/IMCYTCHM 1ST ANTB: CPT | Mod: 26,,, | Performed by: STUDENT IN AN ORGANIZED HEALTH CARE EDUCATION/TRAINING PROGRAM

## 2022-08-29 PROCEDURE — 88305 TISSUE EXAM BY PATHOLOGIST: CPT | Mod: 26,,, | Performed by: STUDENT IN AN ORGANIZED HEALTH CARE EDUCATION/TRAINING PROGRAM

## 2022-08-29 RX ORDER — LIDOCAINE HYDROCHLORIDE AND EPINEPHRINE 10; 10 MG/ML; UG/ML
20 INJECTION, SOLUTION INFILTRATION; PERINEURAL ONCE
Status: DISCONTINUED | OUTPATIENT
Start: 2022-08-29 | End: 2022-08-29 | Stop reason: CLARIF

## 2022-08-29 RX ORDER — LIDOCAINE HYDROCHLORIDE AND EPINEPHRINE 20; 10 MG/ML; UG/ML
20 INJECTION, SOLUTION INFILTRATION; PERINEURAL ONCE
Status: COMPLETED | OUTPATIENT
Start: 2022-08-29 | End: 2022-08-29

## 2022-08-29 RX ORDER — LIDOCAINE HYDROCHLORIDE 10 MG/ML
5 INJECTION INFILTRATION; PERINEURAL ONCE
Status: COMPLETED | OUTPATIENT
Start: 2022-08-29 | End: 2022-08-29

## 2022-08-29 RX ADMIN — LIDOCAINE HYDROCHLORIDE AND EPINEPHRINE 20 ML: 20; 10 INJECTION, SOLUTION INFILTRATION; PERINEURAL at 09:08

## 2022-08-29 RX ADMIN — LIDOCAINE HYDROCHLORIDE 5 ML: 10 INJECTION, SOLUTION INFILTRATION; PERINEURAL at 09:08

## 2022-08-30 LAB
FINAL PATHOLOGIC DIAGNOSIS: NORMAL
Lab: NORMAL

## 2022-08-31 DIAGNOSIS — E78.5 HYPERLIPIDEMIA, UNSPECIFIED HYPERLIPIDEMIA TYPE: ICD-10-CM

## 2022-08-31 LAB
COMMENT: NORMAL
FINAL PATHOLOGIC DIAGNOSIS: NORMAL
GROSS: NORMAL
Lab: NORMAL
MICROSCOPIC EXAM: NORMAL

## 2022-08-31 RX ORDER — ROSUVASTATIN CALCIUM 40 MG/1
TABLET, COATED ORAL
Qty: 90 TABLET | Refills: 2 | Status: SHIPPED | OUTPATIENT
Start: 2022-08-31 | End: 2022-12-22 | Stop reason: SDUPTHER

## 2022-08-31 NOTE — TELEPHONE ENCOUNTER
Refill Decision Note   Sarah Alejandro  is requesting a refill authorization.  Brief Assessment and Rationale for Refill:  Approve     Medication Therapy Plan:       Medication Reconciliation Completed: No   Comments:     No Care Gaps recommended.     Note composed:5:48 PM 08/31/2022

## 2022-08-31 NOTE — TELEPHONE ENCOUNTER
No new care gaps identified.  [facility] Embedded Care Gaps. Reference number: [id number]. 8/31/2022   1:16:32 PM CDT

## 2022-09-01 ENCOUNTER — TELEPHONE (OUTPATIENT)
Dept: RADIOLOGY | Facility: OTHER | Age: 77
End: 2022-09-01
Payer: MEDICARE

## 2022-09-01 NOTE — TELEPHONE ENCOUNTER
Patient notified of right breast biopsy results per pathology reported on 8/31/2022. Diagnosis: ATYPICAL LOBULAR HYPERPLASIA. Explained to patient results are negative for breast cancer. DUE TO THE FINDING OF ATYPICAL CELLS instructed pt on the recommendation of consultation with breast surgeon. Questions answered. Patient verbalized understanding and STATES SHE WILL CONSIDER consultation once she confers with her other providers. Biopsy site WNL. Encouraged to call [phone] for further questions or concerns. Also provided direct number to the breast surgery clinic.

## 2022-09-06 ENCOUNTER — TELEPHONE (OUTPATIENT)
Dept: INTERNAL MEDICINE | Facility: CLINIC | Age: 77
End: 2022-09-06
Payer: MEDICARE

## 2022-09-06 ENCOUNTER — PATIENT MESSAGE (OUTPATIENT)
Dept: UROGYNECOLOGY | Facility: CLINIC | Age: 77
End: 2022-09-06
Payer: MEDICARE

## 2022-09-07 DIAGNOSIS — N60.91 ATYPICAL LOBULAR HYPERPLASIA OF RIGHT BREAST: Primary | ICD-10-CM

## 2022-09-14 ENCOUNTER — PATIENT MESSAGE (OUTPATIENT)
Dept: CARDIOLOGY | Facility: CLINIC | Age: 77
End: 2022-09-14
Payer: MEDICARE

## 2022-09-16 ENCOUNTER — OFFICE VISIT (OUTPATIENT)
Dept: NEUROLOGY | Facility: CLINIC | Age: 77
End: 2022-09-16
Payer: MEDICARE

## 2022-09-16 VITALS
HEIGHT: 64 IN | DIASTOLIC BLOOD PRESSURE: 60 MMHG | WEIGHT: 120.88 LBS | HEART RATE: 57 BPM | SYSTOLIC BLOOD PRESSURE: 111 MMHG | BODY MASS INDEX: 20.64 KG/M2

## 2022-09-16 DIAGNOSIS — G25.2 CEREBELLAR TREMOR: ICD-10-CM

## 2022-09-16 DIAGNOSIS — R25.1 TREMOR: Primary | ICD-10-CM

## 2022-09-16 DIAGNOSIS — R26.81 GAIT INSTABILITY: ICD-10-CM

## 2022-09-16 DIAGNOSIS — G25.0 ESSENTIAL TREMOR: ICD-10-CM

## 2022-09-16 DIAGNOSIS — R27.0 ATAXIA: ICD-10-CM

## 2022-09-16 DIAGNOSIS — Z71.89 COUNSELING REGARDING GOALS OF CARE: ICD-10-CM

## 2022-09-16 PROCEDURE — 99215 OFFICE O/P EST HI 40 MIN: CPT | Mod: PBBFAC | Performed by: PHYSICIAN ASSISTANT

## 2022-09-16 PROCEDURE — 99215 PR OFFICE/OUTPT VISIT, EST, LEVL V, 40-54 MIN: ICD-10-PCS | Mod: S$PBB,,, | Performed by: PHYSICIAN ASSISTANT

## 2022-09-16 PROCEDURE — 99999 PR PBB SHADOW E&M-EST. PATIENT-LVL V: ICD-10-PCS | Mod: PBBFAC,,, | Performed by: PHYSICIAN ASSISTANT

## 2022-09-16 PROCEDURE — 99215 OFFICE O/P EST HI 40 MIN: CPT | Mod: S$PBB,,, | Performed by: PHYSICIAN ASSISTANT

## 2022-09-16 PROCEDURE — 99999 PR PBB SHADOW E&M-EST. PATIENT-LVL V: CPT | Mod: PBBFAC,,, | Performed by: PHYSICIAN ASSISTANT

## 2022-09-16 RX ORDER — PRIMIDONE 50 MG/1
50 TABLET ORAL 2 TIMES DAILY
Qty: 60 TABLET | Refills: 11 | Status: SHIPPED | OUTPATIENT
Start: 2022-09-16 | End: 2022-12-28

## 2022-09-16 NOTE — PROGRESS NOTES
Name: Sarah Alejandro  MRN: [id number]   CSN: [id number]      Date: 9/16/2022    Referring physician:  Latrice Davidson MD  [address] [place] [address]    Chief Complaint: tremor    History of Present Illness (HPI): Sarah Alejandro is a R HANDED 76 y.o. female with a medical issues significant for essential tremor, lumbar radiculopathy, CKDIII, graves disease, HTN, HLD, who presents for tremors. Accompanied by spouse. Currently on propranolol 120 mg capsule XR 1 capsule BID. This does not cause lightheaded or dizziness. Previously on atenolol but didn't feel this helped, switched to propranolol. Helps somewhat. Tremor is in her left hand, present for about 8 years, around the same time she was diagnosed with graves disease. Whenever she is almost due for her next dose of propranolol, tremors are worse. She has not tried any other medications for the tremor. Only in the left hand, no tremor in the right hand. Gradual onset 8 years ago, noticeable after a few months. Head tremor, involuntary, yes-yes. Tremor with posture and action, no tremor at rest. Had back surgery ( fusion from T10 to sacrum) 5 years ago in 2017, took a while for her to get balance back. Now able to stand on one leg but still with some difficulty. No falls. Denies shuffling. She doesn't think that she walks great. Tends to veer to the left or right with longer distances. Has a hard time keeping up with other people, this started after her back surgery.   Thinks head tremor stops whenever she lies down in bed.  notices it in certain positions. She has noticed that she walks with a wider based to keep her balance.     She had cervical MRI in 2018/2019.     If she turns to the left, gets vertigo. Improves with epley maneuver.     She has not noticed that tremor improves with etoh. Does not drink much.     Family History: grandmother had a head tremor, mother possibly had tremor in hands?     Neuroleptic Exposure: none      Nonmotor/Premotor ROS:  Anosmia: hyposmia -- attributes to allergies   Dysarthria/Hypophonia: more hoarse   Dysphagia/Sialorrhea: none   Depression: none   Cognitive slowing: infrequently forgets names   Hallucinations: none   Urinary changes: history of incontinence for many years (at least 7), follows with Dr. Goldberg  Constipation: none   Orthostasis:    Falls: none   Micrographia: none  Sleep issues:  -RBD: screams in sleep       Review of Systems:   Review of Systems   Constitutional:  Negative for chills, fever and malaise/fatigue.   HENT:  Negative for hearing loss.    Eyes:  Negative for blurred vision and double vision.   Respiratory:  Negative for cough, shortness of breath and stridor.    Cardiovascular:  Negative for chest pain and leg swelling.   Gastrointestinal:  Negative for constipation, diarrhea and nausea.   Genitourinary:  Negative for frequency and urgency.   Musculoskeletal:  Negative for falls.   Skin:  Negative for itching and rash.   Neurological:  Positive for tremors. Negative for dizziness, loss of consciousness and weakness.   Psychiatric/Behavioral:  Negative for hallucinations and memory loss.          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    Social History: The patient  reports that she quit smoking about 36 years ago. She has never used smokeless tobacco. She reports that she does not currently use alcohol after a past usage of about 2.0 standard drinks per week. She reports that she does not use drugs.    Family History: Their family history includes Colon cancer in her father; Hypothyroidism in her sister; Leukemia in her mother.    Allergies: Penicillins     Meds:    Current Outpatient Medications on File Prior to Visit   Medication Sig Dispense Refill    ALPRAZolam (XANAX) 0.5 MG tablet Take 1 tablet (0.5 mg total) by mouth daily as needed (severe anxiety). 30 tablet 0    ASCORBATE CALCIUM, VITAMIN C, ORAL Take 500 mg by mouth.      aspirin (ECOTRIN) 81 MG EC tablet Take 81 mg by mouth once daily.      cetirizine-pseudoephedrine 5-120 mg Tb12 cetirizine 5 mg-pseudoephedrine  mg tablet,extended release,12hr   TK 1 T PO BID      cholecalciferol, vitamin D3, (VITAMIN D3) 50 mcg (2,000 unit) Tab Take 2,000 Units by mouth.      ciclopirox 0.77 % Gel Apply topically once daily. To thickened discolored toenails. 30 g 1    CMPD testosterone proprionate 2% in vanicream Apply topically. [facility]      COMPOUND HORMONE REPLACEMENT Take by mouth once daily. ESTROGEN CREAM -- [facility]      conjugated estrogens (PREMARIN) vaginal cream Place 0.5 g vaginally once daily. 1 applicator 5    docusate sodium (COLACE) 100 MG capsule Take 1 capsule (100 mg total) by mouth 2 (two) times daily. 60 capsule 1    EPINEPHrine (EPIPEN) 0.3 mg/0.3 mL AtIn epinephrine 0.3 mg/0.3 mL injection, auto-injector      MAGNESIUM CARBONATE ORAL Take 1 tablet by mouth once daily. 200MG      methIMAzole (TAPAZOLE) 5 MG Tab Take half a tablet daily 90 tablet 3    nitrofurantoin, macrocrystal-monohydrate, (MACROBID) 100 MG capsule Take 1 capsule (100 mg total) by mouth daily as needed (self cath). 30 capsule 0    omalizumab (XOLAIR) 75 mg/0.5 mL injection       ondansetron (ZOFRAN-ODT) 8 MG TbDL Take 1 tablet (8 mg total) by mouth every 8 (eight) hours as needed (nausea). 10 tablet 1    PENNSAID 20 mg/gram /actuation(2 %) sopm APPLY 1 APPLICATION TOPICALLY 2 (TWO) TIMES DAILY AS NEEDED.::NOT COVERED:: 112 g 10    polyethylene glycol (GOLYTELY,NULYTELY) 236-22.74-6.74 -5.86 gram suspension Take by mouth.      progesterone (PROMETRIUM) 200 MG capsule Take 1 capsule by mouth 30-60 minutes before  "bed every night 90 capsule 1    propranoloL (INDERAL LA) 120 MG 24 hr capsule Take 2 capsules (240 mg total) by mouth once daily. (Patient taking differently: Take 120 mg by mouth 2 (two) times a day.) 60 capsule 3    RENOVA 0.02 % Crea RENOVA 0.02 % TOPICAL CREAM APPLY TO AFFECTED AREA EVERY DAY AT NIGHT 40 g 0    rosuvastatin (CRESTOR) 40 MG Tab TAKE 1 TABLET BY MOUTH EVERY DAY 90 tablet 2    urea 20 % Crea Apply 1 application topically once daily. To dry skin on the feet. 75 g 10    valACYclovir (VALTREX) 1000 MG tablet valacyclovir 1 gram tablet      XIIDRA 5 % Dpet       zolpidem (AMBIEN CR) 6.25 MG CR tablet Take 1 tablet (6.25 mg total) by mouth nightly as needed for Insomnia. 30 tablet 2     No current facility-administered medications on file prior to visit.       Exam:  /60 (BP Location: Right arm, Patient Position: Sitting, BP Method: Medium (Automatic))   Pulse (!) 57   Ht 5' 4" (1.626 m)   Wt 54.8 kg (120 lb 14.4 oz)   LMP  (LMP Unknown) Comment:    1996  BMI 20.75 kg/m²     Constitutional  Well-developed, well-nourished, appears stated age   Ophthalmoscopic  No papilledema with no hemorrhages or exudates bilaterally   Cardiovascular  Radial pulses 2+ and symmetric, no LE edema bilaterally   Neurological    * Mental status  MOCA =      - Orientation  Oriented to person, place, time, and situation     - Memory   Intact recent and remote     - Attention/concentration  Attentive, vigilant during exam     - Language  Naming & repetition intact, +2-step commands     - Fund of knowledge  Aware of current events     - Executive  Well-organized thoughts     - Other     * Cranial nerves       - CN II  PERRL, visual fields full to confrontation     - CN III, IV, VI  Extraocular movements full, normal pursuits and saccades   Overshoot with horizontal pursuits with left gaze      - CN V  Sensation V1 - V3 intact     - CN VII  Face strong and symmetric bilaterally     - CN VIII  Hearing intact " "bilaterally     - CN IX, X  Palate raises midline and symmetric     - CN XI  SCM and trapezius 5/5 bilaterally     - CN XII  Tongue midline   * Motor  Muscle bulk normal, strength 5/5 throughout   * Sensory   Intact to temperature    * Coordination  Dysmetria with finger to nose and heel-to-shin on the L    * Gait  See below.   * Deep tendon reflexes  3+ and symmetric throughout   Abnormal jaw jerk    Left > R gomes    Babinski equivocal on the R, possibly upgoing on the R?    * Specialized movement exam  No hypophonic speech.    No facial masking.   No cogwheel rigidity.     No bradykinesia.   Resting tremor of L hand, dystonic posturing    Tremor worse in L hand in wing-beating position    Overshoot with L hand    Intermittent tremor of R pinky as well    No other dystonia, chorea, athetosis, myoclonus, or tics.   No motor impersistence.   No shortened stride length.   Spastic/ataxic gait, some scissoring but mostly wide based    No postural instability.   Yes-yes head tremor      Laboratory/Radiological:  - Results:  Hospital Outpatient Visit on 2022   Component Date Value Ref Range Status    Final Pathologic Diagnosis 2022    Final                    Value:Breast, right, stereotactic-guided core needle biopsies of calcifications:  - Atypical lobular hyperplasia, multifocal  - Background breast parenchyma with fibroadenomatoid changes,  pseudoangiomatous stromal hyperplasia-like changes, columnar cell change,  apocrine metaplasia, sclerosing adenosis, microcysts, and macrocysts  - Microcalcifications, calcium phosphate type, associated with nonneoplastic  breast parenchyma  - Negative for carcinoma      Gross 2022    Final                    Value:Pathology ID: [id number]     :  [id number]     MRN:  [id number]    Received in formalin labeled as "right breast" is a radial biopsy chamber  with 12 evenly placed wells labeled "A" to "L".  Tan-yellow lobulated soft  tissue is submitted in wells " D-G (2.5 x 2.0 x 0.4 cm) and is designated as  having calcifications.  An additional aggregate of lobulated soft tissue is  submitted in well B (1.5 x 1.5 x 0.4 cm) and is presumed to be submitted  without calcifications.  No additional soft tissue is submitted.  The  specimen is submitted entirely follows:  XNG--1-A: Wells D-G - with calcifications  BJR--1-B: Well B - without calcifications    Cold ischemic time: 6 minutes  Formalin fixation time:  6-72 hours    -Marilyn Tariq MS, PA([facility])      Microscopic Exam 08/29/2022    Final                    Value:Immunohistochemistry is performed on block 1A for E-cadherin, p63, and CK5/6,  with appropriate and reactive controls.  Immunostains show lost membranous  expression of E-cadherin within the few foci of atypical lobular hyperplasia,  while p63 and CK5/6 demonstrate intact myoepithelial cell lining surrounding  glands.  The overall findings are in keeping with the above diagnosis.      Comment 08/29/2022    Final                    Value:This case was reviewed in consultation with Dr. Lin, who concurs with the  above findings.      Disclaimer 08/29/2022    Final                    Value:Unless the case is a 'gross only' or additional testing only, the final  diagnosis for each specimen is based on a microscopic examination of  appropriate tissue sections.     Admission on 08/23/2022, Discharged on 08/23/2022   Component Date Value Ref Range Status    Aerobic Bacterial Culture 08/23/2022  (A)   Final                    Value:PROTEUS MIRABILIS ESBL  Moderate      Aerobic Bacterial Culture 08/23/2022  (A)   Final                    Value:PSEUDOMONAS AERUGINOSA  Moderate      Aerobic Bacterial Culture 08/23/2022  (A)   Final                    Value:PROTEUS MIRABILIS  Moderate      AFB Culture & Smear 08/23/2022 Culture in progress   Preliminary    AFB CULTURE STAIN 08/23/2022 No acid fast bacilli seen.   Final    Gram Stain Result 08/23/2022 Rare  "WBC's   Final    Gram Stain Result 08/23/2022 Moderate Gram negative rods   Final    Gram Stain Result 08/23/2022 Few Gram positive cocci   Final    Fungus (Mycology) Culture 08/23/2022 Culture in progress   Preliminary    Fungus (Mycology) Culture 08/23/2022 No fungus isolated after 2 weeks   Preliminary    Anaerobic Culture 08/23/2022 No anaerobes isolated   Final    Aerobic Bacterial Culture 08/23/2022  (A)   Final                    Value:PROTEUS MIRABILIS  Many      Aerobic Bacterial Culture 08/23/2022  (A)   Final                    Value:PSEUDOMONAS AERUGINOSA  Moderate      Final Pathologic Diagnosis 08/23/2022    Final                    [facility]  SINUS SHAVINGS:  - Benign respiratory mucosa with chronic inflammation in a background of  abundant fungal organisms and necrotic debris, consistent with allergic  fungal sinusitis.  - No eosinophils identified.  Cherrie Dumont M.D.  Report attached.  Performing site:  [place] [address]  "Disclaimer:  This case diagnosis was rendered completely by the outside  consultation pathologist and the case is electronically signed by an [facility]  pathologist listed below solely to release the report into the medical  record."      Disclaimer 08/23/2022    Final                    Value:Unless the case is a 'gross only' or additional testing only, the final  diagnosis for each specimen is based on a microscopic examination of  appropriate tissue sections.     Lab Visit on 08/08/2022   Component Date Value Ref Range Status    TSH 08/08/2022 1.819  0.400 - 4.000 uIU/mL Final       - Independent review of images:      - Independent review of consultant's notes: Lety    ASSESSMENT/PLAN:  Tremor   - currently on propranolol 120 mg XR BID with minimal to mild improvement in tremor (would expect significantly more improvement with this high of a dose of propranolol)  - L hand tremor only, worse in wing-beating position with other " cerebellar findings on exam   - L dysmetria with finger-to-nose, heel to shin and tapping interphalangeal joint of L thumb, normal on the R   - she does have a family history of tremors which puts ET in the differential. Her tremor is NOT EtOH responsive. May be an atypical presentation of ET where tremor is VERY asymmetric   - tremor appears to be consistent with an outflow tremor, with other ataxic findings, cerebellar outflow tremor high on the Ddx  - she has not had an MRI of her brain   - given myelopathic features on exam, proceed with brain and cervical MRI   - trial of primidone       2. Gait abnormality  - has been attributed to lumbar DDD in the past however gait is not classic of lumbar DDD  - mostly wide-based gait with some scissoring   - brain and cervical MRI         Orders Placed This Encounter    MRI Brain Without Contrast    MRI Cervical Spine Without Contrast    primidone (MYSOLINE) 50 MG Tab           Follow up: 3 months with [facility]     Collaborating Physician, Dr. Shin, was available during today's encounter. Any change to plan along with cosign to appear in the EMR.       Total time spent with the patient: 66 minutes.  52 minutes of face-to-face consultation and 14 minutes of chart review and coordination of care, on the day of the visit. This includes face to face time and non-face to face time preparing to see the patient (eg, review of tests), obtaining and/or reviewing separately obtained history, documenting clinical information in the electronic or other health record, independently interpreting resultsand communicating results to the patient/family/caregiver, or care coordination.         Nydia Roth PA-C   [facility]  Department of Neurology  Movement Disorders           show

## 2022-09-16 NOTE — PATIENT INSTRUCTIONS
Primidone 50 mg titration    Week 1: Take 1/2 tab in PM  Week 2: Take 1/2 tab in AM and 1/2 tab in PM  Week 3: Take 1/2 tab in AM and 1 tab in PM  Week 4: Take 1 tab in AM and 1 tab in PM    If adequately improved, can stop and maintain at any level of the titration.  Call me with an update anytime.

## 2022-09-23 ENCOUNTER — IMMUNIZATION (OUTPATIENT)
Dept: PHARMACY | Facility: CLINIC | Age: 77
End: 2022-09-23
Payer: MEDICARE

## 2022-09-23 DIAGNOSIS — Z23 NEED FOR VACCINATION: Primary | ICD-10-CM

## 2022-09-26 LAB — FUNGUS SPEC CULT: NORMAL

## 2022-09-27 ENCOUNTER — OFFICE VISIT (OUTPATIENT)
Dept: PODIATRY | Facility: CLINIC | Age: 77
End: 2022-09-27
Payer: MEDICARE

## 2022-09-27 VITALS
BODY MASS INDEX: 20.63 KG/M2 | HEIGHT: 64 IN | DIASTOLIC BLOOD PRESSURE: 73 MMHG | HEART RATE: 59 BPM | SYSTOLIC BLOOD PRESSURE: 172 MMHG | WEIGHT: 120.81 LBS

## 2022-09-27 DIAGNOSIS — M77.42 METATARSALGIA OF BOTH FEET: ICD-10-CM

## 2022-09-27 DIAGNOSIS — L84 PRE-ULCERATIVE CORN OR CALLOUS: Primary | ICD-10-CM

## 2022-09-27 DIAGNOSIS — Z98.890 HISTORY OF FOOT SURGERY: ICD-10-CM

## 2022-09-27 DIAGNOSIS — B35.1 DERMATOPHYTOSIS OF NAIL: ICD-10-CM

## 2022-09-27 DIAGNOSIS — M77.41 METATARSALGIA OF BOTH FEET: ICD-10-CM

## 2022-09-27 PROCEDURE — 99999 PR PBB SHADOW E&M-EST. PATIENT-LVL V: CPT | Mod: PBBFAC,,, | Performed by: PODIATRIST

## 2022-09-27 PROCEDURE — 99213 OFFICE O/P EST LOW 20 MIN: CPT | Mod: S$PBB,,, | Performed by: PODIATRIST

## 2022-09-27 PROCEDURE — 99999 PR PBB SHADOW E&M-EST. PATIENT-LVL V: ICD-10-PCS | Mod: PBBFAC,,, | Performed by: PODIATRIST

## 2022-09-27 PROCEDURE — 99213 PR OFFICE/OUTPT VISIT, EST, LEVL III, 20-29 MIN: ICD-10-PCS | Mod: S$PBB,,, | Performed by: PODIATRIST

## 2022-09-27 PROCEDURE — 99215 OFFICE O/P EST HI 40 MIN: CPT | Mod: PBBFAC,PN | Performed by: PODIATRIST

## 2022-09-27 NOTE — PROGRESS NOTES
PODIATRY NOTE       PATIENT ID:  Sarah Alejandro is a 76 y.o. female.       CHIEF CONCERN:   Nail Care (6/24/22 Dr Davidson)         MEDICAL DECISION MAKING:          Problem List Items Addressed This Visit    None  Visit Diagnoses       Pre-ulcerative corn or callous    -  Primary    Metatarsalgia of both feet        Dermatophytosis of nail        History of foot surgery                I counseled the patient on the patient's conditions, their implications and medical management.    Shoe and activity modification as needed for relief.  Continue urea cream daily to calluses.   Offloading pads as needed.           PCP:  Latrice Davidson MD  Last encounter:  6/24/2022           HISTORY OF PRESENT ILLNESS:  Sarah Alejandro is a 76 y.o. female with concerns regarding: painful calluses sub metatarsal heads, chronic.     She has history of bunionectomy.            Patient Active Problem List   Diagnosis    Stress incontinence of urine    Voiding dysfunction    Rectocele, female    Chronic constipation    Vaginal atrophy    Graves' disease    Stage 3a chronic kidney disease    Hyperlipidemia    Aortic valve stenosis    Essential tremor    Essential hypertension    Insomnia    S/P TAVR (transcatheter aortic valve replacement)    Lumbar radiculopathy    S/P hip replacement    Pelvic floor weakness    Decreased functional mobility and endurance    Disorder of muscle    Preoperative cardiovascular examination    Status post anterior & posterior colporrhaphy w/ MUS 11/1    Incomplete bladder emptying    Pelvic floor tension    Pelvic floor dysfunction    Chronic pansinusitis           Current Outpatient Medications on File Prior to Visit   Medication Sig Dispense Refill    ALPRAZolam (XANAX) 0.5 MG tablet Take 1 tablet (0.5 mg total) by mouth daily as needed (severe anxiety). 30 tablet 0    ASCORBATE CALCIUM, VITAMIN C, ORAL Take 500 mg by mouth.      aspirin (ECOTRIN) 81 MG EC tablet Take 81 mg by mouth once daily.       cetirizine-pseudoephedrine 5-120 mg Tb12 cetirizine 5 mg-pseudoephedrine  mg tablet,extended release,12hr   TK 1 T PO BID      cholecalciferol, vitamin D3, (VITAMIN D3) 50 mcg (2,000 unit) Tab Take 2,000 Units by mouth.      ciclopirox 0.77 % Gel Apply topically once daily. To thickened discolored toenails. 30 g 1    CMPD testosterone proprionate 2% in vanicream Apply topically. [facility]      COMPOUND HORMONE REPLACEMENT Take by mouth once daily. ESTROGEN CREAM -- [facility]      conjugated estrogens (PREMARIN) vaginal cream Place 0.5 g vaginally once daily. 1 applicator 5    docusate sodium (COLACE) 100 MG capsule Take 1 capsule (100 mg total) by mouth 2 (two) times daily. 60 capsule 1    EPINEPHrine (EPIPEN) 0.3 mg/0.3 mL AtIn epinephrine 0.3 mg/0.3 mL injection, auto-injector      MAGNESIUM CARBONATE ORAL Take 1 tablet by mouth once daily. 200MG      methIMAzole (TAPAZOLE) 5 MG Tab Take half a tablet daily 90 tablet 3    nitrofurantoin, macrocrystal-monohydrate, (MACROBID) 100 MG capsule Take 1 capsule (100 mg total) by mouth daily as needed (self cath). 30 capsule 0    omalizumab (XOLAIR) 75 mg/0.5 mL injection       ondansetron (ZOFRAN-ODT) 8 MG TbDL Take 1 tablet (8 mg total) by mouth every 8 (eight) hours as needed (nausea). 10 tablet 1    PENNSAID 20 mg/gram /actuation(2 %) sopm APPLY 1 APPLICATION TOPICALLY 2 (TWO) TIMES DAILY AS NEEDED.::NOT COVERED:: 112 g 10    primidone (MYSOLINE) 50 MG Tab Take 1 tablet (50 mg total) by mouth 2 (two) times a day. 60 tablet 11    progesterone (PROMETRIUM) 200 MG capsule Take 1 capsule by mouth 30-60 minutes before bed every night 90 capsule 1    propranoloL (INDERAL LA) 120 MG 24 hr capsule Take 2 capsules (240 mg total) by mouth once daily. (Patient taking differently: Take 120 mg by mouth 2 (two) times a day.) 60 capsule 3    RENOVA 0.02 % Crea RENOVA 0.02 % TOPICAL CREAM APPLY TO AFFECTED AREA EVERY DAY AT NIGHT 40 g 0    rosuvastatin  "(CRESTOR) 40 MG Tab TAKE 1 TABLET BY MOUTH EVERY DAY 90 tablet 2    sars-cov-2, covid-19 omicron, (MODERNA COVID BIVAL,18Y UP,-PF) 50 mcg/0.5 mL injection Inject into the muscle. 0.5 mL 0    urea 20 % Crea Apply 1 application topically once daily. To dry skin on the feet. 75 g 10    valACYclovir (VALTREX) 1000 MG tablet valacyclovir 1 gram tablet      XIIDRA 5 % Dpet       zolpidem (AMBIEN CR) 6.25 MG CR tablet Take 1 tablet (6.25 mg total) by mouth nightly as needed for Insomnia. 30 tablet 2    [DISCONTINUED] polyethylene glycol (GOLYTELY,NULYTELY) 236-22.74-6.74 -5.86 gram suspension Take by mouth.       No current facility-administered medications on file prior to visit.           Review of patient's allergies indicates:   Allergen Reactions    Penicillins Rash and Hives               ROS:   General ROS: negative for - chills, fever or night sweats  Respiratory ROS: no cough, shortness of breath, or wheezing  Cardiovascular ROS: no chest pain or dyspnea on exertion  Musculoskeletal ROS: positive for - pain in foot - bilateral  Neurological ROS: negative for - impaired coordination/balance and numbness/tingling  Dermatological ROS: negative for pruritus and rash      EXAM:     Vitals:    09/27/22 1142   BP: (!) 172/73   Pulse: (!) 59   Weight: 54.8 kg (120 lb 13 oz)   Height: 5' 4" (1.626 m)        General:  Alert and Oriented x 3;  No acute distress      Bilateral  Lower extremity exam:    Vascular:   Dorsalis Pedis:  present   Posterior Tibial:  present  Capillary refill time:  3 seconds  Temperature of toes cool to touch  Edema:  Trace and non-pitting       Neurological:     Sharp touch:  normal  Light touch: normal  Tinels Sign:  Absent  Mulders Click:   Absent        Dermatological:   Skin: thin, moist and appropriate for age; calluses sub metatarsal heads  Wounds/Ulcers:  Absent  Bruising:  Absent  Erythema:  Absent  Toenails are short and polished.  right 4th toenail thickened and incurvated without " paronychia.         Musculoskeletal:   Metatarsophalangeal range of motion:   diminished range of motion  Subtalar joint range of motion: full range of motion  Ankle joint range of motion:  full range of motion  Bunions:  Present  Hammertoes: Present

## 2022-09-29 ENCOUNTER — CLINICAL SUPPORT (OUTPATIENT)
Dept: INTERNAL MEDICINE | Facility: CLINIC | Age: 77
End: 2022-09-29
Payer: MEDICARE

## 2022-09-29 DIAGNOSIS — Z23 IMMUNIZATION DUE: Primary | ICD-10-CM

## 2022-09-29 PROCEDURE — G0008 ADMIN INFLUENZA VIRUS VAC: HCPCS | Mod: PBBFAC

## 2022-09-29 NOTE — PROGRESS NOTES
"  Patient was given vaccine information sheet for the Flu Vaccine. The area of injection was palpated using the acromion process as a landmark. This area was cleaned with alcohol. Using a 25g 1" safety needle, 0.5mL of the vaccine was placed into the right deltoid muscle. The injection site was dressed with a bandage. Patient experienced no complications and was discharged in stable condition. FluAD (65+) vaccine Lot: [id number] Exp: 05/18/2023.      "

## 2022-09-30 DIAGNOSIS — G25.0 ESSENTIAL TREMOR: ICD-10-CM

## 2022-09-30 NOTE — TELEPHONE ENCOUNTER
Refill Routing Note   Medication(s) are not appropriate for processing by [facility] for the following reason(s):      - Required vitals are abnormal    [facility] action(s):  Defer          Medication reconciliation completed: No     Appointments  past 12m or future 3m with PCP    Date Provider   Last Visit   6/24/2022 Latrice Davidson MD   Next Visit   Visit date not found Latrice Davidson MD   ED visits in past 90 days: 0        Note composed:6:42 PM 09/30/2022

## 2022-09-30 NOTE — TELEPHONE ENCOUNTER
No new care gaps identified.  [facility] Embedded Care Gaps. Reference number: [id number]. 9/30/2022   2:03:44 AM CDT

## 2022-10-03 RX ORDER — PROPRANOLOL HYDROCHLORIDE 120 MG/1
120 CAPSULE, EXTENDED RELEASE ORAL DAILY
Qty: 90 CAPSULE | Refills: 0 | Status: SHIPPED | OUTPATIENT
Start: 2022-10-03 | End: 2022-12-22 | Stop reason: SDUPTHER

## 2022-10-05 ENCOUNTER — PATIENT MESSAGE (OUTPATIENT)
Dept: NEUROLOGY | Facility: CLINIC | Age: 77
End: 2022-10-05
Payer: MEDICARE

## 2022-10-07 ENCOUNTER — HOSPITAL ENCOUNTER (OUTPATIENT)
Dept: RADIOLOGY | Facility: HOSPITAL | Age: 77
Discharge: HOME | End: 2022-10-07
Attending: PHYSICIAN ASSISTANT
Payer: MEDICARE

## 2022-10-07 DIAGNOSIS — R27.0 ATAXIA: ICD-10-CM

## 2022-10-07 DIAGNOSIS — G25.2 CEREBELLAR TREMOR: ICD-10-CM

## 2022-10-07 DIAGNOSIS — R25.1 TREMOR: ICD-10-CM

## 2022-10-07 PROCEDURE — 70551 MRI BRAIN STEM W/O DYE: CPT | Mod: TC

## 2022-10-07 PROCEDURE — 72141 MRI NECK SPINE W/O DYE: CPT | Mod: 26,,, | Performed by: RADIOLOGY

## 2022-10-07 PROCEDURE — 70551 MRI BRAIN STEM W/O DYE: CPT | Mod: 26,,, | Performed by: RADIOLOGY

## 2022-10-07 PROCEDURE — 72141 MRI NECK SPINE W/O DYE: CPT | Mod: TC

## 2022-10-07 PROCEDURE — 72141 MRI CERVICAL SPINE WITHOUT CONTRAST: ICD-10-PCS | Mod: 26,,, | Performed by: RADIOLOGY

## 2022-10-07 PROCEDURE — 70551 MRI BRAIN WITHOUT CONTRAST: ICD-10-PCS | Mod: 26,,, | Performed by: RADIOLOGY

## 2022-10-11 ENCOUNTER — OFFICE VISIT (OUTPATIENT)
Dept: SURGERY | Facility: CLINIC | Age: 77
End: 2022-10-11
Payer: MEDICARE

## 2022-10-11 VITALS
HEART RATE: 63 BPM | WEIGHT: 121 LBS | DIASTOLIC BLOOD PRESSURE: 63 MMHG | BODY MASS INDEX: 20.66 KG/M2 | HEIGHT: 64 IN | SYSTOLIC BLOOD PRESSURE: 132 MMHG

## 2022-10-11 DIAGNOSIS — Z91.89 AT HIGH RISK FOR BREAST CANCER: ICD-10-CM

## 2022-10-11 DIAGNOSIS — N60.91 ATYPICAL LOBULAR HYPERPLASIA OF RIGHT BREAST: Primary | ICD-10-CM

## 2022-10-11 LAB
ACID FAST MOD KINY STN SPEC: NORMAL
MYCOBACTERIUM SPEC QL CULT: NORMAL

## 2022-10-11 PROCEDURE — 99999 PR PBB SHADOW E&M-EST. PATIENT-LVL V: ICD-10-PCS | Mod: PBBFAC,,, | Performed by: SURGERY

## 2022-10-11 PROCEDURE — 99204 PR OFFICE/OUTPT VISIT, NEW, LEVL IV, 45-59 MIN: ICD-10-PCS | Mod: S$PBB,,, | Performed by: SURGERY

## 2022-10-11 PROCEDURE — 99215 OFFICE O/P EST HI 40 MIN: CPT | Mod: PBBFAC | Performed by: SURGERY

## 2022-10-11 PROCEDURE — 99204 OFFICE O/P NEW MOD 45 MIN: CPT | Mod: S$PBB,,, | Performed by: SURGERY

## 2022-10-11 PROCEDURE — 99999 PR PBB SHADOW E&M-EST. PATIENT-LVL V: CPT | Mod: PBBFAC,,, | Performed by: SURGERY

## 2022-10-11 NOTE — PROGRESS NOTES
Breast Surgery  [facility]  Department of Surgery      REFERRING PROVIDER: Hodan Goldberg MD  [address] [place] [address]    Chief Complaint: Breast Mass (NP atypical lobular hyperplasia of right breast )      Subjective:      Patient ID: Sarah Alejandro is a 77 y.o. female who presents with calcifications of the upper outer quadrant of right breast seen on mammography ().     Breast biopsy (22) showed atypical lobular hyperplasia negative for carcinoma.    Patient does routinely do self breast exams.  Patient has not noted a change on breast exam.  Patient denies nipple discharge. Patient has to previous breast biopsy. Patient denies a personal history of breast cancer.    Findings at that time were the following:   Calcification size: 4.7 x 4.4 cm   Tumor grade: NA     GYN History:  Age of menarche was 11. Age of menopause was 53. Patient has hormonal therapy. Patient is . Age of first live birth was 21. Patient did not breast feed.    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     Graves disease     Past Surgical History:   Procedure Laterality Date    BACK SURGERY      BALLOON SINUPLASTY OF PARANASAL SINUS Bilateral 2022    Procedure: SINUPLASTY, USING BALLOON  frontal ans sphenoid;  Surgeon: Samantha Ridley MD;  Location: [facility];  Service: ENT;  Laterality: Bilateral;    CARDIAC VALVE REPLACEMENT  2016    - aortic valve stenosis / COW  VALVE    FUNCTIONAL ENDOSCOPIC SINUS SURGERY (FESS) USING COMPUTER-ASSISTED NAVIGATION N/A 2022    Procedure: FESS, USING COMPUTER-ASSISTED NAVIGATION - MAXILLARY, ETHMOIDS, FRONTAL;   Surgeon: Samantha Ridley MD;  Location: [facility];  Service: ENT;  Laterality: N/A;    HIP REPLACEMENT ARTHROPLASTY Left 2016    INSERTION OF MIDURETHRAL SLING N/A 11/1/2021    Procedure: SLING, MIDURETHRAL;  Surgeon: Hodan Goldberg MD;  Location: [facility] OR;  Service: OB/GYN;  Laterality: N/A;    JOINT REPLACEMENT      LUMBAR FUSION      NASAL ENDOSCOPY N/A 8/23/2022    Procedure: ENDOSCOPY, NOSE - SPHENOIDOTOMY;  Surgeon: Samantha Ridley MD;  Location: [facility] OR;  Service: ENT;  Laterality: N/A;     Current Outpatient Medications on File Prior to Visit   Medication Sig Dispense Refill    ALPRAZolam (XANAX) 0.5 MG tablet Take 1 tablet (0.5 mg total) by mouth daily as needed (severe anxiety). 30 tablet 0    ASCORBATE CALCIUM, VITAMIN C, ORAL Take 500 mg by mouth.      aspirin (ECOTRIN) 81 MG EC tablet Take 81 mg by mouth once daily.      cetirizine-pseudoephedrine 5-120 mg Tb12 cetirizine 5 mg-pseudoephedrine  mg tablet,extended release,12hr   TK 1 T PO BID      cholecalciferol, vitamin D3, (VITAMIN D3) 50 mcg (2,000 unit) Tab Take 2,000 Units by mouth.      ciclopirox 0.77 % Gel Apply topically once daily. To thickened discolored toenails. (Patient taking differently: Apply topically daily as needed. To thickened discolored toenails.) 30 g 1    CMPD testosterone proprionate 2% in vanicream Apply topically. [facility]      COMPOUND HORMONE REPLACEMENT Take by mouth once daily. ESTROGEN CREAM -- [facility]      conjugated estrogens (PREMARIN) vaginal cream Place 0.5 g vaginally once daily. 1 applicator 5    docusate sodium (COLACE) 100 MG capsule Take 1 capsule (100 mg total) by mouth 2 (two) times daily. 60 capsule 1    EPINEPHrine (EPIPEN) 0.3 mg/0.3 mL AtIn epinephrine 0.3 mg/0.3 mL injection, auto-injector      MAGNESIUM CARBONATE ORAL Take 1 tablet by mouth once daily. 200MG      methIMAzole (TAPAZOLE) 5 MG Tab Take half a tablet daily 90 tablet 3    nitrofurantoin,  macrocrystal-monohydrate, (MACROBID) 100 MG capsule Take 1 capsule (100 mg total) by mouth daily as needed (self cath). 30 capsule 0    omalizumab (XOLAIR) 75 mg/0.5 mL injection       progesterone (PROMETRIUM) 200 MG capsule Take 1 capsule by mouth 30-60 minutes before bed every night 90 capsule 1    propranoloL (INDERAL LA) 120 MG 24 hr capsule Take 1 capsule (120 mg total) by mouth once daily. 90 capsule 0    RENOVA 0.02 % Crea RENOVA 0.02 % TOPICAL CREAM APPLY TO AFFECTED AREA EVERY DAY AT NIGHT 40 g 0    rosuvastatin (CRESTOR) 40 MG Tab TAKE 1 TABLET BY MOUTH EVERY DAY 90 tablet 2    sars-cov-2, covid-19 omicron, (MODERNA COVID BIVAL,18Y UP,-PF) 50 mcg/0.5 mL injection Inject into the muscle. 0.5 mL 0    urea 20 % Crea Apply 1 application topically once daily. To dry skin on the feet. 75 g 10    valACYclovir (VALTREX) 1000 MG tablet valacyclovir 1 gram tablet      XIIDRA 5 % Dpet       zolpidem (AMBIEN CR) 6.25 MG CR tablet Take 1 tablet (6.25 mg total) by mouth nightly as needed for Insomnia. 30 tablet 2    ondansetron (ZOFRAN-ODT) 8 MG TbDL Take 1 tablet (8 mg total) by mouth every 8 (eight) hours as needed (nausea). (Patient not taking: Reported on 10/11/2022) 10 tablet 1    PENNSAID 20 mg/gram /actuation(2 %) sopm APPLY 1 APPLICATION TOPICALLY 2 (TWO) TIMES DAILY AS NEEDED.::NOT COVERED:: (Patient not taking: Reported on 10/11/2022) 112 g 10    primidone (MYSOLINE) 50 MG Tab Take 1 tablet (50 mg total) by mouth 2 (two) times a day. (Patient not taking: Reported on 10/11/2022) 60 tablet 11     No current facility-administered medications on file prior to visit.     Social History     Socioeconomic History    Marital status:    Tobacco Use    Smoking status: Former     Types: Cigarettes     Quit date:      Years since quittin.8    Smokeless tobacco: Never   Substance and Sexual Activity    Alcohol use: Not Currently     Alcohol/week: 2.0 standard drinks     Types: 1 Glasses of wine, 1 Shots of  "liquor per week     Comment: weekends     Drug use: Never    Sexual activity: Yes     Partners: Male     Birth control/protection: Post-menopausal     Comment:       Family History   Problem Relation Age of Onset    Colon cancer Father     Leukemia Mother     Hypothyroidism Sister     Breast cancer Neg Hx     Ovarian cancer Neg Hx     Stroke Neg Hx     Diabetes Neg Hx         Review of Systems   Constitutional:  Negative for appetite change, chills, fever and unexpected weight change.   HENT:  Negative for facial swelling, postnasal drip and sore throat.    Eyes:  Negative for redness and itching.   Respiratory:  Negative for chest tightness and shortness of breath.    Cardiovascular:  Negative for chest pain and palpitations.   Gastrointestinal:  Negative for blood in stool, diarrhea, nausea and vomiting.   Genitourinary:  Negative for difficulty urinating and dysuria.   Musculoskeletal:  Negative for arthralgias and joint swelling.   Skin:  Negative for rash and wound.   Neurological:  Negative for dizziness and syncope.   Hematological:  Negative for adenopathy.   Psychiatric/Behavioral:  Negative for agitation. The patient is not nervous/anxious.        Objective:   /63 (BP Location: Left arm, Patient Position: Sitting, BP Method: Small (Automatic))   Pulse 63   Ht 5' 4" (1.626 m)   Wt 54.9 kg (121 lb)   LMP  (LMP Unknown) Comment:    1996  BMI 20.77 kg/m²     Physical Exam   Vitals reviewed.  Constitutional: She is oriented to person, place, and time.   HENT:   Head: Normocephalic and atraumatic.   Nose: Nose normal.   Eyes: Pupils are equal, round, and reactive to light. Right eye exhibits no discharge. Left eye exhibits no discharge.   Pulmonary/Chest: Effort normal and breath sounds normal. No stridor. No respiratory distress. She exhibits no mass, no tenderness and no edema. Right breast exhibits no inverted nipple, no mass, no nipple discharge, no skin change and no tenderness. Left " breast exhibits no inverted nipple, no mass, no nipple discharge, no skin change and no tenderness. No breast swelling or bleeding. Breasts are symmetrical.       Abdominal: Normal appearance.   Genitourinary: No breast swelling or bleeding.   Neurological: She is alert and oriented to person, place, and time.   Skin: Skin is warm and dry.     Psychiatric: Her behavior is normal. Mood, judgment and thought content normal.        Radiology review: Images personally reviewed by me in the clinic.     8/18/22 Right Diagnostic Mammo:    Findings:  This procedure was performed using tomosynthesis. Computer-aided detection was utilized in the interpretation of this examination.  The right breast is heterogeneously dense, which may obscure small masses.     There are amorphous calcifications in a regional distribution seen in the upper region of the right breast. The calcifications correlate with the screening  mammogram finding. Compared to the previous study, the calcifications increased in number. These calcifications span 4.7 cm x 4.4 cm.  They are only seen on the MLO and 90 degree views.      Impression:  Right  Calcifications: Right breast calcifications at the upper position. Assessment: 4 - Suspicious finding. Stereotactic Biopsy is recommended.      BI-RADS Category:   Overall: 4 - Suspicious     Recommendation:  Stereotactic Biopsy is recommended.    7/25/22 Bilateral Screening Mammo:    Findings:  This procedure was performed using tomosynthesis. Computer-aided detection was utilized in the interpretation of this examination.  The breasts are heterogeneously dense, which may obscure small masses.  Bilateral saline implants are present.      Right  There are calcifications in a regional distribution seen in the upper region of the right breast. Compared to the previous study, the calcifications increased in number.      Left  There is no evidence of suspicious masses, calcifications, or other abnormal findings in  the left breast.     Impression:  Right  Calcifications: Right breast calcifications at the upper position. Assessment: 0 - Incomplete. Special Views: Magnification View is recommended.      Left  There is no mammographic evidence of malignancy in the left breast.     BI-RADS Category:   Overall: 0 - Incomplete: Needs Additional Imaging Evaluation     Recommendation:  Diagnostic mammogram including magnification views is recommended.      Assessment:       1. Atypical lobular hyperplasia of right breast          Plan:   We discussed the options for management of atypia. We discussed with atypia, there is an increase in the risk of breast cancer overall.  We discussed that there a options for reducing that risk with chemoprevention using Tamoxifen.  We also discussed active surveillance vs excisional biopsy.  I reviewed her images with radiology, Dr. Mercado.  We felt these were somewhat symmetric and many of the calcifications were there in priors.    The span is 4.7cm which would be a significant cosmetic defect for her breast size if we were to perform an excisional biopsy for this low risk area.   We discussed chemoprevention and she was not yet interested.    We agreed that close surveillance would be acceptable to her with a low risk that this area could be harboring an early breast cancer, <15%.    Patient has elected to proceed with watchful waiting. Will see back in 6 months with right diagnostic mammogram, sooner if there are any changes or concerns.       Total time spent with the patient: 45 minutes.  30 minutes of face to face consultation and 15 minutes of chart review and coordination of care.

## 2022-10-11 NOTE — Clinical Note
Hey!  We had a long discussion about management of this area of atypia.  There is a very low risk of an early cancer developing there.  She would prefer to avoid surgery so we will closely monitor with repeat mammo and breast exams.  She was comfortable with that and I think very reasonable for this area.  If the mammo shows changes, will have to excise.    I'll keep you posted. Thanks for sending her! Preeti

## 2022-10-12 PROBLEM — N60.91 ATYPICAL LOBULAR HYPERPLASIA OF RIGHT BREAST: Status: ACTIVE | Noted: 2022-10-12

## 2022-10-13 ENCOUNTER — PATIENT MESSAGE (OUTPATIENT)
Dept: UROGYNECOLOGY | Facility: CLINIC | Age: 77
End: 2022-10-13
Payer: MEDICARE

## 2022-10-19 ENCOUNTER — HOSPITAL ENCOUNTER (OUTPATIENT)
Dept: CARDIOLOGY | Facility: HOSPITAL | Age: 77
Discharge: HOME | End: 2022-10-19
Attending: INTERNAL MEDICINE
Payer: MEDICARE

## 2022-10-19 VITALS
SYSTOLIC BLOOD PRESSURE: 122 MMHG | HEART RATE: 58 BPM | DIASTOLIC BLOOD PRESSURE: 75 MMHG | BODY MASS INDEX: 20.66 KG/M2 | HEIGHT: 64 IN | WEIGHT: 121 LBS

## 2022-10-19 DIAGNOSIS — Z95.2 HISTORY OF TRANSCATHETER AORTIC VALVE REPLACEMENT (TAVR): ICD-10-CM

## 2022-10-19 LAB
ASCENDING AORTA: 3.56 CM
AV INDEX (PROSTH): 0.6
AV MEAN GRADIENT: 10 MMHG
AV PEAK GRADIENT: 17 MMHG
AV VALVE AREA: 2.67 CM2
AV VELOCITY RATIO: 0.67
BSA FOR ECHO PROCEDURE: 1.57 M2
CV ECHO LV RWT: 0.34 CM
DOP CALC AO PEAK VEL: 2.07 M/S
DOP CALC AO VTI: 54.31 CM
DOP CALC LVOT AREA: 4.4 CM2
DOP CALC LVOT DIAMETER: 2.38 CM
DOP CALC LVOT PEAK VEL: 1.38 M/S
DOP CALC LVOT STROKE VOLUME: 145.27 CM3
DOP CALC MV VTI: 54.48 CM
DOP CALCLVOT PEAK VEL VTI: 32.67 CM
E WAVE DECELERATION TIME: 488.16 MSEC
E/A RATIO: 1.29
E/E' RATIO: 24.55 M/S
ECHO LV POSTERIOR WALL: 0.76 CM (ref 0.6–1.1)
EJECTION FRACTION: 65 %
FRACTIONAL SHORTENING: 36 % (ref 28–44)
INTERVENTRICULAR SEPTUM: 0.83 CM (ref 0.6–1.1)
LA MAJOR: 4.02 CM
LA MINOR: 4.23 CM
LA WIDTH: 3.73 CM
LEFT ATRIUM SIZE: 4.03 CM
LEFT ATRIUM VOLUME INDEX MOD: 29.2 ML/M2
LEFT ATRIUM VOLUME INDEX: 33.3 ML/M2
LEFT ATRIUM VOLUME MOD: 46.1 CM3
LEFT ATRIUM VOLUME: 52.67 CM3
LEFT INTERNAL DIMENSION IN SYSTOLE: 2.83 CM (ref 2.1–4)
LEFT VENTRICLE DIASTOLIC VOLUME INDEX: 57.03 ML/M2
LEFT VENTRICLE DIASTOLIC VOLUME: 90.1 ML
LEFT VENTRICLE MASS INDEX: 70 G/M2
LEFT VENTRICLE SYSTOLIC VOLUME INDEX: 19.3 ML/M2
LEFT VENTRICLE SYSTOLIC VOLUME: 30.45 ML
LEFT VENTRICULAR INTERNAL DIMENSION IN DIASTOLE: 4.45 CM (ref 3.5–6)
LEFT VENTRICULAR MASS: 110.61 G
LV LATERAL E/E' RATIO: 19.29 M/S
LV SEPTAL E/E' RATIO: 33.75 M/S
MV A" WAVE DURATION": 11.42 MSEC
MV MEAN GRADIENT: 1 MMHG
MV PEAK A VEL: 1.05 M/S
MV PEAK E VEL: 1.35 M/S
MV PEAK GRADIENT: 9 MMHG
MV STENOSIS PRESSURE HALF TIME: 141.57 MS
MV VALVE AREA BY CONTINUITY EQUATION: 2.67 CM2
MV VALVE AREA P 1/2 METHOD: 1.55 CM2
PISA MRMAX VEL: 0.05 M/S
PISA TR MAX VEL: 2.93 M/S
PULM VEIN S/D RATIO: 1.91
PV PEAK D VEL: 0.23 M/S
PV PEAK S VEL: 0.44 M/S
RA MAJOR: 4.33 CM
RA PRESSURE: 3 MMHG
RA WIDTH: 3.14 CM
RIGHT VENTRICULAR END-DIASTOLIC DIMENSION: 3.65 CM
RV TISSUE DOPPLER FREE WALL SYSTOLIC VELOCITY 1 (APICAL 4 CHAMBER VIEW): 10.73 CM/S
SINUS: 2.51 CM
STJ: 2.82 CM
TDI LATERAL: 0.07 M/S
TDI SEPTAL: 0.04 M/S
TDI: 0.06 M/S
TR MAX PG: 34 MMHG
TRICUSPID ANNULAR PLANE SYSTOLIC EXCURSION: 1.96 CM
TV REST PULMONARY ARTERY PRESSURE: 37 MMHG

## 2022-10-19 PROCEDURE — 93306 TTE W/DOPPLER COMPLETE: CPT

## 2022-10-19 PROCEDURE — 93306 ECHO (CUPID ONLY): ICD-10-PCS | Mod: 26,,, | Performed by: INTERNAL MEDICINE

## 2022-10-19 PROCEDURE — 93306 TTE W/DOPPLER COMPLETE: CPT | Mod: 26,,, | Performed by: INTERNAL MEDICINE

## 2022-10-25 ENCOUNTER — OFFICE VISIT (OUTPATIENT)
Dept: CARDIOLOGY | Facility: CLINIC | Age: 77
End: 2022-10-25
Payer: MEDICARE

## 2022-10-25 DIAGNOSIS — I10 ESSENTIAL HYPERTENSION: ICD-10-CM

## 2022-10-25 DIAGNOSIS — I35.0 NONRHEUMATIC AORTIC VALVE STENOSIS: ICD-10-CM

## 2022-10-25 DIAGNOSIS — E78.2 MIXED HYPERLIPIDEMIA: ICD-10-CM

## 2022-10-25 PROCEDURE — 99214 OFFICE O/P EST MOD 30 MIN: CPT | Mod: 95,,, | Performed by: INTERNAL MEDICINE

## 2022-10-25 PROCEDURE — 99214 PR OFFICE/OUTPT VISIT, EST, LEVL IV, 30-39 MIN: ICD-10-PCS | Mod: 95,,, | Performed by: INTERNAL MEDICINE

## 2022-10-25 NOTE — PROGRESS NOTES
INTERVENTIONAL CARDIOLOGY TELEMEDICINE VISIT    The patient location is: Home, [place]    Visit type: audiovisual    Face to Face time with patient: 10  20 minutes of total time spent on the encounter, which includes face to face time and non-face to face time preparing to see the patient (eg, review of tests), Obtaining and/or reviewing separately obtained history, Documenting clinical information in the electronic or other health record, Independently interpreting results (not separately reported) and communicating results to the patient/family/caregiver, or Care coordination (not separately reported).       Each patient to whom he or she provides medical services by telemedicine is:  (1) informed of the relationship between the physician and patient and the respective role of any other health care provider with respect to management of the patient; and (2) notified that he or she may decline to receive medical services by telemedicine and may withdraw from such care at any time.      REFERRING PHYSICIAN: Dr Alejandro ([facility])    REASON FOR CONSULT/VISIT:  Follow up of vlavlar heart disease    HISTORY OF PRESENT ILLNESS  Sarah Rico is a 77 y.o. female here for follow up of aortic valve disease.    The patient has a history of severe aortic stenosis status post transcatheter aortic valve replacement in 2006 in [place] by Dr. Spencer Alejandro.  Patient remains very active goes to the gym and denies any symptoms suggestive of heart failure.  She is compliant with her medications including propranolol for hypertension and Crestor for dyslipidemia.  She underwent transthoracic echo 2 weeks ago and is here to discuss results.    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     Graves disease        PAST SURGICAL HISTORY  Past Surgical History:   Procedure Laterality Date    BACK SURGERY      BALLOON SINUPLASTY OF PARANASAL SINUS Bilateral 8/23/2022    Procedure: SINUPLASTY, USING BALLOON  frontal ans sphenoid;  Surgeon: Samantha Ridley MD;  Location: [facility];  Service: ENT;  Laterality: Bilateral;    CARDIAC VALVE REPLACEMENT  05/2016    - aortic valve stenosis / COW  VALVE    FUNCTIONAL ENDOSCOPIC SINUS SURGERY (FESS) USING COMPUTER-ASSISTED NAVIGATION N/A 8/23/2022    Procedure: FESS, USING COMPUTER-ASSISTED NAVIGATION - MAXILLARY, ETHMOIDS, FRONTAL;  Surgeon: Samantha Ridley MD;  Location: [facility] OR;  Service: ENT;  Laterality: N/A;    HIP REPLACEMENT ARTHROPLASTY Left 2016    INSERTION OF MIDURETHRAL SLING N/A 11/1/2021    Procedure: SLING, MIDURETHRAL;  Surgeon: Hodan Goldberg MD;  Location: [facility];  Service: OB/GYN;  Laterality: N/A;    JOINT REPLACEMENT      LUMBAR FUSION      NASAL ENDOSCOPY N/A 8/23/2022    Procedure: ENDOSCOPY, NOSE - SPHENOIDOTOMY;  Surgeon: Samantha Ridley MD;  Location: [facility];  Service: ENT;  Laterality: N/A;       MEDICATIONS  Current Outpatient Medications on File Prior to Visit   Medication Sig Dispense Refill    ALPRAZolam (XANAX) 0.5 MG tablet Take 1 tablet (0.5 mg total) by mouth daily as needed (severe anxiety). 30 tablet 0    ASCORBATE CALCIUM, VITAMIN C, ORAL Take 500 mg by mouth.      aspirin (ECOTRIN) 81 MG EC tablet Take 81 mg by mouth once daily.      cetirizine-pseudoephedrine 5-120 mg Tb12 cetirizine 5 mg-pseudoephedrine  mg tablet,extended release,12hr   TK 1 T PO BID      cholecalciferol, vitamin D3, (VITAMIN D3) 50 mcg (2,000 unit) Tab Take 2,000 Units by mouth.      ciclopirox 0.77 % Gel Apply topically once daily. To thickened discolored toenails. (Patient taking differently: Apply topically daily as needed. To  thickened discolored toenails.) 30 g 1    CMPD testosterone proprionate 2% in vanicream Apply topically. [facility]      COMPOUND HORMONE REPLACEMENT Take by mouth once daily. ESTROGEN CREAM -- [facility]      conjugated estrogens (PREMARIN) vaginal cream Place 0.5 g vaginally once daily. 1 applicator 5    docusate sodium (COLACE) 100 MG capsule Take 1 capsule (100 mg total) by mouth 2 (two) times daily. 60 capsule 1    EPINEPHrine (EPIPEN) 0.3 mg/0.3 mL AtIn epinephrine 0.3 mg/0.3 mL injection, auto-injector      MAGNESIUM CARBONATE ORAL Take 1 tablet by mouth once daily. 200MG      methIMAzole (TAPAZOLE) 5 MG Tab Take half a tablet daily 90 tablet 3    nitrofurantoin, macrocrystal-monohydrate, (MACROBID) 100 MG capsule Take 1 capsule (100 mg total) by mouth daily as needed (self cath). 30 capsule 0    omalizumab (XOLAIR) 75 mg/0.5 mL injection       ondansetron (ZOFRAN-ODT) 8 MG TbDL Take 1 tablet (8 mg total) by mouth every 8 (eight) hours as needed (nausea). (Patient not taking: Reported on 10/11/2022) 10 tablet 1    PENNSAID 20 mg/gram /actuation(2 %) sopm APPLY 1 APPLICATION TOPICALLY 2 (TWO) TIMES DAILY AS NEEDED.::NOT COVERED:: (Patient not taking: Reported on 10/11/2022) 112 g 10    primidone (MYSOLINE) 50 MG Tab Take 1 tablet (50 mg total) by mouth 2 (two) times a day. (Patient not taking: Reported on 10/11/2022) 60 tablet 11    progesterone (PROMETRIUM) 200 MG capsule Take 1 capsule by mouth 30-60 minutes before bed every night 90 capsule 1    propranoloL (INDERAL [place]) 120 MG 24 hr capsule Take 1 capsule (120 mg total) by mouth once daily. 90 capsule 0    RENOVA 0.02 % Crea RENOVA 0.02 % TOPICAL CREAM APPLY TO AFFECTED AREA EVERY DAY AT NIGHT 40 g 0    rosuvastatin (CRESTOR) 40 MG Tab TAKE 1 TABLET BY MOUTH EVERY DAY 90 tablet 2    sars-cov-2, covid-19 omicron, (MODERNA COVID BIVAL,18Y UP,-PF) 50 mcg/0.5 mL injection Inject into the muscle. 0.5 mL 0    urea 20 % Crea Apply 1 application  topically once daily. To dry skin on the feet. 75 g 10    valACYclovir (VALTREX) 1000 MG tablet valacyclovir 1 gram tablet      XIIDRA 5 % Dpet       zolpidem (AMBIEN CR) 6.25 MG CR tablet Take 1 tablet (6.25 mg total) by mouth nightly as needed for Insomnia. 30 tablet 2     No current facility-administered medications on file prior to visit.        SOCIAL HISTORY  TOBACCO: Denies  ETOH: Denies    REVIEW OF SYSTEMS  10 organ system ROS performed and negative    PHYSICAL EXAM  Unable to perform on telemedicine visit. However, patient alert awake and able to hold complete conversation with me    LAB AND RADIOLOGY DATA  Recent labwork including BMP, CBC, lipid profile were reviewed and discussed pertinent results with the patient.  Echocardiogram, EKGs and previous angiogram studies were reviewed.    ASSESSMENT AND PLAN    Aortic valve stenosis  Echocardiogram reviewed, normal ejection fraction, appropriate bowel function, no other abnormalities noted. will continue clinical surveillance with yearly echos given by prosthetic valve that is already 6 years old.  I discussed with the patient today about longevity of these valves and expressed that it is unclear how long this will last, hopefully will last for long on her    Essential hypertension  Complaint to her medications, following with PCP    Hyperlipidemia  On high-intensity statin therapy, following with PCP      There is no height or weight on file to calculate BMI. Healthy lifestyle was discussed with the patient as well as the importance of physical activity to improve cardiovascular health.      Gilbert Velasquez MD [facility]  Interventional Cardiology  Structural/Valvular heart disease  [phone]

## 2022-10-25 NOTE — ASSESSMENT & PLAN NOTE
Echocardiogram reviewed, normal ejection fraction, appropriate bowel function, no other abnormalities noted. will continue clinical surveillance with yearly echos given by prosthetic valve that is already 6 years old.  I discussed with the patient today about longevity of these valves and expressed that it is unclear how long this will last, hopefully will last for long on her

## 2022-10-26 ENCOUNTER — TELEPHONE (OUTPATIENT)
Dept: NEUROSURGERY | Facility: CLINIC | Age: 77
End: 2022-10-26
Payer: MEDICARE

## 2022-10-26 ENCOUNTER — PATIENT MESSAGE (OUTPATIENT)
Dept: NEUROLOGY | Facility: CLINIC | Age: 77
End: 2022-10-26
Payer: MEDICARE

## 2022-10-26 DIAGNOSIS — M48.02 CERVICAL SPINAL STENOSIS: Primary | ICD-10-CM

## 2022-10-26 NOTE — TELEPHONE ENCOUNTER
"Spoke w/ pt via telephone to schedule appointment per referral to Dr. Upton. Pt states she is not interested in seeing neurosurgery at this time. She states she knows she has "neck issues" but "can live with it". Encouraged pt to reach out to us at any time if she changes her mind. Pt verbalized understanding.  "

## 2022-11-02 ENCOUNTER — OFFICE VISIT (OUTPATIENT)
Dept: UROGYNECOLOGY | Facility: CLINIC | Age: 77
End: 2022-11-02
Payer: MEDICARE

## 2022-11-02 VITALS
WEIGHT: 121.25 LBS | BODY MASS INDEX: 20.7 KG/M2 | DIASTOLIC BLOOD PRESSURE: 62 MMHG | SYSTOLIC BLOOD PRESSURE: 126 MMHG | HEIGHT: 64 IN

## 2022-11-02 DIAGNOSIS — N95.2 VAGINAL ATROPHY: Primary | ICD-10-CM

## 2022-11-02 DIAGNOSIS — N94.19 DYSPAREUNIA DUE TO MEDICAL CONDITION IN FEMALE: ICD-10-CM

## 2022-11-02 DIAGNOSIS — N39.8 VOIDING DYSFUNCTION: ICD-10-CM

## 2022-11-02 DIAGNOSIS — N39.3 STRESS INCONTINENCE OF URINE: ICD-10-CM

## 2022-11-02 PROCEDURE — 99999 PR PBB SHADOW E&M-EST. PATIENT-LVL V: CPT | Mod: PBBFAC,,, | Performed by: OBSTETRICS & GYNECOLOGY

## 2022-11-02 PROCEDURE — 99213 PR OFFICE/OUTPT VISIT, EST, LEVL III, 20-29 MIN: ICD-10-PCS | Mod: S$PBB,,, | Performed by: OBSTETRICS & GYNECOLOGY

## 2022-11-02 PROCEDURE — 99999 PR PBB SHADOW E&M-EST. PATIENT-LVL V: ICD-10-PCS | Mod: PBBFAC,,, | Performed by: OBSTETRICS & GYNECOLOGY

## 2022-11-02 PROCEDURE — 99215 OFFICE O/P EST HI 40 MIN: CPT | Mod: PBBFAC | Performed by: OBSTETRICS & GYNECOLOGY

## 2022-11-02 PROCEDURE — 99213 OFFICE O/P EST LOW 20 MIN: CPT | Mod: S$PBB,,, | Performed by: OBSTETRICS & GYNECOLOGY

## 2022-11-02 NOTE — PROGRESS NOTES
"    Urogyn follow up  11/03/2022    [facility] - UROGYNECOLOGY  [address] [address]    Sarah Rico  [id number]  1945      Sarah Rico is a 77 y.o. here for a follow up.  The patient's last visit with me was on 12/15/2021.    Date of Operation: 11/01/2021     Title of Operation:  1)  Anterior repair  2)  Placement of retropubic tension-free midurethral sling, [employer] Fit ([employer])  3)  Posterior repair with perineorrhaphy  4)  Cystourethroscopy     Indications for Surgery:  1)  UI:  (+) QUYNH (walking)  > (--) UUI. Using poise impressa: 2-4 PPD, mostly dry.   (+) pads:2-4/day, usually moderate wetness and 1/ not common at night usually variable wetness.  Daytime frequency: Q 3 hours.  Nocturia: Yes: 1/night.   (--) dysuria,  (--) hematuria,  (--) frequent UTIs.  (--) complete bladder emptying. Pushes, Tries maneuvering. Using bladder gaurds which work well.      Gyn in [place] got recurrent UTI in 2015, some passed blood. In 2015 started on Bactrim. Seen Urologist for Cystoscopy detected no abnormalities. Urethra was "too skinny, was clogging". Went every three weeks for 6-8 months, for a stretching. No longer doing stretching urethra.      2)  POP:  Absent.(--) vaginal bleeding. (--) vaginal discharge. (+) sexually active.  (--) dyspareunia.   (+)  Vaginal dryness.  (+) vaginal estrogen use: 2x/week.   --POP-Q:  Aa -1; Ba -1; C -6; Ap 0; Bp 0 (moderate); D -7.  Genital hiatus 3, perineal body 2, total vaginal length 10-11 (standing).      3)  BM:  (+) constipation/straining.  (--) chronic diarrhea. (--) hematochezia.  (--) fecal incontinence.  (--) fecal smearing/urgency.  (--) complete evacuation.   Antibiotics caused diarrhea, takes Miralax every three-four days to try and completely empty bowels. Have always have trouble with constipation. Hemorrhoids, causing slight bleeding.      Preoperative Diagnosis:  Stress incontinence of urine    Graves disease  "   Voiding dysfunction    Chronic constipation    Cystocele, midline    Rectocele, female    Vaginal atrophy                Concern for major voiding dysfunction              Urodynamic stress incontinence              Decreased urethral coaptation     Postoperative Diagnosis:  Stress incontinence of urine    Graves disease    Voiding dysfunction    Chronic constipation    Cystocele, midline    Rectocele, female    Vaginal atrophy                Concern for major voiding dysfunction              Urodynamic stress incontinence              Decreased urethral coaptation    Issues include:  Patient Active Problem List   Diagnosis    Stress incontinence of urine    Voiding dysfunction    Rectocele, female    Chronic constipation    Vaginal atrophy    Graves' disease    Stage 3a chronic kidney disease    Hyperlipidemia    Aortic valve stenosis    Essential tremor    Essential hypertension    Insomnia    S/P TAVR (transcatheter aortic valve replacement)    Lumbar radiculopathy    S/P hip replacement    Pelvic floor weakness    Decreased functional mobility and endurance    Disorder of muscle    Preoperative cardiovascular examination    Status post anterior & posterior colporrhaphy w/ MUS 11/1    Incomplete bladder emptying    Pelvic floor tension    Pelvic floor dysfunction    Chronic pansinusitis    Atypical lobular hyperplasia of right breast    Dyspareunia due to medical condition in female     Well-woman:  Pap test: 2/21(Location [place]), normal per report.  History of abnormal paps: Yes - Around 2013, HPV positive, Colposcopy done clear after three years.  History of STIs:  No  Mammogram: scheduled for 4/2023. S/p breast consult with Ed for calcifications.   Colonoscopy: Date of last: 2018/2019.  Result:  Wasn't clean enough.  Repeat due: having tomorrow AM.   DEXA:  Date of last: April 12th [facility]  Result: normal. Repeat per GYN.     Last visit:  Has gone to pelvic floor PT  Feels like she is emptying her  bladder well.  Denies UI.   Denies constipation or straining.     TODAY:  No VB, discharge; no s/sx POP  Bladder issues: since about 3 weeks postop, has some PV urgency with small 2nd amount; will gently press urethra post-void and release small 2nd volume; only happens 1st AM or if sitting for long time, better later in day;  no baseline U/F; no dysuria; no QUYNH or obvious UUI   --was having elevated PVRs and taught CIC   --no longer having to do CIC as long as empties regularly and DV/PV   --last  2022 (4/2022)  Bowel issues: easier with stool softener BID; no pain  UTIs:  1/2022 8/2022    Medications:    Current Outpatient Medications:     ALPRAZolam (XANAX) 0.5 MG tablet, Take 1 tablet (0.5 mg total) by mouth daily as needed (severe anxiety)., Disp: 30 tablet, Rfl: 0    ASCORBATE CALCIUM, VITAMIN C, ORAL, Take 500 mg by mouth., Disp: , Rfl:     aspirin (ECOTRIN) 81 MG EC tablet, Take 81 mg by mouth once daily., Disp: , Rfl:     cetirizine-pseudoephedrine 5-120 mg Tb12, cetirizine 5 mg-pseudoephedrine  mg tablet,extended release,12hr  TK 1 T PO BID, Disp: , Rfl:     cholecalciferol, vitamin D3, (VITAMIN D3) 50 mcg (2,000 unit) Tab, Take 2,000 Units by mouth., Disp: , Rfl:     ciclopirox 0.77 % Gel, Apply topically once daily. To thickened discolored toenails. (Patient taking differently: Apply topically daily as needed. To thickened discolored toenails.), Disp: 30 g, Rfl: 1    CMPD testosterone proprionate 2% in vanicream, Apply topically. [facility], Disp: , Rfl:     COMPOUND HORMONE REPLACEMENT, Take by mouth once daily. ESTROGEN CREAM -- [facility], Disp: , Rfl:     EPINEPHrine (EPIPEN) 0.3 mg/0.3 mL AtIn, epinephrine 0.3 mg/0.3 mL injection, auto-injector, Disp: , Rfl:     MAGNESIUM CARBONATE ORAL, Take 1 tablet by mouth once daily. 200MG, Disp: , Rfl:     methIMAzole (TAPAZOLE) 5 MG Tab, Take half a tablet daily, Disp: 90 tablet, Rfl: 3    nitrofurantoin,  macrocrystal-monohydrate, (MACROBID) 100 MG capsule, Take 1 capsule (100 mg total) by mouth daily as needed (self cath)., Disp: 30 capsule, Rfl: 0    omalizumab (XOLAIR) 75 mg/0.5 mL injection, , Disp: , Rfl:     progesterone (PROMETRIUM) 200 MG capsule, Take 1 capsule by mouth 30-60 minutes before bed every night, Disp: 90 capsule, Rfl: 1    propranoloL (INDERAL [place]) 120 MG 24 hr capsule, Take 1 capsule (120 mg total) by mouth once daily., Disp: 90 capsule, Rfl: 0    RENOVA 0.02 % Crea, RENOVA 0.02 % TOPICAL CREAM APPLY TO AFFECTED AREA EVERY DAY AT NIGHT, Disp: 40 g, Rfl: 0    rosuvastatin (CRESTOR) 40 MG Tab, TAKE 1 TABLET BY MOUTH EVERY DAY, Disp: 90 tablet, Rfl: 2    sars-cov-2, covid-19 omicron, (MODERNA COVID BIVAL,18Y UP,-PF) 50 mcg/0.5 mL injection, Inject into the muscle., Disp: 0.5 mL, Rfl: 0    urea 20 % Crea, Apply 1 application topically once daily. To dry skin on the feet., Disp: 75 g, Rfl: 10    valACYclovir (VALTREX) 1000 MG tablet, valacyclovir 1 gram tablet, Disp: , Rfl:     XIIDRA 5 % Dpet, , Disp: , Rfl:     zolpidem (AMBIEN CR) 6.25 MG CR tablet, Take 1 tablet (6.25 mg total) by mouth nightly as needed for Insomnia., Disp: 30 tablet, Rfl: 2    conjugated estrogens (PREMARIN) vaginal cream, Place 0.5 g vaginally once daily., Disp: 1 applicator, Rfl: 5    ondansetron (ZOFRAN-ODT) 8 MG TbDL, Take 1 tablet (8 mg total) by mouth every 8 (eight) hours as needed (nausea). (Patient not taking: Reported on 10/11/2022), Disp: 10 tablet, Rfl: 1    PENNSAID 20 mg/gram /actuation(2 %) sopm, APPLY 1 APPLICATION TOPICALLY 2 (TWO) TIMES DAILY AS NEEDED.::NOT COVERED:: (Patient not taking: Reported on 10/11/2022), Disp: 112 g, Rfl: 10    primidone (MYSOLINE) 50 MG Tab, Take 1 tablet (50 mg total) by mouth 2 (two) times a day. (Patient not taking: Reported on 10/11/2022), Disp: 60 tablet, Rfl: 11    ROS:  As per HPI.      Exam  /62 (BP Location: Left arm, Patient Position: Sitting, BP Method: Medium  "(Automatic))   Ht 5' 4" (1.626 m)   Wt 55 kg (121 lb 4.1 oz)   LMP  (LMP Unknown) Comment:    1996  BMI 20.81 kg/m²   General: alert and oriented, no acute distress  Respiratory: normal respiratory effort  Abd: soft, non-tender, non-distended    Pelvic  Ext. Genitalia: normal external genitalia. Normal bartholins and skenes glands  Vagina: + mild atrophy. Normal vaginal mucosa without lesions. No discharge noted.   Non-tender bladder base without palpable mass. Sling path nontender.  Sling incision and A&P incisions well-healed.  NT. No mesh visible/palpable. Firm area under urethra but no mesh visualized/palpated.   Cervix: no lesions  Uterus:  uterus is normal size, shape, consistency and nontender   Urethra: no masses or tenderness  Urethral meatus: no lesions, caruncle or prolapse.    POP-Q: deferred.  No POP with valsalva.     kegel 2/5    Impression  1. Vaginal atrophy        2. Dyspareunia due to medical condition in female  conjugated estrogens (PREMARIN) vaginal cream      3. s/p MUS, A&P, cysto 2021        4. Voiding dysfunction            We reviewed the above issues and discussed options for short-term versus long-term management of her problems.   Plan:   Postop state: well healed. No lifting > 50 pounds without help   History of constipation:  Try to minimize straining.   Continue daily stool softener 2x/day.   Take miralax as daily as needed.   H/o Incomplete bladder emptying:  DO NOT SQUEEZE OR DO ANY KEGELS/STRENGTHENING EXERCISES.   RELAX WHEN YOU URINATE--PULL OUT ONLY RELAXATION PT EXERCISES AND PRACTICE.   Urine C&S sent to make sure  No UTI.   Standing orders placed for urine culture/ urinalysis  If you have symptoms of uti, please go to nearest [facility] lab to bring a specimen   Perform self catheterization if you are not able to empty your bladder--if you do need to self cath, let us know  If you have to self cath, take one macrobid 100 mg   Continue to use vaginal estrogen cream.   RTC " 1 year with NP.     Approx 20 min spent in consult, 90% in discussion.      Hodan Goldberg MD  [facility]  Division of Female Pelvic Medicine and Reconstructive Surgery  Department of Obstetrics & Gynecology

## 2022-11-02 NOTE — PATIENT INSTRUCTIONS
Postop state: well healed. No lifting > 50 pounds without help   History of constipation:  Try to minimize straining.   Continue daily stool softener 2x/day.   Take miralax as daily as needed.   Incomplete bladder emptying:  DO NOT SQUEEZE OR DO ANY KEGELS/STRENGTHENING EXERCISES.   RELAX WHEN YOU URINATE--PULL OUT ONLY RELAXATION PT EXERCISES AND PRACTICE.   Urine C&S sent to make sure  No UTI.   Standing orders placed for urine culture/ urinalysis  If you have symptoms of uti, please go to nearest [facility] lab to bring a specimen   Perform self catheterization if you are not able to empty your bladder--if you do need to self cath, let us know  If you have to self cath, take one macrobid 100 mg   Continue to use vaginal estrogen cream.   RTC 1 year with NP.

## 2022-11-03 PROBLEM — N94.19 DYSPAREUNIA DUE TO MEDICAL CONDITION IN FEMALE: Status: ACTIVE | Noted: 2022-11-03

## 2022-11-03 LAB — CRC RECOMMENDATION EXT: NORMAL

## 2022-11-07 ENCOUNTER — PATIENT OUTREACH (OUTPATIENT)
Dept: ADMINISTRATIVE | Facility: HOSPITAL | Age: 77
End: 2022-11-07
Payer: MEDICARE

## 2022-11-08 ENCOUNTER — OFFICE VISIT (OUTPATIENT)
Dept: PODIATRY | Facility: CLINIC | Age: 77
End: 2022-11-08
Payer: MEDICARE

## 2022-11-08 VITALS
BODY MASS INDEX: 20.66 KG/M2 | HEART RATE: 57 BPM | HEIGHT: 64 IN | WEIGHT: 121 LBS | DIASTOLIC BLOOD PRESSURE: 74 MMHG | SYSTOLIC BLOOD PRESSURE: 156 MMHG

## 2022-11-08 DIAGNOSIS — Z98.890 HISTORY OF FOOT SURGERY: ICD-10-CM

## 2022-11-08 DIAGNOSIS — L84 PRE-ULCERATIVE CORN OR CALLOUS: ICD-10-CM

## 2022-11-08 DIAGNOSIS — N18.31 STAGE 3A CHRONIC KIDNEY DISEASE: Primary | ICD-10-CM

## 2022-11-08 PROCEDURE — 11056 PARNG/CUTG B9 HYPRKR LES 2-4: CPT | Mod: Q9,PBBFAC,PN | Performed by: PODIATRIST

## 2022-11-08 PROCEDURE — 99999 PR PBB SHADOW E&M-EST. PATIENT-LVL IV: ICD-10-PCS | Mod: PBBFAC,,, | Performed by: PODIATRIST

## 2022-11-08 PROCEDURE — 99499 UNLISTED E&M SERVICE: CPT | Mod: S$PBB,,, | Performed by: PODIATRIST

## 2022-11-08 PROCEDURE — 99999 PR PBB SHADOW E&M-EST. PATIENT-LVL IV: CPT | Mod: PBBFAC,,, | Performed by: PODIATRIST

## 2022-11-08 PROCEDURE — 11056 ROUTINE FOOT CARE: ICD-10-PCS | Mod: Q9,S$PBB,, | Performed by: PODIATRIST

## 2022-11-08 PROCEDURE — 99214 OFFICE O/P EST MOD 30 MIN: CPT | Mod: PBBFAC,PN | Performed by: PODIATRIST

## 2022-11-08 PROCEDURE — 99499 NO LOS: ICD-10-PCS | Mod: S$PBB,,, | Performed by: PODIATRIST

## 2022-11-08 RX ORDER — SULFAMETHOXAZOLE AND TRIMETHOPRIM 800; 160 MG/1; MG/1
1 TABLET ORAL DAILY
COMMUNITY
Start: 2022-11-04

## 2022-11-08 NOTE — PROGRESS NOTES
Routine Foot Care    Date/Time: 11/8/2022 10:00 AM  Performed by: Merary Roblero DPM  Authorized by: Merary Roblero DPM     Consent Done?:  Yes (Verbal)  Hyperkeratotic Skin Lesions?: Yes    Number of trimmed lesions:  3  Location(s):  Left 2nd Metatarsal Head, Right 2nd Metatarsal Head and Right 3rd Metatarsal Head    Nail Care Type:  Trim (no nails trimmed)  Patient tolerance:  Patient tolerated the procedure well with no immediate complications     With patient's permission, Utilizing a #15 scalpel, I trimmed the corns and calluses at the above mentioned location.      The patient will continue to monitor the areas daily, inspect the feet, wear protective shoe gear when ambulatory, and moisturizer to maintain skin integrity.    PCP:  Latrice Davidson MD  Last visit:  6/24/2022

## 2022-11-08 NOTE — PROGRESS NOTES
PODIATRY NOTE       PATIENT ID:  Sarah Rico is a 77 y.o. female.       CHIEF CONCERN:   Follow-up (Trim Calluses (CKD))         MEDICAL DECISION MAKING:          Problem List Items Addressed This Visit    None      I counseled the patient on the patient's conditions, their implications and medical management.    Shoe and activity modification as needed for relief.  Continue urea cream daily to calluses.   Offloading pads as needed.                   HISTORY OF PRESENT ILLNESS:  Sarah Rico is a 77 y.o. female, with history of CKD,  with concerns regarding: painful calluses sub metatarsal heads, chronic.     She has history of bunionectomy.          PCP:  Latrice Davidson MD  Last encounter:  6/24/2022           Patient Active Problem List   Diagnosis    Stress incontinence of urine    Voiding dysfunction    Rectocele, female    Chronic constipation    Vaginal atrophy    Graves' disease    Stage 3a chronic kidney disease    Hyperlipidemia    Aortic valve stenosis    Essential tremor    Essential hypertension    Insomnia    S/P TAVR (transcatheter aortic valve replacement)    Lumbar radiculopathy    S/P hip replacement    Pelvic floor weakness    Decreased functional mobility and endurance    Disorder of muscle    Preoperative cardiovascular examination    Status post anterior & posterior colporrhaphy w/ MUS 11/1    Incomplete bladder emptying    Pelvic floor tension    Pelvic floor dysfunction    Chronic pansinusitis    Atypical lobular hyperplasia of right breast    Dyspareunia due to medical condition in female           Current Outpatient Medications on File Prior to Visit   Medication Sig Dispense Refill    ALPRAZolam (XANAX) 0.5 MG tablet Take 1 tablet (0.5 mg total) by mouth daily as needed (severe anxiety). 30 tablet 0    ASCORBATE CALCIUM, VITAMIN C, ORAL Take 500 mg by mouth.      aspirin (ECOTRIN) 81 MG EC tablet Take 81 mg by mouth once daily.      cetirizine-pseudoephedrine 5-120 mg  Tb12 cetirizine 5 mg-pseudoephedrine  mg tablet,extended release,12hr   TK 1 T PO BID      cholecalciferol, vitamin D3, (VITAMIN D3) 50 mcg (2,000 unit) Tab Take 2,000 Units by mouth.      ciclopirox 0.77 % Gel Apply topically once daily. To thickened discolored toenails. (Patient taking differently: Apply topically daily as needed. To thickened discolored toenails.) 30 g 1    CMPD testosterone proprionate 2% in vanicream Apply topically. [facility]      COMPOUND HORMONE REPLACEMENT Take by mouth once daily. ESTROGEN CREAM -- [facility]      conjugated estrogens (PREMARIN) vaginal cream Place 0.5 g vaginally once daily. 1 applicator 5    EPINEPHrine (EPIPEN) 0.3 mg/0.3 mL AtIn epinephrine 0.3 mg/0.3 mL injection, auto-injector      MAGNESIUM CARBONATE ORAL Take 1 tablet by mouth once daily. 200MG      methIMAzole (TAPAZOLE) 5 MG Tab Take half a tablet daily 90 tablet 3    nitrofurantoin, macrocrystal-monohydrate, (MACROBID) 100 MG capsule Take 1 capsule (100 mg total) by mouth daily as needed (self cath). 30 capsule 0    omalizumab (XOLAIR) 75 mg/0.5 mL injection       ondansetron (ZOFRAN-ODT) 8 MG TbDL Take 1 tablet (8 mg total) by mouth every 8 (eight) hours as needed (nausea). 10 tablet 1    PENNSAID 20 mg/gram /actuation(2 %) sopm APPLY 1 APPLICATION TOPICALLY 2 (TWO) TIMES DAILY AS NEEDED.::NOT COVERED:: 112 g 10    primidone (MYSOLINE) 50 MG Tab Take 1 tablet (50 mg total) by mouth 2 (two) times a day. 60 tablet 11    progesterone (PROMETRIUM) 200 MG capsule Take 1 capsule by mouth 30-60 minutes before bed every night 90 capsule 1    propranoloL (INDERAL LA) 120 MG 24 hr capsule Take 1 capsule (120 mg total) by mouth once daily. 90 capsule 0    RENOVA 0.02 % Crea RENOVA 0.02 % TOPICAL CREAM APPLY TO AFFECTED AREA EVERY DAY AT NIGHT 40 g 0    rosuvastatin (CRESTOR) 40 MG Tab TAKE 1 TABLET BY MOUTH EVERY DAY 90 tablet 2    sars-cov-2, covid-19 omicron, (MODERNA COVID BIVAL,18Y UP,-PF) 50  "mcg/0.5 mL injection Inject into the muscle. 0.5 mL 0    sulfamethoxazole-trimethoprim 800-160mg (BACTRIM DS) 800-160 mg Tab Take 1 tablet by mouth once daily.      urea 20 % Crea Apply 1 application topically once daily. To dry skin on the feet. 75 g 10    valACYclovir (VALTREX) 1000 MG tablet valacyclovir 1 gram tablet      XIIDRA 5 % Dpet       zolpidem (AMBIEN CR) 6.25 MG CR tablet Take 1 tablet (6.25 mg total) by mouth nightly as needed for Insomnia. 30 tablet 2     No current facility-administered medications on file prior to visit.           Review of patient's allergies indicates:   Allergen Reactions    Penicillins Rash and Hives               ROS:   General ROS: negative for - chills, fever or night sweats  Respiratory ROS: no cough, shortness of breath, or wheezing  Cardiovascular ROS: no chest pain or dyspnea on exertion  Musculoskeletal ROS: positive for - pain in foot - bilateral  Neurological ROS: negative for - impaired coordination/balance and numbness/tingling  Dermatological ROS: negative for pruritus and rash      EXAM:     Vitals:    11/08/22 0957   BP: (!) 156/74   Pulse: (!) 57   Weight: 54.9 kg (121 lb)   Height: 5' 4" (1.626 m)        General:  Alert and Oriented x 3;  No acute distress      Bilateral  Lower extremity exam:    Vascular:   Dorsalis Pedis:  present   Posterior Tibial:  present  Capillary refill time:  3 seconds  Temperature of toes cool to touch  Edema:  Trace and non-pitting       Neurological:     Sharp touch:  normal  Light touch: normal  Tinels Sign:  Absent  Mulders Click:   Absent        Dermatological:   Skin: thin, moist and appropriate for age; calluses sub metatarsal heads  Wounds/Ulcers:  Absent  Bruising:  Absent  Erythema:  Absent  Toenails are short and polished.  right 4th toenail thickened and incurvated without paronychia.         Musculoskeletal:   Metatarsophalangeal range of motion:   diminished range of motion  Subtalar joint range of motion: full range of " motion  Ankle joint range of motion:  full range of motion  Bunions:  Present  Hammertoes: Present

## 2022-12-20 ENCOUNTER — OFFICE VISIT (OUTPATIENT)
Dept: PODIATRY | Facility: CLINIC | Age: 77
End: 2022-12-20
Payer: MEDICARE

## 2022-12-20 DIAGNOSIS — M77.41 METATARSALGIA OF BOTH FEET: ICD-10-CM

## 2022-12-20 DIAGNOSIS — M77.42 METATARSALGIA OF BOTH FEET: ICD-10-CM

## 2022-12-20 DIAGNOSIS — L84 PRE-ULCERATIVE CORN OR CALLOUS: Primary | ICD-10-CM

## 2022-12-20 DIAGNOSIS — Z98.890 HISTORY OF FOOT SURGERY: ICD-10-CM

## 2022-12-20 PROCEDURE — 99211 OFF/OP EST MAY X REQ PHY/QHP: CPT | Mod: PBBFAC,PN | Performed by: PODIATRIST

## 2022-12-20 PROCEDURE — 99999 PR PBB SHADOW E&M-EST. PATIENT-LVL I: CPT | Mod: PBBFAC,,, | Performed by: PODIATRIST

## 2022-12-20 PROCEDURE — 17999 UNLISTD PX SKN MUC MEMB SUBQ: CPT | Mod: CSM,S$GLB,, | Performed by: PODIATRIST

## 2022-12-20 PROCEDURE — 99499 NO LOS: ICD-10-PCS | Mod: ,,, | Performed by: PODIATRIST

## 2022-12-20 PROCEDURE — 99499 UNLISTED E&M SERVICE: CPT | Mod: ,,, | Performed by: PODIATRIST

## 2022-12-20 PROCEDURE — 17999 PR NON-COVERED FOOT CARE: ICD-10-PCS | Mod: CSM,S$GLB,, | Performed by: PODIATRIST

## 2022-12-20 PROCEDURE — 99999 PR PBB SHADOW E&M-EST. PATIENT-LVL I: ICD-10-PCS | Mod: PBBFAC,,, | Performed by: PODIATRIST

## 2022-12-20 NOTE — PROGRESS NOTES
PODIATRY NOTE       CHIEF CONCERN:    Calluses         MEDICAL DECISION MAKING:          Problem List Items Addressed This Visit    None  Visit Diagnoses       Pre-ulcerative corn or callous    -  Primary    History of foot surgery        Metatarsalgia of both feet                  I counseled the patient on the patient's conditions, their implications and medical management.    Shoe and activity modification as needed for relief.  Continue urea cream daily to calluses.   Offloading pads as needed.  Budin splint to try.       Patient presents to the clinic for non-covered routine foot care.   Patient understands this is not typically a covered service every six weeks and patient is aware of responsibility of payment.   Calluses  were reduced and trimmed bilaterally. Patient tolerated well.     Follow up in about 6 weeks (around 1/31/2023) for foot exam, or sooner if concerned.                     HISTORY OF PRESENT ILLNESS:  Sarah Rico is a 77 y.o. female, with history of CKD,  with concerns regarding: painful calluses sub metatarsal heads, chronic.       She has history of bunionectomy.          PCP:  Latrice Davidson MD  Last encounter:  6/24/2022           Patient Active Problem List   Diagnosis    Stress incontinence of urine    Voiding dysfunction    Rectocele, female    Chronic constipation    Vaginal atrophy    Graves' disease    Stage 3a chronic kidney disease    Hyperlipidemia    Aortic valve stenosis    Essential tremor    Essential hypertension    Insomnia    S/P TAVR (transcatheter aortic valve replacement)    Lumbar radiculopathy    S/P hip replacement    Pelvic floor weakness    Decreased functional mobility and endurance    Disorder of muscle    Preoperative cardiovascular examination    Status post anterior & posterior colporrhaphy w/ MUS 11/1    Incomplete bladder emptying    Pelvic floor tension    Pelvic floor dysfunction    Chronic pansinusitis    Atypical lobular hyperplasia of  right breast    Dyspareunia due to medical condition in female           Current Outpatient Medications on File Prior to Visit   Medication Sig Dispense Refill    ALPRAZolam (XANAX) 0.5 MG tablet Take 1 tablet (0.5 mg total) by mouth daily as needed (severe anxiety). 30 tablet 0    ASCORBATE CALCIUM, VITAMIN C, ORAL Take 500 mg by mouth.      aspirin (ECOTRIN) 81 MG EC tablet Take 81 mg by mouth once daily.      cetirizine-pseudoephedrine 5-120 mg Tb12 cetirizine 5 mg-pseudoephedrine  mg tablet,extended release,12hr   TK 1 T PO BID      cholecalciferol, vitamin D3, (VITAMIN D3) 50 mcg (2,000 unit) Tab Take 2,000 Units by mouth.      ciclopirox 0.77 % Gel Apply topically once daily. To thickened discolored toenails. (Patient taking differently: Apply topically daily as needed. To thickened discolored toenails.) 30 g 1    CMPD testosterone proprionate 2% in vanicream Apply topically. [facility]      COMPOUND HORMONE REPLACEMENT Take by mouth once daily. ESTROGEN CREAM -- [facility]      conjugated estrogens (PREMARIN) vaginal cream Place 0.5 g vaginally once daily. 1 applicator 5    EPINEPHrine (EPIPEN) 0.3 mg/0.3 mL AtIn epinephrine 0.3 mg/0.3 mL injection, auto-injector      MAGNESIUM CARBONATE ORAL Take 1 tablet by mouth once daily. 200MG      methIMAzole (TAPAZOLE) 5 MG Tab Take half a tablet daily 90 tablet 3    nitrofurantoin, macrocrystal-monohydrate, (MACROBID) 100 MG capsule Take 1 capsule (100 mg total) by mouth daily as needed (self cath). 30 capsule 0    omalizumab (XOLAIR) 75 mg/0.5 mL injection       ondansetron (ZOFRAN-ODT) 8 MG TbDL Take 1 tablet (8 mg total) by mouth every 8 (eight) hours as needed (nausea). 10 tablet 1    PENNSAID 20 mg/gram /actuation(2 %) sopm APPLY 1 APPLICATION TOPICALLY 2 (TWO) TIMES DAILY AS NEEDED.::NOT COVERED:: 112 g 10    primidone (MYSOLINE) 50 MG Tab Take 1 tablet (50 mg total) by mouth 2 (two) times a day. 60 tablet 11    progesterone (PROMETRIUM)  200 MG capsule Take 1 capsule by mouth 30-60 minutes before bed every night 90 capsule 1    propranoloL (INDERAL LA) 120 MG 24 hr capsule Take 1 capsule (120 mg total) by mouth once daily. 90 capsule 0    RENOVA 0.02 % Crea RENOVA 0.02 % TOPICAL CREAM APPLY TO AFFECTED AREA EVERY DAY AT NIGHT 40 g 0    rosuvastatin (CRESTOR) 40 MG Tab TAKE 1 TABLET BY MOUTH EVERY DAY 90 tablet 2    sars-cov-2, covid-19 omicron, (MODERNA COVID BIVAL,18Y UP,-PF) 50 mcg/0.5 mL injection Inject into the muscle. 0.5 mL 0    sulfamethoxazole-trimethoprim 800-160mg (BACTRIM DS) 800-160 mg Tab Take 1 tablet by mouth once daily.      urea 20 % Crea Apply 1 application topically once daily. To dry skin on the feet. 75 g 10    valACYclovir (VALTREX) 1000 MG tablet valacyclovir 1 gram tablet      XIIDRA 5 % Dpet       zolpidem (AMBIEN CR) 6.25 MG CR tablet Take 1 tablet (6.25 mg total) by mouth nightly as needed for Insomnia. 30 tablet 2     No current facility-administered medications on file prior to visit.           Review of patient's allergies indicates:   Allergen Reactions    Penicillins Rash and Hives               ROS:   General ROS: negative for - chills, fever or night sweats  Respiratory ROS: no cough, shortness of breath, or wheezing  Cardiovascular ROS: no chest pain or dyspnea on exertion  Musculoskeletal ROS: positive for - pain in foot - bilateral  Neurological ROS: negative for - impaired coordination/balance and numbness/tingling  Dermatological ROS: negative for pruritus and rash      EXAM:     There were no vitals filed for this visit.       General:  Alert and Oriented x 3;  No acute distress      Bilateral  Lower extremity exam:    Vascular:   Dorsalis Pedis:  present   Posterior Tibial:  present  Capillary refill time:  3 seconds  Temperature of toes cool to touch  Edema:  Trace and non-pitting       Neurological:     Sharp touch:  normal  Light touch: normal  Tinels Sign:  Absent  Mulders Click:    Absent        Dermatological:   Skin: thin, moist and appropriate for age; calluses sub metatarsal heads  Wounds/Ulcers:  Absent  Bruising:  Absent  Erythema:  Absent  Toenails are short and polished.  right 4th toenail thickened and incurvated without paronychia.         Musculoskeletal:   Metatarsophalangeal range of motion:   diminished range of motion  Subtalar joint range of motion: full range of motion  Ankle joint range of motion:  full range of motion  Bunions:  Present  Hammertoes: Present

## 2022-12-27 DIAGNOSIS — G25.0 ESSENTIAL TREMOR: ICD-10-CM

## 2022-12-27 NOTE — TELEPHONE ENCOUNTER
Patient called to get refills on her propanelol stating that she take it 2 x a day and not 1 x a day.  Dr Velasquez is out so refill request sent to BERLIN Corley for her review.  She will send in for a month supply and patient will see her PCP and she will refill going forward.

## 2022-12-28 ENCOUNTER — OFFICE VISIT (OUTPATIENT)
Dept: NEUROLOGY | Facility: CLINIC | Age: 77
End: 2022-12-28
Payer: MEDICARE

## 2022-12-28 VITALS
HEIGHT: 64 IN | BODY MASS INDEX: 20.49 KG/M2 | SYSTOLIC BLOOD PRESSURE: 135 MMHG | WEIGHT: 120 LBS | HEART RATE: 61 BPM | DIASTOLIC BLOOD PRESSURE: 77 MMHG

## 2022-12-28 DIAGNOSIS — R26.89 IMBALANCE: ICD-10-CM

## 2022-12-28 DIAGNOSIS — G25.0 ESSENTIAL TREMOR: ICD-10-CM

## 2022-12-28 DIAGNOSIS — H81.10 BENIGN PAROXYSMAL POSITIONAL VERTIGO, UNSPECIFIED LATERALITY: ICD-10-CM

## 2022-12-28 DIAGNOSIS — R25.1 TREMOR: Primary | ICD-10-CM

## 2022-12-28 DIAGNOSIS — Z74.09 DECREASED FUNCTIONAL MOBILITY AND ENDURANCE: ICD-10-CM

## 2022-12-28 DIAGNOSIS — M54.16 LUMBAR RADICULOPATHY: ICD-10-CM

## 2022-12-28 PROCEDURE — 99999 PR PBB SHADOW E&M-EST. PATIENT-LVL V: ICD-10-PCS | Mod: PBBFAC,,, | Performed by: PHYSICIAN ASSISTANT

## 2022-12-28 PROCEDURE — 99215 OFFICE O/P EST HI 40 MIN: CPT | Mod: PBBFAC | Performed by: PHYSICIAN ASSISTANT

## 2022-12-28 PROCEDURE — 99215 PR OFFICE/OUTPT VISIT, EST, LEVL V, 40-54 MIN: ICD-10-PCS | Mod: S$PBB,,, | Performed by: PHYSICIAN ASSISTANT

## 2022-12-28 PROCEDURE — 99999 PR PBB SHADOW E&M-EST. PATIENT-LVL V: CPT | Mod: PBBFAC,,, | Performed by: PHYSICIAN ASSISTANT

## 2022-12-28 PROCEDURE — 99215 OFFICE O/P EST HI 40 MIN: CPT | Mod: S$PBB,,, | Performed by: PHYSICIAN ASSISTANT

## 2022-12-28 RX ORDER — TOPIRAMATE 25 MG/1
TABLET ORAL
Qty: 67 TABLET | Refills: 3 | Status: SHIPPED | OUTPATIENT
Start: 2022-12-28 | End: 2023-02-03

## 2022-12-28 RX ORDER — PROPRANOLOL HYDROCHLORIDE 120 MG/1
120 CAPSULE, EXTENDED RELEASE ORAL DAILY
Qty: 10 CAPSULE | Refills: 0 | Status: SHIPPED | OUTPATIENT
Start: 2022-12-28 | End: 2024-01-10

## 2022-12-28 NOTE — PROGRESS NOTES
"Name: Sarah Rico  MRN: [id number]   CSN: [id number]      Date: 9/16/2022    Referring physician:  No referring provider defined for this encounter.    Chief Complaint: tremor    Interval History:  - intolerable side effects with primidone, now off of it    - referral to ns[facility] with cervical spine ddd, held off on seeing them   - brain mri  "Left caudate and left thalamic remote lacunar type infarcts.  Left thalamic and right cerebellar remote microhemorrhages may be hypertensive in etiology.  Bilateral tiny cerebellar remote infarcts. "  - on propranolol 120 mg daily   - gabapentin caused nausea in the past   - does epley at home for BPPV   - denies history of kidney stones or glaucoma   - tremor affects her whenever shes eating, holding a plate or glass  - no improvement with EtOH       From Sept 2022: Sarah Rico is a R HANDED 77 y.o. female with a medical issues significant for essential tremor, lumbar radiculopathy, CKDIII, graves disease, HTN, HLD, who presents for tremors. Accompanied by spouse. Currently on propranolol 120 mg capsule XR 1 capsule BID. This does not cause lightheaded or dizziness. Previously on atenolol but didn't feel this helped, switched to propranolol. Helps somewhat. Tremor is in her left hand, present for about 8 years, around the same time she was diagnosed with graves disease. Whenever she is almost due for her next dose of propranolol, tremors are worse. She has not tried any other medications for the tremor. Only in the left hand, no tremor in the right hand. Gradual onset 8 years ago, noticeable after a few months. Head tremor, involuntary, yes-yes. Tremor with posture and action, no tremor at rest. Had back surgery ( fusion from T10 to sacrum) 5 years ago in 2017, took a while for her to get balance back. Now able to stand on one leg but still with some difficulty. No falls. Denies shuffling. She doesn't think that she walks great. Tends to veer to the left or right " with longer distances. Has a hard time keeping up with other people, this started after her back surgery.   Thinks head tremor stops whenever she lies down in bed.  notices it in certain positions. She has noticed that she walks with a wider based to keep her balance.     She had cervical MRI in 2018/2019.     If she turns to the left, gets vertigo. Improves with epley maneuver.     She has not noticed that tremor improves with etoh. Does not drink much.     Family History: grandmother had a head tremor, mother possibly had tremor in hands?     Neuroleptic Exposure: none     Nonmotor/Premotor ROS:  Anosmia: hyposmia -- attributes to allergies   Dysarthria/Hypophonia: more hoarse   Dysphagia/Sialorrhea: none   Depression: none   Cognitive slowing: infrequently forgets names   Hallucinations: none   Urinary changes: history of incontinence for many years (at least 7), follows with Dr. Goldberg  Constipation: none   Orthostasis:    Falls: none   Micrographia: none  Sleep issues:  -RBD: screams in sleep       Review of Systems:   Review of Systems   Constitutional:  Negative for chills, fever and malaise/fatigue.   HENT:  Negative for hearing loss.    Eyes:  Negative for blurred vision and double vision.   Respiratory:  Negative for cough, shortness of breath and stridor.    Cardiovascular:  Negative for chest pain and leg swelling.   Gastrointestinal:  Negative for constipation, diarrhea and nausea.   Genitourinary:  Negative for frequency and urgency.   Musculoskeletal:  Negative for falls.   Skin:  Negative for itching and rash.   Neurological:  Positive for tremors. Negative for dizziness, loss of consciousness and weakness.   Psychiatric/Behavioral:  Negative for hallucinations and memory loss.          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    Social History: The patient  reports that she quit smoking about 37 years ago. Her smoking use included cigarettes. She has never used smokeless tobacco. She reports that she does not currently use alcohol after a past usage of about 2.0 standard drinks per week. She reports that she does not use drugs.    Family History: Their family history includes Colon cancer in her father; Hypothyroidism in her sister; Leukemia in her mother.    Allergies: Penicillins     Meds:   Current Outpatient Medications on File Prior to Visit   Medication Sig Dispense Refill    ALPRAZolam (XANAX) 0.5 MG tablet Take 1 tablet (0.5 mg total) by mouth daily as needed (severe anxiety). 30 tablet 0    ASCORBATE CALCIUM, VITAMIN C, ORAL Take 500 mg by mouth.      aspirin (ECOTRIN) 81 MG EC tablet Take 81 mg by mouth once daily.      cetirizine-pseudoephedrine 5-120 mg Tb12 cetirizine 5 mg-pseudoephedrine  mg tablet,extended release,12hr   TK 1 T PO BID      cholecalciferol, vitamin D3, (VITAMIN D3) 50 mcg (2,000 unit) Tab Take 2,000 Units by mouth.      ciclopirox 0.77 % Gel Apply topically once daily. To thickened discolored toenails. (Patient taking differently: Apply topically daily as needed. To thickened discolored toenails.) 30 g 1    CMPD testosterone proprionate 2% in vanicream Apply topically. [facility]      COMPOUND HORMONE REPLACEMENT Take by mouth once daily. ESTROGEN CREAM -- [facility]      conjugated estrogens (PREMARIN) vaginal cream Place 0.5 g vaginally once daily. 1 applicator 5    EPINEPHrine (EPIPEN) 0.3 mg/0.3 mL AtIn epinephrine 0.3 mg/0.3 mL injection, auto-injector      MAGNESIUM CARBONATE ORAL Take 1 tablet by mouth once daily. 200MG      methIMAzole (TAPAZOLE) 5 MG Tab Take half a tablet daily 90 tablet 3    nitrofurantoin, macrocrystal-monohydrate,  "(MACROBID) 100 MG capsule Take 1 capsule (100 mg total) by mouth daily as needed (self cath). 30 capsule 0    omalizumab (XOLAIR) 75 mg/0.5 mL injection       ondansetron (ZOFRAN-ODT) 8 MG TbDL Take 1 tablet (8 mg total) by mouth every 8 (eight) hours as needed (nausea). 10 tablet 1    PENNSAID 20 mg/gram /actuation(2 %) sopm APPLY 1 APPLICATION TOPICALLY 2 (TWO) TIMES DAILY AS NEEDED.::NOT COVERED:: 112 g 10    progesterone (PROMETRIUM) 200 MG capsule Take 1 capsule by mouth 30-60 minutes before bed every night 90 capsule 1    RENOVA 0.02 % Crea RENOVA 0.02 % TOPICAL CREAM APPLY TO AFFECTED AREA EVERY DAY AT NIGHT 40 g 0    rosuvastatin (CRESTOR) 40 MG Tab Take 1 tablet (40 mg total) by mouth once daily. 90 tablet 3    sars-cov-2, covid-19 omicron, (MODERNA COVID BIVAL,18Y UP,-PF) 50 mcg/0.5 mL injection Inject into the muscle. 0.5 mL 0    sulfamethoxazole-trimethoprim 800-160mg (BACTRIM DS) 800-160 mg Tab Take 1 tablet by mouth once daily.      urea 20 % Crea Apply 1 application topically once daily. To dry skin on the feet. 75 g 10    valACYclovir (VALTREX) 1000 MG tablet valacyclovir 1 gram tablet      XIIDRA 5 % Dpet       zolpidem (AMBIEN CR) 6.25 MG CR tablet Take 1 tablet (6.25 mg total) by mouth nightly as needed for Insomnia. 30 tablet 2    [DISCONTINUED] primidone (MYSOLINE) 50 MG Tab Take 1 tablet (50 mg total) by mouth 2 (two) times a day. 60 tablet 11    [DISCONTINUED] propranoloL (INDERAL LA) 120 MG 24 hr capsule Take 1 capsule (120 mg total) by mouth once daily. 90 capsule 3     No current facility-administered medications on file prior to visit.       Exam:  /77 Comment: 151/80 left arm  Pulse 61   Ht 5' 4" (1.626 m)   Wt 54.4 kg (120 lb) Comment: per pt  LMP  (LMP Unknown) Comment:    1996  BMI 20.60 kg/m²     Constitutional  Well-developed, well-nourished, appears stated age   Ophthalmoscopic  No papilledema with no hemorrhages or exudates bilaterally   Cardiovascular  Radial pulses " 2+ and symmetric, no LE edema bilaterally   Neurological    * Mental status  MOCA =      - Orientation  Oriented to person, place, time, and situation     - Memory   Intact recent and remote     - Attention/concentration  Attentive, vigilant during exam     - Language  Naming & repetition intact, +2-step commands     - Fund of knowledge  Aware of current events     - Executive  Well-organized thoughts     - Other     * Cranial nerves       - CN II  PERRL, visual fields full to confrontation     - CN III, IV, VI  Extraocular movements full, normal pursuits and saccades   Overshoot with horizontal pursuits with left gaze      - CN V  Sensation V1 - V3 intact     - CN VII  Face strong and symmetric bilaterally     - CN VIII  Hearing intact bilaterally     - CN IX, X  Palate raises midline and symmetric     - CN XI  SCM and trapezius 5/5 bilaterally     - CN XII  Tongue midline   * Motor  Muscle bulk normal, strength 5/5 throughout   * Sensory   Intact to temperature    * Coordination  Dysmetria with finger to nose and heel-to-shin on the L    * Gait  See below.   * Deep tendon reflexes  3+ and symmetric throughout   Abnormal jaw jerk    Left > R gomes    Babinski equivocal on the R, possibly upgoing on the R?    * Specialized movement exam  No hypophonic speech.    No facial masking.   No cogwheel rigidity.     No bradykinesia.   Resting tremor of L hand, dystonic posturing    Tremor worse in L hand in wing-beating position    Overshoot with L hand    Intermittent tremor of R pinky as well    No other dystonia, chorea, athetosis, myoclonus, or tics.   No motor impersistence.   No shortened stride length.   Spastic/ataxic gait? L foot inversion, some circumduction, some improvement with walking backwards ? Dystonic?    No postural instability.   Yes-yes head tremor      Laboratory/Radiological:  - Results:  Patient Outreach on 11/07/2022   Component Date Value Ref Range Status    CRC Recommendation External 11/03/2022  "Repeat colonoscopy in 3 years   Final   Hospital Outpatient Visit on 10/19/2022   Component Date Value Ref Range Status    BSA 10/19/2022 1.57  m2 Final    TDI SEPTAL 10/19/2022 0.04  m/s Final    LV LATERAL E/E' RATIO 10/19/2022 19.29  m/s Final    LV SEPTAL E/E' RATIO 10/19/2022 33.75  m/s Final    LA WIDTH 10/19/2022 3.73  cm Final    TDI LATERAL 10/19/2022 0.07  m/s Final    LVIDd 10/19/2022 4.45  3.5 - 6.0 cm Final    IVS 10/19/2022 0.83  0.6 - 1.1 cm Final    Posterior Wall 10/19/2022 0.76  0.6 - 1.1 cm Final    LVIDs 10/19/2022 2.83  2.1 - 4.0 cm Final    FS 10/19/2022 36  28 - 44 % Final    LA volume 10/19/2022 52.67  cm3 Final    Sinus 10/19/2022 2.51  cm Final    STJ 10/19/2022 2.82  cm Final    Ascending aorta 10/19/2022 3.56  cm Final    LV mass 10/19/2022 110.61  g Final    LA size 10/19/2022 4.03  cm Final    RVDD 10/19/2022 3.65  cm Final    TAPSE 10/19/2022 1.96  cm Final    RV S' 10/19/2022 10.73  cm/s Final    Left Ventricle Relative Wall Thick* 10/19/2022 0.34  cm Final    AV mean gradient 10/19/2022 10  mmHg Final    AV valve area 10/19/2022 2.67  cm2 Final    AV Velocity Ratio 10/19/2022 0.67   Final    AV index (prosthetic) 10/19/2022 0.60   Final    MV mean gradient 10/19/2022 1  mmHg Final    MV valve area p 1/2 method 10/19/2022 1.55  cm2 Final    MV valve area by continuity eq 10/19/2022 2.67  cm2 Final    E/A ratio 10/19/2022 1.29   Final    Mean e' 10/19/2022 0.06  m/s Final    E wave deceleration time 10/19/2022 488.16  msec Final    MV "A" wave duration 10/19/2022 11.42  msec Final    Pulm vein S/D ratio 10/19/2022 1.91   Final    LVOT diameter 10/19/2022 2.38  cm Final    LVOT area 10/19/2022 4.4  cm2 Final    LVOT peak marcel 10/19/2022 1.38  m/s Final    LVOT peak VTI 10/19/2022 32.67  cm Final    Ao peak marcel 10/19/2022 2.07  m/s Final    Ao VTI 10/19/2022 54.31  cm Final    Mr max marcel 10/19/2022 0.05  m/s Final    LVOT stroke volume 10/19/2022 145.27  cm3 Final    AV peak gradient " 10/19/2022 17  mmHg Final    MV peak gradient 10/19/2022 9  mmHg Final    E/E' ratio 10/19/2022 24.55  m/s Final    MV Peak E Arnold 10/19/2022 1.35  m/s Final    TR Max Arnold 10/19/2022 2.93  m/s Final    MV VTI 10/19/2022 54.48  cm Final    MV stenosis pressure 1/2 time 10/19/2022 141.57  ms Final    MV Peak A Arnold 10/19/2022 1.05  m/s Final    PV Peak S Arnold 10/19/2022 0.44  m/s Final    PV Peak D Arnold 10/19/2022 0.23  m/s Final    LV Systolic Volume 10/19/2022 30.45  mL Final    LV Systolic Volume Index 10/19/2022 19.3  mL/m2 Final    LV Diastolic Volume 10/19/2022 90.10  mL Final    LV Diastolic Volume Index 10/19/2022 57.03  mL/m2 Final    LA Volume Index 10/19/2022 33.3  mL/m2 Final    LV Mass Index 10/19/2022 70  g/m2 Final    RA Major Axis 10/19/2022 4.33  cm Final    Left Atrium Minor Axis 10/19/2022 4.23  cm Final    Left Atrium Major Axis 10/19/2022 4.02  cm Final    Triscuspid Valve Regurgitation Pea* 10/19/2022 34  mmHg Final    LA Volume Index (Mod) 10/19/2022 29.2  mL/m2 Final    LA volume (mod) 10/19/2022 46.10  cm3 Final    RA Width 10/19/2022 3.14  cm Final    Right Atrial Pressure (from IVC) 10/19/2022 3  mmHg Final    EF 10/19/2022 65  % Final    TV rest pulmonary artery pressure 10/19/2022 37  mmHg Final       - Independent review of images:      - Independent review of consultant's notes: Lety    ASSESSMENT/PLAN:  Tremor   - currently on propranolol 120 mg XR BID with minimal to mild improvement in tremor (would expect significantly more improvement with this high of a dose of propranolol)  - did not tolerate primidone   - some dystonic component -- dystonic head tremor, mild dystonic posturing of L hand and inversion of L foot. Asymmetric tremor, no MRI findings to indicate cerebellar or thalamic outflow tremor   - discussed trial of gabapentin vs topamax   - had nausea with gabapentin in the past   - trial of topamax 25 mg x 1 week then 2 tabs once daily       2. Gait abnormality  - has been  attributed to lumbar DDD in the past however gait is not classic of lumbar DDD  - PT        3. Cervical spinal stenosis   - continue to monitor gait changes   - PT now     Orders Placed This Encounter    Ambulatory referral/consult to Physical/Occupational Therapy    topiramate (TOPAMAX) 25 MG tablet             Follow up: 3-4 months with [facility]     Collaborating Physician, Dr. Shin, was available during today's encounter. Any change to plan along with cosign to appear in the EMR.       Total time spent with the patient: 47 minutes.  32 minutes of face-to-face consultation and 15 minutes of chart review and coordination of care, on the day of the visit. This includes face to face time and non-face to face time preparing to see the patient (eg, review of tests), obtaining and/or reviewing separately obtained history, documenting clinical information in the electronic or other health record, independently interpreting resultsand communicating results to the patient/family/caregiver, or care coordination.         Nydia Roth PA-C   [facility]  Department of Neurology  Movement Disorders

## 2023-01-05 ENCOUNTER — CLINICAL SUPPORT (OUTPATIENT)
Dept: REHABILITATION | Facility: HOSPITAL | Age: 78
End: 2023-01-05
Payer: MEDICARE

## 2023-01-05 DIAGNOSIS — R42 VERTIGO: ICD-10-CM

## 2023-01-05 DIAGNOSIS — H81.13 BENIGN PAROXYSMAL POSITIONAL VERTIGO DUE TO BILATERAL VESTIBULAR DISORDER: ICD-10-CM

## 2023-01-05 DIAGNOSIS — R26.89 IMBALANCE: ICD-10-CM

## 2023-01-05 DIAGNOSIS — H81.10 BENIGN PAROXYSMAL POSITIONAL VERTIGO, UNSPECIFIED LATERALITY: ICD-10-CM

## 2023-01-05 PROCEDURE — 97162 PT EVAL MOD COMPLEX 30 MIN: CPT | Mod: PO

## 2023-01-05 PROCEDURE — 95992 CANALITH REPOSITIONING PROC: CPT | Mod: PO

## 2023-01-05 NOTE — PLAN OF CARE
[facility]  Physical Therapy Neurological Rehabilitation Initial Evaluation    Name: Sarah Rico  Clinic Number: [id number]    Therapy Diagnosis: BPPV, Vertigo, imbalance   Physician: Nydia Roth P*    Physician Orders: PT Eval and Treat   Medical Diagnosis from Referral:   R26.89 (ICD-10-CM) - Imbalance   H81.10 (ICD-10-CM) - Benign paroxysmal positional vertigo, unspecified laterality     Evaluation Date: 1/5/2023  Authorization Period Expiration: 12/31/2023  Plan of Care Expiration: 3/28/2023  Visit # / Visits authorized: 01/ 01    Time In: 1150  Time Out: 1230  Total Billable Time: 40 minutes    Precautions: Standard      Subjective   Date of onset: began five years ago after the spinal fusion surgery     History of current condition - Cinda reports: That she has been off balance since her spinal fusion surgery five years prior. Patient reports she started to feel dizziness after her spinal fusion as well. Patient reports she performs the epley maneuver at home independently and feels like it helps but the patient reports that her dizziness has become worse recently and she is unsure if she is performing the maneuver correctly. The patient reports she is the most unstable directly after standing up and feels more stable after she has been moving for a while. The patient reports she is no longer able to ambulate quickly anymore. The patient reports no falls. The patient denies taking meclizine for dizziness.     History of Current Symptoms   Triggers: when patient is getting up from bed    Alleviating Factors: waiting    Description of symptoms: spinning/woozy sensation  (sensation of spinning vs unsteadiness vs lightheadedness)   Frequency: every time the patient gets up from her bed    Duration: one minute    Positional changes: see above       Limitations due to symptoms:patient has to wait prior to standing up     History of migraines: none   Blood Pressure:  patient medicated for high blood pressure and reports blood pressure is under control and regulated   History of Heart Condition: previous heart procedure due to heart murmur      Medical History:   Past Medical History:   Diagnosis Date    Abnormal Pap smear of cervix     Asthma     Encounter for blood transfusion     Graves disease     ESSENTIAL TREMORS    History of back surgery     Hormone replacement therapy (HRT)     Hyperlipidemia     Hypertension     Infection of spine     Menopause 1996    Osteomyelitis     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    Medications:   Sarah has a current medication list which includes the following prescription(s): alprazolam, ascorbate calcium, aspirin, cetirizine-pseudoephedrine, cholecalciferol (vitamin d3), ciclopirox, testosterone, COMPOUND HORMONE REPLACEMENT, conjugated estrogens, epinephrine, magnesium carbonate, methimazole, nitrofurantoin (macrocrystal-monohydrate), omalizumab, ondansetron, pennsaid, progesterone, propranolol, renova, rosuvastatin, moderna covid bival(6y up)(pf), sulfamethoxazole-trimethoprim 800-160mg, topiramate, urea, valacyclovir, xiidra, and zolpidem.    Allergies:   Review of patient's allergies indicates:   Allergen Reactions    Penicillins Rash and Hives        Imaging:   MRI of brain on 9/16/2022: No acute intracranial hemorrhage or infarct.Moderate chronic microvascular ischemic changes.  Chronic infarcts as detailed above.  Chronic potentially hypertensive  microhemorrhages.Ventriculomegaly thought to represent central volume loss unless there is high clinical suspicion of NPH   Cervical MRI on 9/16/2022: Reversal of the normal cervical lordotic curvature with areas of anterolisthesis and retrolisthesis.Central stenosis most advanced at C5-6 where there is degenerative retrolisthesis endplate osteophyte formation and facet and ligamentous hypertrophy that flattens the traversing cord.  Developing cord impingement to be considered although currently the cord is relatively mildly flattened (at least in the supine position) with no underlying cord signal abnormality.  Severe bilateral foraminal stenosis is present.      VNG: none   Audiogram: none    Prior Therapy: previous neuro PT to treat dizziness   Social History:     Falls: none    DME: none     Home Environment: [facility] with no steps to enter    Exercise Routine / History: elliptical or treadmill 3-5 times per week    Family Present at time of Eval: none   Occupation: none   Prior Level of Function: independent with all functional mobility and ADLs  Current Level of Function: independent with all functional mobility and ADLs but patient reports everything requires more time since recently     Pain:  Current 0/10, worst 0/10, best 0/10   Location:  NA  Description: NA  Aggravating Factors: NA  Easing Factors: NA    Patient's goals: Improve balance and gait mechanics,     Objective   - Follows commands: 100% of time   - Speech: no deficits       Mental status: alert, oriented to person, place, and time  Appearance: Casually dressed  Behavior:  calm and cooperative  Attention Span and Concentration:  Normal    Posture Alignment in sitting/standing:   Head: WNL   Scapulae: slouched shoulders    Trunk: WNL   Pelvis: WNL   Legs: WNL     Sensation: Light Touch: Intact             Auditory: denies changes in hearing          Visual/Oculomotor Screen: denies changes in vision   Will be assessed at follow up session          ROM:   CERVICAL SPINE  Cervical range of motion screens- limited but WFL, no pain or dizziness with cervical range of motion screen       Modified VAS (Vertebral Artery Screen), in sitting (rotation, then extension):  R: (-)  L: (-)          Strength:   LE strength to be assessed at follow up sessions     Balance to be assessed at follow up session.     Gait Assessment:  - AD used: none  - Assistance: independent   - Distance: ambulatory throughout session     POSITIONAL CANAL TESTING  Looking for nystagmus (slow phase followed by quick phase to the affected side for BPPV)    Supported Tawny Hallpike (posterior / CL anterior)   Right : (+) nystagmus, (+) dizziness, (+) vertigo     Left: NT  Horizontal Canals- performed in side lying   Right: NT   Left: NT  Treatment Performed: see treatment session       CMS Impairment/Limitation/Restriction for FOTO Balance Survey    Therapist reviewed FOTO scores for Sarah Rico on 1/5/2023.   FOTO documents entered into [employer] - see Media section.    Limitation Score: 47%         TREATMENT     Epley for canalith repositioning x 8 minutes: 3 minute sitting rest break between trials   Trial 1: Positions 2-4 held for 30s each. Increased symptoms only in position one.   Trial 2: patient (-) in tawny hallpike position, treatment not performed     Home Exercises and Patient Education Provided    Education provided:   - POC, purpose of PT, discharge planning     Written Home Exercises Provided:home exercise program to be provided at follow up session     Assessment   Sarah is a 77 y.o. female referred to outpatient Physical Therapy with a medical diagnosis of Benign paroxysmal positional vertigo, unspecified laterality and imbalance. Patient presents with primary complaints of impaired balance/ gait mechanics and dizziness. The patient reports she had BPPV in the past and her therapist taught her how to perform the epley maneuver, which she does at home often. The patient  reports she has a spinal fusion surgery 5 years prior and this is when she started to experience gait problems and dizziness. Cervical range of motion screen, patient with decreased cervical range of motion but no reports of dizziness or pain with testing. (-) VAS. Cinda was tested for benign paroxysmal positional vertigo and tested positive for Right posterior canal BPPV with right, up-beating, torsional nystagmus. This was immediately treated with one trial of the Epley maneuver. Patient tested negative on second tawny hallpike trial on the R side. The patient was instructed to discontinue performing epley maneuver at home. Cinda would benefit from PT to address BPPV. Plan to assess occulomotor function and cervical range of motion of next session. Plan to assess balance and gait mechanics after resolution of BPPV.      Patient prognosis is Good.   Patient will benefit from skilled outpatient Physical Therapy to address the deficits stated above and in the chart below, provide patient/family education, and to maximize patient's level of independence.     Plan of care discussed with patient: Yes  Patient's spiritual, cultural and educational needs considered and patient is agreeable to the plan of care and goals as stated below:     Anticipated Barriers for therapy: co-morbidities     Medical Necessity is demonstrated by the following  History  Co-morbidities and personal factors that may impact the plan of care Co-morbidities:   HTN, lumbar rediculopathy, insomnia, essential tremor, arotic valve stenosis, graves disease     Personal Factors:   no deficits     moderate   Examination  Body Structures and Functions, activity limitations and participation restrictions that may impact the plan of care Body Regions:   head  neck  back  lower extremities    Body Systems:    gross symmetry  ROM  strength  gross coordinated movement  balance  gait  transfers  transitions  motor control  motor learning    Participation  Restrictions:   none    Activity limitations:   Learning and applying knowledge  no deficits    General Tasks and Commands  no deficits    Communication  no deficits    Mobility  walking    Self care  no deficits    Domestic Life  no deficits    Interactions/Relationships  no deficits    Life Areas  no deficits    Community and Social Life  community life  recreation and leisure         moderate   Clinical Presentation evolving clinical presentation with changing clinical characteristics moderate   Decision Making/ Complexity Score: moderate     Goals:  Short Term Goals:  weeks   STG to be established at follow up session     Long Term Goals: 8 weeks   Patient will be independent with HEP emphasizing balance.   Patient exhibit negative positional testing of each canal bilaterally indicating resolution of BPPV.  Balance, strength and oculomotor goals to be established at follow up session         Plan   Plan of care Certification: 1/5/2023 to  3/28/2023.    Outpatient Physical Therapy 2 times weekly for 12 weeks to include the following interventions: Gait Training, Manual Therapy, Orthotic Management and Training, Patient Education, Self Care, Therapeutic Activities, and Therapeutic Exercise.     Paola Alberts, PT

## 2023-01-19 ENCOUNTER — CLINICAL SUPPORT (OUTPATIENT)
Dept: REHABILITATION | Facility: HOSPITAL | Age: 78
End: 2023-01-19
Payer: MEDICARE

## 2023-01-19 DIAGNOSIS — H81.13 BENIGN PAROXYSMAL POSITIONAL VERTIGO DUE TO BILATERAL VESTIBULAR DISORDER: ICD-10-CM

## 2023-01-19 DIAGNOSIS — R42 VERTIGO: Primary | ICD-10-CM

## 2023-01-19 PROCEDURE — 97112 NEUROMUSCULAR REEDUCATION: CPT | Mod: PO

## 2023-01-19 NOTE — PROGRESS NOTES
[facility]   Physical Therapy Treatment Note     Name: Sarah Rico  Clinic Number: [id number]    Therapy Diagnosis:   Encounter Diagnoses   Name Primary?    Vertigo Yes    Benign paroxysmal positional vertigo due to bilateral vestibular disorder      Physician: Nydia Roth P*    Visit Date: 1/19/2023    Therapy Diagnosis: BPPV, Vertigo, imbalance   Physician: Nydia Roth P*     Physician Orders: PT Eval and Treat   Medical Diagnosis from Referral:   R26.89 (ICD-10-CM) - Imbalance   H81.10 (ICD-10-CM) - Benign paroxysmal positional vertigo, unspecified laterality      Evaluation Date: 1/5/2023  Authorization Period Expiration: 12/31/2023  Plan of Care Expiration: 3/28/2023  Visit # / Visits authorized: 01/ 40 (+evaluation)       PTA Visit #: 0/5     Time In: 11:45  Time Out: 1230  Total Billable Time: 45 minutes    SUBJECTIVE     Pt reports: that her dizziness has improved. She says it helps her when she sits up in her bed from her side instead of sitting up forward.     She will be given home exercise program after resolution of BPPV.   Response to previous treatment: very good, patient reports her dizziness was better. Slight nausea in the afternoon that resolved  Functional change: ongoing     Pain: 0/10  Location: NA    OBJECTIVE     Objective Measures updated at progress report unless specified.     Treatment     Cinda received the treatments listed below:        neuromuscular re-education activities to improve: Balance, Coordination, Kinesthetic, Sense, Proprioception, and Posture for 45 minutes. The following activities were included:           Visual/Oculomotor Screen: denies changes with her vision   Tracking/Smooth Pursuits:Impaired: no dizziness, jerkiness of the eyes and difficulty tracking the eyes in all planes   Saccades: Intact  Convergence: 5 cm, intact   VOR 1: Impaired, no dizziness but slippage of the eyes noted   Acuity:Intact  R/L  "discrimination: Intact  Visual field: Intact    SUZIE SENSORY TESTING:  (P= Pass, F= Fail; note any sway; hold each position for 30")  Condition 1: (firm surface/feet together/eyes open) P  Condition 2: (firm surface/feet together/eyes closed) P min sway   Condition 3: (firm surface/feet in tandem/eyes open) P BLE leading   Condition 4: (firm surface/feet in tandem/eyes closed) F 2 seconds with BLE leading   Condition 5: (soft surface/feet together/eyes open) P mod sway   Condition 6: (soft surface/feet together/eyes closed) F 5 seconds   Condition 7: (Fakuda step test), measure distance varied from center starting position NT       POSITIONAL CANAL TESTING  Looking for nystagmus (slow phase followed by quick phase to the affected side for BPPV)     Supported [place] Hallpike (posterior / CL anterior)              Right : (-) nystagmus, (-) dizziness, (-) vertigo, slight woozy/ unsteady feeling when sitting up                Left:  (-) nystagmus, (-) dizziness, (-) vertigo, slight woozy/ unsteady feeling when sitting up  Horizontal Canals- performed in side lying              Right:  (-) nystagmus, (-) dizziness, (-) vertigo              Left:  (-) nystagmus, (-) dizziness, (-) vertigo  Treatment Performed: not indicated       Patient Education and Home Exercises     Home Exercises Provided and Patient Education Provided     Education provided:   - motion tolerance training, BPPV, discharge planning, POC    Written Home Exercises Provided: home exercise program to be provided at following session     ASSESSMENT     Cinda tolerated today's first follow up session well this afternoon. Patient tested negative for posterior and horizontal canal testing. Patient reports wozzy/ ff balance sensation when sitting up from testing positions. Patient reports no dizziness with oculomotor testing. Impaired smooth pursuits noted with jerkiness and inability to maintain tracking of target throughout all planes. Eye slippage noted with " VOR1 testing. With GST, the patient had the most difficulty in vision eliminated conditions, especially on compliant surface and with NBOS. Plan to do FGA testing at next session and provide home exercise program to patient. The patient will benefit from continued physical therapy intervention to address remaining deficits.     Cinda Is progressing well towards her goals.   Pt prognosis is Good.     Pt will continue to benefit from skilled outpatient physical therapy to address the deficits listed in the problem list box on initial evaluation, provide pt/family education and to maximize pt's level of independence in the home and community environment.     Pt's spiritual, cultural and educational needs considered and pt agreeable to plan of care and goals.     Anticipated barriers to physical therapy: co morbidities       Goals:  Short Term Goals:  weeks   Patient will be independent with established home exercise program emphasizing balance. Ongoing   Patient to perform smooth pursuits with single target in all planes WFL for improved ability to scan environment.Ongoing   Patient to perform VOR 1 at 90 bpm WFL for improve gaze stabilization. Ongoing   4. Patient to pass GST condition 2 with slight sway  for improved balance in vision eliminated conditions. Ongoing   5. Patient to improve GST condition 4 to 10  seconds for improved stability with NBOS. Ongoing        Long Term Goals: 8 weeks   Patient will be independent with advanced HEP emphasizing balance. Ongoing   Patient exhibit negative positional testing of each canal bilaterally indicating resolution of BPPV.Ongoing   Patient to perform smooth pursuits with single target in all planes WFL for improved ability to scan environment.Ongoing   Patient to perform VOR 1 at 90 bpm WFL for improve gaze stabilization. Ongoing   Patient to pass GST condition 2 with slight sway  for improved balance in vision eliminated conditions. Ongoing   Patient to improve GST  condition 4 to 10  seconds for improved stability with NBOS. Ongoing              PLAN     Perform FGA   Test LE strength   Give oculomotor home exercise program exercises   Give static balance home exercise program exercises     Paola Alberts, PT

## 2023-01-23 NOTE — PROGRESS NOTES
[facility]   Physical Therapy Treatment Note     Name: Sarah Rico  Clinic Number: [id number]    Therapy Diagnosis:   Encounter Diagnoses   Name Primary?    Vertigo Yes    Benign paroxysmal positional vertigo due to bilateral vestibular disorder        Physician: Nydia Roth P*    Visit Date: 1/25/2023    Therapy Diagnosis: BPPV, Vertigo, imbalance   Physician: Nydia Roth P*     Physician Orders: PT Eval and Treat   Medical Diagnosis from Referral:   R26.89 (ICD-10-CM) - Imbalance   H81.10 (ICD-10-CM) - Benign paroxysmal positional vertigo, unspecified laterality      Evaluation Date: 1/5/2023  Authorization Period Expiration: 12/31/2023  Plan of Care Expiration: 3/28/2023  Visit # / Visits authorized: 02/ 40 (+evaluation)       PTA Visit #: 0/5     Time In: 1430  Time Out: 1515  Total Billable Time: 45 minutes    SUBJECTIVE     Pt reports:that she sleeps on the right side of the bed because when getting up on the left side she becomes dizzy     She was given home exercise program, patient verbalizes/ demonstrates understanding   Response to previous treatment: very good, patient reports her dizziness was better  Functional change: ongoing     Pain: 0/10  Location: NA    OBJECTIVE     Objective Measures updated at progress report unless specified.     Treatment     Cinda received the treatments listed below:        neuromuscular re-education activities to improve: Balance, Coordination, Kinesthetic, Sense, Proprioception, and Posture for 45 minutes. The following activities were included:      Patient performed 3 x 30 seconds of the following exercises that are included in the patient's home exercise program. Patient demonstrates and verbalizes understanding of home exercise program.   VOR1   Smooth pursuits, horizontal and vertical    Ambulation with horizontal and vertical head turns     2 x 30 seconds LLE on ground, RLE on foam fitter, eyes closed, CGA   2 x  30 seconds LLE on ground, RLE on foam fitter, eyes closed, CGA     4 laps tandem ambulation, no UE support, CGA     2 x 30 seconds static standing on foam fitter with horizontal head turns, CGA     Functional Gait Assessment:   1. Gait on level surface =  3  2. Change in Gait Speed = 3  3. Gait with horizontal head turns  = 1  4. Gait with vertical head turns = 2  5. Gait with pivot turns = 2  6. Step over obstacle = 3  7. Gait with Narrow MARGARITO = 1  8. Gait with eyes closed = 1  9. Ambulating Backwards = 3  10. Steps = 2    Score 21/30            Patient Education and Home Exercises     Home Exercises Provided and Patient Education Provided     Education provided:   - motion tolerance training, BPPV, discharge planning, POC    Written Home Exercises Provided: home exercise program to be provided at following session     ASSESSMENT     Cinda tolerated today's session well this afternoon. FGA performed, patient's score places her in the elevated fall risk category. The patient reports the most difficulty with tandem ambulation and ambulation with horizontal head turns. The patient reports and demonstrates understanding of home exercise program given today.  The patient will benefit from continued physical therapy intervention to address remaining deficits.       Cinda Is progressing well towards her goals.   Pt prognosis is Good.     Pt will continue to benefit from skilled outpatient physical therapy to address the deficits listed in the problem list box on initial evaluation, provide pt/family education and to maximize pt's level of independence in the home and community environment.     Pt's spiritual, cultural and educational needs considered and pt agreeable to plan of care and goals.     Anticipated barriers to physical therapy: co morbidities       Goals:  Short Term Goals:  weeks   Patient will be independent with established home exercise program emphasizing balance. Ongoing   Patient to perform smooth pursuits  with single target in all planes WFL for improved ability to scan environment.Ongoing   Patient to perform VOR 1 at 90 bpm WFL for improve gaze stabilization. Ongoing   4. Patient to pass GST condition 2 with slight sway  for improved balance in vision eliminated conditions. Ongoing   5. Patient to improve GST condition 4 to 10  seconds for improved stability with NBOS. Ongoing        Long Term Goals: 8 weeks   Patient will be independent with advanced HEP emphasizing balance. Ongoing   Patient exhibit negative positional testing of each canal bilaterally indicating resolution of BPPV.Ongoing   Patient to perform smooth pursuits with single target in all planes WFL for improved ability to scan environment.Ongoing   Patient to perform VOR 1 at 90 bpm WFL for improve gaze stabilization. Ongoing   Patient to pass GST condition 2 with slight sway  for improved balance in vision eliminated conditions. Ongoing   Patient to improve GST condition 4 to 10  seconds for improved stability with NBOS. Ongoing              PLAN     Check home exercise program compliance   Oculomotor exercises  Motion tolerance   Static/ dynamic balance activities     Paola Alberts, PT

## 2023-01-24 ENCOUNTER — PATIENT MESSAGE (OUTPATIENT)
Dept: CARDIOLOGY | Facility: CLINIC | Age: 78
End: 2023-01-24
Payer: MEDICARE

## 2023-01-25 ENCOUNTER — CLINICAL SUPPORT (OUTPATIENT)
Dept: REHABILITATION | Facility: HOSPITAL | Age: 78
End: 2023-01-25
Payer: MEDICARE

## 2023-01-25 DIAGNOSIS — H81.13 BENIGN PAROXYSMAL POSITIONAL VERTIGO DUE TO BILATERAL VESTIBULAR DISORDER: ICD-10-CM

## 2023-01-25 DIAGNOSIS — R42 VERTIGO: Primary | ICD-10-CM

## 2023-01-25 PROCEDURE — 97112 NEUROMUSCULAR REEDUCATION: CPT | Mod: PO

## 2023-01-25 RX ORDER — AZITHROMYCIN 500 MG/1
500 TABLET, FILM COATED ORAL ONCE
Qty: 1 TABLET | Refills: 2 | Status: SHIPPED | OUTPATIENT
Start: 2023-01-25 | End: 2023-01-25

## 2023-02-02 NOTE — PROGRESS NOTES
[facility]   Physical Therapy Treatment Note     Name: Sarah Rico  Clinic Number: [id number]    Therapy Diagnosis:   Encounter Diagnoses   Name Primary?    Vertigo Yes    Benign paroxysmal positional vertigo due to bilateral vestibular disorder          Physician: Nydia Roth P*    Visit Date: 2/3/2023    Therapy Diagnosis: BPPV, Vertigo, imbalance   Physician: Nydia Roth P*     Physician Orders: PT Eval and Treat   Medical Diagnosis from Referral:   R26.89 (ICD-10-CM) - Imbalance   H81.10 (ICD-10-CM) - Benign paroxysmal positional vertigo, unspecified laterality      Evaluation Date: 1/5/2023  Authorization Period Expiration: 12/31/2023  Plan of Care Expiration: 3/28/2023  Visit # / Visits authorized: 03/ 40 (+evaluation)       PTA Visit #: 0/5     Time In: 1530  Time Out: 1615  Total Billable Time: 45 minutes    SUBJECTIVE     Pt reports: that she has had a crazy week and has not been able to do the home exercises. The patient reports she has not been having any dizziness recently.     She was given home exercise program, patient verbalizes/ demonstrates understanding   Response to previous treatment: very good, patient reports her dizziness was better  Functional change: ongoing     Pain: 0/10  Location: NA    OBJECTIVE     Objective Measures updated at progress report unless specified.     Treatment     Cinda received the treatments listed below:        neuromuscular re-education activities to improve: Balance, Coordination, Kinesthetic, Sense, Proprioception, and Posture for 45 minutes. The following activities were included:    2 x 100 feet ambulation with horizontal head turns, CGA, min instability  2 x 100 feet ambulation with vertical head turns, CGA, min instability   4 x 100 feet ambulation with diagonal head turns, CGA, mod instability   1 x 100 feet walking with vertical self ball toss, CGA   1 x 100 feet walking with passing ball back to  therapist behind patient    3 x 30 seconds VOR1 at self selected speed, slight eye slippage noted when turning head to the R   3 x 30 seconds horizontal smooth pursuits, moderate jerkiness noted      2 x 30 seconds LLE on ground, RLE on foam fitter, eyes closed, CGA   2 x 30 seconds LLE on ground, RLE on foam fitter, eyes closed, CGA     4 laps tandem ambulation, no UE support, CGA   4 laps marching with 3 second holds, CGA    2 x 30 seconds static standing on foam fitter with horizontal head turns, CGA   1 x 30 seconds standing on foam fitter with mini basketball chest press    2 x 10 reps one large cone tap while standing on foam fitter, CGA, no UE support     Patient Education and Home Exercises     Home Exercises Provided and Patient Education Provided     Education provided:   - motion tolerance training, BPPV, discharge planning, POC    Written Home Exercises Provided: home exercise program to be provided at following session     ASSESSMENT     Cinda tolerated today's session well this afternoon. Patient continues to require constant verbal cues when performing oculomotor exercises to perform the exercise correctly. The patient tolerated all balance activities well today and reports no dizziness. The patient will benefit from continued physical therapy intervention to address remaining deficits.      Cinda Is progressing well towards her goals.   Pt prognosis is Good.     Pt will continue to benefit from skilled outpatient physical therapy to address the deficits listed in the problem list box on initial evaluation, provide pt/family education and to maximize pt's level of independence in the home and community environment.     Pt's spiritual, cultural and educational needs considered and pt agreeable to plan of care and goals.     Anticipated barriers to physical therapy: co morbidities       Goals:  Short Term Goals:  weeks   Patient will be independent with established home exercise program emphasizing  balance. Ongoing   Patient to perform smooth pursuits with single target in all planes WFL for improved ability to scan environment.Ongoing   Patient to perform VOR 1 at 90 bpm WFL for improve gaze stabilization. Ongoing   4. Patient to pass GST condition 2 with slight sway  for improved balance in vision eliminated conditions. Ongoing   5. Patient to improve GST condition 4 to 10  seconds for improved stability with NBOS. Ongoing        Long Term Goals: 8 weeks   Patient will be independent with advanced HEP emphasizing balance. Ongoing   Patient exhibit negative positional testing of each canal bilaterally indicating resolution of BPPV.Ongoing   Patient to perform smooth pursuits with single target in all planes WFL for improved ability to scan environment.Ongoing   Patient to perform VOR 1 at 90 bpm WFL for improve gaze stabilization. Ongoing   Patient to pass GST condition 2 with slight sway  for improved balance in vision eliminated conditions. Ongoing   Patient to improve GST condition 4 to 10  seconds for improved stability with NBOS. Ongoing              PLAN     Check home exercise program compliance   Oculomotor exercises  Motion tolerance   Static/ dynamic balance activities     Paola Alberts, PT

## 2023-02-03 ENCOUNTER — CLINICAL SUPPORT (OUTPATIENT)
Dept: REHABILITATION | Facility: HOSPITAL | Age: 78
End: 2023-02-03
Payer: MEDICARE

## 2023-02-03 DIAGNOSIS — R42 VERTIGO: Primary | ICD-10-CM

## 2023-02-03 DIAGNOSIS — H81.13 BENIGN PAROXYSMAL POSITIONAL VERTIGO DUE TO BILATERAL VESTIBULAR DISORDER: ICD-10-CM

## 2023-02-03 PROCEDURE — 97112 NEUROMUSCULAR REEDUCATION: CPT | Mod: PO

## 2023-02-08 ENCOUNTER — CLINICAL SUPPORT (OUTPATIENT)
Dept: REHABILITATION | Facility: HOSPITAL | Age: 78
End: 2023-02-08
Attending: PHYSICIAN ASSISTANT
Payer: MEDICARE

## 2023-02-08 DIAGNOSIS — R42 VERTIGO: Primary | ICD-10-CM

## 2023-02-08 DIAGNOSIS — H81.13 BENIGN PAROXYSMAL POSITIONAL VERTIGO DUE TO BILATERAL VESTIBULAR DISORDER: ICD-10-CM

## 2023-02-08 PROCEDURE — 97112 NEUROMUSCULAR REEDUCATION: CPT | Mod: PO

## 2023-02-08 NOTE — PROGRESS NOTES
[facility]   Physical Therapy Treatment Note     Name: Sarah Rico  Clinic Number: [id number]    Therapy Diagnosis:   Encounter Diagnoses   Name Primary?    Vertigo Yes    Benign paroxysmal positional vertigo due to bilateral vestibular disorder        Physician: Nydia Roth P*    Visit Date: 2/8/2023    Therapy Diagnosis: BPPV, Vertigo, imbalance   Physician: Nydia Roth P*     Physician Orders: PT Eval and Treat   Medical Diagnosis from Referral:   R26.89 (ICD-10-CM) - Imbalance   H81.10 (ICD-10-CM) - Benign paroxysmal positional vertigo, unspecified laterality      Evaluation Date: 1/5/2023  Authorization Period Expiration: 12/31/2023  Plan of Care Expiration: 3/28/2023  Visit # / Visits authorized: 04/ 40 (+evaluation)       PTA Visit #: 0/5     Time In: 10:15  Time Out: 11:00  Total Billable Time: 45 minutes    SUBJECTIVE     Pt reports: She has not had any dizziness.  She reports some difficulty cutting her food and flossing due to her hand tremor.    She was given home exercise program, patient verbalizes/ demonstrates understanding   Response to previous treatment: felt good  Functional change: ongoing     Pain: 0/10  Location: NA    OBJECTIVE     Objective Measures updated at progress report unless specified.     Treatment     Cinda received the treatments listed below:        neuromuscular re-education activities to improve: Balance, Coordination, Kinesthetic, Sense, Proprioception, and Posture for 45 minutes. The following activities were included:    2 x 100 feet ambulation with horizontal head turns, CGA, min instability  2 x 100 feet ambulation with vertical head turns, CGA, min instability   4 x 100 feet ambulation with diagonal head turns, CGA, mod instability   2 x 100 feet ambulation with cues to push down through 1st MTP during push off    X 25 feet Walking lunge with trunk rotation, mod instability  X 25 feet Tandem gait    3 x 30 seconds  VOR1 at self selected speed, slight eye slippage noted when turning head to the R   X 30 sec saccades with busy background horizontal   X 30 sec saccades vertical with busy back ground  3 x 30 seconds horizontal smooth pursuits, moderate jerkiness noted    Some cervical tremor with smooth pursuits       At ballet bar:    On airex pad:  X 30 sec feet together  2 X 30 sec feet together with head rotation  2 X 30 sec feet together eyes closed     X 10 step up onto foam fitter, bilateral lower extremity   2 x 10 reps two large cone taps while standing on foam fitter, CGA, occasional  UE support     Patient Education and Home Exercises     Home Exercises Provided and Patient Education Provided     Education provided:   - motion tolerance training, BPPV, discharge planning, Plan of Care  Weighted utensils for tremor    Written Home Exercises Provided: home exercise program to be provided at following session     ASSESSMENT     Cinda tolerated today's session well today.  She has increased difficulty with smooth pursuits, demo's saccadic intrusions as well as increased cervical tremor when performing. Educated on use of weighted utensils for cutting food with upper extremity tremor.  Focused on gait quality today with cues to push off 1st MTP during toe off, she demo's improved weight bearing and smoothness of gait.   She will continue to benefit from skilled Physical Therapy to address impairments.       Cinda Is progressing well towards her goals.   Pt prognosis is Good.     Pt will continue to benefit from skilled outpatient physical therapy to address the deficits listed in the problem list box on initial evaluation, provide pt/family education and to maximize pt's level of independence in the home and community environment.     Pt's spiritual, cultural and educational needs considered and pt agreeable to plan of care and goals.     Anticipated barriers to physical therapy: co morbidities       Goals:  Short Term Goals:   weeks   Patient will be independent with established home exercise program emphasizing balance. Ongoing   Patient to perform smooth pursuits with single target in all planes WFL for improved ability to scan environment.Ongoing   Patient to perform VOR 1 at 90 bpm WFL for improve gaze stabilization. Ongoing   4. Patient to pass GST condition 2 with slight sway  for improved balance in vision eliminated conditions. Ongoing   5. Patient to improve GST condition 4 to 10  seconds for improved stability with NBOS. Ongoing        Long Term Goals: 8 weeks   Patient will be independent with advanced HEP emphasizing balance. Ongoing   Patient exhibit negative positional testing of each canal bilaterally indicating resolution of BPPV.Ongoing   Patient to perform smooth pursuits with single target in all planes WFL for improved ability to scan environment.Ongoing   Patient to perform VOR 1 at 90 bpm WFL for improve gaze stabilization. Ongoing   Patient to pass GST condition 2 with slight sway  for improved balance in vision eliminated conditions. Ongoing   Patient to improve GST condition 4 to 10  seconds for improved stability with NBOS. Ongoing              PLAN     Check home exercise program compliance   Oculomotor exercises  Motion tolerance   Static/ dynamic balance activities     Kaylyn Godoy, PT

## 2023-02-08 NOTE — PROGRESS NOTES
[facility]   Physical Therapy Treatment Note     Name: Sarah Rico  Clinic Number: [id number]    Therapy Diagnosis:   Encounter Diagnoses   Name Primary?    Vertigo Yes    Benign paroxysmal positional vertigo due to bilateral vestibular disorder          Physician: Nydia Roth P*    Visit Date: 2/9/2023    Therapy Diagnosis: BPPV, Vertigo, imbalance   Physician: Nydia Roth P*     Physician Orders: PT Eval and Treat   Medical Diagnosis from Referral:   R26.89 (ICD-10-CM) - Imbalance   H81.10 (ICD-10-CM) - Benign paroxysmal positional vertigo, unspecified laterality      Evaluation Date: 1/5/2023  Authorization Period Expiration: 12/31/2023  Plan of Care Expiration: 3/28/2023  Visit # / Visits authorized: 05/ 40 (+evaluation)       PTA Visit #: 0/5     Time In: 1330  Time Out: 1415  Total Billable Time: 45 minutes    SUBJECTIVE     Pt reports: her dizziness has not been bad and she feels okay    She was given home exercise program, patient verbalizes/ demonstrates understanding   Response to previous treatment: felt good  Functional change: ongoing     Pain: 0/10  Location: NA    OBJECTIVE     Objective Measures updated at progress report unless specified.     Treatment     Cinda received the treatments listed below:        neuromuscular re-education activities to improve: Balance, Coordination, Kinesthetic, Sense, Proprioception, and Posture for 45 minutes. The following activities were included:    2 x 100 feet ambulation with horizontal head turns, CGA, min instability  2 x 100 feet ambulation with vertical head turns, CGA, min instability   4 x 100 feet ambulation with diagonal head turns, CGA, mod instability     4 laps tandem ambulation, CGA   4 laps marching with 3 second holds, CGA    4 laps backwards ambulation, CGA     3 x 30 seconds VOR1 horizontal at self selected speed, no eye slippage noted today   3 x 30 seconds VOR1 vertical  at self selected  speed, no eye slippage noted today   3 x 30 seconds horizontal smooth pursuits, moderate jerkiness noted       At ballet bar:  On four point foam fitter:   X 30 sec feet together  2 X 30 sec feet together with horizontal head rotation  3 X 30 sec feet together eyes closed     2 x 10 reps two large cone taps while standing on foam fitter, CGA, occasional  UE support     Patient Education and Home Exercises     Home Exercises Provided and Patient Education Provided     Education provided:   - motion tolerance training, BPPV, discharge planning, Plan of Care  Weighted utensils for tremor    Written Home Exercises Provided: home exercise program to be provided at following session     ASSESSMENT     Cinda tolerated today's session well today.  No rest breaks required today and improvements noted with VOR1. Patient continues require significant cueing for smooth pursuits.  She will continue to benefit from skilled Physical Therapy to address impairments.      Cinda Is progressing well towards her goals.   Pt prognosis is Good.     Pt will continue to benefit from skilled outpatient physical therapy to address the deficits listed in the problem list box on initial evaluation, provide pt/family education and to maximize pt's level of independence in the home and community environment.     Pt's spiritual, cultural and educational needs considered and pt agreeable to plan of care and goals.     Anticipated barriers to physical therapy: co morbidities       Goals:  Short Term Goals:  weeks   Patient will be independent with established home exercise program emphasizing balance. Ongoing   Patient to perform smooth pursuits with single target in all planes WFL for improved ability to scan environment.Ongoing   Patient to perform VOR 1 at 90 bpm WFL for improve gaze stabilization. Ongoing   4. Patient to pass GST condition 2 with slight sway  for improved balance in vision eliminated conditions. Ongoing   5. Patient to improve GST  condition 4 to 10  seconds for improved stability with NBOS. Ongoing        Long Term Goals: 8 weeks   Patient will be independent with advanced HEP emphasizing balance. Ongoing   Patient exhibit negative positional testing of each canal bilaterally indicating resolution of BPPV.Ongoing   Patient to perform smooth pursuits with single target in all planes WFL for improved ability to scan environment.Ongoing   Patient to perform VOR 1 at 90 bpm WFL for improve gaze stabilization. Ongoing   Patient to pass GST condition 2 with slight sway  for improved balance in vision eliminated conditions. Ongoing   Patient to improve GST condition 4 to 10  seconds for improved stability with NBOS. Ongoing              PLAN     Check home exercise program compliance   Oculomotor exercises  Motion tolerance   Static/ dynamic balance activities     Paola Alberts, PT

## 2023-02-09 ENCOUNTER — CLINICAL SUPPORT (OUTPATIENT)
Dept: REHABILITATION | Facility: HOSPITAL | Age: 78
End: 2023-02-09
Payer: MEDICARE

## 2023-02-09 DIAGNOSIS — H81.13 BENIGN PAROXYSMAL POSITIONAL VERTIGO DUE TO BILATERAL VESTIBULAR DISORDER: ICD-10-CM

## 2023-02-09 DIAGNOSIS — R42 VERTIGO: Primary | ICD-10-CM

## 2023-02-09 PROCEDURE — 97112 NEUROMUSCULAR REEDUCATION: CPT | Mod: PO

## 2023-02-13 ENCOUNTER — CLINICAL SUPPORT (OUTPATIENT)
Dept: REHABILITATION | Facility: HOSPITAL | Age: 78
End: 2023-02-13
Payer: MEDICARE

## 2023-02-13 DIAGNOSIS — R42 VERTIGO: Primary | ICD-10-CM

## 2023-02-13 DIAGNOSIS — H81.13 BENIGN PAROXYSMAL POSITIONAL VERTIGO DUE TO BILATERAL VESTIBULAR DISORDER: ICD-10-CM

## 2023-02-13 PROCEDURE — 97112 NEUROMUSCULAR REEDUCATION: CPT | Mod: PO

## 2023-02-13 NOTE — PROGRESS NOTES
[facility]   Physical Therapy Treatment Note     Name: Sarah Rico  Clinic Number: [id number]    Therapy Diagnosis:   Encounter Diagnoses   Name Primary?    Vertigo Yes    Benign paroxysmal positional vertigo due to bilateral vestibular disorder        Physician: Nydia Roth P*    Visit Date: 2/13/2023    Therapy Diagnosis: BPPV, Vertigo, imbalance   Physician: Nydia Roth P*     Physician Orders: PT Eval and Treat   Medical Diagnosis from Referral:   R26.89 (ICD-10-CM) - Imbalance   H81.10 (ICD-10-CM) - Benign paroxysmal positional vertigo, unspecified laterality      Evaluation Date: 1/5/2023  Authorization Period Expiration: 12/31/2023  Plan of Care Expiration: 3/28/2023  Visit # / Visits authorized: 06/ 40 (+evaluation)       PTA Visit #: 0/5     Time In: 11:15  Time Out: 12:00  Total Billable Time: 45 minutes    SUBJECTIVE     Pt reports: Patient reports she has been working on tandem gait and head turns at the gym.  Also walks with 1st MTP push off when she remembers.    She was given home exercise program, patient verbalizes/ demonstrates understanding   Response to previous treatment: felt good  Functional change: ongoing     Pain: 0/10  Location: NA    OBJECTIVE     Objective Measures updated at progress report unless specified.     Treatment     Cinda received the treatments listed below:        neuromuscular re-education activities to improve: Balance, Coordination, Kinesthetic, Sense, Proprioception, and Posture for 45 minutes. The following activities were included:    2 x 100 feet ambulation with horizontal head turns, CGA, min instability  2 x 100 feet ambulation with vertical head turns, CGA, min instability   4 x 100 feet ambulation with diagonal head turns, CGA, mod instability   3 x 30 seconds VOR1 horizontal at self selected speed, no eye slippage noted today   3 x 30 seconds VOR1 vertical  at self selected speed, no eye slippage noted today  "  2 x 25 feet tandem ambulation, CGA   X 50 feet marching with 3 second holds, CGA    X 50 feet backwards ambulation, CGA   X 25 feet  Walking lunge with rotation      3 x 30 seconds VOR cancel horizontal, standing feet apart, looking at "X", some difficulty staying on target  3 x 30 seconds VOR cancel vertical, standing feet apart, looking at "X"      At ballet bar:  On airex pad:  2 x 30 sec Feet together head rotation  2 x 30 sec Feet together head nods   2 x 30 sec Feet together eyes closed  2 x 30 sec Tandem stance bilateral       Patient Education and Home Exercises     Home Exercises Provided and Patient Education Provided     Education provided:   - motion tolerance training,  discharge planning, Plan of Care  Weighted utensils for tremor    Written Home Exercises Provided: educated on performing tandem stance at ballet bar in gym    ASSESSMENT     Cinda tolerated today's session well today.  She has most difficulty in static tandem stance, when performing on foam, requires upper extremity touch down support.  She was able to progress vestibular exercises to standing VOR cancel, she had some difficulty staying on target so slowed speed.  She demo's improved stability with focus on pushing  off of 1st MTP. She will continue to benefit from Physical Therapy to address impairments and maximize mobiltiy.       Cinda Is progressing well towards her goals.   Pt prognosis is Good.     Pt will continue to benefit from skilled outpatient physical therapy to address the deficits listed in the problem list box on initial evaluation, provide pt/family education and to maximize pt's level of independence in the home and community environment.     Pt's spiritual, cultural and educational needs considered and pt agreeable to plan of care and goals.     Anticipated barriers to physical therapy: co morbidities       Goals:  Short Term Goals:  weeks   Patient will be independent with established home exercise program " emphasizing balance. Ongoing   Patient to perform smooth pursuits with single target in all planes WFL for improved ability to scan environment.Ongoing   Patient to perform VOR 1 at 90 bpm WFL for improve gaze stabilization. Ongoing   4. Patient to pass GST condition 2 with slight sway  for improved balance in vision eliminated conditions. Ongoing   5. Patient to improve GST condition 4 to 10  seconds for improved stability with NBOS. Ongoing        Long Term Goals: 8 weeks   Patient will be independent with advanced HEP emphasizing balance. Ongoing   Patient exhibit negative positional testing of each canal bilaterally indicating resolution of BPPV.Ongoing   Patient to perform smooth pursuits with single target in all planes WFL for improved ability to scan environment.Ongoing   Patient to perform VOR 1 at 90 bpm WFL for improve gaze stabilization. Ongoing   Patient to pass GST condition 2 with slight sway  for improved balance in vision eliminated conditions. Ongoing   Patient to improve GST condition 4 to 10  seconds for improved stability with NBOS. Ongoing              PLAN     Check home exercise program compliance   Oculomotor exercises  Motion tolerance   Static/ dynamic balance activities     Kaylyn Godoy, PT

## 2023-02-16 ENCOUNTER — CLINICAL SUPPORT (OUTPATIENT)
Dept: REHABILITATION | Facility: HOSPITAL | Age: 78
End: 2023-02-16
Payer: MEDICARE

## 2023-02-16 DIAGNOSIS — R42 VERTIGO: Primary | ICD-10-CM

## 2023-02-16 DIAGNOSIS — H81.13 BENIGN PAROXYSMAL POSITIONAL VERTIGO DUE TO BILATERAL VESTIBULAR DISORDER: ICD-10-CM

## 2023-02-16 PROCEDURE — 97112 NEUROMUSCULAR REEDUCATION: CPT | Mod: PO

## 2023-02-16 NOTE — PROGRESS NOTES
[facility]   Physical Therapy Treatment Note     Name: Sarah Rico  Clinic Number: [id number]    Therapy Diagnosis:   Encounter Diagnoses   Name Primary?    Vertigo Yes    Benign paroxysmal positional vertigo due to bilateral vestibular disorder          Physician: Nydia Roth P*    Visit Date: 2/16/2023    Therapy Diagnosis: BPPV, Vertigo, imbalance   Physician: Nydia Roth P*     Physician Orders: PT Eval and Treat   Medical Diagnosis from Referral:   R26.89 (ICD-10-CM) - Imbalance   H81.10 (ICD-10-CM) - Benign paroxysmal positional vertigo, unspecified laterality      Evaluation Date: 1/5/2023  Authorization Period Expiration: 12/31/2023  Plan of Care Expiration: 3/28/2023  Visit # / Visits authorized: 07/ 40 (+evaluation)       PTA Visit #: 0/5     Time In: 11:45  Time Out: 12:30  Total Billable Time: 45 minutes    SUBJECTIVE     Pt reports: no dizziness and that she feels fine    She was given home exercise program, patient verbalizes/ demonstrates understanding   Response to previous treatment: felt good  Functional change: ongoing     Pain: 0/10  Location: NA    OBJECTIVE     Objective Measures updated at progress report unless specified.     Treatment     Cinda received the treatments listed below:        neuromuscular re-education activities to improve: Balance, Coordination, Kinesthetic, Sense, Proprioception, and Posture for 45 minutes. The following activities were included:    2 x 100 feet ambulation with horizontal head turns, CGA, min instability  2 x 100 feet ambulation with vertical head turns, CGA, min instability   4 x 100 feet ambulation with diagonal head turns, CGA, mod instability     3 x 30 seconds VOR1 horizontal at self selected speed, no eye slippage noted today   3 x 30 seconds VOR1 vertical  at self selected speed, no eye slippage noted today     In // bars:   4 laps tandem ambulation, CGA   4 laps marching with 3 second holds, CGA     4 laps backwards ambulation, CGA     2 x 30 seconds LLE on bosu, RLE on ground, eyes closed, CGA   2 x 30 seconds RLE on bosu, LLE on ground, eyes closed, CGA     10 reps two large cone tap with LLE while standing on foam fitter, CGA   10 reps two large cone tap with RLE while standing on foam fitter, CGA     Patient Education and Home Exercises     Home Exercises Provided and Patient Education Provided     Education provided:   - motion tolerance training,  discharge planning, Plan of Care  Weighted utensils for tremor    Written Home Exercises Provided: educated on performing tandem stance at ballet bar in gym    ASSESSMENT     Cinda tolerated today's session well today. The patient is progressing well with therapy and demonstrates improved static/dynamic balance. She will continue to benefit from Physical Therapy to address impairments and maximize mobiltiy.         Cinda Is progressing well towards her goals.   Pt prognosis is Good.     Pt will continue to benefit from skilled outpatient physical therapy to address the deficits listed in the problem list box on initial evaluation, provide pt/family education and to maximize pt's level of independence in the home and community environment.     Pt's spiritual, cultural and educational needs considered and pt agreeable to plan of care and goals.     Anticipated barriers to physical therapy: co morbidities       Goals:  Short Term Goals:  weeks   Patient will be independent with established home exercise program emphasizing balance. Ongoing   Patient to perform smooth pursuits with single target in all planes WFL for improved ability to scan environment.Ongoing   Patient to perform VOR 1 at 90 bpm WFL for improve gaze stabilization. Ongoing   4. Patient to pass GST condition 2 with slight sway  for improved balance in vision eliminated conditions. Ongoing   5. Patient to improve GST condition 4 to 10  seconds for improved stability with NBOS. Ongoing        Long  Term Goals: 8 weeks   Patient will be independent with advanced HEP emphasizing balance. Ongoing   Patient exhibit negative positional testing of each canal bilaterally indicating resolution of BPPV.Ongoing   Patient to perform smooth pursuits with single target in all planes WFL for improved ability to scan environment.Ongoing   Patient to perform VOR 1 at 90 bpm WFL for improve gaze stabilization. Ongoing   Patient to pass GST condition 2 with slight sway  for improved balance in vision eliminated conditions. Ongoing   Patient to improve GST condition 4 to 10  seconds for improved stability with NBOS. Ongoing              PLAN     Check home exercise program compliance   Oculomotor exercises  Motion tolerance   Static/ dynamic balance activities     Paola Alberts, PT

## 2023-02-22 ENCOUNTER — CLINICAL SUPPORT (OUTPATIENT)
Dept: REHABILITATION | Facility: HOSPITAL | Age: 78
End: 2023-02-22
Payer: MEDICARE

## 2023-02-22 DIAGNOSIS — H81.13 BENIGN PAROXYSMAL POSITIONAL VERTIGO DUE TO BILATERAL VESTIBULAR DISORDER: ICD-10-CM

## 2023-02-22 DIAGNOSIS — R42 VERTIGO: Primary | ICD-10-CM

## 2023-02-22 PROCEDURE — 97112 NEUROMUSCULAR REEDUCATION: CPT | Mod: PO

## 2023-02-22 NOTE — PROGRESS NOTES
[facility]   Physical Therapy Treatment Note     Name: Sarah Rico  Clinic Number: [id number]    Therapy Diagnosis:   Encounter Diagnoses   Name Primary?    Vertigo Yes    Benign paroxysmal positional vertigo due to bilateral vestibular disorder          Physician: Nydia Roth P*    Visit Date: 2/22/2023    Therapy Diagnosis: BPPV, Vertigo, imbalance   Physician: Nydia Roth P*     Physician Orders: PT Eval and Treat   Medical Diagnosis from Referral:   R26.89 (ICD-10-CM) - Imbalance   H81.10 (ICD-10-CM) - Benign paroxysmal positional vertigo, unspecified laterality      Evaluation Date: 1/5/2023  Authorization Period Expiration: 12/31/2023  Plan of Care Expiration: 3/28/2023  Visit # / Visits authorized: 08/ 40 (+evaluation)       PTA Visit #: 0/5     Time In: 11:15  Time Out: 12:00  Total Billable Time: 45 minutes    SUBJECTIVE     Pt reports: that she has not had any dizziness recently    She was given home exercise program, patient verbalizes/ demonstrates understanding   Response to previous treatment: felt good  Functional change: ongoing     Pain: 0/10  Location: NA    OBJECTIVE     Objective Measures updated at progress report unless specified.     Treatment     Cinda received the treatments listed below:        neuromuscular re-education activities to improve: Balance, Coordination, Kinesthetic, Sense, Proprioception, and Posture for 45 minutes. The following activities were included:    2 x 100 feet ambulation with horizontal head turns, CGA, min instability  2 x 100 feet ambulation with vertical head turns, CGA, min instability   4 x 100 feet ambulation with diagonal head turns, CGA, mod instability     3 x 30 seconds VOR1 horizontal at self selected speed, no eye slippage noted today   3 x 30 seconds VOR1 vertical  at self selected speed, no eye slippage noted today     In // bars:   4 laps tandem ambulation, CGA   4 laps marching with 3 second  holds, CGA    4 laps backwards ambulation, CGA     2 x 30 seconds LLE on bosu, RLE on ground, eyes closed, CGA   2 x 30 seconds RLE on bosu, LLE on ground, eyes closed, CGA     10 reps two large cone tap with LLE while standing on foam fitter, CGA   10 reps two large cone tap with RLE while standing on foam fitter, CGA     2 x 60 seconds forward/backwards weight shifting on two point fitter, CGA   2 x 60 seconds left/ right  weight shifting on two point fitter, CGA     Patient Education and Home Exercises     Home Exercises Provided and Patient Education Provided     Education provided:   - motion tolerance training,  discharge planning, Plan of Care  Weighted utensils for tremor    Written Home Exercises Provided: educated on performing tandem stance at ballet bar in gym    ASSESSMENT     Cinda tolerated today's session well today. Patient tolerated weight shifting on two point fitter board well, requiring no UE support. The patient continues to report no dizziness, only instability.  She will continue to benefit from Physical Therapy to address impairments and maximize mobiltiy.    Cinda Is progressing well towards her goals.   Pt prognosis is Good.     Pt will continue to benefit from skilled outpatient physical therapy to address the deficits listed in the problem list box on initial evaluation, provide pt/family education and to maximize pt's level of independence in the home and community environment.     Pt's spiritual, cultural and educational needs considered and pt agreeable to plan of care and goals.     Anticipated barriers to physical therapy: co morbidities       Goals:  Short Term Goals:  weeks   Patient will be independent with established home exercise program emphasizing balance. Ongoing   Patient to perform smooth pursuits with single target in all planes WFL for improved ability to scan environment.Ongoing   Patient to perform VOR 1 at 90 bpm WFL for improve gaze stabilization. Ongoing   4. Patient  to pass GST condition 2 with slight sway  for improved balance in vision eliminated conditions. Ongoing   5. Patient to improve GST condition 4 to 10  seconds for improved stability with NBOS. Ongoing        Long Term Goals: 8 weeks   Patient will be independent with advanced HEP emphasizing balance. Ongoing   Patient exhibit negative positional testing of each canal bilaterally indicating resolution of BPPV.Ongoing   Patient to perform smooth pursuits with single target in all planes WFL for improved ability to scan environment.Ongoing   Patient to perform VOR 1 at 90 bpm WFL for improve gaze stabilization. Ongoing   Patient to pass GST condition 2 with slight sway  for improved balance in vision eliminated conditions. Ongoing   Patient to improve GST condition 4 to 10  seconds for improved stability with NBOS. Ongoing              PLAN     Check home exercise program compliance   Oculomotor exercises  Motion tolerance   Static/ dynamic balance activities     Paola Alberts, PT

## 2023-02-23 NOTE — PROGRESS NOTES
[facility]   Physical Therapy Treatment and Reassessment Note     Name: Sarah Rico  Clinic Number: [id number]    Therapy Diagnosis:   Encounter Diagnoses   Name Primary?    Vertigo Yes    Benign paroxysmal positional vertigo due to bilateral vestibular disorder            Physician: Nydia Roth P*    Visit Date: 2/24/2023    Therapy Diagnosis: BPPV, Vertigo, imbalance   Physician: Nydia Roth P*     Physician Orders: PT Eval and Treat   Medical Diagnosis from Referral:   R26.89 (ICD-10-CM) - Imbalance   H81.10 (ICD-10-CM) - Benign paroxysmal positional vertigo, unspecified laterality      Evaluation Date: 1/5/2023  Authorization Period Expiration: 12/31/2023  Plan of Care Expiration: 3/28/2023  Visit # / Visits authorized: 09/ 40 (+evaluation)       PTA Visit #: 0/5     Time In: 11:08  Time Out: 1145  Total Billable Time: 38 minutes    SUBJECTIVE     Pt reports: that she continues not to have dizziness     She was given home exercise program, patient verbalizes/ demonstrates understanding   Response to previous treatment: felt good  Functional change: ongoing     Pain: 0/10  Location: NA    OBJECTIVE     Objective Measures updated at progress report unless specified.        Supported Holcomb Hallpike (posterior / CL anterior)              Right : (-) nystagmus, (-) dizziness, (-) vertigo                Left: (-) nystagmus, (-) dizziness, (-) vertigo    Horizontal Canals- performed in side lying              Right: (-) nystagmus, (-) dizziness, (-) vertigo                Left: (-) nystagmus, (-) dizziness, (-) vertigo    Treatment Performed: see treatment session                            Visual/Oculomotor Screen: denies changes with her vision   Tracking/Smooth Pursuits intact, no dizziness reported and no jerkiness   Saccades: Intact  Convergence: 5 cm, intact   VOR 1: intact, no dizziness or jerkiness of the eyes noted  Acuity:Intact  R/L discrimination:  "Intact  Visual field: Intact     SUZIE SENSORY TESTING:  (P= Pass, F= Fail; note any sway; hold each position for 30")  Condition 1: (firm surface/feet together/eyes open) P  Condition 2: (firm surface/feet together/eyes closed) P slight  sway   Condition 3: (firm surface/feet in tandem/eyes open) P BLE leading   Condition 4: (firm surface/feet in tandem/eyes closed) F 13 sec LLE leading, 11 seconds RLE leading   Condition 5: (soft surface/feet together/eyes open) P mod sway   Condition 6: (soft surface/feet together/eyes closed) F 17 seconds   Condition 7: (Fakuda step test), measure distance varied from center starting position NT        Functional Gait Assessment:   1. Gait on level surface =  3  2. Change in Gait Speed = 3  3. Gait with horizontal head turns  = 2  4. Gait with vertical head turns = 2  5. Gait with pivot turns = 2  6. Step over obstacle = 3  7. Gait with Narrow MARGARITO = 2  8. Gait with eyes closed = 1  9. Ambulating Backwards = 3  10. Steps = 2     Score 23/30          Treatment     Cinda received the treatments listed below:        neuromuscular re-education activities to improve: Balance, Coordination, Kinesthetic, Sense, Proprioception, and Posture for 38 minutes. The following activities were included:    2 x 100 feet ambulation with horizontal head turns, CGA, min instability  2 x 100 feet ambulation with vertical head turns, CGA, min instability   2 x 100 feet ambulation with diagonal head turns, CGA, mod instability     Time above includes time to complete objective measures       Patient Education and Home Exercises     Home Exercises Provided and Patient Education Provided     Education provided:   - motion tolerance training,  discharge planning, Plan of Care  Weighted utensils for tremor    Written Home Exercises Provided: educated on performing tandem stance at ballet bar in gym    ASSESSMENT     Cinda tolerated today's reassessment session well. Good progress has been made with therapy. " Improvements noted with all eye movements, with no dizziness or jerkiness noted with smooth pursuits or VOR1 today. CRT's performed today with patient testing negative for BPPV in all canals. No dizziness or vertigo noted with all CRT. Slight improvement noted with the patient's FGA score, placing the patient right outside of the fall risk category. The patient continues to have the most difficultly with ambulation with eyes closed. Significant improvements noted with GST today. The patient continues to have the most difficulty with vision eliminated conditions. Plan to focus remainder of POC on developing advanced home exercise program to prepare patient for discharge. Plan to also focus on vision eliminated conditions and progress VOR1 and smooth pursuit exercises.       Cinda Is progressing well towards her goals.   Pt prognosis is Good.     Pt will continue to benefit from skilled outpatient physical therapy to address the deficits listed in the problem list box on initial evaluation, provide pt/family education and to maximize pt's level of independence in the home and community environment.     Pt's spiritual, cultural and educational needs considered and pt agreeable to plan of care and goals.     Anticipated barriers to physical therapy: co morbidities       Goals:  Short Term Goals:  weeks   Patient will be independent with established home exercise program emphasizing balance. MET 2/24/2023   Patient to perform smooth pursuits with single target in all planes WFL for improved ability to scan environment.MET 2/24/2023   Patient to perform VOR 1 at 90 bpm WFL for improve gaze stabilization. Progressing    4. Patient to pass GST condition 2 with slight sway  for improved balance in vision eliminated conditions. MET 2/24/2023   5. Patient to improve GST condition 4 to 10  seconds for improved stability with NBOS. MET 2/24/2023        Long Term Goals: 8 weeks   Patient will be independent with advanced HEP  emphasizing balance. Progressing    Patient exhibit negative positional testing of each canal bilaterally indicating resolution of BPPV.Progressing    Patient to perform VOR 1 at 110 bpm WFL for improve gaze stabilization. Progressing    Patient to pass GST condition 2 with no sway  for improved balance in vision eliminated conditions. Progressing    Patient to improve GST condition 4 to 20  seconds for improved stability with NBOS. Progressing               PLAN     Check home exercise program compliance   Oculomotor exercises  Motion tolerance   Static/ dynamic balance activities     Paola Alberts, PT

## 2023-02-24 ENCOUNTER — CLINICAL SUPPORT (OUTPATIENT)
Dept: REHABILITATION | Facility: HOSPITAL | Age: 78
End: 2023-02-24
Payer: MEDICARE

## 2023-02-24 DIAGNOSIS — H81.13 BENIGN PAROXYSMAL POSITIONAL VERTIGO DUE TO BILATERAL VESTIBULAR DISORDER: ICD-10-CM

## 2023-02-24 DIAGNOSIS — R42 VERTIGO: Primary | ICD-10-CM

## 2023-02-24 PROCEDURE — 97112 NEUROMUSCULAR REEDUCATION: CPT | Mod: PO

## 2023-02-28 NOTE — PROGRESS NOTES
[facility]   Physical Therapy Treatment and Reassessment Note     Name: Sarah Rico  Clinic Number: [id number]    Therapy Diagnosis:   No diagnosis found.          Physician: Nydia Roth P*    Visit Date: 3/1/2023    Therapy Diagnosis: BPPV, Vertigo, imbalance   Physician: Nydia Roth P*     Physician Orders: PT Eval and Treat   Medical Diagnosis from Referral:   R26.89 (ICD-10-CM) - Imbalance   H81.10 (ICD-10-CM) - Benign paroxysmal positional vertigo, unspecified laterality      Evaluation Date: 1/5/2023  Authorization Period Expiration: 12/31/2023  Plan of Care Expiration: 3/28/2023  Visit # / Visits authorized: 10/ 40 (+evaluation)       PTA Visit #: 0/5     Time In: 1440  Time Out: 1518  Total Billable Time: 38 minutes    SUBJECTIVE     Pt reports: that she feels good and has not been dizzy     She was given home exercise program, patient verbalizes/ demonstrates understanding   Response to previous treatment: felt good  Functional change: ongoing     Pain: 0/10  Location: NA    OBJECTIVE     Objective Measures updated at progress report unless specified.        Treatment     Cinda received the treatments listed below:        neuromuscular re-education activities to improve: Balance, Coordination, Kinesthetic, Sense, Proprioception, and Posture for 38 minutes. The following activities were included:    2 x 100 feet ambulation with horizontal head turns, CGA, min instability  2 x 100 feet ambulation with vertical head turns, CGA, min instability   2 x 100 feet ambulation with diagonal head turns, CGA, mod instability   1 x 100 feet vertical self ball toss, CGA   1 x 100 feet horizontal self ball toss, CGA   2 x 100 feet ambulation with eyes closed, minimal assist      3 x 30 seconds VOR1 at 80 BPM, no dizziness, minimal eye slippage     On bosu:   1 x 30 seconds eyes open, CGA   3 x 30 seconds eyes closed, minimal assist, occ touchdown support   2 x 30  seconds horizontal head turns, minimal assist, occ touchdown support    10 reps LLE on foam fitter, RLE lefts from 8 inch step. CGA, no UE support   10 reps RLE on foam fitter, LLE lefts from 8 inch step. CGA, no UE support       Patient Education and Home Exercises     Home Exercises Provided and Patient Education Provided     Education provided:   - motion tolerance training,  discharge planning, Plan of Care  Weighted utensils for tremor    Written Home Exercises Provided: educated on performing tandem stance at ballet bar in gym    ASSESSMENT     Cinda tolerated today's session well this afternoon. Patient continues to report no dizziness. Patient did well with ambulation with eyes closed, requiring minimal assist to maintain stability. 90 BPM attempted for VOR1 but patient was unable to perform. Patient did well with 80 BPM with VOR1.         Cinda Is progressing well towards her goals.   Pt prognosis is Good.     Pt will continue to benefit from skilled outpatient physical therapy to address the deficits listed in the problem list box on initial evaluation, provide pt/family education and to maximize pt's level of independence in the home and community environment.     Pt's spiritual, cultural and educational needs considered and pt agreeable to plan of care and goals.     Anticipated barriers to physical therapy: co morbidities       Goals:  Short Term Goals:  weeks   Patient will be independent with established home exercise program emphasizing balance. MET 2/24/2023   Patient to perform smooth pursuits with single target in all planes WFL for improved ability to scan environment.MET 2/24/2023   Patient to perform VOR 1 at 90 bpm WFL for improve gaze stabilization. Progressing    4. Patient to pass GST condition 2 with slight sway  for improved balance in vision eliminated conditions. MET 2/24/2023   5. Patient to improve GST condition 4 to 10  seconds for improved stability with NBOS. MET 2/24/2023         Long Term Goals: 8 weeks   Patient will be independent with advanced HEP emphasizing balance. Progressing    Patient exhibit negative positional testing of each canal bilaterally indicating resolution of BPPV.Progressing    Patient to perform VOR 1 at 110 bpm WFL for improve gaze stabilization. Progressing    Patient to pass GST condition 2 with no sway  for improved balance in vision eliminated conditions. Progressing    Patient to improve GST condition 4 to 20  seconds for improved stability with NBOS. Progressing               PLAN     Check home exercise program compliance   Oculomotor exercises  Motion tolerance   Static/ dynamic balance activities     Paola Alberts, PT

## 2023-03-01 ENCOUNTER — CLINICAL SUPPORT (OUTPATIENT)
Dept: REHABILITATION | Facility: HOSPITAL | Age: 78
End: 2023-03-01
Payer: MEDICARE

## 2023-03-01 DIAGNOSIS — R42 VERTIGO: Primary | ICD-10-CM

## 2023-03-01 DIAGNOSIS — H81.13 BENIGN PAROXYSMAL POSITIONAL VERTIGO DUE TO BILATERAL VESTIBULAR DISORDER: ICD-10-CM

## 2023-03-01 PROCEDURE — 97112 NEUROMUSCULAR REEDUCATION: CPT | Mod: PO

## 2023-03-02 NOTE — PROGRESS NOTES
[facility]   Physical Therapy Treatment and Reassessment Note     Name: Sarah Rico  Clinic Number: [id number]    Therapy Diagnosis:   No diagnosis found.          Physician: Nydia Roth P*    Visit Date: 3/3/2023    Therapy Diagnosis: BPPV, Vertigo, imbalance   Physician: Nydia Roth P*     Physician Orders: PT Eval and Treat   Medical Diagnosis from Referral:   R26.89 (ICD-10-CM) - Imbalance   H81.10 (ICD-10-CM) - Benign paroxysmal positional vertigo, unspecified laterality      Evaluation Date: 1/5/2023  Authorization Period Expiration: 12/31/2023  Plan of Care Expiration: 3/28/2023  Visit # / Visits authorized: 10/ 40 (+evaluation)       PTA Visit #: 0/5     Time In: 1100  Time Out: 1145  Total Billable Time: 45*** minutes    SUBJECTIVE     Pt reports: that she feels good and has not been dizzy ***    She was given home exercise program, patient verbalizes/ demonstrates understanding   Response to previous treatment: felt good  Functional change: ongoing     Pain: 0/10  Location: NA    OBJECTIVE     Objective Measures updated at progress report unless specified.        Treatment     Cinda received the treatments listed below:        neuromuscular re-education activities to improve: Balance, Coordination, Kinesthetic, Sense, Proprioception, and Posture for 38*** minutes. The following activities were included:    2 x 100 feet ambulation with horizontal head turns, CGA, min instability  2 x 100 feet ambulation with vertical head turns, CGA, min instability   2 x 100 feet ambulation with diagonal head turns, CGA, mod instability   1 x 100 feet vertical self ball toss, CGA   1 x 100 feet horizontal self ball toss, CGA   2 x 100 feet ambulation with eyes closed, minimal assist      3 x 30 seconds VOR1 at 80 BPM, no dizziness, minimal eye slippage     On bosu:   1 x 30 seconds eyes open, CGA   3 x 30 seconds eyes closed, minimal assist, occ touchdown support   2 x  30 seconds horizontal head turns, minimal assist, occ touchdown support    10 reps LLE on foam fitter, RLE lefts from 8 inch step. CGA, no UE support   10 reps RLE on foam fitter, LLE lefts from 8 inch step. CGA, no UE support       Patient Education and Home Exercises     Home Exercises Provided and Patient Education Provided     Education provided:   - motion tolerance training,  discharge planning, Plan of Care  Weighted utensils for tremor    Written Home Exercises Provided: educated on performing tandem stance at ballet bar in gym    ASSESSMENT     iCnda tolerated today's session well this afternoon. Patient continues to report no dizziness. Patient did well with ambulation with eyes closed, requiring minimal assist to maintain stability. 90 BPM attempted for VOR1 but patient was unable to perform. Patient did well with 80 BPM with VOR1. ***        Cinda Is progressing well towards her goals.   Pt prognosis is Good.     Pt will continue to benefit from skilled outpatient physical therapy to address the deficits listed in the problem list box on initial evaluation, provide pt/family education and to maximize pt's level of independence in the home and community environment.     Pt's spiritual, cultural and educational needs considered and pt agreeable to plan of care and goals.     Anticipated barriers to physical therapy: co morbidities       Goals:  Short Term Goals:  weeks   Patient will be independent with established home exercise program emphasizing balance. MET 2/24/2023   Patient to perform smooth pursuits with single target in all planes WFL for improved ability to scan environment.MET 2/24/2023   Patient to perform VOR 1 at 90 bpm WFL for improve gaze stabilization. Progressing    4. Patient to pass GST condition 2 with slight sway  for improved balance in vision eliminated conditions. MET 2/24/2023   5. Patient to improve GST condition 4 to 10  seconds for improved stability with NBOS. MET 2/24/2023         Long Term Goals: 8 weeks   Patient will be independent with advanced HEP emphasizing balance. Progressing    Patient exhibit negative positional testing of each canal bilaterally indicating resolution of BPPV.Progressing    Patient to perform VOR 1 at 110 bpm WFL for improve gaze stabilization. Progressing    Patient to pass GST condition 2 with no sway  for improved balance in vision eliminated conditions. Progressing    Patient to improve GST condition 4 to 20  seconds for improved stability with NBOS. Progressing               PLAN     Check home exercise program compliance   Oculomotor exercises  Motion tolerance   Static/ dynamic balance activities     Paola Alberts, PT

## 2023-03-03 ENCOUNTER — CLINICAL SUPPORT (OUTPATIENT)
Dept: REHABILITATION | Facility: HOSPITAL | Age: 78
End: 2023-03-03
Payer: MEDICARE

## 2023-03-03 DIAGNOSIS — R42 VERTIGO: Primary | ICD-10-CM

## 2023-03-03 DIAGNOSIS — H81.11 BENIGN PAROXYSMAL POSITIONAL VERTIGO OF RIGHT EAR: ICD-10-CM

## 2023-03-03 PROCEDURE — 97112 NEUROMUSCULAR REEDUCATION: CPT | Mod: PO

## 2023-03-03 NOTE — PROGRESS NOTES
[facility]   Physical Therapy Treatment Note    Name: Sarah Rico  Clinic Number: [id number]    Therapy Diagnosis:   Encounter Diagnoses   Name Primary?    Vertigo Yes    Benign paroxysmal positional vertigo of right ear      Physician: Nydia Roth P*    Visit Date: 3/3/2023    Therapy Diagnosis: BPPV, Vertigo, imbalance   Physician: Nydia Roth P*     Physician Orders: PT Eval and Treat   Medical Diagnosis from Referral:   R26.89 (ICD-10-CM) - Imbalance   H81.10 (ICD-10-CM) - Benign paroxysmal positional vertigo, unspecified laterality      Evaluation Date: 1/5/2023  Authorization Period Expiration: 12/31/2023  Plan of Care Expiration: 3/28/2023  Visit # / Visits authorized: 11/ 40 (+evaluation)       PTA Visit #: 0/5     Time In: 11:07 am( pt arrived late to session)  Time Out: 11:45 am  Total Billable Time: 38 minutes    SUBJECTIVE     Pt reports: that she feels good and has not had any increase in her symptoms.  She prefers to stay with [facility] as she gets closer to discharge.     She was compliant with home exercise program.  Response to previous treatment: felt good  Functional change: ongoing     Pain: 0/10  Location: NA    OBJECTIVE     Objective Measures updated at progress report unless specified.        Treatment     Cinda received the treatments listed below:        neuromuscular re-education activities to improve: Balance, Coordination, Kinesthetic, Sense, Proprioception, and Posture for 38 minutes. The following activities were included:    2 x 100 feet ambulation with horizontal head turns, CGA, min instability  2 x 100 feet ambulation with vertical head turns, CGA, min instability   2 x 100 feet ambulation with diagonal head turns, CGA, mod instability   1 x 100 feet vertical self ball toss, CGA   1 x 100 feet horizontal self ball toss, CGA   2 x 100 feet ambulation with eyes closed, min A. Two verbal cues provided to correct veering off of path  "    Seated on brown bench:  3 x 30 seconds VOR1 at 80 BPM, no dizziness, minimal to no eye slippage (some difficulty performing head turns)    In parallel bars:  On bosu: (flat side)  1 x 30 seconds eyes open, CGA   3 x 30 seconds eyes closed, min A, occ touchdown support   2 x 30 seconds horizontal head turns, min A, occ touchdown support    Wooden rocker board:  3 x 30" anterior/posterior, occasional to no UE support, CGA  2 x 30" medial/lateral, occasional to no UE support, CGA    10 reps LLE on foam fitter, RLE lifts from 8 inch step. CGA, no UE support   10 reps RLE on foam fitter, LLE lifts from 8 inch step. CGA, no UE support   10 reps LLE on foam fitter, RLE lifts from 8 inch step with foam pad, CGA, no UE support   10 reps RLE on foam fitter, LLE lifts from 8 inch step with foam pad, CGA, one UE support       Patient Education and Home Exercises     Home Exercises Provided and Patient Education Provided     Education provided:   - motion tolerance training,  discharge planning, Plan of Care  Weighted utensils for tremor    Written Home Exercises Provided: educated on performing tandem stance at [place] bar in gym    ASSESSMENT     Cinda tolerated her session well this morning. She continues to do well with VOR1 training at 80 BPM (some difficulty noted maintaining appropriate head turns). Two verbal cues were provided to pt during gait with eyes closed to correct her from veering off straight path. She also displayed some difficulty maintaining her gaze on the small ball during vertical and horizontal self toss while ambulating. During balance training, she displayed more difficulty stabilizing the L LE, but did well overall. Pt is appropriate to continue skilled Physical therapy weekly addressing remaining vestibular and balance deficits.     Cinda Is progressing well towards her goals.   Pt prognosis is Good.     Pt will continue to benefit from skilled outpatient physical therapy to address the deficits " listed in the problem list box on initial evaluation, provide pt/family education and to maximize pt's level of independence in the home and community environment.     Pt's spiritual, cultural and educational needs considered and pt agreeable to plan of care and goals.     Anticipated barriers to physical therapy: co morbidities       Goals:  Short Term Goals:  weeks   Patient will be independent with established home exercise program emphasizing balance. MET 2/24/2023   Patient to perform smooth pursuits with single target in all planes WFL for improved ability to scan environment.MET 2/24/2023   Patient to perform VOR 1 at 90 bpm WFL for improve gaze stabilization. Progressing    4. Patient to pass GST condition 2 with slight sway  for improved balance in vision eliminated conditions. MET 2/24/2023   5. Patient to improve GST condition 4 to 10  seconds for improved stability with NBOS. MET 2/24/2023        Long Term Goals: 8 weeks   Patient will be independent with advanced HEP emphasizing balance. Progressing    Patient exhibit negative positional testing of each canal bilaterally indicating resolution of BPPV.Progressing    Patient to perform VOR 1 at 110 bpm WFL for improve gaze stabilization. Progressing    Patient to pass GST condition 2 with no sway  for improved balance in vision eliminated conditions. Progressing    Patient to improve GST condition 4 to 20  seconds for improved stability with NBOS. Progressing               PLAN     Continue oculomotor exercises, motion tolerance challenges, and static/ dynamic balance activities.    Sharona Alejandro, SPT    3/3/2023    I certify that I was present in the room directing the student in service delivery and guiding them using my skilled judgment. As the co-signing therapist, I have reviewed the student's documentation and am responsible for the treatment, assessment and plan.    Brian Thornton, PT   3/3/2023

## 2023-03-07 ENCOUNTER — OFFICE VISIT (OUTPATIENT)
Dept: PODIATRY | Facility: CLINIC | Age: 78
End: 2023-03-07
Payer: MEDICARE

## 2023-03-07 VITALS
HEART RATE: 61 BPM | HEIGHT: 64 IN | SYSTOLIC BLOOD PRESSURE: 120 MMHG | WEIGHT: 120 LBS | BODY MASS INDEX: 20.49 KG/M2 | DIASTOLIC BLOOD PRESSURE: 58 MMHG

## 2023-03-07 DIAGNOSIS — B35.1 DERMATOPHYTOSIS OF NAIL: ICD-10-CM

## 2023-03-07 DIAGNOSIS — N18.31 STAGE 3A CHRONIC KIDNEY DISEASE: Primary | ICD-10-CM

## 2023-03-07 DIAGNOSIS — Z98.890 HISTORY OF FOOT SURGERY: ICD-10-CM

## 2023-03-07 DIAGNOSIS — L84 PRE-ULCERATIVE CORN OR CALLOUS: ICD-10-CM

## 2023-03-07 PROCEDURE — 11056 ROUTINE FOOT CARE: ICD-10-PCS | Mod: Q9,S$PBB,, | Performed by: PODIATRIST

## 2023-03-07 PROCEDURE — 99999 PR PBB SHADOW E&M-EST. PATIENT-LVL IV: CPT | Mod: PBBFAC,,, | Performed by: PODIATRIST

## 2023-03-07 PROCEDURE — 99499 UNLISTED E&M SERVICE: CPT | Mod: S$PBB,,, | Performed by: PODIATRIST

## 2023-03-07 PROCEDURE — 11721 ROUTINE FOOT CARE: ICD-10-PCS | Mod: 59,Q9,S$PBB, | Performed by: PODIATRIST

## 2023-03-07 PROCEDURE — 11721 DEBRIDE NAIL 6 OR MORE: CPT | Mod: Q9,PBBFAC,PN | Performed by: PODIATRIST

## 2023-03-07 PROCEDURE — 99999 PR PBB SHADOW E&M-EST. PATIENT-LVL IV: ICD-10-PCS | Mod: PBBFAC,,, | Performed by: PODIATRIST

## 2023-03-07 PROCEDURE — 99214 OFFICE O/P EST MOD 30 MIN: CPT | Mod: PBBFAC,PN | Performed by: PODIATRIST

## 2023-03-07 PROCEDURE — 11056 PARNG/CUTG B9 HYPRKR LES 2-4: CPT | Mod: Q9,S$PBB,, | Performed by: PODIATRIST

## 2023-03-07 PROCEDURE — 99499 NO LOS: ICD-10-PCS | Mod: S$PBB,,, | Performed by: PODIATRIST

## 2023-03-07 RX ORDER — PREDNISONE 10 MG/1
TABLET ORAL
COMMUNITY
Start: 2022-12-22

## 2023-03-07 RX ORDER — PROMETHAZINE HYDROCHLORIDE AND DEXTROMETHORPHAN HYDROBROMIDE 6.25; 15 MG/5ML; MG/5ML
SYRUP ORAL
COMMUNITY
Start: 2022-12-16 | End: 2023-08-07

## 2023-03-07 RX ORDER — AZITHROMYCIN 250 MG/1
TABLET, FILM COATED ORAL
COMMUNITY
Start: 2022-12-13

## 2023-03-07 RX ORDER — LEVOFLOXACIN 500 MG/1
500 TABLET, FILM COATED ORAL
COMMUNITY
Start: 2022-12-16 | End: 2023-08-07

## 2023-03-07 RX ORDER — AZITHROMYCIN 500 MG/1
500 TABLET, FILM COATED ORAL ONCE
COMMUNITY
Start: 2023-01-25

## 2023-03-07 NOTE — PROGRESS NOTES
PODIATRY NOTE       CHIEF CONCERN:    Calluses         MEDICAL DECISION MAKING:          Problem List Items Addressed This Visit       Stage 3a chronic kidney disease - Primary     Other Visit Diagnoses       History of foot surgery        Pre-ulcerative corn or callous        Dermatophytosis of nail                    I counseled the patient on the patient's conditions, their implications and medical management.    Shoe and activity modification as needed for relief.  Continue urea cream daily to calluses.   Offloading pads as needed.        Routine Foot Care    Date/Time: 3/7/2023 2:15 PM  Performed by: Merary Roblero DPM  Authorized by: Merary Roblero DPM     Consent Done?:  Yes (Verbal)  Hyperkeratotic Skin Lesions?: Yes    Number of trimmed lesions:  4  Location(s):  Left 2nd Metatarsal Head, Right 2nd Metatarsal Head, Left 3rd Metatarsal Head and Right 3rd Metatarsal Head    Nail Care Type:  Debride  Location(s): All  (Left 1st Toe, Left 3rd Toe, Left 2nd Toe, Left 4th Toe, Left 5th Toe, Right 1st Toe, Right 2nd Toe, Right 3rd Toe, Right 4th Toe and Right 5th Toe)  Patient tolerance:  Patient tolerated the procedure well with no immediate complications     With patient's permission, the toenails mentioned above were reduced and debrided using a nail nipper, removing offending nail and debris.   Utilizing a #15 scalpel, I trimmed the corns and calluses at the above mentioned location.      The patient will continue to monitor the areas daily, inspect the feet, wear protective shoe gear when ambulatory, and moisturizer to maintain skin integrity.      Routine Foot Care (Foot Exam/PCP Nydia Roth PA-C/Cardiology  10/25/22)                   HISTORY OF PRESENT ILLNESS:  Sarah Rico is a 77 y.o. female, with history of CKD,  with concerns regarding: painful calluses sub metatarsal heads, chronic.       She has history of bunionectomy.          PCP:  Latrice Davidson MD  Last encounter:   6/24/2022;  Routine Foot Care (Foot Exam/PCP Nydia Roth PA-C/Cardiology  10/25/22)               Patient Active Problem List   Diagnosis    Stress incontinence of urine    Voiding dysfunction    Rectocele, female    Chronic constipation    Vaginal atrophy    Graves' disease    Stage 3a chronic kidney disease    Hyperlipidemia    Aortic valve stenosis    Essential tremor    Essential hypertension    Insomnia    S/P TAVR (transcatheter aortic valve replacement)    Lumbar radiculopathy    S/P hip replacement    Pelvic floor weakness    Decreased functional mobility and endurance    Disorder of muscle    Preoperative cardiovascular examination    Status post anterior & posterior colporrhaphy w/ MUS 11/1    Incomplete bladder emptying    Pelvic floor tension    Pelvic floor dysfunction    Chronic pansinusitis    Atypical lobular hyperplasia of right breast    Dyspareunia due to medical condition in female    Vertigo    BPPV (benign paroxysmal positional vertigo)           Current Outpatient Medications on File Prior to Visit   Medication Sig Dispense Refill    ALPRAZolam (XANAX) 0.5 MG tablet Take 1 tablet (0.5 mg total) by mouth daily as needed (severe anxiety). 30 tablet 0    ASCORBATE CALCIUM, VITAMIN C, ORAL Take 500 mg by mouth.      aspirin (ECOTRIN) 81 MG EC tablet Take 81 mg by mouth once daily.      azithromycin (Z-BEATRIZ) 250 MG tablet TAKE 2 TABLETS BY MOUTH TODAY, THEN TAKE 1 TABLET DAILY FOR 4 DAYS      azithromycin (ZITHROMAX) 500 MG tablet Take 500 mg by mouth once. for one dose      cetirizine-pseudoephedrine 5-120 mg Tb12 cetirizine 5 mg-pseudoephedrine  mg tablet,extended release,12hr   TK 1 T PO BID      cholecalciferol, vitamin D3, (VITAMIN D3) 50 mcg (2,000 unit) Tab Take 2,000 Units by mouth.      ciclopirox 0.77 % Gel Apply topically once daily. To thickened discolored toenails. (Patient taking differently: Apply topically daily as needed. To thickened discolored toenails.) 30 g 1     CMPD testosterone proprionate 2% in vanicream Apply topically. [facility]      COMPOUND HORMONE REPLACEMENT Take by mouth once daily. ESTROGEN CREAM -- [facility]      conjugated estrogens (PREMARIN) vaginal cream Place 0.5 g vaginally once daily. 1 applicator 5    EPINEPHrine (EPIPEN) 0.3 mg/0.3 mL AtIn epinephrine 0.3 mg/0.3 mL injection, auto-injector      levoFLOXacin (LEVAQUIN) 500 MG tablet Take 500 mg by mouth.      MAGNESIUM CARBONATE ORAL Take 1 tablet by mouth once daily. 200MG      methIMAzole (TAPAZOLE) 5 MG Tab Take half a tablet daily 90 tablet 3    nitrofurantoin, macrocrystal-monohydrate, (MACROBID) 100 MG capsule Take 1 capsule (100 mg total) by mouth daily as needed (self cath). 30 capsule 0    omalizumab (XOLAIR) 75 mg/0.5 mL injection       ondansetron (ZOFRAN-ODT) 8 MG TbDL Take 1 tablet (8 mg total) by mouth every 8 (eight) hours as needed (nausea). 10 tablet 1    PENNSAID 20 mg/gram /actuation(2 %) sopm APPLY 1 APPLICATION TOPICALLY 2 (TWO) TIMES DAILY AS NEEDED.::NOT COVERED:: 112 g 10    predniSONE (DELTASONE) 10 MG tablet Take by mouth.      progesterone (PROMETRIUM) 200 MG capsule Take 1 capsule by mouth 30-60 minutes before bed every night 90 capsule 0    promethazine-dextromethorphan (PROMETHAZINE-DM) 6.25-15 mg/5 mL Syrp SMARTSI Teaspoon By Mouth Every 12 Hours PRN      RENOVA 0.02 % Crea RENOVA 0.02 % TOPICAL CREAM APPLY TO AFFECTED AREA EVERY DAY AT NIGHT 40 g 0    rosuvastatin (CRESTOR) 40 MG Tab Take 1 tablet (40 mg total) by mouth once daily. 90 tablet 3    sars-cov-2, covid-19 omicron, (MODERNA COVID BIVAL,18Y UP,-PF) 50 mcg/0.5 mL injection Inject into the muscle. 0.5 mL 0    sulfamethoxazole-trimethoprim 800-160mg (BACTRIM DS) 800-160 mg Tab Take 1 tablet by mouth once daily.      urea 20 % Crea Apply 1 application topically once daily. To dry skin on the feet. 75 g 10    valACYclovir (VALTREX) 1000 MG tablet valacyclovir 1 gram tablet      XIIDRA 5 % Dpet     "   zolpidem (AMBIEN CR) 6.25 MG CR tablet Take 1 tablet (6.25 mg total) by mouth nightly as needed for Insomnia. 30 tablet 2    propranoloL (INDERAL LA) 120 MG 24 hr capsule Take 1 capsule (120 mg total) by mouth once daily. for 10 days 10 capsule 0    topiramate (TOPAMAX) 25 MG tablet Take 1 tablet (25 mg total) by mouth once daily for 7 days, THEN 2 tablets (50 mg total) once daily. 67 tablet 3     No current facility-administered medications on file prior to visit.           Review of patient's allergies indicates:   Allergen Reactions    Penicillins Rash and Hives               ROS:   General ROS: negative for - chills, fever or night sweats  Respiratory ROS: no cough, shortness of breath, or wheezing  Cardiovascular ROS: no chest pain or dyspnea on exertion  Musculoskeletal ROS: positive for - pain in foot - bilateral  Neurological ROS: negative for - impaired coordination/balance and numbness/tingling  Dermatological ROS: negative for pruritus and rash      EXAM:     Vitals:    03/07/23 1419   BP: (!) 120/58   Pulse: 61   Weight: 54.4 kg (120 lb)   Height: 5' 4" (1.626 m)          General:  Alert and Oriented x 3;  No acute distress      Bilateral  Lower extremity exam:    Vascular:   Dorsalis Pedis:  present   Posterior Tibial:  present  Capillary refill time:  3 seconds  Temperature of toes cool to touch  Edema:  Trace and non-pitting       Neurological:     Sharp touch:  normal  Light touch: normal  Tinels Sign:  Absent  Mulders Click:   Absent        Dermatological:   Skin: thin, moist and appropriate for age; calluses sub metatarsal heads  Wounds/Ulcers:  Absent  Bruising:  Absent  Erythema:  Absent  Toenails elongated.   right 4th toenail thickened and incurvated without paronychia.         Musculoskeletal:   Metatarsophalangeal range of motion:   diminished range of motion  Subtalar joint range of motion: full range of motion  Ankle joint range of motion:  full range of motion  Bunions:  " Present  Anton: Present

## 2023-03-08 NOTE — PROGRESS NOTES
[facility]   Physical Therapy Treatment Note     Name: Sarah Rico  Clinic Number: [id number]    Therapy Diagnosis:   Encounter Diagnoses   Name Primary?    Vertigo Yes    Benign paroxysmal positional vertigo of right ear          Physician: Nydia Roth P*    Visit Date: 3/10/2023    Therapy Diagnosis: BPPV, Vertigo, imbalance   Physician: Nydia Roth P*     Physician Orders: PT Eval and Treat   Medical Diagnosis from Referral:   R26.89 (ICD-10-CM) - Imbalance   H81.10 (ICD-10-CM) - Benign paroxysmal positional vertigo, unspecified laterality      Evaluation Date: 1/5/2023  Authorization Period Expiration: 12/31/2023  Plan of Care Expiration: 3/28/2023  Visit # / Visits authorized: 11/ 40 (+evaluation)       PTA Visit #: 0/5     Time In: 0920  Time Out: 1000  Total Billable Time: 40 minutes    SUBJECTIVE     Pt reports: that she had one episode of dizziness but can't remember exactly what it was from     She was given home exercise program, patient verbalizes/ demonstrates understanding   Response to previous treatment: felt good  Functional change: ongoing     Pain: 0/10  Location: NA    OBJECTIVE     Objective Measures updated at progress report unless specified.        Treatment     Cinda received the treatments listed below:        neuromuscular re-education activities to improve: Balance, Coordination, Kinesthetic, Sense, Proprioception, and Posture for 40 minutes. The following activities were included:    2 x 100 feet ambulation with horizontal head turns, CGA, min instability  2 x 100 feet ambulation with vertical head turns, CGA, min instability   2 x 100 feet ambulation with diagonal head turns, CGA, mod instability   1 x 100 feet vertical self ball toss, CGA   1 x 100 feet horizontal self ball toss, CGA   2 x 100 feet ambulation with eyes closed, minimal assist      3 x 30 seconds horizontal VOR1 at 80 BPM, no dizziness, minimal eye slippage   3 x 30  seconds vertical VOR1 at 80 BPM, no dizziness, slight eye slippage      On bosu:   1 x 30 seconds eyes open, CGA   3 x 30 seconds eyes closed, minimal assist, occ touchdown support   2 x 30 seconds horizontal head turns, minimal assist, occ touchdown support    10 reps LLE on foam fitter, RLE lifts from 8 inch step. CGA, no UE support   10 reps RLE on foam fitter, LLE lfts from 8 inch step. CGA, no UE support     10 reps LLE leading forward lunge onto soft side of bosu, CGA, occ touchdown support   10 reps RLE leading forward lunge onto soft side of bosu, CGA, occ touchdown support     Patient Education and Home Exercises     Home Exercises Provided and Patient Education Provided     Education provided:   - motion tolerance training,  discharge planning, Plan of Care  Weighted utensils for tremor    Written Home Exercises Provided: educated on performing tandem stance at ballet bar in gym    ASSESSMENT     Cinda tolerated today's session well this morning. VOR1 performed vertical and horizontal today, patient tolerated exercise well. Patient reports significant sensation of unsteadiness today with forward lunge but tolerated exercise well. Continue per POC.         Cinda Is progressing well towards her goals.   Pt prognosis is Good.     Pt will continue to benefit from skilled outpatient physical therapy to address the deficits listed in the problem list box on initial evaluation, provide pt/family education and to maximize pt's level of independence in the home and community environment.     Pt's spiritual, cultural and educational needs considered and pt agreeable to plan of care and goals.     Anticipated barriers to physical therapy: co morbidities       Goals:  Short Term Goals:  weeks   Patient will be independent with established home exercise program emphasizing balance. MET 2/24/2023   Patient to perform smooth pursuits with single target in all planes WFL for improved ability to scan environment.MET  2/24/2023   Patient to perform VOR 1 at 90 bpm WFL for improve gaze stabilization. Progressing    4. Patient to pass GST condition 2 with slight sway  for improved balance in vision eliminated conditions. MET 2/24/2023   5. Patient to improve GST condition 4 to 10  seconds for improved stability with NBOS. MET 2/24/2023        Long Term Goals: 8 weeks   Patient will be independent with advanced HEP emphasizing balance. Progressing    Patient exhibit negative positional testing of each canal bilaterally indicating resolution of BPPV.Progressing    Patient to perform VOR 1 at 110 bpm WFL for improve gaze stabilization. Progressing    Patient to pass GST condition 2 with no sway  for improved balance in vision eliminated conditions. Progressing    Patient to improve GST condition 4 to 20  seconds for improved stability with NBOS. Progressing               PLAN     Check home exercise program compliance   Oculomotor exercises  Motion tolerance   Static/ dynamic balance activities     Paola Alberts, PT

## 2023-03-10 ENCOUNTER — CLINICAL SUPPORT (OUTPATIENT)
Dept: REHABILITATION | Facility: HOSPITAL | Age: 78
End: 2023-03-10
Payer: MEDICARE

## 2023-03-10 DIAGNOSIS — R42 VERTIGO: Primary | ICD-10-CM

## 2023-03-10 DIAGNOSIS — H81.11 BENIGN PAROXYSMAL POSITIONAL VERTIGO OF RIGHT EAR: ICD-10-CM

## 2023-03-10 PROCEDURE — 97112 NEUROMUSCULAR REEDUCATION: CPT | Mod: PO

## 2023-03-17 ENCOUNTER — PATIENT MESSAGE (OUTPATIENT)
Dept: INFECTIOUS DISEASES | Facility: CLINIC | Age: 78
End: 2023-03-17
Payer: MEDICARE

## 2023-03-21 NOTE — PROGRESS NOTES
"[facility]   Physical Therapy Treatment Note     Name: Sarah Rico  Clinic Number: [id number]    Therapy Diagnosis:   Encounter Diagnoses   Name Primary?    Vertigo Yes    Benign paroxysmal positional vertigo of right ear            Physician: Nydia Roth P*    Visit Date: 3/22/2023    Therapy Diagnosis: BPPV, Vertigo, imbalance   Physician: Nydia Roth P*     Physician Orders: PT Eval and Treat   Medical Diagnosis from Referral:   R26.89 (ICD-10-CM) - Imbalance   H81.10 (ICD-10-CM) - Benign paroxysmal positional vertigo, unspecified laterality      Evaluation Date: 1/5/2023  Authorization Period Expiration: 12/31/2023  Plan of Care Expiration: 3/28/2023  Visit # / Visits authorized: 12/ 40 (+evaluation)       PTA Visit #: 0/5     Time In: 1115  Time Out: 1200  Total Billable Time: 45 minutes    SUBJECTIVE     Pt reports: " I am feeling good, I really think I am better"     She was given home exercise program, patient verbalizes/ demonstrates understanding   Response to previous treatment: felt good  Functional change: ongoing     Pain: 0/10  Location: NA    OBJECTIVE     Objective Measures updated at progress report unless specified.        Treatment     Cinda received the treatments listed below:        neuromuscular re-education activities to improve: Balance, Coordination, Kinesthetic, Sense, Proprioception, and Posture for 45 minutes. The following activities were included:    2 x 100 feet ambulation with horizontal head turns, CGA, min instability  2 x 100 feet ambulation with vertical head turns, CGA, min instability   2 x 100 feet ambulation with diagonal head turns, CGA, mod instability   1 x 100 feet vertical self ball toss, CGA   1 x 100 feet horizontal self ball toss, CGA   2 x 100 feet ambulation with eyes closed, minimal assist      3 x 30 seconds horizontal VOR1 at 90 BPM, no dizziness, minimal eye slippage   3 x 30 seconds vertical VOR1 at 90 " BPM, no dizziness, slight eye slippage      On bosu:   1 x 30 seconds eyes open, CGA   3 x 30 seconds eyes closed, minimal assist, occ touchdown support   2 x 30 seconds horizontal head turns, minimal assist, occ touchdown support    2 x 10 reps step up to foam fitter with opposite leg hike, CGA, occ touchdown support     10 reps LLE leading forward lunge onto soft side of bosu, CGA, occ touchdown support   10 reps RLE leading forward lunge onto soft side of bosu, CGA, occ touchdown support     Patient Education and Home Exercises     Home Exercises Provided and Patient Education Provided     Education provided:   - motion tolerance training,  discharge planning, Plan of Care  Weighted utensils for tremor    Written Home Exercises Provided: educated on performing tandem stance at ballet bar in gym    ASSESSMENT     Cinda tolerated today's session well. The patient continues to make excellent progress and with therapy and reports no longer having any dizziness. The patient will benefit from continued PT intervention to address remaining balance deficits      Cinda Is progressing well towards her goals.   Pt prognosis is Good.     Pt will continue to benefit from skilled outpatient physical therapy to address the deficits listed in the problem list box on initial evaluation, provide pt/family education and to maximize pt's level of independence in the home and community environment.     Pt's spiritual, cultural and educational needs considered and pt agreeable to plan of care and goals.     Anticipated barriers to physical therapy: co morbidities       Goals:  Short Term Goals:  weeks   Patient will be independent with established home exercise program emphasizing balance. MET 2/24/2023   Patient to perform smooth pursuits with single target in all planes WFL for improved ability to scan environment.MET 2/24/2023   Patient to perform VOR 1 at 90 bpm WFL for improve gaze stabilization. Progressing    4. Patient to pass  GST condition 2 with slight sway  for improved balance in vision eliminated conditions. MET 2/24/2023   5. Patient to improve GST condition 4 to 10  seconds for improved stability with NBOS. MET 2/24/2023        Long Term Goals: 8 weeks   Patient will be independent with advanced HEP emphasizing balance. Progressing    Patient exhibit negative positional testing of each canal bilaterally indicating resolution of BPPV.Progressing    Patient to perform VOR 1 at 110 bpm WFL for improve gaze stabilization. Progressing    Patient to pass GST condition 2 with no sway  for improved balance in vision eliminated conditions. Progressing    Patient to improve GST condition 4 to 20  seconds for improved stability with NBOS. Progressing               PLAN     Check home exercise program compliance   Oculomotor exercises  Motion tolerance   Static/ dynamic balance activities     Paola Alberts, PT

## 2023-03-22 ENCOUNTER — CLINICAL SUPPORT (OUTPATIENT)
Dept: REHABILITATION | Facility: HOSPITAL | Age: 78
End: 2023-03-22
Attending: PHYSICIAN ASSISTANT
Payer: MEDICARE

## 2023-03-22 DIAGNOSIS — H81.11 BENIGN PAROXYSMAL POSITIONAL VERTIGO OF RIGHT EAR: ICD-10-CM

## 2023-03-22 DIAGNOSIS — R42 VERTIGO: Primary | ICD-10-CM

## 2023-03-22 PROCEDURE — 97112 NEUROMUSCULAR REEDUCATION: CPT | Mod: PO

## 2023-03-22 NOTE — PROGRESS NOTES
"[facility]   Physical Therapy Treatment Note     Name: Sarah Rico  Clinic Number: [id number]    Therapy Diagnosis:   Encounter Diagnoses   Name Primary?    Vertigo Yes    Benign paroxysmal positional vertigo of right ear              Physician: Nydia Roth P*    Visit Date: 3/24/2023    Therapy Diagnosis: BPPV, Vertigo, imbalance   Physician: Nydia Roth P*     Physician Orders: PT Eval and Treat   Medical Diagnosis from Referral:   R26.89 (ICD-10-CM) - Imbalance   H81.10 (ICD-10-CM) - Benign paroxysmal positional vertigo, unspecified laterality      Evaluation Date: 1/5/2023  Authorization Period Expiration: 12/31/2023  Plan of Care Expiration: 3/28/2023  Visit # / Visits authorized: 13/ 40 (+evaluation)       PTA Visit #: 0/5     Time In: 0915  Time Out: 1008  Total Billable Time: 53 minutes    SUBJECTIVE     Pt reports: " I really don't have any dizziness anymore, I am just scared its going to come back"    She was given home exercise program, patient verbalizes/ demonstrates understanding   Response to previous treatment: felt good  Functional change: ongoing     Pain: 0/10  Location: NA    OBJECTIVE     Objective Measures updated at progress report unless specified.        Treatment     Cinda received the treatments listed below:        neuromuscular re-education activities to improve: Balance, Coordination, Kinesthetic, Sense, Proprioception, and Posture for 53 minutes. The following activities were included:    2 x 100 feet ambulation with horizontal head turns, CGA, min instability  2 x 100 feet ambulation with vertical head turns, CGA, min instability   2 x 100 feet ambulation with diagonal head turns, CGA, mod instability   2 x 100 feet ambulation with eyes closed, minimal assist      3 x 30 seconds horizontal VOR1 at 100 BPM, no dizziness,no eye slippage   3 x 30 seconds vertical VOR1 at 100 BPM, no dizziness, no eye slippage       4 laps tandem " ambulation, no UE support  4 laps marching with 3 second holds, no UE support    On bosu:   1 x 30 seconds eyes open, CGA   3 x 30 seconds eyes closed, minimal assist, occ touchdown support   2 x 30 seconds horizontal head turns, minimal assist, occ touchdown support    2 x 10 reps step up to foam fitter with opposite leg hike, CGA, occ touchdown support     10 reps LLE leading forward lunge onto soft side of bosu, CGA, occ touchdown support   10 reps RLE leading forward lunge onto soft side of bosu, CGA, occ touchdown support     Patient Education and Home Exercises     Home Exercises Provided and Patient Education Provided     Education provided:   - motion tolerance training,  discharge planning, Plan of Care  Weighted utensils for tremor    Written Home Exercises Provided: educated on performing tandem stance at ballet bar in gym    ASSESSMENT     Cinda tolerated today's session very well. The patient required no rest breaks and tolerated all activities well. The patient reports no dizziness and no eye slippage noted with VOR1 at 100 BPM. The patient will benefit from continued PT intervention to address remaining balance deficits      Cinda Is progressing well towards her goals.   Pt prognosis is Good.     Pt will continue to benefit from skilled outpatient physical therapy to address the deficits listed in the problem list box on initial evaluation, provide pt/family education and to maximize pt's level of independence in the home and community environment.     Pt's spiritual, cultural and educational needs considered and pt agreeable to plan of care and goals.     Anticipated barriers to physical therapy: co morbidities       Goals:  Short Term Goals:  weeks   Patient will be independent with established home exercise program emphasizing balance. MET 2/24/2023   Patient to perform smooth pursuits with single target in all planes WFL for improved ability to scan environment.MET 2/24/2023   Patient to perform VOR  1 at 90 bpm WFL for improve gaze stabilization. Progressing    4. Patient to pass GST condition 2 with slight sway  for improved balance in vision eliminated conditions. MET 2/24/2023   5. Patient to improve GST condition 4 to 10  seconds for improved stability with NBOS. MET 2/24/2023        Long Term Goals: 8 weeks   Patient will be independent with advanced HEP emphasizing balance. Progressing    Patient exhibit negative positional testing of each canal bilaterally indicating resolution of BPPV.Progressing    Patient to perform VOR 1 at 110 bpm WFL for improve gaze stabilization. Progressing    Patient to pass GST condition 2 with no sway  for improved balance in vision eliminated conditions. Progressing    Patient to improve GST condition 4 to 20  seconds for improved stability with NBOS. Progressing               PLAN     Check home exercise program compliance   Oculomotor exercises  Motion tolerance   Static/ dynamic balance activities     Paola Alberts, PT

## 2023-03-24 ENCOUNTER — CLINICAL SUPPORT (OUTPATIENT)
Dept: REHABILITATION | Facility: HOSPITAL | Age: 78
End: 2023-03-24
Payer: MEDICARE

## 2023-03-24 DIAGNOSIS — R42 VERTIGO: Primary | ICD-10-CM

## 2023-03-24 DIAGNOSIS — H81.11 BENIGN PAROXYSMAL POSITIONAL VERTIGO OF RIGHT EAR: ICD-10-CM

## 2023-03-24 PROCEDURE — 97112 NEUROMUSCULAR REEDUCATION: CPT | Mod: PO

## 2023-03-29 DIAGNOSIS — Z00.00 PERIODIC HEALTH ASSESSMENT, GENERAL SCREENING, ADULT: ICD-10-CM

## 2023-03-29 DIAGNOSIS — Z00.00 ROUTINE GENERAL MEDICAL EXAMINATION AT A HEALTH CARE FACILITY: ICD-10-CM

## 2023-03-29 DIAGNOSIS — Z13.21 ENCOUNTER FOR VITAMIN DEFICIENCY SCREENING: Primary | ICD-10-CM

## 2023-03-30 NOTE — PROGRESS NOTES
"[facility]   Physical Therapy Treatment and Discharge Note     Name: Sarah Rico  Clinic Number: [id number]    Therapy Diagnosis:   Encounter Diagnoses   Name Primary?    Vertigo Yes    Benign paroxysmal positional vertigo of right ear            Physician: Nydia Roth P*    Visit Date: 3/31/2023    Therapy Diagnosis: BPPV, Vertigo, imbalance   Physician: Nydia Roth P*     Physician Orders: PT Eval and Treat   Medical Diagnosis from Referral:   R26.89 (ICD-10-CM) - Imbalance   H81.10 (ICD-10-CM) - Benign paroxysmal positional vertigo, unspecified laterality      Evaluation Date: 1/5/2023  Authorization Period Expiration: 12/31/2023  Plan of Care Expiration: 3/28/2023  Visit # / Visits authorized: 14/ 40 (+evaluation)       PTA Visit #: 0/5     Time In: 1020  Time Out: 1100  Total Billable Time: 40 minutes    SUBJECTIVE     Pt reports: no dizziness or changes     She was given home exercise program, patient verbalizes/ demonstrates understanding   Response to previous treatment: felt good  Functional change: ongoing     Pain: 0/10  Location: NA    OBJECTIVE     Objective Measures updated at progress report unless specified.                             Visual/Oculomotor Screen: denies changes with her vision   Tracking/Smooth Pursuits intact, no dizziness reported and no jerkiness   Saccades: Intact  Convergence: 5 cm, intact   VOR 1: intact at 110 BPM, no dizziness or jerkiness of the eyes noted  Acuity:Intact  R/L discrimination: Intact  Visual field: Intact     [place] SENSORY TESTING:  (P= Pass, F= Fail; note any sway; hold each position for 30")  Condition 1: (firm surface/feet together/eyes open) P  Condition 2: (firm surface/feet together/eyes closed) P no sway   Condition 3: (firm surface/feet in tandem/eyes open) P BLE leading   Condition 4: (firm surface/feet in tandem/eyes closed) F 21 sec LLE leading, P  RLE leading   Condition 5: (soft surface/feet " together/eyes open) P no sway   Condition 6: (soft surface/feet together/eyes closed) P   Condition 7: (Fakuda step test), measure distance varied from center starting position NT         Functional Gait Assessment:   1. Gait on level surface =  3  2. Change in Gait Speed = 3  3. Gait with horizontal head turns  = 3  4. Gait with vertical head turns = 3  5. Gait with pivot turns = 2  6. Step over obstacle = 3  7. Gait with Narrow MARGARITO = 3  8. Gait with eyes closed = 3  9. Ambulating Backwards = 3  10. Steps = 3     Score 29/30            Treatment     Cinda received the treatments listed below:        neuromuscular re-education activities to improve: Balance, Coordination, Kinesthetic, Sense, Proprioception, and Posture for 40 minutes. The following activities were included:    30 seconds at 110 BPM, VOR 1    2 x 100 feet ambulation with horizontal head turns, CGA, min instability  2 x 100 feet ambulation with vertical head turns, CGA, min instability   2 x 100 feet ambulation with diagonal head turns, CGA, mod instability   2 x 100 feet ambulation with eyes closed, minimal assist        On bosu:   1 x 30 seconds eyes open, CGA   3 x 30 seconds eyes closed, minimal assist, occ touchdown support   2 x 30 seconds horizontal head turns, minimal assist, occ touchdown support    2 x 10 reps step up to foam fitter with opposite leg hike, CGA, occ touchdown support     Time above includes time to complete objective measures     Patient Education and Home Exercises     Home Exercises Provided and Patient Education Provided     Education provided:   - motion tolerance training,  discharge planning, Plan of Care  Weighted utensils for tremor    Written Home Exercises Provided: educated on performing tandem stance at ballet bar in gym      PHYSICAL THERAPY DISCHARGE SUMMARY   Total Visits:15  Missed Visits:0  Cancelled Visits: 5    Status Towards Goals Met: Patient met     Goals Not achieved and why:   Patient met all goals and  has maximized the benefits of PT at this time. The patient made excellent progress with therapy and no longer reports any dizziness and reports significantly improved stability with all activities. The patient verbalizes understanding of advanced home exercise program to maintain progress made with therapy independently at home.        Goals:  Short Term Goals:  weeks   Patient will be independent with established home exercise program emphasizing balance. MET 2/24/2023   Patient to perform smooth pursuits with single target in all planes WFL for improved ability to scan environment.MET 2/24/2023   Patient to perform VOR 1 at 90 bpm WFL for improve gaze stabilization. Progressing    4. Patient to pass GST condition 2 with slight sway  for improved balance in vision eliminated conditions. MET 2/24/2023   5. Patient to improve GST condition 4 to 10  seconds for improved stability with NBOS. MET 2/24/2023        Long Term Goals: 8 weeks   Patient will be independent with advanced HEP emphasizing balance. MET 3/31/2023    Patient exhibit negative positional testing of each canal bilaterally indicating resolution of BPPV.MET 3/31/2023   Patient to perform VOR 1 at 110 bpm WFL for improve gaze stabilization. MET 3/31/2023   Patient to pass GST condition 2 with no sway  for improved balance in vision eliminated conditions. MET 3/31/2023   Patient to improve GST condition 4 to 20  seconds for improved stability with NBOS. MET 3/31/2023              Discharge reason : Met goals and Patient has maximized benefit from PT at this time    PLAN   This patient is discharged from Outpatient Physical Therapy Services.     Paola Alberts, PT  03/31/2023

## 2023-03-31 ENCOUNTER — CLINICAL SUPPORT (OUTPATIENT)
Dept: REHABILITATION | Facility: HOSPITAL | Age: 78
End: 2023-03-31
Payer: MEDICARE

## 2023-03-31 DIAGNOSIS — H81.11 BENIGN PAROXYSMAL POSITIONAL VERTIGO OF RIGHT EAR: ICD-10-CM

## 2023-03-31 DIAGNOSIS — R42 VERTIGO: Primary | ICD-10-CM

## 2023-03-31 PROCEDURE — 97112 NEUROMUSCULAR REEDUCATION: CPT | Mod: PO

## 2023-04-10 NOTE — PROGRESS NOTES
Breast Surgery  [facility]  Department of Surgery      REFERRING PROVIDER: No referring provider defined for this encounter.    Chief Complaint: Follow-up (6 month follow up AL .)      Subjective:     Patient ID: Sarah Alejandro is a 77 y.o. female who presented originally with calcifications of the upper outer quadrant of right breast seen on mammography (22).      Breast biopsy (22) showed atypical lobular hyperplasia negative for carcinoma.     Patient does routinely do self breast exams.  Patient has not noted a change on breast exam.  Patient denies nipple discharge. Patient has to previous breast biopsy. Patient denies a personal history of breast cancer.     Findings at that time were the following:   Calcification size: 4.7 x 4.4 cm   Tumor grade: NA      GYN History:  Age of menarche was 11. Age of menopause was 53. Patient takes hormonal therapy. Patient is . Age of first live birth was 21. Patient did not breast feed.    Patient elected to proceed with watchful waiting. We are seeing back today 6 months out following right diagnostic mammogram.     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     Graves disease     Past Surgical History:   Procedure Laterality Date    AUGMENTATION OF BREAST Bilateral     BACK SURGERY      BALLOON SINUPLASTY OF PARANASAL SINUS Bilateral 2022    Procedure: SINUPLASTY, USING BALLOON  frontal ans sphenoid;  Surgeon: Samantha Ridley MD;  Location: [facility];  Service: ENT;  Laterality: Bilateral;    BREAST BIOPSY Right 2022    atypical lobular hyperplasia    CARDIAC VALVE REPLACEMENT  2016    - aortic  valve stenosis / COW  VALVE    FUNCTIONAL ENDOSCOPIC SINUS SURGERY (FESS) USING COMPUTER-ASSISTED NAVIGATION N/A 08/23/2022    Procedure: FESS, USING COMPUTER-ASSISTED NAVIGATION - MAXILLARY, ETHMOIDS, FRONTAL;  Surgeon: Samantha Ridley MD;  Location: [facility];  Service: ENT;  Laterality: N/A;    HIP REPLACEMENT ARTHROPLASTY Left 2016    INSERTION OF MIDURETHRAL SLING N/A 11/01/2021    Procedure: SLING, MIDURETHRAL;  Surgeon: Hodan Goldberg MD;  Location: [facility];  Service: OB/GYN;  Laterality: N/A;    JOINT REPLACEMENT      LUMBAR FUSION      NASAL ENDOSCOPY N/A 08/23/2022    Procedure: ENDOSCOPY, NOSE - SPHENOIDOTOMY;  Surgeon: Samantha Ridley MD;  Location: [facility] OR;  Service: ENT;  Laterality: N/A;     Current Outpatient Medications on File Prior to Visit   Medication Sig Dispense Refill    ALPRAZolam (XANAX) 0.5 MG tablet Take 1 tablet (0.5 mg total) by mouth daily as needed (severe anxiety). 30 tablet 0    ASCORBATE CALCIUM, VITAMIN C, ORAL Take 500 mg by mouth.      aspirin (ECOTRIN) 81 MG EC tablet Take 81 mg by mouth once daily.      cholecalciferol, vitamin D3, (VITAMIN D3) 50 mcg (2,000 unit) Tab Take 2,000 Units by mouth.      CMPD testosterone proprionate 2% in vanicream Apply topically. [facility]      COMPOUND HORMONE REPLACEMENT Take by mouth once daily. ESTROGEN CREAM -- [facility]      conjugated estrogens (PREMARIN) vaginal cream Place 0.5 g vaginally once daily. 1 applicator 5    EPINEPHrine (EPIPEN) 0.3 mg/0.3 mL AtIn epinephrine 0.3 mg/0.3 mL injection, auto-injector      MAGNESIUM CARBONATE ORAL Take 1 tablet by mouth once daily. 200MG      PENNSAID 20 mg/gram /actuation(2 %) sopm APPLY 1 APPLICATION TOPICALLY 2 (TWO) TIMES DAILY AS NEEDED.::NOT COVERED:: 112 g 10    progesterone (PROMETRIUM) 200 MG capsule Take 1 capsule by mouth 30-60 minutes before bed every night 90 capsule 0    rosuvastatin (CRESTOR) 40 MG Tab Take 1 tablet (40 mg total) by mouth once daily. 90  tablet 3    valACYclovir (VALTREX) 1000 MG tablet valacyclovir 1 gram tablet      XIIDRA 5 % Dpet       azithromycin (Z-BEATRIZ) 250 MG tablet TAKE 2 TABLETS BY MOUTH TODAY, THEN TAKE 1 TABLET DAILY FOR 4 DAYS      azithromycin (ZITHROMAX) 500 MG tablet Take 500 mg by mouth once. for one dose      cetirizine-pseudoephedrine 5-120 mg Tb12 cetirizine 5 mg-pseudoephedrine  mg tablet,extended release,12hr   TK 1 T PO BID      ciclopirox 0.77 % Gel Apply topically once daily. To thickened discolored toenails. (Patient taking differently: Apply topically daily as needed. To thickened discolored toenails.) 30 g 1    levoFLOXacin (LEVAQUIN) 500 MG tablet Take 500 mg by mouth.      methIMAzole (TAPAZOLE) 5 MG Tab Take half a tablet daily 90 tablet 3    nitrofurantoin, macrocrystal-monohydrate, (MACROBID) 100 MG capsule Take 1 capsule (100 mg total) by mouth daily as needed (self cath). (Patient not taking: Reported on 2023) 30 capsule 0    omalizumab (XOLAIR) 75 mg/0.5 mL injection       ondansetron (ZOFRAN-ODT) 8 MG TbDL Take 1 tablet (8 mg total) by mouth every 8 (eight) hours as needed (nausea). 10 tablet 1    predniSONE (DELTASONE) 10 MG tablet Take by mouth.      promethazine-dextromethorphan (PROMETHAZINE-DM) 6.25-15 mg/5 mL Syrp SMARTSI Teaspoon By Mouth Every 12 Hours PRN      propranoloL (INDERAL LA) 120 MG 24 hr capsule Take 1 capsule (120 mg total) by mouth once daily. for 10 days 10 capsule 0    RENOVA 0.02 % Crea RENOVA 0.02 % TOPICAL CREAM APPLY TO AFFECTED AREA EVERY DAY AT NIGHT 40 g 0    sars-cov-2, covid-19 omicron, (MODERNA COVID BIVAL,18Y UP,-PF) 50 mcg/0.5 mL injection Inject into the muscle. 0.5 mL 0    sulfamethoxazole-trimethoprim 800-160mg (BACTRIM DS) 800-160 mg Tab Take 1 tablet by mouth once daily.      topiramate (TOPAMAX) 25 MG tablet Take 1 tablet (25 mg total) by mouth once daily for 7 days, THEN 2 tablets (50 mg total) once daily. 67 tablet 3    urea 20 % Crea Apply 1 application  "topically once daily. To dry skin on the feet. 75 g 10    zolpidem (AMBIEN CR) 6.25 MG CR tablet Take 1 tablet (6.25 mg total) by mouth nightly as needed for Insomnia. 30 tablet 2     No current facility-administered medications on file prior to visit.     Social History     Socioeconomic History    Marital status:    Tobacco Use    Smoking status: Former     Types: Cigarettes     Quit date:      Years since quittin.3    Smokeless tobacco: Never   Substance and Sexual Activity    Alcohol use: Not Currently     Alcohol/week: 2.0 standard drinks     Types: 1 Glasses of wine, 1 Shots of liquor per week     Comment: weekends     Drug use: Never    Sexual activity: Yes     Partners: Male     Birth control/protection: Post-menopausal     Comment:       Family History   Problem Relation Age of Onset    Colon cancer Father     Leukemia Mother     Hypothyroidism Sister     Breast cancer Neg Hx     Ovarian cancer Neg Hx     Stroke Neg Hx     Diabetes Neg Hx         Review of Systems   Constitutional:  Negative for chills and fever.   Respiratory:  Negative for cough and shortness of breath.    Cardiovascular:  Negative for chest pain and palpitations.   Gastrointestinal:  Negative for abdominal pain, nausea and vomiting.   Genitourinary:  Negative for dysuria.   Neurological:  Negative for dizziness and light-headedness.   Objective:   /67 (BP Location: Left arm, Patient Position: Sitting, BP Method: Medium (Automatic))   Pulse 61   Ht 5' 4" (1.626 m)   Wt 55.8 kg (123 lb)   LMP  (LMP Unknown) Comment:    1996  SpO2 98%   BMI 21.11 kg/m²     Physical Exam   Vitals reviewed.  Constitutional: She is oriented to person, place, and time.   HENT:   Head: Normocephalic.   Cardiovascular:  Normal rate, regular rhythm and normal pulses.            Pulmonary/Chest: Effort normal. Right breast exhibits no inverted nipple, no mass, no nipple discharge, no skin change and no tenderness. Left breast " exhibits no inverted nipple, no mass, no nipple discharge, no skin change and no tenderness.   Abdominal: Soft. Normal appearance. There is no abdominal tenderness.   Neurological: She is alert and oriented to person, place, and time.   Skin: Skin is warm and dry.     Psychiatric: Her behavior is normal. Mood normal.     Radiology review: Images personally reviewed by me in the clinic.   Result:   Mammo Digital Diagnostic Right with Dez     History:  Patient is 77 y.o. and is seen for diagnostic imaging.     Films Compared:  Compared to: 08/29/2022 Mammo Breast Stereotactic Biopsy Right, 08/18/2022 Mammo Digital Diagnostic Right with Dez, 07/25/2022 Mammo Digital Screening Bilat w/ Dez, and 06/10/2021 Mammo Digital Screening Bilat w/ Dez     Findings:  This procedure was performed using tomosynthesis. Computer-aided detection was utilized in the interpretation of this examination.  The right breast is heterogeneously dense, which may obscure small masses.     There are amorphous calcifications in a regional distribution seen in the upper region of the right breast. Compared to the previous study, there are no significant changes. These calcifications span 4.7 cm x 4.4 cm.        There is no evidence of suspicious masses, calcifications, or other abnormal findings in the right breast.     Impression:  The patient has known atypical lobular hyperplasia.  She elects to proceed with watchful waiting as opposed to excisional biopsy or chemoprevention therapy.  She is being managed by breast surgery.     BI-RADS Category:   Overall: 2 - Benign        Recommendation:  Return to annual screening mammogram schedule is recommended        Your estimated lifetime risk of breast cancer (to age 85) based on Tyrer-Cuzick risk assessment model is Tyrer-Cuzick: 1.6 %. According to the [employer], patients with a lifetime breast cancer risk of 20% or higher might benefit from supplemental screening  tests.    Assessment:       1. Atypical lobular hyperplasia of right breast        Plan:     We discussed the options for management of atypia. We discussed with atypia, there is an increase in the risk of breast cancer overall.  We discussed that there a options for reducing that risk with chemoprevention using Tamoxifen.  She is not interested at this time.  We also discussed active surveillance vs excisional biopsy. We felt these were somewhat symmetric and many of the calcifications were there in priors.    The span is stable at 4.7cm which would be a significant cosmetic defect for her breast size if we were to perform an excisional biopsy for this low risk area.   We discussed chemoprevention and she is not interested.    We agreed that continued close surveillance would be acceptable to her with a low risk that this area could be harboring an early breast cancer, <15%.    Given that her mammogram today was unchanged from prior, we will plan to do a bilateral mammogram in 6 months, and if the findings are again stable, we can move back to yearly mammograms for two years of stability.        Total time spent with the patient: 20 minutes.  15 minutes of face to face consultation and 5 minutes of chart review and coordination of care.

## 2023-04-11 ENCOUNTER — HOSPITAL ENCOUNTER (OUTPATIENT)
Dept: RADIOLOGY | Facility: HOSPITAL | Age: 78
Discharge: HOME | End: 2023-04-11
Attending: SURGERY
Payer: MEDICARE

## 2023-04-11 ENCOUNTER — OFFICE VISIT (OUTPATIENT)
Dept: SURGERY | Facility: CLINIC | Age: 78
End: 2023-04-11
Payer: MEDICARE

## 2023-04-11 VITALS
SYSTOLIC BLOOD PRESSURE: 127 MMHG | OXYGEN SATURATION: 98 % | HEIGHT: 64 IN | DIASTOLIC BLOOD PRESSURE: 67 MMHG | WEIGHT: 123 LBS | HEART RATE: 61 BPM | BODY MASS INDEX: 21 KG/M2

## 2023-04-11 VITALS — WEIGHT: 117 LBS | BODY MASS INDEX: 20.08 KG/M2

## 2023-04-11 DIAGNOSIS — Z12.31 SCREENING MAMMOGRAM FOR BREAST CANCER: ICD-10-CM

## 2023-04-11 DIAGNOSIS — Z91.89 AT HIGH RISK FOR BREAST CANCER: ICD-10-CM

## 2023-04-11 DIAGNOSIS — N60.91 ATYPICAL LOBULAR HYPERPLASIA OF RIGHT BREAST: ICD-10-CM

## 2023-04-11 DIAGNOSIS — N60.91 ATYPICAL LOBULAR HYPERPLASIA (ALH) OF RIGHT BREAST: Primary | ICD-10-CM

## 2023-04-11 PROCEDURE — 99212 PR OFFICE/OUTPT VISIT, EST, LEVL II, 10-19 MIN: ICD-10-PCS | Mod: S$PBB,,, | Performed by: SURGERY

## 2023-04-11 PROCEDURE — 77061 MAMMO DIGITAL DIAGNOSTIC RIGHT WITH TOMO: ICD-10-PCS | Mod: 26,RT,, | Performed by: RADIOLOGY

## 2023-04-11 PROCEDURE — 99212 OFFICE O/P EST SF 10 MIN: CPT | Mod: S$PBB,,, | Performed by: SURGERY

## 2023-04-11 PROCEDURE — 77061 BREAST TOMOSYNTHESIS UNI: CPT | Mod: 26,RT,, | Performed by: RADIOLOGY

## 2023-04-11 PROCEDURE — 99999 PR PBB SHADOW E&M-EST. PATIENT-LVL III: CPT | Mod: PBBFAC,,, | Performed by: SURGERY

## 2023-04-11 PROCEDURE — 77065 MAMMO DIGITAL DIAGNOSTIC RIGHT WITH TOMO: ICD-10-PCS | Mod: 26,RT,, | Performed by: RADIOLOGY

## 2023-04-11 PROCEDURE — 99213 OFFICE O/P EST LOW 20 MIN: CPT | Mod: PBBFAC | Performed by: SURGERY

## 2023-04-11 PROCEDURE — 77065 DX MAMMO INCL CAD UNI: CPT | Mod: 26,RT,, | Performed by: RADIOLOGY

## 2023-04-11 PROCEDURE — 77061 BREAST TOMOSYNTHESIS UNI: CPT | Mod: TC,RT

## 2023-04-11 PROCEDURE — 99999 PR PBB SHADOW E&M-EST. PATIENT-LVL III: ICD-10-PCS | Mod: PBBFAC,,, | Performed by: SURGERY

## 2023-04-17 ENCOUNTER — OFFICE VISIT (OUTPATIENT)
Dept: NEUROLOGY | Facility: CLINIC | Age: 78
End: 2023-04-17
Payer: MEDICARE

## 2023-04-17 VITALS
HEART RATE: 60 BPM | SYSTOLIC BLOOD PRESSURE: 152 MMHG | DIASTOLIC BLOOD PRESSURE: 77 MMHG | WEIGHT: 117 LBS | BODY MASS INDEX: 19.97 KG/M2 | HEIGHT: 64 IN

## 2023-04-17 DIAGNOSIS — R27.0 ATAXIA: ICD-10-CM

## 2023-04-17 DIAGNOSIS — M48.02 CERVICAL SPINAL STENOSIS: ICD-10-CM

## 2023-04-17 DIAGNOSIS — Z74.09 DECREASED FUNCTIONAL MOBILITY AND ENDURANCE: ICD-10-CM

## 2023-04-17 DIAGNOSIS — E78.2 MIXED HYPERLIPIDEMIA: ICD-10-CM

## 2023-04-17 DIAGNOSIS — G25.0 ESSENTIAL TREMOR: Primary | ICD-10-CM

## 2023-04-17 DIAGNOSIS — Z71.89 COUNSELING REGARDING GOALS OF CARE: ICD-10-CM

## 2023-04-17 DIAGNOSIS — I10 ESSENTIAL HYPERTENSION: ICD-10-CM

## 2023-04-17 DIAGNOSIS — R42 VERTIGO: ICD-10-CM

## 2023-04-17 DIAGNOSIS — I70.0 AORTIC ATHEROSCLEROSIS: ICD-10-CM

## 2023-04-17 PROCEDURE — 99214 OFFICE O/P EST MOD 30 MIN: CPT | Mod: S$PBB,,, | Performed by: PSYCHIATRY & NEUROLOGY

## 2023-04-17 PROCEDURE — 99214 PR OFFICE/OUTPT VISIT, EST, LEVL IV, 30-39 MIN: ICD-10-PCS | Mod: S$PBB,,, | Performed by: PSYCHIATRY & NEUROLOGY

## 2023-04-17 PROCEDURE — 99999 PR PBB SHADOW E&M-EST. PATIENT-LVL IV: ICD-10-PCS | Mod: PBBFAC,,, | Performed by: PSYCHIATRY & NEUROLOGY

## 2023-04-17 PROCEDURE — 99214 OFFICE O/P EST MOD 30 MIN: CPT | Mod: PBBFAC | Performed by: PSYCHIATRY & NEUROLOGY

## 2023-04-17 PROCEDURE — 99999 PR PBB SHADOW E&M-EST. PATIENT-LVL IV: CPT | Mod: PBBFAC,,, | Performed by: PSYCHIATRY & NEUROLOGY

## 2023-04-17 NOTE — PROGRESS NOTES
"Name: Sarah Rico  MRN: [id number]   CSN: [id number]      Date: 9/16/2022    Referring physician:  No referring provider defined for this encounter.    Chief Complaint: tremor    Interval History:  - trial of topamax last visit   - did not tolerate due to side effects   - no falls   - did PT for imbalance     - on propranolol 120 mg daily   - got better with PT from a balance standpoint, vestibular rehab   - still incorporates balance exercises at home   - tremor is affecting qol but living with it           From Dec 2022  - intolerable side effects with primidone, now off of it    - referral to ns[facility] with cervical spine ddd, held off on seeing them   - brain mri  "Left caudate and left thalamic remote lacunar type infarcts.  Left thalamic and right cerebellar remote microhemorrhages may be hypertensive in etiology.  Bilateral tiny cerebellar remote infarcts. "  - on propranolol 120 mg daily   - gabapentin caused nausea in the past   - does epley at home for BPPV   - denies history of kidney stones or glaucoma   - tremor affects her whenever shes eating, holding a plate or glass  - no improvement with EtOH       From Sept 2022: Sarah Rico is a R HANDED 77 y.o. female with a medical issues significant for essential tremor, lumbar radiculopathy, CKDIII, graves disease, HTN, HLD, who presents for tremors. Accompanied by spouse. Currently on propranolol 120 mg capsule XR 1 capsule BID. This does not cause lightheaded or dizziness. Previously on atenolol but didn't feel this helped, switched to propranolol. Helps somewhat. Tremor is in her left hand, present for about 8 years, around the same time she was diagnosed with graves disease. Whenever she is almost due for her next dose of propranolol, tremors are worse. She has not tried any other medications for the tremor. Only in the left hand, no tremor in the right hand. Gradual onset 8 years ago, noticeable after a few months. Head tremor, involuntary, " yes-yes. Tremor with posture and action, no tremor at rest. Had back surgery ( fusion from T10 to sacrum) 5 years ago in 2017, took a while for her to get balance back. Now able to stand on one leg but still with some difficulty. No falls. Denies shuffling. She doesn't think that she walks great. Tends to veer to the left or right with longer distances. Has a hard time keeping up with other people, this started after her back surgery.   Thinks head tremor stops whenever she lies down in bed.  notices it in certain positions. She has noticed that she walks with a wider based to keep her balance.     She had cervical MRI in 2018/2019.     If she turns to the left, gets vertigo. Improves with epley maneuver.     She has not noticed that tremor improves with etoh. Does not drink much.     Family History: grandmother had a head tremor, mother possibly had tremor in hands?     Neuroleptic Exposure: none     Nonmotor/Premotor ROS:  Anosmia: hyposmia -- attributes to allergies   Dysarthria/Hypophonia: more hoarse   Dysphagia/Sialorrhea: none   Depression: none   Cognitive slowing: infrequently forgets names   Hallucinations: none   Urinary changes: history of incontinence for many years (at least 7), follows with Dr. Glodberg  Constipation: none   Orthostasis:    Falls: none   Micrographia: none  Sleep issues:  -RBD: screams in sleep       Review of Systems:   Review of Systems   Constitutional:  Negative for chills, fever and malaise/fatigue.   HENT:  Negative for hearing loss.    Eyes:  Negative for blurred vision and double vision.   Respiratory:  Negative for cough, shortness of breath and stridor.    Cardiovascular:  Negative for chest pain and leg swelling.   Gastrointestinal:  Negative for constipation, diarrhea and nausea.   Genitourinary:  Negative for frequency and urgency.   Musculoskeletal:  Negative for falls.   Skin:  Negative for itching and rash.   Neurological:  Positive for tremors. Negative for  dizziness, loss of consciousness and weakness.   Psychiatric/Behavioral:  Negative for hallucinations and memory loss.          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    Social History: The patient  reports that she quit smoking about 37 years ago. Her smoking use included cigarettes. She has never used smokeless tobacco. She reports that she does not currently use alcohol after a past usage of about 2.0 standard drinks per week. She reports that she does not use drugs.    Family History: Their family history includes Colon cancer in her father; Hypothyroidism in her sister; Leukemia in her mother.    Allergies: Penicillins     Meds:   Current Outpatient Medications on File Prior to Visit   Medication Sig Dispense Refill    ALPRAZolam (XANAX) 0.5 MG tablet Take 1 tablet (0.5 mg total) by mouth daily as needed (severe anxiety). 30 tablet 0    ASCORBATE CALCIUM, VITAMIN C, ORAL Take 500 mg by mouth.      aspirin (ECOTRIN) 81 MG EC tablet Take 81 mg by mouth once daily.      azithromycin (Z-BEATRIZ) 250 MG tablet TAKE 2 TABLETS BY MOUTH TODAY, THEN TAKE 1 TABLET DAILY FOR 4 DAYS      azithromycin (ZITHROMAX) 500 MG tablet Take 500 mg by mouth once. for one dose      cetirizine-pseudoephedrine 5-120 mg Tb12 cetirizine 5 mg-pseudoephedrine  mg tablet,extended release,12hr   TK 1 T PO BID      cholecalciferol, vitamin D3, (VITAMIN D3) 50 mcg (2,000 unit) Tab Take 2,000 Units by mouth.      ciclopirox 0.77 % Gel Apply topically once daily. To thickened discolored toenails. (Patient taking differently: Apply topically daily as needed. To thickened discolored toenails.) 30 g 1    CMPD testosterone proprionate 2% in  vanicream Apply topically. "[facility]" [facility]      COMPOUND HORMONE REPLACEMENT Take by mouth once daily. ESTROGEN CREAM -- [facility]      conjugated estrogens (PREMARIN) vaginal cream Place 0.5 g vaginally once daily. 1 applicator 5    EPINEPHrine (EPIPEN) 0.3 mg/0.3 mL AtIn epinephrine 0.3 mg/0.3 mL injection, auto-injector      levoFLOXacin (LEVAQUIN) 500 MG tablet Take 500 mg by mouth.      MAGNESIUM CARBONATE ORAL Take 1 tablet by mouth once daily. 200MG      methIMAzole (TAPAZOLE) 5 MG Tab Take half a tablet daily 90 tablet 3    nitrofurantoin, macrocrystal-monohydrate, (MACROBID) 100 MG capsule Take 1 capsule (100 mg total) by mouth daily as needed (self cath). 30 capsule 0    omalizumab (XOLAIR) 75 mg/0.5 mL injection       PENNSAID 20 mg/gram /actuation(2 %) sopm APPLY 1 APPLICATION TOPICALLY 2 (TWO) TIMES DAILY AS NEEDED.::NOT COVERED:: 112 g 10    predniSONE (DELTASONE) 10 MG tablet Take by mouth.      progesterone (PROMETRIUM) 200 MG capsule Take 1 capsule by mouth 30-60 minutes before bed every night 90 capsule 0    promethazine-dextromethorphan (PROMETHAZINE-DM) 6.25-15 mg/5 mL Syrp SMARTSI Teaspoon By Mouth Every 12 Hours PRN      RENOVA 0.02 % Crea RENOVA 0.02 % TOPICAL CREAM APPLY TO AFFECTED AREA EVERY DAY AT NIGHT 40 g 0    rosuvastatin (CRESTOR) 40 MG Tab Take 1 tablet (40 mg total) by mouth once daily. 90 tablet 3    sars-cov-2, covid-19 omicron, (MODERNA COVID BIVAL,18Y UP,-PF) 50 mcg/0.5 mL injection Inject into the muscle. 0.5 mL 0    sulfamethoxazole-trimethoprim 800-160mg (BACTRIM DS) 800-160 mg Tab Take 1 tablet by mouth once daily.      urea 20 % Crea Apply 1 application topically once daily. To dry skin on the feet. 75 g 10    valACYclovir (VALTREX) 1000 MG tablet valacyclovir 1 gram tablet      XIIDRA 5 % Dpet       zolpidem (AMBIEN CR) 6.25 MG CR tablet Take 1 tablet (6.25 mg total) by mouth nightly as needed for Insomnia. 30 tablet 2    propranoloL (INDERAL LA) 120 MG 24  "hr capsule Take 1 capsule (120 mg total) by mouth once daily. for 10 days 10 capsule 0    topiramate (TOPAMAX) 25 MG tablet Take 1 tablet (25 mg total) by mouth once daily for 7 days, THEN 2 tablets (50 mg total) once daily. 67 tablet 3     No current facility-administered medications on file prior to visit.       Exam:  BP (!) 152/77 (BP Location: Left arm, Patient Position: Sitting, BP Method: Medium (Automatic))   Pulse 60   Ht 5' 4" (1.626 m)   Wt 53.1 kg (117 lb)   LMP  (LMP Unknown) Comment:    1996  BMI 20.08 kg/m²     Constitutional  Well-developed, well-nourished, appears stated age   Ophthalmoscopic  No papilledema with no hemorrhages or exudates bilaterally   Cardiovascular  Radial pulses 2+ and symmetric, no LE edema bilaterally   Neurological    * Mental status  MOCA =      - Orientation  Oriented to person, place, time, and situation     - Memory   Intact recent and remote     - Attention/concentration  Attentive, vigilant during exam     - Language  Naming & repetition intact, +2-step commands     - Fund of knowledge  Aware of current events     - Executive  Well-organized thoughts     - Other     * Cranial nerves       - CN II  PERRL, visual fields full to confrontation     - CN III, IV, VI  Extraocular movements full, normal pursuits and saccades   Overshoot with horizontal pursuits with left gaze      - CN V  Sensation V1 - V3 intact     - CN VII  Face strong and symmetric bilaterally     - CN VIII  Hearing intact bilaterally     - CN IX, X  Palate raises midline and symmetric     - CN XI  SCM and trapezius 5/5 bilaterally     - CN XII  Tongue midline   * Motor  Muscle bulk normal, strength 5/5 throughout   * Sensory   Intact to temperature    * Coordination  Dysmetria with finger to nose and heel-to-shin on the L    * Gait  See below.   * Deep tendon reflexes  3+ and symmetric throughout   Abnormal jaw jerk    Left > R gomes    Babinski equivocal on the R, possibly upgoing on the R?    * " Specialized movement exam  No hypophonic speech.    No facial masking.   No cogwheel rigidity.     No bradykinesia.   Resting tremor of L hand, dystonic posturing    Tremor worse in L hand in wing-beating position    Overshoot with L hand    Intermittent tremor of R pinky as well    No other dystonia, chorea, athetosis, myoclonus, or tics.   No motor impersistence.   No shortened stride length.   Spastic/ataxic gait? L foot inversion, some circumduction, some improvement with walking backwards ? Dystonic?    No postural instability.   Yes-yes head tremor, titubation      Laboratory/Radiological:  - Results:  No visits with results within 3 Month(s) from this visit.   Latest known visit with results is:   Patient Outreach on 11/07/2022   Component Date Value Ref Range Status    CRC Recommendation External 11/03/2022 Repeat colonoscopy in 3 years   Final       - Independent review of images:      - Independent review of consultant's notes: Lety    ASSESSMENT/PLAN:  Tremor   - currently on propranolol 120 mg XR BID with minimal to mild improvement in tremor (would expect significantly more improvement with this high of a dose of propranolol)  - did not tolerate primidone   - some dystonic component -- dystonic head tremor, mild dystonic posturing of L hand and inversion of L foot. Asymmetric tremor, no MRI findings to indicate cerebellar or thalamic outflow tremor -- could consider dystonic tremor med, amantadine vs baclofen   - failed topamax, primidone and gabapentin   - no new meds       2. Gait abnormality  - has been attributed to lumbar DDD in the past however gait is not classic of lumbar DDD  - PT        3. Cervical spinal stenosis   - continue to monitor gait changes   - PT now              The patient has a documented history of hypertension, hyperlipidemia and/or hypercholesteremia with long-term complications such as cerebrovascular disease, peripheral vascular disease, and/or aortic atherosclerosis.  Collectively these risk factors may contribute to cerebral atherosclerosis, and cerebral hypoperfusion compounded neurocognitive disorder. Rec'd maximizing cerebrovascular-related medical therapy, including but not limited to cholesterol medications and antiplatelet agents. Rec'd controlling vascular risk factors like hypertension, hyperlipidemia, and Diabetes SBP<130, LDL<100, and A1C<7.0. Rec'd optimizing lifestyle choices, including a heart-healthy diet (e.g., Mediterranean or DASH), increased cardiovascular exercise (goal 150 minutes of moderate-intensity per week), and staying cognitively and socially active.        Follow up: 3-4 months with [facility]     Collaborating Physician, Dr. Shin, was available during today's encounter. Any change to plan along with cosign to appear in the EMR.       Total time spent with the patient: 30 minutes.  20 minutes of face-to-face consultation and 10 minutes of chart review and coordination of care, on the day of the visit. This includes face to face time and non-face to face time preparing to see the patient (eg, review of tests), obtaining and/or reviewing separately obtained history, documenting clinical information in the electronic or other health record, independently interpreting resultsand communicating results to the patient/family/caregiver, or care coordination.         Nydia Roth PA-C   [facility]  Department of Neurology  Movement Disorders

## 2023-04-18 ENCOUNTER — OFFICE VISIT (OUTPATIENT)
Dept: PODIATRY | Facility: CLINIC | Age: 78
End: 2023-04-18
Payer: MEDICARE

## 2023-04-18 VITALS
WEIGHT: 117 LBS | DIASTOLIC BLOOD PRESSURE: 62 MMHG | SYSTOLIC BLOOD PRESSURE: 145 MMHG | HEIGHT: 64 IN | BODY MASS INDEX: 19.97 KG/M2 | HEART RATE: 89 BPM

## 2023-04-18 DIAGNOSIS — L84 PRE-ULCERATIVE CORN OR CALLOUS: ICD-10-CM

## 2023-04-18 DIAGNOSIS — Z98.890 HISTORY OF FOOT SURGERY: ICD-10-CM

## 2023-04-18 DIAGNOSIS — M20.42 HAMMER TOE OF LEFT FOOT: Primary | ICD-10-CM

## 2023-04-18 PROCEDURE — 99999 PR PBB SHADOW E&M-EST. PATIENT-LVL IV: ICD-10-PCS | Mod: PBBFAC,,, | Performed by: PODIATRIST

## 2023-04-18 PROCEDURE — 99214 OFFICE O/P EST MOD 30 MIN: CPT | Mod: PBBFAC,PN | Performed by: PODIATRIST

## 2023-04-18 PROCEDURE — 99213 PR OFFICE/OUTPT VISIT, EST, LEVL III, 20-29 MIN: ICD-10-PCS | Mod: S$PBB,,, | Performed by: PODIATRIST

## 2023-04-18 PROCEDURE — 99213 OFFICE O/P EST LOW 20 MIN: CPT | Mod: S$PBB,,, | Performed by: PODIATRIST

## 2023-04-18 PROCEDURE — 99999 PR PBB SHADOW E&M-EST. PATIENT-LVL IV: CPT | Mod: PBBFAC,,, | Performed by: PODIATRIST

## 2023-04-18 NOTE — PROGRESS NOTES
PODIATRY NOTE       CHIEF CONCERN:   Foot Problem            MEDICAL DECISION MAKING:          Problem List Items Addressed This Visit    None  Visit Diagnoses       Hammer toe of left foot    -  Primary    History of foot surgery        Pre-ulcerative corn or callous                  I counseled the patient on the patient's conditions, their implications and medical management.    Shoe and activity modification as needed for relief.  Extra depth shoes to accommodate hammertoes.   Continue urea cream daily to calluses.   Offloading pads as needed.     No barefoot walking.   Follow up six weeks or sooner if concerned.         I spent a total of 20 minutes on the day of the visit.  This includes face to face time and non-face to face time preparing to see the patient (eg, review of tests), obtaining and/or reviewing separately obtained history, documenting clinical information in the electronic or other health record, independently interpreting results and communicating results to the patient/family/caregiver, or care coordinator.                HISTORY OF PRESENT ILLNESS:  Sarah Rico is a 77 y.o. female, with history of CKD,  with concerns regarding: painful calluses sub metatarsal heads, chronic.       She has history of bunionectomy bilateral.   Two surgeries on the right and one left foot surgery in [place]        PCP:  Latrice Davidson MD  Last encounter:  6/24/2022;               Patient Active Problem List   Diagnosis    Stress incontinence of urine    Voiding dysfunction    Rectocele, female    Chronic constipation    Vaginal atrophy    Graves' disease    Stage 3a chronic kidney disease    Hyperlipidemia    Aortic valve stenosis    Essential tremor    Essential hypertension    Insomnia    S/P TAVR (transcatheter aortic valve replacement)    Lumbar radiculopathy    S/P hip replacement    Pelvic floor weakness    Decreased functional mobility and endurance    Disorder of muscle    Preoperative  cardiovascular examination    Status post anterior & posterior colporrhaphy w/ MUS 11/1    Incomplete bladder emptying    Pelvic floor tension    Pelvic floor dysfunction    Chronic pansinusitis    Atypical lobular hyperplasia (ALH) of right breast    Dyspareunia due to medical condition in female    Vertigo    BPPV (benign paroxysmal positional vertigo)    Aortic atherosclerosis    Cervical spinal stenosis    Ataxia           Current Outpatient Medications on File Prior to Visit   Medication Sig Dispense Refill    ALPRAZolam (XANAX) 0.5 MG tablet Take 1 tablet (0.5 mg total) by mouth daily as needed (severe anxiety). 30 tablet 0    ASCORBATE CALCIUM, VITAMIN C, ORAL Take 500 mg by mouth.      aspirin (ECOTRIN) 81 MG EC tablet Take 81 mg by mouth once daily.      azithromycin (Z-BEATRIZ) 250 MG tablet TAKE 2 TABLETS BY MOUTH TODAY, THEN TAKE 1 TABLET DAILY FOR 4 DAYS      azithromycin (ZITHROMAX) 500 MG tablet Take 500 mg by mouth once. for one dose      cetirizine-pseudoephedrine 5-120 mg Tb12 cetirizine 5 mg-pseudoephedrine  mg tablet,extended release,12hr   TK 1 T PO BID      cholecalciferol, vitamin D3, (VITAMIN D3) 50 mcg (2,000 unit) Tab Take 2,000 Units by mouth.      ciclopirox 0.77 % Gel Apply topically once daily. To thickened discolored toenails. (Patient taking differently: Apply topically daily as needed. To thickened discolored toenails.) 30 g 1    CMPD testosterone proprionate 2% in vanicream Apply topically. [facility]      COMPOUND HORMONE REPLACEMENT Take by mouth once daily. ESTROGEN CREAM -- [facility]      conjugated estrogens (PREMARIN) vaginal cream Place 0.5 g vaginally once daily. 1 applicator 5    EPINEPHrine (EPIPEN) 0.3 mg/0.3 mL AtIn epinephrine 0.3 mg/0.3 mL injection, auto-injector      levoFLOXacin (LEVAQUIN) 500 MG tablet Take 500 mg by mouth.      MAGNESIUM CARBONATE ORAL Take 1 tablet by mouth once daily. 200MG      methIMAzole (TAPAZOLE) 5 MG Tab Take half a  tablet daily 90 tablet 3    nitrofurantoin, macrocrystal-monohydrate, (MACROBID) 100 MG capsule Take 1 capsule (100 mg total) by mouth daily as needed (self cath). 30 capsule 0    omalizumab (XOLAIR) 75 mg/0.5 mL injection       PENNSAID 20 mg/gram /actuation(2 %) sopm APPLY 1 APPLICATION TOPICALLY 2 (TWO) TIMES DAILY AS NEEDED.::NOT COVERED:: 112 g 10    predniSONE (DELTASONE) 10 MG tablet Take by mouth.      progesterone (PROMETRIUM) 200 MG capsule Take 1 capsule by mouth 30-60 minutes before bed every night 90 capsule 0    promethazine-dextromethorphan (PROMETHAZINE-DM) 6.25-15 mg/5 mL Syrp SMARTSI Teaspoon By Mouth Every 12 Hours PRN      RENOVA 0.02 % Crea RENOVA 0.02 % TOPICAL CREAM APPLY TO AFFECTED AREA EVERY DAY AT NIGHT 40 g 0    rosuvastatin (CRESTOR) 40 MG Tab Take 1 tablet (40 mg total) by mouth once daily. 90 tablet 3    sars-cov-2, covid-19 omicron, (MODERNA COVID BIVAL,18Y UP,-PF) 50 mcg/0.5 mL injection Inject into the muscle. 0.5 mL 0    sulfamethoxazole-trimethoprim 800-160mg (BACTRIM DS) 800-160 mg Tab Take 1 tablet by mouth once daily.      urea 20 % Crea Apply 1 application topically once daily. To dry skin on the feet. 75 g 10    valACYclovir (VALTREX) 1000 MG tablet valacyclovir 1 gram tablet      XIIDRA 5 % Dpet       zolpidem (AMBIEN CR) 6.25 MG CR tablet Take 1 tablet (6.25 mg total) by mouth nightly as needed for Insomnia. 30 tablet 2    propranoloL (INDERAL LA) 120 MG 24 hr capsule Take 1 capsule (120 mg total) by mouth once daily. for 10 days 10 capsule 0    topiramate (TOPAMAX) 25 MG tablet Take 1 tablet (25 mg total) by mouth once daily for 7 days, THEN 2 tablets (50 mg total) once daily. 67 tablet 3     No current facility-administered medications on file prior to visit.           Review of patient's allergies indicates:   Allergen Reactions    Penicillins Rash and Hives               ROS:   General ROS: negative for - chills, fever or night sweats  Respiratory ROS: no cough,  "shortness of breath, or wheezing  Cardiovascular ROS: no chest pain or dyspnea on exertion  Musculoskeletal ROS: positive for - pain in foot - bilateral  Neurological ROS: negative for - impaired coordination/balance and numbness/tingling  Dermatological ROS: negative for pruritus and rash      EXAM:     Vitals:    04/18/23 1018   BP: (!) 145/62   Pulse: 89   Weight: 53.1 kg (117 lb)   Height: 5' 4" (1.626 m)            General:  Alert and Oriented x 3;  No acute distress      Bilateral  Lower extremity exam:    Vascular:   Dorsalis Pedis:  present   Posterior Tibial:  present  Capillary refill time:  3 seconds  Temperature of toes cool to touch  Edema:  Trace and non-pitting       Neurological:     Sharp touch:  normal  Light touch: normal  Tinels Sign:  Absent  Mulders Click:   Absent        Dermatological:   Skin: thin, moist and appropriate for age; calluses sub metatarsal heads  Wounds/Ulcers:  Absent  Bruising:  Absent  Erythema:  Absent  Toenails elongated.   right 4th toenail thickened and incurvated without paronychia.         Musculoskeletal:   Metatarsophalangeal range of motion:   diminished range of motion  Subtalar joint range of motion: full range of motion  Ankle joint range of motion:  full range of motion  Bunions:  Present  Hammertoes: Present          "

## 2023-05-09 DIAGNOSIS — Z78.0 MENOPAUSE: ICD-10-CM

## 2023-05-09 RX ORDER — PROGESTERONE 200 MG/1
CAPSULE ORAL
Qty: 90 CAPSULE | Refills: 0 | OUTPATIENT
Start: 2023-05-09

## 2023-05-30 ENCOUNTER — OFFICE VISIT (OUTPATIENT)
Dept: PODIATRY | Facility: CLINIC | Age: 78
End: 2023-05-30
Payer: MEDICARE

## 2023-05-30 VITALS
WEIGHT: 117 LBS | BODY MASS INDEX: 19.97 KG/M2 | HEIGHT: 64 IN | DIASTOLIC BLOOD PRESSURE: 78 MMHG | SYSTOLIC BLOOD PRESSURE: 143 MMHG | HEART RATE: 61 BPM

## 2023-05-30 DIAGNOSIS — Z98.890 HISTORY OF FOOT SURGERY: Primary | ICD-10-CM

## 2023-05-30 DIAGNOSIS — L84 PRE-ULCERATIVE CORN OR CALLOUS: ICD-10-CM

## 2023-05-30 DIAGNOSIS — M77.41 METATARSALGIA OF BOTH FEET: ICD-10-CM

## 2023-05-30 DIAGNOSIS — N18.31 STAGE 3A CHRONIC KIDNEY DISEASE: ICD-10-CM

## 2023-05-30 DIAGNOSIS — M77.42 METATARSALGIA OF BOTH FEET: ICD-10-CM

## 2023-05-30 PROCEDURE — 99999 PR PBB SHADOW E&M-EST. PATIENT-LVL IV: CPT | Mod: PBBFAC,,, | Performed by: PODIATRIST

## 2023-05-30 PROCEDURE — 99213 PR OFFICE/OUTPT VISIT, EST, LEVL III, 20-29 MIN: ICD-10-PCS | Mod: S$PBB,,, | Performed by: PODIATRIST

## 2023-05-30 PROCEDURE — 99213 OFFICE O/P EST LOW 20 MIN: CPT | Mod: S$PBB,,, | Performed by: PODIATRIST

## 2023-05-30 PROCEDURE — 99214 OFFICE O/P EST MOD 30 MIN: CPT | Mod: PBBFAC,PN | Performed by: PODIATRIST

## 2023-05-30 PROCEDURE — 99999 PR PBB SHADOW E&M-EST. PATIENT-LVL IV: ICD-10-PCS | Mod: PBBFAC,,, | Performed by: PODIATRIST

## 2023-05-30 NOTE — PROGRESS NOTES
PODIATRY NOTE         CHIEF CONCERN:   Routine Foot Care (Foot Exam/PCP Nydia Roth PA-C/Cardiology 01/25/23)                MEDICAL DECISION MAKING:          Problem List Items Addressed This Visit       Stage 3a chronic kidney disease     Other Visit Diagnoses       History of foot surgery    -  Primary    Pre-ulcerative corn or callous        Metatarsalgia of both feet                    I counseled the patient on the patient's conditions, their implications and medical management.    Shoe and activity modification as needed for relief.  Extra depth shoes to accommodate hammertoes.   Continue urea cream daily to calluses.   Offloading pads as needed.     No barefoot walking.   Follow up six weeks or sooner if concerned.         I spent a total of 20 minutes on the day of the visit.  This includes face to face time and non-face to face time preparing to see the patient (eg, review of tests), obtaining and/or reviewing separately obtained history, documenting clinical information in the electronic or other health record, independently interpreting results and communicating results to the patient/family/caregiver, or care coordinator.                HISTORY OF PRESENT ILLNESS:  Sarah Rico is a 77 y.o. female, with history of CKD,  with concerns regarding: painful calluses sub metatarsal heads, chronic.       She has history of bunionectomy bilateral.   Two surgeries on the right and one left foot surgery in [place]        PCP:  Latrice Davidson MD  Last encounter:  6/24/2022;               Patient Active Problem List   Diagnosis    Stress incontinence of urine    Voiding dysfunction    Rectocele, female    Chronic constipation    Vaginal atrophy    Graves' disease    Stage 3a chronic kidney disease    Hyperlipidemia    Aortic valve stenosis    Essential tremor    Essential hypertension    Insomnia    S/P TAVR (transcatheter aortic valve replacement)    Lumbar radiculopathy    S/P hip replacement     Pelvic floor weakness    Decreased functional mobility and endurance    Disorder of muscle    Preoperative cardiovascular examination    Status post anterior & posterior colporrhaphy w/ MUS 11/1    Incomplete bladder emptying    Pelvic floor tension    Pelvic floor dysfunction    Chronic pansinusitis    Atypical lobular hyperplasia (ALH) of right breast    Dyspareunia due to medical condition in female    Vertigo    BPPV (benign paroxysmal positional vertigo)    Aortic atherosclerosis    Cervical spinal stenosis    Ataxia           Current Outpatient Medications on File Prior to Visit   Medication Sig Dispense Refill    ALPRAZolam (XANAX) 0.5 MG tablet Take 1 tablet (0.5 mg total) by mouth daily as needed (severe anxiety). 30 tablet 0    ASCORBATE CALCIUM, VITAMIN C, ORAL Take 500 mg by mouth.      aspirin (ECOTRIN) 81 MG EC tablet Take 81 mg by mouth once daily.      azithromycin (Z-BEATRIZ) 250 MG tablet TAKE 2 TABLETS BY MOUTH TODAY, THEN TAKE 1 TABLET DAILY FOR 4 DAYS      azithromycin (ZITHROMAX) 500 MG tablet Take 500 mg by mouth once. for one dose      cetirizine-pseudoephedrine 5-120 mg Tb12 cetirizine 5 mg-pseudoephedrine  mg tablet,extended release,12hr   TK 1 T PO BID      cholecalciferol, vitamin D3, (VITAMIN D3) 50 mcg (2,000 unit) Tab Take 2,000 Units by mouth.      ciclopirox 0.77 % Gel Apply topically once daily. To thickened discolored toenails. (Patient taking differently: Apply topically daily as needed. To thickened discolored toenails.) 30 g 1    CMPD testosterone proprionate 2% in vanicream Apply topically. [facility]      COMPOUND HORMONE REPLACEMENT Take by mouth once daily. ESTROGEN CREAM -- [facility]      conjugated estrogens (PREMARIN) vaginal cream Place 0.5 g vaginally once daily. 1 applicator 5    EPINEPHrine (EPIPEN) 0.3 mg/0.3 mL AtIn epinephrine 0.3 mg/0.3 mL injection, auto-injector      levoFLOXacin (LEVAQUIN) 500 MG tablet Take 500 mg by mouth.      MAGNESIUM  CARBONATE ORAL Take 1 tablet by mouth once daily. 200MG      methIMAzole (TAPAZOLE) 5 MG Tab Take half a tablet daily 90 tablet 3    nitrofurantoin, macrocrystal-monohydrate, (MACROBID) 100 MG capsule Take 1 capsule (100 mg total) by mouth daily as needed (self cath). 30 capsule 0    omalizumab (XOLAIR) 75 mg/0.5 mL injection       PENNSAID 20 mg/gram /actuation(2 %) sopm APPLY 1 APPLICATION TOPICALLY 2 (TWO) TIMES DAILY AS NEEDED.::NOT COVERED:: 112 g 10    predniSONE (DELTASONE) 10 MG tablet Take by mouth.      progesterone (PROMETRIUM) 200 MG capsule Take 1 capsule by mouth 30-60 minutes before bed every night 90 capsule 0    promethazine-dextromethorphan (PROMETHAZINE-DM) 6.25-15 mg/5 mL Syrp SMARTSI Teaspoon By Mouth Every 12 Hours PRN      RENOVA 0.02 % Crea RENOVA 0.02 % TOPICAL CREAM APPLY TO AFFECTED AREA EVERY DAY AT NIGHT 40 g 0    rosuvastatin (CRESTOR) 40 MG Tab Take 1 tablet (40 mg total) by mouth once daily. 90 tablet 3    sars-cov-2, covid-19 omicron, (MODERNA COVID BIVAL,18Y UP,-PF) 50 mcg/0.5 mL injection Inject into the muscle. 0.5 mL 0    sulfamethoxazole-trimethoprim 800-160mg (BACTRIM DS) 800-160 mg Tab Take 1 tablet by mouth once daily.      urea 20 % Crea Apply 1 application topically once daily. To dry skin on the feet. 75 g 10    valACYclovir (VALTREX) 1000 MG tablet valacyclovir 1 gram tablet      XIIDRA 5 % Dpet       zolpidem (AMBIEN CR) 6.25 MG CR tablet Take 1 tablet (6.25 mg total) by mouth nightly as needed for Insomnia. 30 tablet 2    propranoloL (INDERAL LA) 120 MG 24 hr capsule Take 1 capsule (120 mg total) by mouth once daily. for 10 days 10 capsule 0    topiramate (TOPAMAX) 25 MG tablet Take 1 tablet (25 mg total) by mouth once daily for 7 days, THEN 2 tablets (50 mg total) once daily. 67 tablet 3     No current facility-administered medications on file prior to visit.           Review of patient's allergies indicates:   Allergen Reactions    Penicillins Rash and Hives  "              ROS:   General ROS: negative for - chills, fever or night sweats  Respiratory ROS: no cough, shortness of breath, or wheezing  Cardiovascular ROS: no chest pain or dyspnea on exertion  Musculoskeletal ROS: positive for - pain in foot - bilateral  Neurological ROS: negative for - impaired coordination/balance and numbness/tingling  Dermatological ROS: negative for pruritus and rash      EXAM:     Vitals:    05/30/23 1114   BP: (!) 143/78   Pulse: 61   Weight: 53.1 kg (117 lb)   Height: 5' 4" (1.626 m)            General:  Alert and Oriented x 3;  No acute distress      Bilateral  Lower extremity exam:    Vascular:   Dorsalis Pedis:  present   Posterior Tibial:  present  Capillary refill time:  3 seconds  Temperature of toes cool to touch  Edema:  Trace and non-pitting       Neurological:     Sharp touch:  normal  Light touch: normal  Tinels Sign:  Absent  Mulders Click:   Absent        Dermatological:   Skin: thin, moist and appropriate for age; calluses sub metatarsal heads  Wounds/Ulcers:  Absent  Bruising:  Absent  Erythema:  Absent  Toenails elongated.   right 4th toenail thickened and incurvated without paronychia.         Musculoskeletal:   Metatarsophalangeal range of motion:   diminished range of motion  Subtalar joint range of motion: full range of motion  Ankle joint range of motion:  full range of motion  Bunions:  Present  Hammertoes: Present          "

## 2023-07-05 ENCOUNTER — PES CALL (OUTPATIENT)
Dept: ADMINISTRATIVE | Facility: CLINIC | Age: 78
End: 2023-07-05
Payer: MEDICARE

## 2023-07-17 ENCOUNTER — OFFICE VISIT (OUTPATIENT)
Dept: PODIATRY | Facility: CLINIC | Age: 78
End: 2023-07-17
Payer: MEDICARE

## 2023-07-17 VITALS
HEIGHT: 64 IN | DIASTOLIC BLOOD PRESSURE: 75 MMHG | WEIGHT: 117 LBS | SYSTOLIC BLOOD PRESSURE: 146 MMHG | BODY MASS INDEX: 19.97 KG/M2 | HEART RATE: 62 BPM

## 2023-07-17 DIAGNOSIS — M77.42 METATARSALGIA OF BOTH FEET: Primary | ICD-10-CM

## 2023-07-17 DIAGNOSIS — L84 PRE-ULCERATIVE CORN OR CALLOUS: ICD-10-CM

## 2023-07-17 DIAGNOSIS — Z98.890 HISTORY OF FOOT SURGERY: ICD-10-CM

## 2023-07-17 DIAGNOSIS — M77.41 METATARSALGIA OF BOTH FEET: Primary | ICD-10-CM

## 2023-07-17 PROCEDURE — 99213 PR OFFICE/OUTPT VISIT, EST, LEVL III, 20-29 MIN: ICD-10-PCS | Mod: S$PBB,,, | Performed by: PODIATRIST

## 2023-07-17 PROCEDURE — 99999 PR PBB SHADOW E&M-EST. PATIENT-LVL IV: CPT | Mod: PBBFAC,,, | Performed by: PODIATRIST

## 2023-07-17 PROCEDURE — 99214 OFFICE O/P EST MOD 30 MIN: CPT | Mod: PBBFAC,PN | Performed by: PODIATRIST

## 2023-07-17 PROCEDURE — 99213 OFFICE O/P EST LOW 20 MIN: CPT | Mod: S$PBB,,, | Performed by: PODIATRIST

## 2023-07-17 PROCEDURE — 99999 PR PBB SHADOW E&M-EST. PATIENT-LVL IV: ICD-10-PCS | Mod: PBBFAC,,, | Performed by: PODIATRIST

## 2023-07-17 NOTE — PROGRESS NOTES
PODIATRY NOTE         CHIEF CONCERN:   Routine Foot Care            MEDICAL DECISION MAKING:          Problem List Items Addressed This Visit    None  Visit Diagnoses       Metatarsalgia of both feet    -  Primary    History of foot surgery        Pre-ulcerative corn or callous                      I counseled the patient on the patient's conditions, their implications and medical management.    Shoe and activity modification as needed for relief.  Extra depth shoes to accommodate hammertoes.  Will try hammertoe padding.   Continue urea cream daily to calluses.   Offloading pads as needed.     No barefoot walking.   Follow up six weeks or sooner if concerned.         I spent a total of 20 minutes on the day of the visit.  This includes face to face time and non-face to face time preparing to see the patient (eg, review of tests), obtaining and/or reviewing separately obtained history, documenting clinical information in the electronic or other health record, independently interpreting results and communicating results to the patient/family/caregiver, or care coordinator.                HISTORY OF PRESENT ILLNESS:  Sarah Rico is a 77 y.o. female, with history of CKD,  with concerns regarding: painful calluses sub metatarsal heads, chronic.       She has history of bunionectomy bilateral.   Two surgeries on the right and one left foot surgery in [place]        PCP:  Latrice Davidson MD  Last encounter:  6/24/2022;               Patient Active Problem List   Diagnosis    Stress incontinence of urine    Voiding dysfunction    Rectocele, female    Chronic constipation    Vaginal atrophy    Graves' disease    Stage 3a chronic kidney disease    Hyperlipidemia    Aortic valve stenosis    Essential tremor    Essential hypertension    Insomnia    S/P TAVR (transcatheter aortic valve replacement)    Lumbar radiculopathy    S/P hip replacement    Pelvic floor weakness    Decreased functional mobility and endurance     Disorder of muscle    Preoperative cardiovascular examination    Status post anterior & posterior colporrhaphy w/ MUS 11/1    Incomplete bladder emptying    Pelvic floor tension    Pelvic floor dysfunction    Chronic pansinusitis    Atypical lobular hyperplasia (ALH) of right breast    Dyspareunia due to medical condition in female    Vertigo    BPPV (benign paroxysmal positional vertigo)    Aortic atherosclerosis    Cervical spinal stenosis    Ataxia           Current Outpatient Medications on File Prior to Visit   Medication Sig Dispense Refill    ALPRAZolam (XANAX) 0.5 MG tablet Take 1 tablet (0.5 mg total) by mouth daily as needed (severe anxiety). 30 tablet 0    ASCORBATE CALCIUM, VITAMIN C, ORAL Take 500 mg by mouth.      aspirin (ECOTRIN) 81 MG EC tablet Take 81 mg by mouth once daily.      azithromycin (Z-BEATRIZ) 250 MG tablet TAKE 2 TABLETS BY MOUTH TODAY, THEN TAKE 1 TABLET DAILY FOR 4 DAYS      azithromycin (ZITHROMAX) 500 MG tablet Take 500 mg by mouth once. for one dose      cetirizine-pseudoephedrine 5-120 mg Tb12 cetirizine 5 mg-pseudoephedrine  mg tablet,extended release,12hr   TK 1 T PO BID      cholecalciferol, vitamin D3, (VITAMIN D3) 50 mcg (2,000 unit) Tab Take 2,000 Units by mouth.      ciclopirox 0.77 % Gel Apply topically once daily. To thickened discolored toenails. (Patient taking differently: Apply topically daily as needed. To thickened discolored toenails.) 30 g 1    CMPD testosterone proprionate 2% in vanicream Apply topically. [facility]      COMPOUND HORMONE REPLACEMENT Take by mouth once daily. ESTROGEN CREAM -- [facility]      conjugated estrogens (PREMARIN) vaginal cream Place 0.5 g vaginally once daily. 1 applicator 5    EPINEPHrine (EPIPEN) 0.3 mg/0.3 mL AtIn epinephrine 0.3 mg/0.3 mL injection, auto-injector      levoFLOXacin (LEVAQUIN) 500 MG tablet Take 500 mg by mouth.      MAGNESIUM CARBONATE ORAL Take 1 tablet by mouth once daily. 200MG      methIMAzole  (TAPAZOLE) 5 MG Tab Take half a tablet daily 90 tablet 3    nitrofurantoin, macrocrystal-monohydrate, (MACROBID) 100 MG capsule Take 1 capsule (100 mg total) by mouth daily as needed (self cath). 30 capsule 0    omalizumab (XOLAIR) 75 mg/0.5 mL injection       PENNSAID 20 mg/gram /actuation(2 %) sopm APPLY 1 APPLICATION TOPICALLY 2 (TWO) TIMES DAILY AS NEEDED.::NOT COVERED:: 112 g 10    predniSONE (DELTASONE) 10 MG tablet Take by mouth.      progesterone (PROMETRIUM) 200 MG capsule Take 1 capsule by mouth 30-60 minutes before bed every night 90 capsule 0    promethazine-dextromethorphan (PROMETHAZINE-DM) 6.25-15 mg/5 mL Syrp SMARTSI Teaspoon By Mouth Every 12 Hours PRN      RENOVA 0.02 % Crea RENOVA 0.02 % TOPICAL CREAM APPLY TO AFFECTED AREA EVERY DAY AT NIGHT 40 g 0    rosuvastatin (CRESTOR) 40 MG Tab Take 1 tablet (40 mg total) by mouth once daily. 90 tablet 3    sars-cov-2, covid-19 omicron, (MODERNA COVID BIVAL,18Y UP,-PF) 50 mcg/0.5 mL injection Inject into the muscle. 0.5 mL 0    sulfamethoxazole-trimethoprim 800-160mg (BACTRIM DS) 800-160 mg Tab Take 1 tablet by mouth once daily.      urea 20 % Crea Apply 1 application topically once daily. To dry skin on the feet. 75 g 10    valACYclovir (VALTREX) 1000 MG tablet valacyclovir 1 gram tablet      XIIDRA 5 % Dpet       zolpidem (AMBIEN CR) 6.25 MG CR tablet Take 1 tablet (6.25 mg total) by mouth nightly as needed for Insomnia. 30 tablet 2    propranoloL (INDERAL LA) 120 MG 24 hr capsule Take 1 capsule (120 mg total) by mouth once daily. for 10 days 10 capsule 0    topiramate (TOPAMAX) 25 MG tablet Take 1 tablet (25 mg total) by mouth once daily for 7 days, THEN 2 tablets (50 mg total) once daily. 67 tablet 3     No current facility-administered medications on file prior to visit.           Review of patient's allergies indicates:   Allergen Reactions    Penicillins Rash and Hives               ROS:   General ROS: negative for - chills, fever or night  "sweats  Respiratory ROS: no cough, shortness of breath, or wheezing  Cardiovascular ROS: no chest pain or dyspnea on exertion  Musculoskeletal ROS: positive for - pain in foot - bilateral  Neurological ROS: negative for - impaired coordination/balance and numbness/tingling  Dermatological ROS: negative for pruritus and rash      EXAM:     Vitals:    07/17/23 1517   BP: (!) 146/75   Pulse: 62   Weight: 53.1 kg (117 lb)   Height: 5' 4" (1.626 m)            General:  Alert and Oriented x 3;  No acute distress      Bilateral  Lower extremity exam:    Vascular:   Dorsalis Pedis:  present   Posterior Tibial:  present  Capillary refill time:  3 seconds  Temperature of toes cool to touch  Edema:  Trace and non-pitting       Neurological:     Sharp touch:  normal  Light touch: normal  Tinels Sign:  Absent  Mulders Click:   Absent        Dermatological:   Skin: thin, moist and appropriate for age; calluses sub metatarsal heads  Wounds/Ulcers:  Absent  Bruising:  Absent  Erythema:  Absent        Musculoskeletal:   Metatarsophalangeal range of motion:   diminished range of motion  Subtalar joint range of motion: full range of motion  Ankle joint range of motion:  full range of motion  Bunions:  Present  Hammertoes: Present; left 2nd mainly          "

## 2023-08-07 ENCOUNTER — OFFICE VISIT (OUTPATIENT)
Dept: OBSTETRICS AND GYNECOLOGY | Facility: CLINIC | Age: 78
End: 2023-08-07
Attending: OBSTETRICS & GYNECOLOGY
Payer: MEDICARE

## 2023-08-07 VITALS
BODY MASS INDEX: 20.38 KG/M2 | HEIGHT: 64 IN | SYSTOLIC BLOOD PRESSURE: 131 MMHG | DIASTOLIC BLOOD PRESSURE: 80 MMHG | WEIGHT: 119.38 LBS

## 2023-08-07 DIAGNOSIS — N95.1 MENOPAUSAL SYMPTOM: ICD-10-CM

## 2023-08-07 DIAGNOSIS — Z12.4 SCREENING FOR MALIGNANT NEOPLASM OF THE CERVIX: ICD-10-CM

## 2023-08-07 DIAGNOSIS — Z78.0 MENOPAUSE: ICD-10-CM

## 2023-08-07 DIAGNOSIS — Z01.419 ENCOUNTER FOR GYNECOLOGICAL EXAMINATION WITHOUT ABNORMAL FINDING: Primary | ICD-10-CM

## 2023-08-07 PROCEDURE — 99999 PR PBB SHADOW E&M-EST. PATIENT-LVL II: ICD-10-PCS | Mod: PBBFAC,,, | Performed by: OBSTETRICS & GYNECOLOGY

## 2023-08-07 PROCEDURE — 88141 CYTOPATH C/V INTERPRET: CPT | Mod: ,,, | Performed by: PATHOLOGY

## 2023-08-07 PROCEDURE — 87624 HPV HI-RISK TYP POOLED RSLT: CPT | Performed by: OBSTETRICS & GYNECOLOGY

## 2023-08-07 PROCEDURE — 99212 OFFICE O/P EST SF 10 MIN: CPT | Mod: PBBFAC,25 | Performed by: OBSTETRICS & GYNECOLOGY

## 2023-08-07 PROCEDURE — 88141 PR  CYTOPATH CERV/VAG INTERPRET: ICD-10-PCS | Mod: ,,, | Performed by: PATHOLOGY

## 2023-08-07 PROCEDURE — G0101 CA SCREEN;PELVIC/BREAST EXAM: HCPCS | Mod: PBBFAC | Performed by: OBSTETRICS & GYNECOLOGY

## 2023-08-07 PROCEDURE — G0101 PR CA SCREEN;PELVIC/BREAST EXAM: ICD-10-PCS | Mod: S$PBB,GZ,, | Performed by: OBSTETRICS & GYNECOLOGY

## 2023-08-07 PROCEDURE — 99999 PR PBB SHADOW E&M-EST. PATIENT-LVL II: CPT | Mod: PBBFAC,,, | Performed by: OBSTETRICS & GYNECOLOGY

## 2023-08-07 PROCEDURE — G0101 CA SCREEN;PELVIC/BREAST EXAM: HCPCS | Mod: S$PBB,GZ,, | Performed by: OBSTETRICS & GYNECOLOGY

## 2023-08-07 PROCEDURE — 88175 CYTOPATH C/V AUTO FLUID REDO: CPT | Performed by: PATHOLOGY

## 2023-08-07 RX ORDER — ESTRADIOL 1 MG/1
1 TABLET ORAL EVERY MORNING
COMMUNITY
Start: 2023-07-17 | End: 2023-08-07 | Stop reason: SDUPTHER

## 2023-08-07 RX ORDER — ESTRADIOL 1 MG/1
1 TABLET ORAL EVERY MORNING
Qty: 90 TABLET | Refills: 3 | Status: SHIPPED | OUTPATIENT
Start: 2023-08-07 | End: 2023-11-10

## 2023-08-07 RX ORDER — PROGESTERONE 200 MG/1
CAPSULE ORAL
Qty: 90 CAPSULE | Refills: 3 | Status: SHIPPED | OUTPATIENT
Start: 2023-08-07

## 2023-08-07 NOTE — PROGRESS NOTES
SUBJECTIVE:   77 y.o. female   for annual routine Pap and checkup. No LMP recorded (lmp unknown). Patient is postmenopausal..  She has been seeing Dr. Boudreaux for hormones but would like to switch her care because of cost. Dr. Boudreaux recently changed her to Estradiol 1mg and planned to increase her over time. She is also on Prometrium 200mg nightly and testosterone cream. She also uses vaginal estrogen cream .    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     Graves disease     Past Surgical History:   Procedure Laterality Date    AUGMENTATION OF BREAST Bilateral     BACK SURGERY      BALLOON SINUPLASTY OF PARANASAL SINUS Bilateral 2022    Procedure: SINUPLASTY, USING BALLOON  frontal ans sphenoid;  Surgeon: Samantha Ridley MD;  Location: [facility];  Service: ENT;  Laterality: Bilateral;    BREAST BIOPSY Right 2022    atypical lobular hyperplasia    CARDIAC VALVE REPLACEMENT  2016    - aortic valve stenosis / COW  VALVE    FUNCTIONAL ENDOSCOPIC SINUS SURGERY (FESS) USING COMPUTER-ASSISTED NAVIGATION N/A 2022    Procedure: FESS, USING COMPUTER-ASSISTED NAVIGATION - MAXILLARY, ETHMOIDS, FRONTAL;  Surgeon: Samantha Ridley MD;  Location: [facility] OR;  Service: ENT;  Laterality: N/A;    HIP REPLACEMENT ARTHROPLASTY Left     INSERTION OF MIDURETHRAL SLING N/A 2021    Procedure: SLING, MIDURETHRAL;  Surgeon: Hodan Goldberg MD;  Location: [facility];  Service: OB/GYN;  Laterality: N/A;    JOINT REPLACEMENT      LUMBAR FUSION      NASAL ENDOSCOPY N/A 2022    Procedure: ENDOSCOPY, NOSE -  SPHENOIDOTOMY;  Surgeon: Samantha Ridley MD;  Location: [facility];  Service: ENT;  Laterality: N/A;     Social History     Socioeconomic History    Marital status:    Tobacco Use    Smoking status: Former     Current packs/day: 0.00     Types: Cigarettes     Quit date:      Years since quittin.6    Smokeless tobacco: Never   Substance and Sexual Activity    Alcohol use: Not Currently     Alcohol/week: 2.0 standard drinks of alcohol     Types: 1 Glasses of wine, 1 Shots of liquor per week     Comment: weekends     Drug use: Never    Sexual activity: Yes     Partners: Male     Birth control/protection: Post-menopausal     Comment:       Family History   Problem Relation Age of Onset    Colon cancer Father     Leukemia Mother     Hypothyroidism Sister     Breast cancer Neg Hx     Ovarian cancer Neg Hx     Stroke Neg Hx     Diabetes Neg Hx      OB History    Para Term  AB Living   2 1 1   1     SAB IAB Ectopic Multiple Live Births                  # Outcome Date GA Lbr Paolo/2nd Weight Sex Delivery Anes PTL Lv   2 Term 10/22/66   3.062 kg (6 lb 12 oz) M Vag-Spont      1 AB               Obstetric Comments   Menarche 11           Current Outpatient Medications   Medication Sig Dispense Refill    ALPRAZolam (XANAX) 0.5 MG tablet Take 1 tablet (0.5 mg total) by mouth daily as needed (severe anxiety). 30 tablet 0    ASCORBATE CALCIUM, VITAMIN C, ORAL Take 500 mg by mouth.      aspirin (ECOTRIN) 81 MG EC tablet Take 81 mg by mouth once daily.      azithromycin (Z-BEATRIZ) 250 MG tablet TAKE 2 TABLETS BY MOUTH TODAY, THEN TAKE 1 TABLET DAILY FOR 4 DAYS      azithromycin (ZITHROMAX) 500 MG tablet Take 500 mg by mouth once. for one dose      cetirizine-pseudoephedrine 5-120 mg Tb12 cetirizine 5 mg-pseudoephedrine  mg tablet,extended release,12hr   TK 1 T PO BID      cholecalciferol, vitamin D3, (VITAMIN D3) 50 mcg (2,000 unit) Tab Take 2,000 Units by mouth.      ciclopirox 0.77 % Gel  Apply topically once daily. To thickened discolored toenails. (Patient taking differently: Apply topically daily as needed. To thickened discolored toenails.) 30 g 1    CMPD testosterone proprionate 2% in vanicream Apply topically. [facility]      COMPOUND HORMONE REPLACEMENT Take by mouth once daily. ESTROGEN CREAM -- [facility]      conjugated estrogens (PREMARIN) vaginal cream Place 0.5 g vaginally once daily. 1 applicator 5    EPINEPHrine (EPIPEN) 0.3 mg/0.3 mL AtIn epinephrine 0.3 mg/0.3 mL injection, auto-injector      MAGNESIUM CARBONATE ORAL Take 1 tablet by mouth once daily. 200MG      methIMAzole (TAPAZOLE) 5 MG Tab Take half a tablet daily 90 tablet 3    omalizumab (XOLAIR) 75 mg/0.5 mL injection       PENNSAID 20 mg/gram /actuation(2 %) sopm APPLY 1 APPLICATION TOPICALLY 2 (TWO) TIMES DAILY AS NEEDED.::NOT COVERED:: 112 g 10    predniSONE (DELTASONE) 10 MG tablet Take by mouth.      RENOVA 0.02 % Crea RENOVA 0.02 % TOPICAL CREAM APPLY TO AFFECTED AREA EVERY DAY AT NIGHT 40 g 0    rosuvastatin (CRESTOR) 40 MG Tab Take 1 tablet (40 mg total) by mouth once daily. 90 tablet 3    sars-cov-2, covid-19 omicron, (MODERNA COVID BIVAL,18Y UP,-PF) 50 mcg/0.5 mL injection Inject into the muscle. 0.5 mL 0    sulfamethoxazole-trimethoprim 800-160mg (BACTRIM DS) 800-160 mg Tab Take 1 tablet by mouth once daily.      urea 20 % Crea Apply 1 application topically once daily. To dry skin on the feet. 75 g 10    valACYclovir (VALTREX) 1000 MG tablet valacyclovir 1 gram tablet      XIIDRA 5 % Dpet       zolpidem (AMBIEN CR) 6.25 MG CR tablet Take 1 tablet (6.25 mg total) by mouth nightly as needed for Insomnia. 30 tablet 2    estradioL (ESTRACE) 1 MG tablet Take 1 tablet (1 mg total) by mouth every morning. 90 tablet 3    progesterone (PROMETRIUM) 200 MG capsule Take 1 capsule by mouth 30-60 minutes before bed every night 90 capsule 3    propranoloL (INDERAL LA) 120 MG 24 hr capsule Take 1 capsule (120 mg  total) by mouth once daily. for 10 days 10 capsule 0    topiramate (TOPAMAX) 25 MG tablet Take 1 tablet (25 mg total) by mouth once daily for 7 days, THEN 2 tablets (50 mg total) once daily. 67 tablet 3     No current facility-administered medications for this visit.     Allergies: Penicillins     The 10-year ASCVD risk score (Javid CASSIDY, et al., 2019) is: 23.7%    Values used to calculate the score:      Age: 77 years      Sex: Female      Is Non- : No      Diabetic: No      Tobacco smoker: No      Systolic Blood Pressure: 131 mmHg      Is BP treated: No      HDL Cholesterol: 76 mg/dL      Total Cholesterol: 167 mg/dL      ROS:  Constitutional: no weight loss, weight gain, fever, fatigue  Eyes:  No vision changes, glasses/contacts  ENT/Mouth: No ulcers, sinus problems, ears ringing, headache  Cardiovascular: No inability to lie flat, chest pain, exercise intolerance, swelling, heart palpitations  Respiratory: No wheezing, coughing blood, shortness of breath, or cough  Gastrointestinal: No diarrhea, bloody stool, nausea/vomiting, constipation, gas, hemorrhoids  Genitourinary: No blood in urine, painful urination, urgency of urination, frequency of urination, incomplete emptying, incontinence, abnormal bleeding, painful periods, heavy periods, vaginal discharge, vaginal odor, painful intercourse, sexual problems, bleeding after intercourse.  Musculoskeletal: No muscle weakness  Skin/Breast: No painful breasts, nipple discharge, masses, rash, ulcers  Neurological: No passing out, seizures, numbness, headache  Endocrine: No diabetes, hypothyroid, hyperthyroid, hot flashes, hair loss, abnormal hair growth, acne  Psychiatric: No depression, crying  Hematologic: No bruises, bleeding, swollen lymph nodes, anemia.      Physical Exam:   Constitutional: She is oriented to person, place, and time. She appears well-developed and well-nourished.      Neck: No tracheal deviation present. No thyromegaly  present.    Cardiovascular:       Exam reveals no edema.        Pulmonary/Chest: Effort normal. She exhibits no mass, no tenderness, no deformity and no retraction. Right breast exhibits no inverted nipple, no mass, no nipple discharge, no skin change, no tenderness, presence, no bleeding and no swelling. Left breast exhibits no inverted nipple, no mass, no nipple discharge, no skin change, no tenderness, presence, no bleeding and no swelling. Breasts are symmetrical.        Abdominal: Soft. She exhibits no distension and no mass. There is no abdominal tenderness. There is no rebound and no guarding. No hernia. Hernia confirmed negative in the left inguinal area.     Genitourinary:    Vagina and uterus normal.   Rectum:      No external hemorrhoid.   There is no rash, tenderness or lesion on the right labia. There is no rash, tenderness or lesion on the left labia. Cervix is normal. No no adexnal prolapse. Right adnexum displays no mass, no tenderness and no fullness. Left adnexum displays no mass, no tenderness and no fullness. No  no vaginal discharge, tenderness, bleeding, rectocele, cystocele or unspecified prolapse of vaginal walls in the vagina. Cervix exhibits no motion tenderness, no discharge and no friability. Uterus is not deviated.           Musculoskeletal: Normal range of motion and moves all extremeties. No edema.       Neurological: She is alert and oriented to person, place, and time.    Skin: No rash noted. No erythema. No pallor.    Psychiatric: She has a normal mood and affect. Her behavior is normal. Judgment and thought content normal.         ASSESSMENT:   well woman  Menopausal symptoms  PLAN:   mammogram  pap smear  Follow up in 3 months for hormone consult- does not need to fill out questionnaire  Labs ordered.   return annually or prn

## 2023-08-10 LAB
HPV HR 12 DNA SPEC QL NAA+PROBE: NEGATIVE
HPV16 AG SPEC QL: NEGATIVE
HPV18 DNA SPEC QL NAA+PROBE: NEGATIVE

## 2023-08-14 ENCOUNTER — PATIENT MESSAGE (OUTPATIENT)
Dept: OBSTETRICS AND GYNECOLOGY | Facility: CLINIC | Age: 78
End: 2023-08-14
Payer: MEDICARE

## 2023-08-14 ENCOUNTER — OFFICE VISIT (OUTPATIENT)
Dept: PODIATRY | Facility: CLINIC | Age: 78
End: 2023-08-14
Payer: MEDICARE

## 2023-08-14 VITALS
WEIGHT: 119.5 LBS | SYSTOLIC BLOOD PRESSURE: 117 MMHG | HEIGHT: 64 IN | BODY MASS INDEX: 20.4 KG/M2 | DIASTOLIC BLOOD PRESSURE: 72 MMHG | HEART RATE: 69 BPM

## 2023-08-14 DIAGNOSIS — Z98.890 HISTORY OF FOOT SURGERY: ICD-10-CM

## 2023-08-14 DIAGNOSIS — L84 PRE-ULCERATIVE CORN OR CALLOUS: Primary | ICD-10-CM

## 2023-08-14 DIAGNOSIS — B35.1 DERMATOPHYTOSIS OF NAIL: ICD-10-CM

## 2023-08-14 DIAGNOSIS — N18.31 STAGE 3A CHRONIC KIDNEY DISEASE: ICD-10-CM

## 2023-08-14 PROCEDURE — 99499 UNLISTED E&M SERVICE: CPT | Mod: S$PBB,,, | Performed by: PODIATRIST

## 2023-08-14 PROCEDURE — 11721 DEBRIDE NAIL 6 OR MORE: CPT | Performed by: PODIATRIST

## 2023-08-14 PROCEDURE — 99999 PR PBB SHADOW E&M-EST. PATIENT-LVL III: CPT | Mod: PBBFAC,,, | Performed by: PODIATRIST

## 2023-08-14 PROCEDURE — 99213 OFFICE O/P EST LOW 20 MIN: CPT | Mod: PBBFAC,PN | Performed by: PODIATRIST

## 2023-08-14 PROCEDURE — 99499 NO LOS: ICD-10-PCS | Mod: S$PBB,,, | Performed by: PODIATRIST

## 2023-08-14 PROCEDURE — 99999 PR PBB SHADOW E&M-EST. PATIENT-LVL III: ICD-10-PCS | Mod: PBBFAC,,, | Performed by: PODIATRIST

## 2023-08-14 PROCEDURE — 11056 PARNG/CUTG B9 HYPRKR LES 2-4: CPT | Mod: Q9,S$PBB,, | Performed by: PODIATRIST

## 2023-08-14 PROCEDURE — 11056 ROUTINE FOOT CARE: ICD-10-PCS | Mod: Q9,S$PBB,, | Performed by: PODIATRIST

## 2023-08-14 PROCEDURE — 11721 ROUTINE FOOT CARE: ICD-10-PCS | Mod: 59,Q9,S$PBB, | Performed by: PODIATRIST

## 2023-08-14 NOTE — PROGRESS NOTES
PODIATRY NOTE       CHIEF CONCERN:   Nail Care            MEDICAL DECISION MAKING:          Problem List Items Addressed This Visit       Stage 3a chronic kidney disease     Other Visit Diagnoses       Pre-ulcerative corn or callous    -  Primary    History of foot surgery        Dermatophytosis of nail                      I counseled the patient on the patient's conditions, their implications and medical management.    Shoe and activity modification as needed for relief.  Continue extra depth shoes to accommodate hammertoes.    Continue urea cream daily to calluses.   Offloading pads as needed.     No barefoot walking.       Routine Foot Care    Date/Time: 8/14/2023 11:45 AM    Performed by: Merary Roblero DPM  Authorized by: Merary Roblero DPM    Consent Done?:  Yes (Verbal)  Hyperkeratotic Skin Lesions?: Yes    Number of trimmed lesions:  2  Location(s):  Left 2nd Metatarsal Head and Right 2nd Metatarsal Head    Nail Care Type:  Debride  Location(s): All  (Left 1st Toe, Left 3rd Toe, Left 2nd Toe, Left 4th Toe, Left 5th Toe, Right 1st Toe, Right 2nd Toe, Right 3rd Toe, Right 4th Toe and Right 5th Toe)  Patient tolerance:  Patient tolerated the procedure well with no immediate complications     With patient's permission, the toenails mentioned above were reduced and debrided using a nail nipper, removing offending nail and debris.   Utilizing a #15 scalpel, I trimmed the corns and calluses at the above mentioned location.      The patient will continue to monitor the areas daily, inspect the feet, wear protective shoe gear when ambulatory, and moisturizer to maintain skin integrity.                HISTORY OF PRESENT ILLNESS:  Sarah Rico is a 77 y.o. female, with history of CKD,  with concerns regarding: painful calluses sub metatarsal heads, chronic.      She has history of bunionectomy bilateral.   Two surgeries on the right and one left foot surgery in [place]        PCP:  Latrice Davidson MD;   Nydia Roth PA-C   Last encounter:   4/17/2023               Patient Active Problem List   Diagnosis    Stress incontinence of urine    Voiding dysfunction    Rectocele, female    Chronic constipation    Vaginal atrophy    Graves' disease    Stage 3a chronic kidney disease    Hyperlipidemia    Aortic valve stenosis    Essential tremor    Essential hypertension    Insomnia    S/P TAVR (transcatheter aortic valve replacement)    Lumbar radiculopathy    S/P hip replacement    Pelvic floor weakness    Decreased functional mobility and endurance    Disorder of muscle    Preoperative cardiovascular examination    Status post anterior & posterior colporrhaphy w/ MUS 11/1    Incomplete bladder emptying    Pelvic floor tension    Pelvic floor dysfunction    Chronic pansinusitis    Atypical lobular hyperplasia (ALH) of right breast    Dyspareunia due to medical condition in female    Vertigo    BPPV (benign paroxysmal positional vertigo)    Aortic atherosclerosis    Cervical spinal stenosis    Ataxia           Current Outpatient Medications on File Prior to Visit   Medication Sig Dispense Refill    ALPRAZolam (XANAX) 0.5 MG tablet Take 1 tablet (0.5 mg total) by mouth daily as needed (severe anxiety). 30 tablet 0    ASCORBATE CALCIUM, VITAMIN C, ORAL Take 500 mg by mouth.      aspirin (ECOTRIN) 81 MG EC tablet Take 81 mg by mouth once daily.      azithromycin (Z-BEATRIZ) 250 MG tablet TAKE 2 TABLETS BY MOUTH TODAY, THEN TAKE 1 TABLET DAILY FOR 4 DAYS      azithromycin (ZITHROMAX) 500 MG tablet Take 500 mg by mouth once. for one dose      cetirizine-pseudoephedrine 5-120 mg Tb12 cetirizine 5 mg-pseudoephedrine  mg tablet,extended release,12hr   TK 1 T PO BID      cholecalciferol, vitamin D3, (VITAMIN D3) 50 mcg (2,000 unit) Tab Take 2,000 Units by mouth.      ciclopirox 0.77 % Gel Apply topically once daily. To thickened discolored toenails. (Patient taking differently: Apply topically daily as needed. To thickened  discolored toenails.) 30 g 1    CMPD testosterone proprionate 2% in vanicream Apply topically. [facility]      COMPOUND HORMONE REPLACEMENT Take by mouth once daily. ESTROGEN CREAM -- [facility]      conjugated estrogens (PREMARIN) vaginal cream Place 0.5 g vaginally once daily. 1 applicator 5    EPINEPHrine (EPIPEN) 0.3 mg/0.3 mL AtIn epinephrine 0.3 mg/0.3 mL injection, auto-injector      estradioL (ESTRACE) 1 MG tablet Take 1 tablet (1 mg total) by mouth every morning. 90 tablet 3    MAGNESIUM CARBONATE ORAL Take 1 tablet by mouth once daily. 200MG      methIMAzole (TAPAZOLE) 5 MG Tab Take half a tablet daily 90 tablet 3    omalizumab (XOLAIR) 75 mg/0.5 mL injection       PENNSAID 20 mg/gram /actuation(2 %) sopm APPLY 1 APPLICATION TOPICALLY 2 (TWO) TIMES DAILY AS NEEDED.::NOT COVERED:: 112 g 10    predniSONE (DELTASONE) 10 MG tablet Take by mouth.      progesterone (PROMETRIUM) 200 MG capsule Take 1 capsule by mouth 30-60 minutes before bed every night 90 capsule 3    RENOVA 0.02 % Crea RENOVA 0.02 % TOPICAL CREAM APPLY TO AFFECTED AREA EVERY DAY AT NIGHT 40 g 0    rosuvastatin (CRESTOR) 40 MG Tab Take 1 tablet (40 mg total) by mouth once daily. 90 tablet 3    sars-cov-2, covid-19 omicron, (MODERNA COVID BIVAL,18Y UP,-PF) 50 mcg/0.5 mL injection Inject into the muscle. 0.5 mL 0    sulfamethoxazole-trimethoprim 800-160mg (BACTRIM DS) 800-160 mg Tab Take 1 tablet by mouth once daily.      urea 20 % Crea Apply 1 application topically once daily. To dry skin on the feet. 75 g 10    valACYclovir (VALTREX) 1000 MG tablet valacyclovir 1 gram tablet      XIIDRA 5 % Dpet       zolpidem (AMBIEN CR) 6.25 MG CR tablet Take 1 tablet (6.25 mg total) by mouth nightly as needed for Insomnia. 30 tablet 2    propranoloL (INDERAL LA) 120 MG 24 hr capsule Take 1 capsule (120 mg total) by mouth once daily. for 10 days 10 capsule 0    topiramate (TOPAMAX) 25 MG tablet Take 1 tablet (25 mg total) by mouth once daily  "for 7 days, THEN 2 tablets (50 mg total) once daily. 67 tablet 3     No current facility-administered medications on file prior to visit.           Review of patient's allergies indicates:   Allergen Reactions    Penicillins Rash and Hives               ROS:   General ROS: negative for - chills, fever or night sweats  Respiratory ROS: no cough, shortness of breath, or wheezing  Cardiovascular ROS: no chest pain or dyspnea on exertion  Musculoskeletal ROS: positive for - pain in foot - bilateral  Neurological ROS: negative for - impaired coordination/balance and numbness/tingling  Dermatological ROS: negative for pruritus and rash      EXAM:     Vitals:    08/14/23 1159   BP: 117/72   Pulse: 69   Weight: 54.2 kg (119 lb 7.8 oz)   Height: 5' 4" (1.626 m)            General:  Alert and Oriented x 3;  No acute distress      Bilateral  Lower extremity exam:    Vascular:   Dorsalis Pedis:  present   Posterior Tibial:  present  Capillary refill time:  3 seconds  Temperature of toes cool to touch  Edema:  Trace and non-pitting       Neurological:     Sharp touch:  normal  Light touch: normal  Tinels Sign:  Absent  Mulders Click:   Absent        Dermatological:   Skin: thin, moist and appropriate for age; calluses sub metatarsal heads  Wounds/Ulcers:  Absent  Bruising:  Absent  Erythema:  Absent  Hair:  decreased        Musculoskeletal:   Metatarsophalangeal range of motion:   diminished range of motion  Subtalar joint range of motion: full range of motion  Ankle joint range of motion:  full range of motion  Bunions:  Present  Hammertoes: Present; left 2nd mainly          "

## 2023-08-16 LAB
FINAL PATHOLOGIC DIAGNOSIS: ABNORMAL
Lab: ABNORMAL

## 2023-09-06 DIAGNOSIS — E05.00 GRAVES' DISEASE: ICD-10-CM

## 2023-09-06 RX ORDER — METHIMAZOLE 5 MG/1
TABLET ORAL
Qty: 90 TABLET | Refills: 0 | Status: SHIPPED | OUTPATIENT
Start: 2023-09-06

## 2023-09-15 ENCOUNTER — LAB VISIT (OUTPATIENT)
Dept: LAB | Facility: OTHER | Age: 78
End: 2023-09-15
Attending: OBSTETRICS & GYNECOLOGY
Payer: MEDICARE

## 2023-09-15 DIAGNOSIS — N95.1 MENOPAUSAL SYMPTOM: ICD-10-CM

## 2023-09-15 LAB
ESTRADIOL SERPL-MCNC: 22 PG/ML
TESTOST SERPL-MCNC: 226 NG/DL (ref 5–73)

## 2023-09-15 PROCEDURE — 84403 ASSAY OF TOTAL TESTOSTERONE: CPT | Performed by: OBSTETRICS & GYNECOLOGY

## 2023-09-15 PROCEDURE — 82670 ASSAY OF TOTAL ESTRADIOL: CPT | Performed by: OBSTETRICS & GYNECOLOGY

## 2023-09-15 PROCEDURE — 84402 ASSAY OF FREE TESTOSTERONE: CPT | Performed by: OBSTETRICS & GYNECOLOGY

## 2023-09-15 PROCEDURE — 36415 COLL VENOUS BLD VENIPUNCTURE: CPT | Performed by: OBSTETRICS & GYNECOLOGY

## 2023-09-18 LAB — TESTOST FREE SERPL-MCNC: 1.4 PG/ML

## 2023-09-25 ENCOUNTER — OFFICE VISIT (OUTPATIENT)
Dept: PODIATRY | Facility: CLINIC | Age: 78
End: 2023-09-25
Payer: MEDICARE

## 2023-09-25 VITALS
DIASTOLIC BLOOD PRESSURE: 70 MMHG | HEIGHT: 64 IN | SYSTOLIC BLOOD PRESSURE: 129 MMHG | BODY MASS INDEX: 20.32 KG/M2 | WEIGHT: 119 LBS | HEART RATE: 57 BPM

## 2023-09-25 DIAGNOSIS — M79.671 BILATERAL FOOT PAIN: ICD-10-CM

## 2023-09-25 DIAGNOSIS — B35.1 DERMATOPHYTOSIS OF NAIL: Primary | ICD-10-CM

## 2023-09-25 DIAGNOSIS — L84 PRE-ULCERATIVE CORN OR CALLOUS: ICD-10-CM

## 2023-09-25 DIAGNOSIS — N18.31 STAGE 3A CHRONIC KIDNEY DISEASE: ICD-10-CM

## 2023-09-25 DIAGNOSIS — M79.672 BILATERAL FOOT PAIN: ICD-10-CM

## 2023-09-25 PROCEDURE — 99213 OFFICE O/P EST LOW 20 MIN: CPT | Mod: 25,S$PBB,ICN, | Performed by: PODIATRIST

## 2023-09-25 PROCEDURE — 99999 PR PBB SHADOW E&M-EST. PATIENT-LVL III: ICD-10-PCS | Mod: PBBFAC,,, | Performed by: PODIATRIST

## 2023-09-25 PROCEDURE — 11721 DEBRIDE NAIL 6 OR MORE: CPT | Mod: Q9,PBBFAC,PN | Performed by: PODIATRIST

## 2023-09-25 PROCEDURE — 99213 PR OFFICE/OUTPT VISIT, EST, LEVL III, 20-29 MIN: ICD-10-PCS | Mod: 25,S$PBB,ICN, | Performed by: PODIATRIST

## 2023-09-25 PROCEDURE — 11056 ROUTINE FOOT CARE: ICD-10-PCS | Mod: Q9,S$PBB,ICN, | Performed by: PODIATRIST

## 2023-09-25 PROCEDURE — 99999 PR PBB SHADOW E&M-EST. PATIENT-LVL III: CPT | Mod: PBBFAC,,, | Performed by: PODIATRIST

## 2023-09-25 PROCEDURE — 99213 OFFICE O/P EST LOW 20 MIN: CPT | Mod: PBBFAC,PN | Performed by: PODIATRIST

## 2023-09-25 PROCEDURE — 11721 ROUTINE FOOT CARE: ICD-10-PCS | Mod: 59,Q9,S$PBB,ICN | Performed by: PODIATRIST

## 2023-09-25 PROCEDURE — 11056 PARNG/CUTG B9 HYPRKR LES 2-4: CPT | Mod: Q9,S$PBB,ICN, | Performed by: PODIATRIST

## 2023-09-25 RX ORDER — CLOTRIMAZOLE 1 %
CREAM (GRAM) TOPICAL 2 TIMES DAILY
Qty: 45 G | Refills: 1 | Status: SHIPPED | OUTPATIENT
Start: 2023-09-25

## 2023-09-25 NOTE — PROGRESS NOTES
PODIATRY NOTE       CHIEF CONCERN:   Callouses, Nail Problem, and Routine Foot Care (Patient here for routine foot care.)            MEDICAL DECISION MAKING:          Problem List Items Addressed This Visit       Stage 3a chronic kidney disease     Other Visit Diagnoses       Dermatophytosis of nail    -  Primary    Relevant Medications    clotrimazole (LOTRIMIN) 1 % cream    Pre-ulcerative corn or callous                    I counseled the patient on the patient's conditions, their implications and medical management.    Shoe and activity modification as needed for relief.  Continue extra depth shoes to accommodate hammertoes.    Continue urea cream daily to calluses.   Lotrimin to toenails.   Offloading pads as needed.     No barefoot walking.       Routine Foot Care    Date/Time: 9/25/2023 10:15 AM    Performed by: Merary Roblero DPM  Authorized by: Merary Roblero DPM    Consent Done?:  Yes (Verbal)  Hyperkeratotic Skin Lesions?: Yes    Number of trimmed lesions:  2  Location(s):  Left 2nd Metatarsal Head and Right 2nd Metatarsal Head    Nail Care Type:  Debride  Location(s): All  (Left 1st Toe, Left 3rd Toe, Left 2nd Toe, Left 4th Toe, Left 5th Toe, Right 1st Toe, Right 2nd Toe, Right 3rd Toe, Right 4th Toe and Right 5th Toe)  Patient tolerance:  Patient tolerated the procedure well with no immediate complications     With patient's permission, the toenails mentioned above were reduced and debrided using a nail nipper, removing offending nail and debris.   Utilizing a #15 scalpel, I trimmed the corns and calluses at the above mentioned location.      The patient will continue to monitor the areas daily, inspect the feet, wear protective shoe gear when ambulatory, and moisturizer to maintain skin integrity.                HISTORY OF PRESENT ILLNESS:  Sarah Rico is a 77 y.o. female, with history of CKD,  with concerns regarding: painful calluses sub metatarsal heads, chronic.      She has history of  bunionectomy bilateral.   Two surgeries on the right and one left foot surgery in [place]        PCP:  Latrice Davidson MD;  Nydia Roth PA-C   Last encounter:   4/17/2023               Patient Active Problem List   Diagnosis    Stress incontinence of urine    Voiding dysfunction    Rectocele, female    Chronic constipation    Vaginal atrophy    Graves' disease    Stage 3a chronic kidney disease    Hyperlipidemia    Aortic valve stenosis    Essential tremor    Essential hypertension    Insomnia    S/P TAVR (transcatheter aortic valve replacement)    Lumbar radiculopathy    S/P hip replacement    Pelvic floor weakness    Decreased functional mobility and endurance    Disorder of muscle    Preoperative cardiovascular examination    Status post anterior & posterior colporrhaphy w/ MUS 11/1    Incomplete bladder emptying    Pelvic floor tension    Pelvic floor dysfunction    Chronic pansinusitis    Atypical lobular hyperplasia (ALH) of right breast    Dyspareunia due to medical condition in female    Vertigo    BPPV (benign paroxysmal positional vertigo)    Aortic atherosclerosis    Cervical spinal stenosis    Ataxia           Current Outpatient Medications on File Prior to Visit   Medication Sig Dispense Refill    ALPRAZolam (XANAX) 0.5 MG tablet Take 1 tablet (0.5 mg total) by mouth daily as needed (severe anxiety). 30 tablet 0    ASCORBATE CALCIUM, VITAMIN C, ORAL Take 500 mg by mouth.      aspirin (ECOTRIN) 81 MG EC tablet Take 81 mg by mouth once daily.      azithromycin (Z-BEATRIZ) 250 MG tablet TAKE 2 TABLETS BY MOUTH TODAY, THEN TAKE 1 TABLET DAILY FOR 4 DAYS      azithromycin (ZITHROMAX) 500 MG tablet Take 500 mg by mouth once. for one dose      cetirizine-pseudoephedrine 5-120 mg Tb12 cetirizine 5 mg-pseudoephedrine  mg tablet,extended release,12hr   TK 1 T PO BID      cholecalciferol, vitamin D3, (VITAMIN D3) 50 mcg (2,000 unit) Tab Take 2,000 Units by mouth.      ciclopirox 0.77 % Gel Apply  topically once daily. To thickened discolored toenails. (Patient taking differently: Apply topically daily as needed. To thickened discolored toenails.) 30 g 1    CMPD testosterone proprionate 2% in vanicream Apply topically. [facility]      COMPOUND HORMONE REPLACEMENT Take by mouth once daily. ESTROGEN CREAM -- [facility]      conjugated estrogens (PREMARIN) vaginal cream Place 0.5 g vaginally once daily. 1 applicator 5    EPINEPHrine (EPIPEN) 0.3 mg/0.3 mL AtIn epinephrine 0.3 mg/0.3 mL injection, auto-injector      estradioL (ESTRACE) 1 MG tablet Take 1 tablet (1 mg total) by mouth every morning. 90 tablet 3    MAGNESIUM CARBONATE ORAL Take 1 tablet by mouth once daily. 200MG      methIMAzole (TAPAZOLE) 5 MG Tab TAKE 1/2 TABLET BY MOUTH DAILY. Needs appointment for future refills. 90 tablet 0    omalizumab (XOLAIR) 75 mg/0.5 mL injection       PENNSAID 20 mg/gram /actuation(2 %) sopm APPLY 1 APPLICATION TOPICALLY 2 (TWO) TIMES DAILY AS NEEDED.::NOT COVERED:: 112 g 10    predniSONE (DELTASONE) 10 MG tablet Take by mouth.      progesterone (PROMETRIUM) 200 MG capsule Take 1 capsule by mouth 30-60 minutes before bed every night 90 capsule 3    RENOVA 0.02 % Crea RENOVA 0.02 % TOPICAL CREAM APPLY TO AFFECTED AREA EVERY DAY AT NIGHT 40 g 0    rosuvastatin (CRESTOR) 40 MG Tab Take 1 tablet (40 mg total) by mouth once daily. 90 tablet 3    sars-cov-2, covid-19 omicron, (MODERNA COVID BIVAL,18Y UP,-PF) 50 mcg/0.5 mL injection Inject into the muscle. 0.5 mL 0    sulfamethoxazole-trimethoprim 800-160mg (BACTRIM DS) 800-160 mg Tab Take 1 tablet by mouth once daily.      urea 20 % Crea Apply 1 application topically once daily. To dry skin on the feet. 75 g 10    valACYclovir (VALTREX) 1000 MG tablet valacyclovir 1 gram tablet      XIIDRA 5 % Dpet       zolpidem (AMBIEN CR) 6.25 MG CR tablet Take 1 tablet (6.25 mg total) by mouth nightly as needed for Insomnia. 30 tablet 2    propranoloL (INDERAL LA) 120 MG  "24 hr capsule Take 1 capsule (120 mg total) by mouth once daily. for 10 days 10 capsule 0    topiramate (TOPAMAX) 25 MG tablet Take 1 tablet (25 mg total) by mouth once daily for 7 days, THEN 2 tablets (50 mg total) once daily. 67 tablet 3     No current facility-administered medications on file prior to visit.           Review of patient's allergies indicates:   Allergen Reactions    Penicillins Rash and Hives               ROS:   General ROS: negative for - chills, fever or night sweats  Respiratory ROS: no cough, shortness of breath, or wheezing  Cardiovascular ROS: no chest pain or dyspnea on exertion  Musculoskeletal ROS: positive for - pain in foot - bilateral  Neurological ROS: negative for - impaired coordination/balance and numbness/tingling  Dermatological ROS: negative for pruritus and rash      EXAM:     Vitals:    09/25/23 1016   BP: 129/70   Pulse: (!) 57   Weight: 54 kg (119 lb)   Height: 5' 4" (1.626 m)            General:  Alert and Oriented x 3;  No acute distress      Bilateral  Lower extremity exam:    Vascular:   Dorsalis Pedis:  present   Posterior Tibial:  present  Capillary refill time:  3 seconds  Temperature of toes cool to touch  Edema:  Trace and non-pitting       Neurological:     Sharp touch:  normal  Light touch: normal  Tinels Sign:  Absent  Mulders Click:   Absent        Dermatological:   Skin: thin, moist and appropriate for age; calluses sub metatarsal heads  Wounds/Ulcers:  Absent  Bruising:  Absent  Erythema:  Absent  Hair:  decreased        Musculoskeletal:   Metatarsophalangeal range of motion:   diminished range of motion  Subtalar joint range of motion: full range of motion  Ankle joint range of motion:  full range of motion  Bunions:  Present  Hammertoes: Present; left 2nd mainly          "

## 2023-10-10 ENCOUNTER — HOSPITAL ENCOUNTER (OUTPATIENT)
Dept: RADIOLOGY | Facility: HOSPITAL | Age: 78
Discharge: HOME | End: 2023-10-10
Attending: SURGERY
Payer: MEDICARE

## 2023-10-10 ENCOUNTER — HOSPITAL ENCOUNTER (OUTPATIENT)
Dept: RADIOLOGY | Facility: HOSPITAL | Age: 78
Discharge: HOME | End: 2023-10-10
Attending: PHYSICIAN ASSISTANT
Payer: MEDICARE

## 2023-10-10 ENCOUNTER — OFFICE VISIT (OUTPATIENT)
Dept: SURGERY | Facility: CLINIC | Age: 78
End: 2023-10-10
Payer: MEDICARE

## 2023-10-10 VITALS
OXYGEN SATURATION: 100 % | SYSTOLIC BLOOD PRESSURE: 147 MMHG | DIASTOLIC BLOOD PRESSURE: 64 MMHG | WEIGHT: 119 LBS | HEART RATE: 60 BPM | HEIGHT: 64 IN | BODY MASS INDEX: 20.32 KG/M2

## 2023-10-10 DIAGNOSIS — Z12.31 SCREENING MAMMOGRAM FOR BREAST CANCER: ICD-10-CM

## 2023-10-10 DIAGNOSIS — Z12.31 SCREENING MAMMOGRAM FOR BREAST CANCER: Primary | ICD-10-CM

## 2023-10-10 DIAGNOSIS — R92.8 ABNORMAL FINDING ON BREAST IMAGING: ICD-10-CM

## 2023-10-10 PROCEDURE — 77061 BREAST TOMOSYNTHESIS UNI: CPT | Mod: 26,RT,, | Performed by: RADIOLOGY

## 2023-10-10 PROCEDURE — 99214 OFFICE O/P EST MOD 30 MIN: CPT | Mod: S$PBB,,, | Performed by: PHYSICIAN ASSISTANT

## 2023-10-10 PROCEDURE — 77063 BREAST TOMOSYNTHESIS BI: CPT | Mod: 26,59,, | Performed by: RADIOLOGY

## 2023-10-10 PROCEDURE — 99214 PR OFFICE/OUTPT VISIT, EST, LEVL IV, 30-39 MIN: ICD-10-PCS | Mod: S$PBB,,, | Performed by: PHYSICIAN ASSISTANT

## 2023-10-10 PROCEDURE — 77065 MAMMO DIGITAL DIAGNOSTIC RIGHT WITH TOMO: ICD-10-PCS | Mod: 26,RT,, | Performed by: RADIOLOGY

## 2023-10-10 PROCEDURE — 99999 PR PBB SHADOW E&M-EST. PATIENT-LVL IV: ICD-10-PCS | Mod: PBBFAC,,, | Performed by: PHYSICIAN ASSISTANT

## 2023-10-10 PROCEDURE — 99214 OFFICE O/P EST MOD 30 MIN: CPT | Mod: PBBFAC | Performed by: PHYSICIAN ASSISTANT

## 2023-10-10 PROCEDURE — 77067 SCR MAMMO BI INCL CAD: CPT | Mod: TC,59

## 2023-10-10 PROCEDURE — 77061 MAMMO DIGITAL DIAGNOSTIC RIGHT WITH TOMO: ICD-10-PCS | Mod: 26,RT,, | Performed by: RADIOLOGY

## 2023-10-10 PROCEDURE — 99999 PR PBB SHADOW E&M-EST. PATIENT-LVL IV: CPT | Mod: PBBFAC,,, | Performed by: PHYSICIAN ASSISTANT

## 2023-10-10 PROCEDURE — 77067 SCR MAMMO BI INCL CAD: CPT | Mod: 26,59,, | Performed by: RADIOLOGY

## 2023-10-10 PROCEDURE — 77061 BREAST TOMOSYNTHESIS UNI: CPT | Mod: TC,RT

## 2023-10-10 PROCEDURE — 77067 MAMMO DIGITAL SCREENING BILAT WITH TOMO: ICD-10-PCS | Mod: 26,59,, | Performed by: RADIOLOGY

## 2023-10-10 PROCEDURE — 77065 DX MAMMO INCL CAD UNI: CPT | Mod: 26,RT,, | Performed by: RADIOLOGY

## 2023-10-10 PROCEDURE — 77063 MAMMO DIGITAL SCREENING BILAT WITH TOMO: ICD-10-PCS | Mod: 26,59,, | Performed by: RADIOLOGY

## 2023-10-16 ENCOUNTER — PATIENT MESSAGE (OUTPATIENT)
Dept: NEUROLOGY | Facility: CLINIC | Age: 78
End: 2023-10-16
Payer: MEDICARE

## 2023-10-18 ENCOUNTER — HOSPITAL ENCOUNTER (OUTPATIENT)
Dept: CARDIOLOGY | Facility: HOSPITAL | Age: 78
Discharge: HOME | End: 2023-10-18
Attending: INTERNAL MEDICINE
Payer: MEDICARE

## 2023-10-18 ENCOUNTER — OFFICE VISIT (OUTPATIENT)
Dept: CARDIOLOGY | Facility: CLINIC | Age: 78
End: 2023-10-18
Payer: MEDICARE

## 2023-10-18 VITALS
WEIGHT: 116.38 LBS | HEART RATE: 52 BPM | DIASTOLIC BLOOD PRESSURE: 66 MMHG | HEIGHT: 63 IN | SYSTOLIC BLOOD PRESSURE: 132 MMHG | BODY MASS INDEX: 20.62 KG/M2 | OXYGEN SATURATION: 99 %

## 2023-10-18 VITALS
DIASTOLIC BLOOD PRESSURE: 68 MMHG | WEIGHT: 119 LBS | HEART RATE: 55 BPM | SYSTOLIC BLOOD PRESSURE: 130 MMHG | HEIGHT: 64 IN | BODY MASS INDEX: 20.32 KG/M2

## 2023-10-18 DIAGNOSIS — I50.32 CHRONIC DIASTOLIC CHF (CONGESTIVE HEART FAILURE), NYHA CLASS 1: Primary | ICD-10-CM

## 2023-10-18 DIAGNOSIS — I35.0 NONRHEUMATIC AORTIC VALVE STENOSIS: ICD-10-CM

## 2023-10-18 DIAGNOSIS — Z95.2 S/P TAVR (TRANSCATHETER AORTIC VALVE REPLACEMENT): ICD-10-CM

## 2023-10-18 DIAGNOSIS — E78.2 MIXED HYPERLIPIDEMIA: ICD-10-CM

## 2023-10-18 LAB
ASCENDING AORTA: 3.24 CM
AV INDEX (PROSTH): 0.39
AV MEAN GRADIENT: 12 MMHG
AV PEAK GRADIENT: 21 MMHG
AV VALVE AREA BY VELOCITY RATIO: 1.24 CM²
AV VALVE AREA: 1.11 CM²
AV VELOCITY RATIO: 0.44
BSA FOR ECHO PROCEDURE: 1.56 M2
CV ECHO LV RWT: 0.46 CM
DOP CALC AO PEAK VEL: 2.31 M/S
DOP CALC AO VTI: 58.93 CM
DOP CALC LVOT AREA: 2.8 CM2
DOP CALC LVOT DIAMETER: 1.9 CM
DOP CALC LVOT PEAK VEL: 1.01 M/S
DOP CALC LVOT STROKE VOLUME: 65.18 CM3
DOP CALCLVOT PEAK VEL VTI: 23 CM
E WAVE DECELERATION TIME: 370.59 MSEC
E/A RATIO: 0.96
E/E' RATIO: 18 M/S
ECHO LV POSTERIOR WALL: 0.9 CM (ref 0.6–1.1)
EJECTION FRACTION: 65 %
FRACTIONAL SHORTENING: 34 % (ref 28–44)
HR MV ECHO: 55 BPM
INTERVENTRICULAR SEPTUM: 1.1 CM (ref 0.6–1.1)
LA MAJOR: 4.7 CM
LA MINOR: 4.83 CM
LA WIDTH: 3.97 CM
LEFT ATRIUM SIZE: 3.7 CM
LEFT ATRIUM VOLUME INDEX MOD: 42.8 ML/M2
LEFT ATRIUM VOLUME INDEX: 37.9 ML/M2
LEFT ATRIUM VOLUME MOD: 67.22 CM3
LEFT ATRIUM VOLUME: 59.48 CM3
LEFT INTERNAL DIMENSION IN SYSTOLE: 2.6 CM (ref 2.1–4)
LEFT VENTRICLE DIASTOLIC VOLUME INDEX: 42.8 ML/M2
LEFT VENTRICLE DIASTOLIC VOLUME: 67.2 ML
LEFT VENTRICLE MASS INDEX: 79 G/M2
LEFT VENTRICLE SYSTOLIC VOLUME INDEX: 15.6 ML/M2
LEFT VENTRICLE SYSTOLIC VOLUME: 24.55 ML
LEFT VENTRICULAR INTERNAL DIMENSION IN DIASTOLE: 3.93 CM (ref 3.5–6)
LEFT VENTRICULAR MASS: 123.59 G
LV LATERAL E/E' RATIO: 15.75 M/S
LV SEPTAL E/E' RATIO: 21 M/S
MV MEAN GRADIENT: 3 MMHG
MV PEAK A VEL: 1.31 M/S
MV PEAK E VEL: 1.26 M/S
MV STENOSIS PRESSURE HALF TIME: 107.47 MS
MV VALVE AREA P 1/2 METHOD: 2.05 CM2
PISA TR MAX VEL: 2.61 M/S
RA MAJOR: 4.86 CM
RA PRESSURE ESTIMATED: 3 MMHG
RA WIDTH: 3.36 CM
RIGHT VENTRICULAR END-DIASTOLIC DIMENSION: 3.45 CM
RV TB RVSP: 6 MMHG
SINUS: 3.08 CM
STJ: 2.44 CM
TDI LATERAL: 0.08 M/S
TDI SEPTAL: 0.06 M/S
TDI: 0.07 M/S
TR MAX PG: 27 MMHG
TRICUSPID ANNULAR PLANE SYSTOLIC EXCURSION: 1.96 CM
TV REST PULMONARY ARTERY PRESSURE: 30 MMHG
Z-SCORE OF LEFT VENTRICULAR DIMENSION IN END DIASTOLE: -1.38
Z-SCORE OF LEFT VENTRICULAR DIMENSION IN END SYSTOLE: -0.58

## 2023-10-18 PROCEDURE — 93306 TTE W/DOPPLER COMPLETE: CPT | Mod: 26,,, | Performed by: INTERNAL MEDICINE

## 2023-10-18 PROCEDURE — 99215 OFFICE O/P EST HI 40 MIN: CPT | Mod: PBBFAC,25 | Performed by: INTERNAL MEDICINE

## 2023-10-18 PROCEDURE — 93306 ECHO (CUPID ONLY): ICD-10-PCS | Mod: 26,,, | Performed by: INTERNAL MEDICINE

## 2023-10-18 PROCEDURE — 99999 PR PBB SHADOW E&M-EST. PATIENT-LVL V: ICD-10-PCS | Mod: PBBFAC,,, | Performed by: INTERNAL MEDICINE

## 2023-10-18 PROCEDURE — 99214 OFFICE O/P EST MOD 30 MIN: CPT | Mod: S$PBB,,, | Performed by: INTERNAL MEDICINE

## 2023-10-18 PROCEDURE — 93306 TTE W/DOPPLER COMPLETE: CPT

## 2023-10-18 PROCEDURE — 99999 PR PBB SHADOW E&M-EST. PATIENT-LVL V: CPT | Mod: PBBFAC,,, | Performed by: INTERNAL MEDICINE

## 2023-10-18 PROCEDURE — 99214 PR OFFICE/OUTPT VISIT, EST, LEVL IV, 30-39 MIN: ICD-10-PCS | Mod: S$PBB,,, | Performed by: INTERNAL MEDICINE

## 2023-10-18 NOTE — PROGRESS NOTES
INTERVENTIONAL CARDIOLOGY    REFERRING PHYSICIAN: Dr Alejandro ([facility])    REASON FOR CONSULT/VISIT:  Follow up of valvular heart disease    HISTORY OF PRESENT ILLNESS  Sarah Rico is a 78 y.o. female here for follow up of aortic valve disease.    The patient has a history of severe aortic stenosis status post transcatheter aortic valve replacement in 2016 in [place] by Dr. Spencer Alejandro.  Patient remains very active goes to the gym and denies any symptoms suggestive of heart failure. She had a fall outside of her gym last week and sustained significant face trauma.  She is compliant with her medications including propranolol for hypertension and Crestor for dyslipidemia.  She underwent transthoracic echo this morning.    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     Graves disease        PAST SURGICAL HISTORY  Past Surgical History:   Procedure Laterality Date    AUGMENTATION OF BREAST Bilateral     BACK SURGERY      BALLOON SINUPLASTY OF PARANASAL SINUS Bilateral 08/23/2022    Procedure: SINUPLASTY, USING BALLOON  frontal ans sphenoid;  Surgeon: Samantha Ridley MD;  Location: [facility] OR;  Service: ENT;  Laterality: Bilateral;    BREAST BIOPSY Right 08/29/2022    atypical lobular hyperplasia    CARDIAC VALVE REPLACEMENT  05/2016    - aortic valve stenosis / COW  VALVE    FUNCTIONAL ENDOSCOPIC SINUS SURGERY (FESS) USING COMPUTER-ASSISTED NAVIGATION N/A 08/23/2022    Procedure: FESS, USING COMPUTER-ASSISTED NAVIGATION - MAXILLARY, ETHMOIDS, FRONTAL;  Surgeon: Samantha Ridley MD;  Location: [facility] OR;  Service: ENT;  Laterality: N/A;    HIP REPLACEMENT  ARTHROPLASTY Left 2016    INSERTION OF MIDURETHRAL SLING N/A 11/01/2021    Procedure: SLING, MIDURETHRAL;  Surgeon: Hodan Goldberg MD;  Location: [facility] OR;  Service: OB/GYN;  Laterality: N/A;    JOINT REPLACEMENT      LUMBAR FUSION      NASAL ENDOSCOPY N/A 08/23/2022    Procedure: ENDOSCOPY, NOSE - SPHENOIDOTOMY;  Surgeon: Samantha Ridley MD;  Location: [facility] OR;  Service: ENT;  Laterality: N/A;       MEDICATIONS  Current Outpatient Medications on File Prior to Visit   Medication Sig Dispense Refill    aspirin (ECOTRIN) 81 MG EC tablet Take 81 mg by mouth once daily.      conjugated estrogens (PREMARIN) vaginal cream Place 0.5 g vaginally once daily. 1 applicator 5    EPINEPHrine (EPIPEN) 0.3 mg/0.3 mL AtIn epinephrine 0.3 mg/0.3 mL injection, auto-injector      estradioL (ESTRACE) 1 MG tablet Take 1 tablet (1 mg total) by mouth every morning. 90 tablet 3    MAGNESIUM CARBONATE ORAL Take 1 tablet by mouth once daily. 200MG      omalizumab (XOLAIR) 75 mg/0.5 mL injection       progesterone (PROMETRIUM) 200 MG capsule Take 1 capsule by mouth 30-60 minutes before bed every night 90 capsule 3    propranoloL (INDERAL LA) 120 MG 24 hr capsule Take 1 capsule (120 mg total) by mouth once daily. for 10 days 10 capsule 0    rosuvastatin (CRESTOR) 40 MG Tab Take 1 tablet (40 mg total) by mouth once daily. 90 tablet 3    valACYclovir (VALTREX) 1000 MG tablet valacyclovir 1 gram tablet      zolpidem (AMBIEN CR) 6.25 MG CR tablet Take 1 tablet (6.25 mg total) by mouth nightly as needed for Insomnia. 30 tablet 2    ALPRAZolam (XANAX) 0.5 MG tablet Take 1 tablet (0.5 mg total) by mouth daily as needed (severe anxiety). (Patient not taking: Reported on 10/18/2023) 30 tablet 0    ASCORBATE CALCIUM, VITAMIN C, ORAL Take 500 mg by mouth.      azithromycin (Z-BEATRIZ) 250 MG tablet TAKE 2 TABLETS BY MOUTH TODAY, THEN TAKE 1 TABLET DAILY FOR 4 DAYS      azithromycin (ZITHROMAX) 500 MG tablet Take 500 mg by mouth once. for one dose       cetirizine-pseudoephedrine 5-120 mg Tb12 cetirizine 5 mg-pseudoephedrine  mg tablet,extended release,12hr   TK 1 T PO BID      cholecalciferol, vitamin D3, (VITAMIN D3) 50 mcg (2,000 unit) Tab Take 2,000 Units by mouth.      ciclopirox 0.77 % Gel Apply topically once daily. To thickened discolored toenails. (Patient not taking: Reported on 10/18/2023) 30 g 1    clotrimazole (LOTRIMIN) 1 % cream Apply topically 2 (two) times daily. To affected toenails. (Patient not taking: Reported on 10/18/2023) 45 g 1    CMPD testosterone proprionate 2% in vanicream Apply topically. [facility]      COMPOUND HORMONE REPLACEMENT Take by mouth once daily. ESTROGEN CREAM -- [facility]      methIMAzole (TAPAZOLE) 5 MG Tab TAKE 1/2 TABLET BY MOUTH DAILY. Needs appointment for future refills. (Patient not taking: Reported on 10/18/2023) 90 tablet 0    PENNSAID 20 mg/gram /actuation(2 %) sopm APPLY 1 APPLICATION TOPICALLY 2 (TWO) TIMES DAILY AS NEEDED.::NOT COVERED:: (Patient not taking: Reported on 10/18/2023) 112 g 10    predniSONE (DELTASONE) 10 MG tablet Take by mouth.      RENOVA 0.02 % Crea RENOVA 0.02 % TOPICAL CREAM APPLY TO AFFECTED AREA EVERY DAY AT NIGHT (Patient not taking: Reported on 10/18/2023) 40 g 0    sars-cov-2, covid-19 omicron, (MODERNA COVID BIVAL,18Y UP,-PF) 50 mcg/0.5 mL injection Inject into the muscle. (Patient not taking: Reported on 10/18/2023) 0.5 mL 0    sulfamethoxazole-trimethoprim 800-160mg (BACTRIM DS) 800-160 mg Tab Take 1 tablet by mouth once daily.      topiramate (TOPAMAX) 25 MG tablet Take 1 tablet (25 mg total) by mouth once daily for 7 days, THEN 2 tablets (50 mg total) once daily. (Patient not taking: Reported on 10/18/2023) 67 tablet 3    urea 20 % Crea Apply 1 application topically once daily. To dry skin on the feet. (Patient not taking: Reported on 10/18/2023) 75 g 10    XIIDRA 5 % Dpet        No current facility-administered medications on file prior to visit.         SOCIAL HISTORY  TOBACCO: Denies  ETOH: Denies    REVIEW OF SYSTEMS  10 organ system ROS performed and negative    PHYSICAL EXAM  Physical Exam  Constitutional:       General: She is not in acute distress.  HENT:      Head:      Comments: Multiple bruises in her face     Nose: Nose normal.   Cardiovascular:      Rate and Rhythm: Normal rate and regular rhythm.   Pulmonary:      Effort: Pulmonary effort is normal.   Musculoskeletal:      Cervical back: Neck supple.      Left lower leg: No edema.   Skin:     General: Skin is warm.      Capillary Refill: Capillary refill takes less than 2 seconds.   Neurological:      Mental Status: She is alert and oriented to person, place, and time.   Psychiatric:         Mood and Affect: Mood normal.           LAB AND RADIOLOGY DATA  Recent labwork including BMP, CBC, lipid profile were reviewed and discussed pertinent results with the patient.  Echocardiogram, EKGs and previous angiogram studies were reviewed.    ASSESSMENT AND PLAN    Chronic diastolic CHF (congestive heart failure), NYHA class 1  Stable symptoms. Continue yearly echo.     S/P TAVR (transcatheter aortic valve replacement)  Echocardiogram reviewed, normal ejection fraction, appropriate valve function, no other abnormalities noted. will continue clinical surveillance with yearly echos given by prosthetic valve that is already 7 years old.  I discussed with the patient today about longevity of these valves and expressed that it is unclear how long this will last, hopefully will last for long on her    Hyperlipidemia  Will check lipid profile. Continue statin therapy for now      Body mass index is 20.62 kg/m². Healthy lifestyle was discussed with the patient as well as the importance of physical activity to improve cardiovascular health.      Gilbert Velasquez MD [facility]  Interventional Cardiology  Structural/Valvular heart disease  [phone]

## 2023-10-18 NOTE — ASSESSMENT & PLAN NOTE
Echocardiogram reviewed, normal ejection fraction, appropriate valve function, no other abnormalities noted. will continue clinical surveillance with yearly echos given by prosthetic valve that is already 7 years old.  I discussed with the patient today about longevity of these valves and expressed that it is unclear how long this will last, hopefully will last for long on her

## 2023-11-06 ENCOUNTER — OFFICE VISIT (OUTPATIENT)
Dept: PODIATRY | Facility: CLINIC | Age: 78
End: 2023-11-06
Payer: MEDICARE

## 2023-11-06 VITALS
DIASTOLIC BLOOD PRESSURE: 79 MMHG | WEIGHT: 118 LBS | SYSTOLIC BLOOD PRESSURE: 145 MMHG | BODY MASS INDEX: 20.91 KG/M2 | HEIGHT: 63 IN

## 2023-11-06 DIAGNOSIS — M20.42 HAMMER TOE OF LEFT FOOT: Primary | ICD-10-CM

## 2023-11-06 DIAGNOSIS — B35.1 DERMATOPHYTOSIS OF NAIL: ICD-10-CM

## 2023-11-06 PROCEDURE — 99213 OFFICE O/P EST LOW 20 MIN: CPT | Mod: S$PBB,,, | Performed by: PODIATRIST

## 2023-11-06 PROCEDURE — 99213 PR OFFICE/OUTPT VISIT, EST, LEVL III, 20-29 MIN: ICD-10-PCS | Mod: S$PBB,,, | Performed by: PODIATRIST

## 2023-11-06 PROCEDURE — 99999 PR PBB SHADOW E&M-EST. PATIENT-LVL IV: ICD-10-PCS | Mod: PBBFAC,,, | Performed by: PODIATRIST

## 2023-11-06 PROCEDURE — 99999 PR PBB SHADOW E&M-EST. PATIENT-LVL IV: CPT | Mod: PBBFAC,,, | Performed by: PODIATRIST

## 2023-11-06 PROCEDURE — 99214 OFFICE O/P EST MOD 30 MIN: CPT | Mod: PBBFAC,PN | Performed by: PODIATRIST

## 2023-11-06 RX ORDER — IPRATROPIUM BROMIDE 42 UG/1
SPRAY, METERED NASAL
COMMUNITY
Start: 2023-11-01

## 2023-11-06 RX ORDER — ATENOLOL 50 MG/1
1 TABLET ORAL DAILY
COMMUNITY
End: 2024-01-04

## 2023-11-06 NOTE — PROGRESS NOTES
PODIATRY NOTE       CHIEF CONCERN:   Foot Pain            MEDICAL DECISION MAKING:          Problem List Items Addressed This Visit    None  Visit Diagnoses       Hammer toe of left foot    -  Primary    Dermatophytosis of nail                  I counseled the patient on the patient's conditions, their implications and medical management.    Shoe and activity modification as needed for relief.  Continue extra depth shoes to accommodate hammertoes.    Continue urea cream daily to calluses.     Continue Lotrimin (clotrimazole) to toenails daily.   Offloading pads as needed.    Budin splint to try for left  2nd hammertoe.   Keep from overlapping left hallux to allow toenail to grow properly.   No barefoot walking.   She would like to follow up six weeks for routine care.         I spent a total of 20 minutes on the day of the visit.  This includes face to face time and non-face to face time preparing to see the patient (eg, review of tests), obtaining and/or reviewing separately obtained history, documenting clinical information in the electronic or other health record, independently interpreting results and communicating results to the patient/family/caregiver, or care coordinator.              HISTORY OF PRESENT ILLNESS:  Sarah Rico is a 78 y.o. female, with history of CKD,  with concerns regarding: painful calluses sub metatarsal heads, chronic.      She has history of bunionectomy bilateral.   Two surgeries on the right and one left foot surgery in [place].   left 2nd toe overlaps the left hallux.  She is wondering if the left hallux nail will come back.          PCP:  Latrice Davidson MD;  Nydia Roth PA-C   Last encounter:   4/17/2023               Patient Active Problem List   Diagnosis    Stress incontinence of urine    Voiding dysfunction    Rectocele, female    Chronic constipation    Vaginal atrophy    Graves' disease    Stage 3a chronic kidney disease    Hyperlipidemia    Aortic valve  stenosis    Essential tremor    Essential hypertension    Insomnia    S/P TAVR (transcatheter aortic valve replacement)    Lumbar radiculopathy    S/P hip replacement    Pelvic floor weakness    Decreased functional mobility and endurance    Disorder of muscle    Preoperative cardiovascular examination    Status post anterior & posterior colporrhaphy w/ MUS 11/1    Incomplete bladder emptying    Pelvic floor tension    Pelvic floor dysfunction    Chronic pansinusitis    Atypical lobular hyperplasia (ALH) of right breast    Dyspareunia due to medical condition in female    Vertigo    BPPV (benign paroxysmal positional vertigo)    Aortic atherosclerosis    Cervical spinal stenosis    Ataxia    Chronic diastolic CHF (congestive heart failure), NYHA class 1           Current Outpatient Medications on File Prior to Visit   Medication Sig Dispense Refill    ALPRAZolam (XANAX) 0.5 MG tablet Take 1 tablet (0.5 mg total) by mouth daily as needed (severe anxiety). 30 tablet 0    ASCORBATE CALCIUM, VITAMIN C, ORAL Take 500 mg by mouth.      aspirin (ECOTRIN) 81 MG EC tablet Take 81 mg by mouth once daily.      atenoloL (TENORMIN) 50 MG tablet Take 1 tablet by mouth once daily.      azithromycin (Z-BEATRIZ) 250 MG tablet TAKE 2 TABLETS BY MOUTH TODAY, THEN TAKE 1 TABLET DAILY FOR 4 DAYS      azithromycin (ZITHROMAX) 500 MG tablet Take 500 mg by mouth once. for one dose      cetirizine-pseudoephedrine 5-120 mg Tb12 cetirizine 5 mg-pseudoephedrine  mg tablet,extended release,12hr   TK 1 T PO BID      cholecalciferol, vitamin D3, (VITAMIN D3) 50 mcg (2,000 unit) Tab Take 2,000 Units by mouth.      ciclopirox 0.77 % Gel Apply topically once daily. To thickened discolored toenails. 30 g 1    clotrimazole (LOTRIMIN) 1 % cream Apply topically 2 (two) times daily. To affected toenails. 45 g 1    CMPD testosterone proprionate 2% in vanicream Apply topically. [facility]      COMPOUND HORMONE REPLACEMENT Take by mouth once  daily. ESTROGEN CREAM -- [facility]      EPINEPHrine (EPIPEN) 0.3 mg/0.3 mL AtIn epinephrine 0.3 mg/0.3 mL injection, auto-injector      estradioL (ESTRACE) 1 MG tablet Take 1 tablet (1 mg total) by mouth every morning. 90 tablet 3    ipratropium (ATROVENT) 42 mcg (0.06 %) nasal spray SMARTSI Both Nares 3 Times Daily      MAGNESIUM CARBONATE ORAL Take 1 tablet by mouth once daily. 200MG      methIMAzole (TAPAZOLE) 5 MG Tab TAKE 1/2 TABLET BY MOUTH DAILY. Needs appointment for future refills. 90 tablet 0    omalizumab (XOLAIR) 75 mg/0.5 mL injection       PENNSAID 20 mg/gram /actuation(2 %) sopm APPLY 1 APPLICATION TOPICALLY 2 (TWO) TIMES DAILY AS NEEDED.::NOT COVERED:: 112 g 10    predniSONE (DELTASONE) 10 MG tablet Take by mouth.      progesterone (PROMETRIUM) 200 MG capsule Take 1 capsule by mouth 30-60 minutes before bed every night 90 capsule 3    RENOVA 0.02 % Crea RENOVA 0.02 % TOPICAL CREAM APPLY TO AFFECTED AREA EVERY DAY AT NIGHT 40 g 0    rosuvastatin (CRESTOR) 40 MG Tab Take 1 tablet (40 mg total) by mouth once daily. 90 tablet 3    sars-cov-2, covid-19 omicron, (MODERNA COVID BIVAL,18Y UP,-PF) 50 mcg/0.5 mL injection Inject into the muscle. 0.5 mL 0    sulfamethoxazole-trimethoprim 800-160mg (BACTRIM DS) 800-160 mg Tab Take 1 tablet by mouth once daily.      urea 20 % Crea Apply 1 application topically once daily. To dry skin on the feet. 75 g 10    valACYclovir (VALTREX) 1000 MG tablet valacyclovir 1 gram tablet      XIIDRA 5 % Dpet       zolpidem (AMBIEN CR) 6.25 MG CR tablet Take 1 tablet (6.25 mg total) by mouth nightly as needed for Insomnia. 30 tablet 2    conjugated estrogens (PREMARIN) vaginal cream Place 0.5 g vaginally once daily. 1 applicator 5    propranoloL (INDERAL LA) 120 MG 24 hr capsule Take 1 capsule (120 mg total) by mouth once daily. for 10 days 10 capsule 0    topiramate (TOPAMAX) 25 MG tablet Take 1 tablet (25 mg total) by mouth once daily for 7 days, THEN 2 tablets (50  "mg total) once daily. (Patient not taking: Reported on 10/18/2023) 67 tablet 3     No current facility-administered medications on file prior to visit.           Review of patient's allergies indicates:   Allergen Reactions    Penicillins Rash and Hives               ROS:   General ROS: negative for - chills, fever or night sweats  Respiratory ROS: no cough, shortness of breath, or wheezing  Cardiovascular ROS: no chest pain or dyspnea on exertion  Musculoskeletal ROS: positive for - pain in foot - bilateral  Neurological ROS: negative for - impaired coordination/balance and numbness/tingling  Dermatological ROS: negative for pruritus and rash      EXAM:     Vitals:    11/06/23 1000   BP: (!) 145/79   Weight: 53.5 kg (118 lb)   Height: 5' 3" (1.6 m)            General:  Alert and Oriented x 3;  No acute distress      Bilateral  Lower extremity exam:    Vascular:   Dorsalis Pedis:  present   Posterior Tibial:  present  Capillary refill time:  3 seconds  Temperature of toes cool to touch  Edema:  Trace and non-pitting       Neurological:     Sharp touch:  normal  Light touch: normal  Tinels Sign:  Absent  Mulders Click:   Absent        Dermatological:   Skin: thin, moist and appropriate for age; calluses sub metatarsal heads  Wounds/Ulcers:  Absent  Bruising:  Absent  Erythema:  Absent  Hair:  decreased  Toenails superficially mycotic, with subungual debris  right hallux         Musculoskeletal:   Metatarsophalangeal range of motion:   diminished range of motion  Subtalar joint range of motion: full range of motion  Ankle joint range of motion:  full range of motion  Bunions:  Present  Hammertoes: Present; left 2nd mainly, overlapping hallux.           "

## 2023-11-10 ENCOUNTER — LAB VISIT (OUTPATIENT)
Dept: LAB | Facility: OTHER | Age: 78
End: 2023-11-10
Attending: PHYSICIAN ASSISTANT
Payer: MEDICARE

## 2023-11-10 ENCOUNTER — OFFICE VISIT (OUTPATIENT)
Dept: OBSTETRICS AND GYNECOLOGY | Facility: CLINIC | Age: 78
End: 2023-11-10
Payer: MEDICARE

## 2023-11-10 VITALS
SYSTOLIC BLOOD PRESSURE: 134 MMHG | HEIGHT: 63 IN | DIASTOLIC BLOOD PRESSURE: 77 MMHG | BODY MASS INDEX: 20.8 KG/M2 | WEIGHT: 117.38 LBS | HEART RATE: 61 BPM

## 2023-11-10 DIAGNOSIS — N95.1 MENOPAUSAL SYMPTOM: Primary | ICD-10-CM

## 2023-11-10 DIAGNOSIS — Z13.220 SCREENING FOR HYPERLIPIDEMIA: ICD-10-CM

## 2023-11-10 DIAGNOSIS — E78.2 MIXED HYPERLIPIDEMIA: ICD-10-CM

## 2023-11-10 DIAGNOSIS — N95.1 MENOPAUSAL SYMPTOM: ICD-10-CM

## 2023-11-10 DIAGNOSIS — I10 ESSENTIAL HYPERTENSION: ICD-10-CM

## 2023-11-10 LAB
ALBUMIN SERPL BCP-MCNC: 4 G/DL (ref 3.5–5.2)
ALP SERPL-CCNC: 40 U/L (ref 55–135)
ALT SERPL W/O P-5'-P-CCNC: 14 U/L (ref 10–44)
ANION GAP SERPL CALC-SCNC: 5 MMOL/L (ref 8–16)
AST SERPL-CCNC: 20 U/L (ref 10–40)
BASOPHILS # BLD AUTO: 0.06 K/UL (ref 0–0.2)
BASOPHILS NFR BLD: 1 % (ref 0–1.9)
BILIRUB SERPL-MCNC: 0.5 MG/DL (ref 0.1–1)
BUN SERPL-MCNC: 21 MG/DL (ref 8–23)
CALCIUM SERPL-MCNC: 9.5 MG/DL (ref 8.7–10.5)
CHLORIDE SERPL-SCNC: 105 MMOL/L (ref 95–110)
CHOLEST SERPL-MCNC: 191 MG/DL (ref 120–199)
CHOLEST/HDLC SERPL: 2.4 {RATIO} (ref 2–5)
CO2 SERPL-SCNC: 28 MMOL/L (ref 23–29)
CREAT SERPL-MCNC: 1 MG/DL (ref 0.5–1.4)
DIFFERENTIAL METHOD: ABNORMAL
EOSINOPHIL # BLD AUTO: 0.2 K/UL (ref 0–0.5)
EOSINOPHIL NFR BLD: 3 % (ref 0–8)
ERYTHROCYTE [DISTWIDTH] IN BLOOD BY AUTOMATED COUNT: 13.1 % (ref 11.5–14.5)
EST. GFR  (NO RACE VARIABLE): 57 ML/MIN/1.73 M^2
ESTRADIOL SERPL-MCNC: 74 PG/ML
GLUCOSE SERPL-MCNC: 90 MG/DL (ref 70–110)
HCT VFR BLD AUTO: 37.8 % (ref 37–48.5)
HDLC SERPL-MCNC: 78 MG/DL (ref 40–75)
HDLC SERPL: 40.8 % (ref 20–50)
HGB BLD-MCNC: 12.5 G/DL (ref 12–16)
IMM GRANULOCYTES # BLD AUTO: 0.01 K/UL (ref 0–0.04)
IMM GRANULOCYTES NFR BLD AUTO: 0.2 % (ref 0–0.5)
LDLC SERPL CALC-MCNC: 99.8 MG/DL (ref 63–159)
LYMPHOCYTES # BLD AUTO: 0.9 K/UL (ref 1–4.8)
LYMPHOCYTES NFR BLD: 15.9 % (ref 18–48)
MCH RBC QN AUTO: 30.5 PG (ref 27–31)
MCHC RBC AUTO-ENTMCNC: 33.1 G/DL (ref 32–36)
MCV RBC AUTO: 92 FL (ref 82–98)
MONOCYTES # BLD AUTO: 0.7 K/UL (ref 0.3–1)
MONOCYTES NFR BLD: 11.7 % (ref 4–15)
NEUTROPHILS # BLD AUTO: 3.9 K/UL (ref 1.8–7.7)
NEUTROPHILS NFR BLD: 68.2 % (ref 38–73)
NONHDLC SERPL-MCNC: 113 MG/DL
NRBC BLD-RTO: 0 /100 WBC
PLATELET # BLD AUTO: 260 K/UL (ref 150–450)
PMV BLD AUTO: 9.2 FL (ref 9.2–12.9)
POTASSIUM SERPL-SCNC: 5.4 MMOL/L (ref 3.5–5.1)
PROT SERPL-MCNC: 7.3 G/DL (ref 6–8.4)
RBC # BLD AUTO: 4.1 M/UL (ref 4–5.4)
SODIUM SERPL-SCNC: 138 MMOL/L (ref 136–145)
TRIGL SERPL-MCNC: 66 MG/DL (ref 30–150)
TSH SERPL DL<=0.005 MIU/L-ACNC: 2.19 UIU/ML (ref 0.4–4)
WBC # BLD AUTO: 5.73 K/UL (ref 3.9–12.7)

## 2023-11-10 PROCEDURE — 99214 OFFICE O/P EST MOD 30 MIN: CPT | Mod: PBBFAC | Performed by: PHYSICIAN ASSISTANT

## 2023-11-10 PROCEDURE — 82670 ASSAY OF TOTAL ESTRADIOL: CPT | Performed by: PHYSICIAN ASSISTANT

## 2023-11-10 PROCEDURE — 99214 OFFICE O/P EST MOD 30 MIN: CPT | Mod: S$PBB,,, | Performed by: PHYSICIAN ASSISTANT

## 2023-11-10 PROCEDURE — 80061 LIPID PANEL: CPT | Performed by: PHYSICIAN ASSISTANT

## 2023-11-10 PROCEDURE — 80053 COMPREHEN METABOLIC PANEL: CPT | Performed by: PHYSICIAN ASSISTANT

## 2023-11-10 PROCEDURE — 99999 PR PBB SHADOW E&M-EST. PATIENT-LVL IV: ICD-10-PCS | Mod: PBBFAC,,, | Performed by: PHYSICIAN ASSISTANT

## 2023-11-10 PROCEDURE — 99214 PR OFFICE/OUTPT VISIT, EST, LEVL IV, 30-39 MIN: ICD-10-PCS | Mod: S$PBB,,, | Performed by: PHYSICIAN ASSISTANT

## 2023-11-10 PROCEDURE — 84443 ASSAY THYROID STIM HORMONE: CPT | Performed by: PHYSICIAN ASSISTANT

## 2023-11-10 PROCEDURE — 99999 PR PBB SHADOW E&M-EST. PATIENT-LVL IV: CPT | Mod: PBBFAC,,, | Performed by: PHYSICIAN ASSISTANT

## 2023-11-10 PROCEDURE — 85025 COMPLETE CBC W/AUTO DIFF WBC: CPT | Performed by: PHYSICIAN ASSISTANT

## 2023-11-10 PROCEDURE — 84402 ASSAY OF FREE TESTOSTERONE: CPT | Performed by: PHYSICIAN ASSISTANT

## 2023-11-10 RX ORDER — ESTRADIOL 2 MG/1
2 TABLET ORAL DAILY
Qty: 90 TABLET | Refills: 3 | Status: SHIPPED | OUTPATIENT
Start: 2023-11-10 | End: 2024-11-09

## 2023-11-10 NOTE — PROGRESS NOTES
Subjective:      Sarah Rico is a 78 y.o. female who presents for HRT follow up. Menarche ar age 11 and menopause at age 51. Started on HRT at age 51 with oral premarin. Then started using a compounded cream. This was messy and expensive so eventually transitioned to oral estradiol and progesterone with Dr. Boudreaux.  She recently increased oral estradiol from 1mg to 2mgn and progesterone from 100mg to 200mg QHS per Dr. Boudreaux's recommendation. She is also using a testosterone cream 20mg/mL in versabase 1/4 mL (1 click daily) daily from [employer].  She is feeling well and wishes to continue. Denies adverse side effects or vaginal bleeding.   Prior history of atypical lobular hyperplasia and history history of severe aortic stenosis status post transcatheter aortic valve replacement in 2016 in [place] by Dr. Spencer Alejandro.  She is followed by cardiology at [facility] and reports that they are aware that she is on HRT. Denies history of thrombosis, CVA, MI or liver disease.    9/15/2023 Labs  Estradiol 22  Free Testosterone 1.4  Total Testosterone 226    PCP: Nydia Roth PA-C    Routine labs: 5/31/2022  WWE: 8/7/2023  Pap smear: 8/16/2023  Mammogram: 10/10/2023 followed by Breast Surgery  DEXA: 4/12/2021 normal  Colonoscopy: 11/3/2022    Lab Visit on 11/10/2023   Component Date Value Ref Range Status    WBC 11/10/2023 5.73  3.90 - 12.70 K/uL Final    RBC 11/10/2023 4.10  4.00 - 5.40 M/uL Final    Hemoglobin 11/10/2023 12.5  12.0 - 16.0 g/dL Final    Hematocrit 11/10/2023 37.8  37.0 - 48.5 % Final    MCV 11/10/2023 92  82 - 98 fL Final    MCH 11/10/2023 30.5  27.0 - 31.0 pg Final    MCHC 11/10/2023 33.1  32.0 - 36.0 g/dL Final    RDW 11/10/2023 13.1  11.5 - 14.5 % Final    Platelets 11/10/2023 260  150 - 450 K/uL Final    MPV 11/10/2023 9.2  9.2 - 12.9 fL Final    Immature Granulocytes 11/10/2023 0.2  0.0 - 0.5 % Final    Gran # (ANC) 11/10/2023 3.9  1.8 - 7.7 K/uL Final    Immature Grans (Abs) 11/10/2023 0.01   0.00 - 0.04 K/uL Final    Lymph # 11/10/2023 0.9 (L)  1.0 - 4.8 K/uL Final    Mono # 11/10/2023 0.7  0.3 - 1.0 K/uL Final    Eos # 11/10/2023 0.2  0.0 - 0.5 K/uL Final    Baso # 11/10/2023 0.06  0.00 - 0.20 K/uL Final    nRBC 11/10/2023 0  0 /100 WBC Final    Gran % 11/10/2023 68.2  38.0 - 73.0 % Final    Lymph % 11/10/2023 15.9 (L)  18.0 - 48.0 % Final    Mono % 11/10/2023 11.7  4.0 - 15.0 % Final    Eosinophil % 11/10/2023 3.0  0.0 - 8.0 % Final    Basophil % 11/10/2023 1.0  0.0 - 1.9 % Final    Differential Method 11/10/2023 Automated   Final   Hospital Outpatient Visit on 10/18/2023   Component Date Value Ref Range Status    BSA 10/18/2023 1.56  m2 Final    LVOT stroke volume 10/18/2023 65.18  cm3 Final    LVIDd 10/18/2023 3.93  3.5 - 6.0 cm Final    LV Systolic Volume 10/18/2023 24.55  mL Final    LV Systolic Volume Index 10/18/2023 15.6  mL/m2 Final    LVIDs 10/18/2023 2.60  2.1 - 4.0 cm Final    LV Diastolic Volume 10/18/2023 67.20  mL Final    LV Diastolic Volume Index 10/18/2023 42.80  mL/m2 Final    IVS 10/18/2023 1.1  0.6 - 1.1 cm Final    LVOT diameter 10/18/2023 1.9  cm Final    LVOT area 10/18/2023 2.8  cm2 Final    FS 10/18/2023 34  28 - 44 % Final    Left Ventricle Relative Wall Thick* 10/18/2023 0.46  cm Final    Posterior Wall 10/18/2023 0.90  0.6 - 1.1 cm Final    LV mass 10/18/2023 123.59  g Final    LV Mass Index 10/18/2023 79  g/m2 Final    MV Peak E Arnold 10/18/2023 1.26  m/s Final    TDI LATERAL 10/18/2023 0.08  m/s Final    TDI SEPTAL 10/18/2023 0.06  m/s Final    E/E' ratio 10/18/2023 18.00  m/s Final    MV Peak A Arnold 10/18/2023 1.31  m/s Final    TR Max Arnold 10/18/2023 2.61  m/s Final    E/A ratio 10/18/2023 0.96   Final    E wave deceleration time 10/18/2023 370.59  msec Final    LV SEPTAL E/E' RATIO 10/18/2023 21.00  m/s Final    LA Volume Index 10/18/2023 37.9  mL/m2 Final    LV LATERAL E/E' RATIO 10/18/2023 15.75  m/s Final    LA volume 10/18/2023 59.48  cm3 Final    LVOT peak arnold  10/18/2023 1.01  m/s Final    LA size 10/18/2023 3.70  cm Final    Left Atrium Minor Axis 10/18/2023 4.83  cm Final    Left Atrium Major Axis 10/18/2023 4.70  cm Final    LA volume (mod) 10/18/2023 67.22  cm3 Final    LA WIDTH 10/18/2023 3.97  cm Final    LA Volume Index (Mod) 10/18/2023 42.8  mL/m2 Final    RVDD 10/18/2023 3.45  cm Final    TAPSE 10/18/2023 1.96  cm Final    RA Major Axis 10/18/2023 4.86  cm Final    RA Width 10/18/2023 3.36  cm Final    AV mean gradient 10/18/2023 12  mmHg Final    AV peak gradient 10/18/2023 21  mmHg Final    Ao peak marcel 10/18/2023 2.31  m/s Final    Ao VTI 10/18/2023 58.93  cm Final    LVOT peak VTI 10/18/2023 23.00  cm Final    AV valve area 10/18/2023 1.11  cm² Final    AV Velocity Ratio 10/18/2023 0.44   Final    AV index (prosthetic) 10/18/2023 0.39   Final    KENN by Velocity Ratio 10/18/2023 1.24  cm² Final    MV stenosis pressure 1/2 time 10/18/2023 107.47  ms Final    MV valve area p 1/2 method 10/18/2023 2.05  cm2 Final    Triscuspid Valve Regurgitation Pea* 10/18/2023 27  mmHg Final    Sinus 10/18/2023 3.08  cm Final    STJ 10/18/2023 2.44  cm Final    Ascending aorta 10/18/2023 3.24  cm Final    Mean e' 10/18/2023 0.07  m/s Final    ZLVIDS 10/18/2023 -0.58   Final    ZLVIDD 10/18/2023 -1.38   Final    TV resting pulmonary artery pressu* 10/18/2023 30  mmHg Final    RV TB RVSP 10/18/2023 6  mmHg Final    Est. RA pres 10/18/2023 3  mmHg Final    EF 10/18/2023 65  % Final    Mitral Valve Heart Rate 10/18/2023 55  bpm Final    MV mean gradient 10/18/2023 3  mmHg Final   Lab Visit on 09/15/2023   Component Date Value Ref Range Status    Estradiol 09/15/2023 22  See Text pg/mL Final    Testosterone, Free 09/15/2023 1.4  pg/mL Final    Testosterone, Total 09/15/2023 226 (H)  5 - 73 ng/dL Final       Past Medical History:   Diagnosis Date    Abnormal Pap smear of cervix     Asthma     Encounter for blood transfusion     Graves disease     ESSENTIAL TREMORS    History of back  surgery     Hormone replacement therapy (HRT)     Hyperlipidemia     Hypertension     Infection of spine     Menopause     Osteomyelitis     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     Graves disease     Past Surgical History:   Procedure Laterality Date    AUGMENTATION OF BREAST Bilateral     BACK SURGERY      BALLOON SINUPLASTY OF PARANASAL SINUS Bilateral 2022    Procedure: SINUPLASTY, USING BALLOON  frontal ans sphenoid;  Surgeon: Samantha Ridley MD;  Location: [facility];  Service: ENT;  Laterality: Bilateral;    BREAST BIOPSY Right 2022    atypical lobular hyperplasia    CARDIAC VALVE REPLACEMENT  2016    - aortic valve stenosis / COW  VALVE    FUNCTIONAL ENDOSCOPIC SINUS SURGERY (FESS) USING COMPUTER-ASSISTED NAVIGATION N/A 2022    Procedure: FESS, USING COMPUTER-ASSISTED NAVIGATION - MAXILLARY, ETHMOIDS, FRONTAL;  Surgeon: Samantha Ridley MD;  Location: [facility];  Service: ENT;  Laterality: N/A;    HIP REPLACEMENT ARTHROPLASTY Left     INSERTION OF MIDURETHRAL SLING N/A 2021    Procedure: SLING, MIDURETHRAL;  Surgeon: Hodan Goldberg MD;  Location: [facility];  Service: OB/GYN;  Laterality: N/A;    JOINT REPLACEMENT      LUMBAR FUSION      NASAL ENDOSCOPY N/A 2022    Procedure: ENDOSCOPY, NOSE - SPHENOIDOTOMY;  Surgeon: Samantha Ridley MD;  Location: [facility];  Service: ENT;  Laterality: N/A;     Social History     Tobacco Use    Smoking status: Former     Current packs/day: 0.00     Types: Cigarettes     Quit date:      Years since quittin.8    Smokeless tobacco: Never   Substance Use Topics    Alcohol use: Not Currently     Alcohol/week: 2.0 standard drinks of alcohol     Types: 1 Glasses of wine, 1 Shots of liquor per week     Comment: weekends     Drug use: Never     Family History   Problem Relation Age of Onset    Colon cancer Father     Leukemia Mother     Hypothyroidism Sister     Breast cancer Neg Hx     Ovarian cancer Neg Hx      Stroke Neg Hx     Diabetes Neg Hx      OB History    Para Term  AB Living   2 1 1   1     SAB IAB Ectopic Multiple Live Births                  # Outcome Date GA Lbr Paolo/2nd Weight Sex Delivery Anes PTL Lv   2 Term 10/22/66   3.062 kg (6 lb 12 oz) M Vag-Spont      1 AB               Obstetric Comments   Menarche 11       Current Outpatient Medications:     ALPRAZolam (XANAX) 0.5 MG tablet, Take 1 tablet (0.5 mg total) by mouth daily as needed (severe anxiety)., Disp: 30 tablet, Rfl: 0    ASCORBATE CALCIUM, VITAMIN C, ORAL, Take 500 mg by mouth., Disp: , Rfl:     aspirin (ECOTRIN) 81 MG EC tablet, Take 81 mg by mouth once daily., Disp: , Rfl:     atenoloL (TENORMIN) 50 MG tablet, Take 1 tablet by mouth once daily., Disp: , Rfl:     azithromycin (Z-BEATRIZ) 250 MG tablet, TAKE 2 TABLETS BY MOUTH TODAY, THEN TAKE 1 TABLET DAILY FOR 4 DAYS, Disp: , Rfl:     azithromycin (ZITHROMAX) 500 MG tablet, Take 500 mg by mouth once. for one dose, Disp: , Rfl:     cetirizine-pseudoephedrine 5-120 mg Tb12, cetirizine 5 mg-pseudoephedrine  mg tablet,extended release,12hr  TK 1 T PO BID, Disp: , Rfl:     cholecalciferol, vitamin D3, (VITAMIN D3) 50 mcg (2,000 unit) Tab, Take 2,000 Units by mouth., Disp: , Rfl:     ciclopirox 0.77 % Gel, Apply topically once daily. To thickened discolored toenails., Disp: 30 g, Rfl: 1    clotrimazole (LOTRIMIN) 1 % cream, Apply topically 2 (two) times daily. To affected toenails., Disp: 45 g, Rfl: 1    CMPD testosterone proprionate 2% in vanicream, Apply topically. [facility], Disp: , Rfl:     COMPOUND HORMONE REPLACEMENT, Take by mouth once daily. ESTROGEN CREAM -- [facility], Disp: , Rfl:     conjugated estrogens (PREMARIN) vaginal cream, Place 0.5 g vaginally once daily., Disp: 1 applicator, Rfl: 5    EPINEPHrine (EPIPEN) 0.3 mg/0.3 mL AtIn, epinephrine 0.3 mg/0.3 mL injection, auto-injector, Disp: , Rfl:     estradioL (ESTRACE) 2 MG tablet, Take 1 tablet (2 mg  total) by mouth once daily., Disp: 90 tablet, Rfl: 3    ipratropium (ATROVENT) 42 mcg (0.06 %) nasal spray, SMARTSI Both Nares 3 Times Daily, Disp: , Rfl:     MAGNESIUM CARBONATE ORAL, Take 1 tablet by mouth once daily. 200MG, Disp: , Rfl:     methIMAzole (TAPAZOLE) 5 MG Tab, TAKE 1/2 TABLET BY MOUTH DAILY. Needs appointment for future refills., Disp: 90 tablet, Rfl: 0    omalizumab (XOLAIR) 75 mg/0.5 mL injection, , Disp: , Rfl:     PENNSAID 20 mg/gram /actuation(2 %) sopm, APPLY 1 APPLICATION TOPICALLY 2 (TWO) TIMES DAILY AS NEEDED.::NOT COVERED::, Disp: 112 g, Rfl: 10    predniSONE (DELTASONE) 10 MG tablet, Take by mouth., Disp: , Rfl:     progesterone (PROMETRIUM) 200 MG capsule, Take 1 capsule by mouth 30-60 minutes before bed every night, Disp: 90 capsule, Rfl: 3    propranoloL (INDERAL LA) 120 MG 24 hr capsule, Take 1 capsule (120 mg total) by mouth once daily. for 10 days, Disp: 10 capsule, Rfl: 0    RENOVA 0.02 % Crea, RENOVA 0.02 % TOPICAL CREAM APPLY TO AFFECTED AREA EVERY DAY AT NIGHT, Disp: 40 g, Rfl: 0    rosuvastatin (CRESTOR) 40 MG Tab, Take 1 tablet (40 mg total) by mouth once daily., Disp: 90 tablet, Rfl: 3    sars-cov-2, covid-19 omicron, (MODERNA COVID BIVAL,18Y UP,-PF) 50 mcg/0.5 mL injection, Inject into the muscle., Disp: 0.5 mL, Rfl: 0    sulfamethoxazole-trimethoprim 800-160mg (BACTRIM DS) 800-160 mg Tab, Take 1 tablet by mouth once daily., Disp: , Rfl:     topiramate (TOPAMAX) 25 MG tablet, Take 1 tablet (25 mg total) by mouth once daily for 7 days, THEN 2 tablets (50 mg total) once daily. (Patient not taking: Reported on 10/18/2023), Disp: 67 tablet, Rfl: 3    urea 20 % Crea, Apply 1 application topically once daily. To dry skin on the feet., Disp: 75 g, Rfl: 10    valACYclovir (VALTREX) 1000 MG tablet, valacyclovir 1 gram tablet, Disp: , Rfl:     XIIDRA 5 % Dpet, , Disp: , Rfl:     zolpidem (AMBIEN CR) 6.25 MG CR tablet, Take 1 tablet (6.25 mg total) by mouth nightly as needed for  "Insomnia., Disp: 30 tablet, Rfl: 2    The 10-year ASCVD risk score (Javid CASSIDY, et al., 2019) is: 35.5%    Values used to calculate the score:      Age: 78 years      Sex: Female      Is Non- : No      Diabetic: No      Tobacco smoker: No      Systolic Blood Pressure: 134 mmHg      Is BP treated: Yes      HDL Cholesterol: 76 mg/dL      Total Cholesterol: 167 mg/dL    Review of Systems:  General: No fever, chills, or weight loss.  Chest: No chest pain, shortness of breath, or palpitations.  Breast: No pain, masses, or nipple discharge.  Vulva: No pain, lesions, or itching.  Vagina: No relaxation, itching, discharge, or lesions.  Abdomen: No pain, nausea, vomiting, diarrhea, or constipation.  Urinary: No incontinence, nocturia, frequency, or dysuria.  Extremities:  No leg cramps, edema, or calf pain.  Neurologic: No headaches, dizziness, or visual changes.    Objective:     Vitals:    11/10/23 1008   BP: 134/77   Pulse: 61   Weight: 53.3 kg (117 lb 6.4 oz)   Height: 5' 3" (1.6 m)   PainSc: 0-No pain     Body mass index is 20.8 kg/m².      Physical Exam: Deferred      Assessment:      Menopausal symptom: No contraindication to HRT. Risks and benefits of hormone replacement therapy were discussed. Discussed increased cardiovascular risk with advancing age. We did discuss that transdermal option of estrogen is safer than oral estradiol as transdermal methods decrease risk for blood clots and stroke as they bypass liver metabolism. She would prefer to stay on oral but recommend on staying at the lowest effective dose. We will recheck estradiol levels today. If elevated, will reduce back down to estradiol 1mg   -     estradioL (ESTRACE) 2 MG tablet; Take 1 tablet (2 mg total) by mouth once daily.  Dispense: 90 tablet; Refill: 3  -     Estradiol; Future; Expected date: 11/10/2023  -     Testosterone, free; Future; Expected date: 11/10/2023    Screening for hyperlipidemia    Mixed hyperlipidemia  -     " Lipid panel; Future; Expected date: 11/10/2023    Essential hypertension  -     CBC Auto Differential; Future; Expected date: 11/10/2023  -     Comprehensive Metabolic Panel; Future; Expected date: 11/10/2023  -     TSH; Future; Expected date: 11/10/2023        Plan:   Continue Estrace 2mg and Progesterone 200mg QHS  Continue Testosterone Cream in Versabase 20mg/ML 1/4mL nightly to marie munoz- called in to [facility]  Hormone levels today  If estradiol elevated, will reduce to 1mg  Routine screening labs today as well.  She is working to establish with a new PCP  Follow up annually for WWE or sooner PRN    Instructed patient to call if she experiences any side effects or has any questions.    I spent a total of 30 minutes on the day of the visit.This includes face to face time and non-face to face time preparing to see the patient (eg, review of tests), obtaining and/or reviewing separately obtained history, documenting clinical information in the electronic or other health record, independently interpreting results and communicating results to the patient/family/caregiver, or care coordinator.

## 2023-11-13 LAB — TESTOST FREE SERPL-MCNC: 0.5 PG/ML

## 2023-12-11 DIAGNOSIS — N94.19 DYSPAREUNIA DUE TO MEDICAL CONDITION IN FEMALE: ICD-10-CM

## 2023-12-11 RX ORDER — CONJUGATED ESTROGENS 0.62 MG/G
0.5 CREAM VAGINAL
Qty: 90 G | Refills: 0 | Status: SHIPPED | OUTPATIENT
Start: 2023-12-11

## 2023-12-11 NOTE — TELEPHONE ENCOUNTER
Refill Decision Note   Sarah Rico  is requesting a refill authorization.  Brief Assessment and Rationale for Refill:  Approve     Medication Therapy Plan:         Comments:     Note composed:1:57 PM 12/11/2023

## 2023-12-14 ENCOUNTER — OFFICE VISIT (OUTPATIENT)
Dept: NEUROLOGY | Facility: CLINIC | Age: 78
End: 2023-12-14
Payer: MEDICARE

## 2023-12-14 DIAGNOSIS — R26.89 IMBALANCE: Primary | ICD-10-CM

## 2023-12-14 DIAGNOSIS — G25.0 ESSENTIAL TREMOR: ICD-10-CM

## 2023-12-14 PROCEDURE — 99214 OFFICE O/P EST MOD 30 MIN: CPT | Mod: 95,,, | Performed by: PSYCHIATRY & NEUROLOGY

## 2023-12-14 PROCEDURE — 99214 PR OFFICE/OUTPT VISIT, EST, LEVL IV, 30-39 MIN: ICD-10-PCS | Mod: 95,,, | Performed by: PSYCHIATRY & NEUROLOGY

## 2023-12-14 NOTE — PROGRESS NOTES
Neurology Telemedicine Note     Name: Sarah Rico  MRN: [id number]   [facility]: [id number]      Date: 12/14/2023    The patient location is: Home  The chief complaint leading to consultation is: f/u et  Visit type: Virtual visit with synchronous audio and video    TOTAL TIME SPENT: 18 min      Each patient to whom I provide medical services by telemedicine is:  (1) informed of the relationship between the physician and patient and the respective role of any other health care provider with respect to management of the patient; and (2) notified that they may decline to receive medical services by telemedicine and may withdraw from such care at any time.    History of Present Illness (HPI):   - hand tremor is worse, head tremor is worse (mostly yes yes)  - more imbalance, had a bad fall and hit face about 2 months ago (PT helped with balance in the past)  - no other issues with [email or web address] recently   - prefer no new drugs for now  - willing ot do pt  - discussed amantadine and ldopa    From April 2023:    - trial of topamax last visit   - did not tolerate due to side effects   - no falls   - did PT for imbalance     - on propranolol 120 mg daily   - got better with PT from a balance standpoint, vestibular rehab   - still incorporates balance exercises at home   - tremor is affecting qol but living with it     Nonmotor/Premotor ROS: as per HPI, and all other systems are negative    Past Medical History: The patient  has a past medical history of Abnormal Pap smear of cervix, Asthma, Encounter for blood transfusion, Graves disease, History of back surgery, Hormone replacement therapy (HRT), Hyperlipidemia, Hypertension, Infection of spine, Menopause (1996), Osteomyelitis, and 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    Social History: The patient  reports that she quit smoking about 37 years ago. Her smoking use included cigarettes. She has never used smokeless tobacco. She reports that she does not currently use  alcohol after a past usage of about 2.0 standard drinks of alcohol per week. She reports that she does not use drugs.    Family History: Their family history includes Colon cancer in her father; Hypothyroidism in her sister; Leukemia in her mother.    Allergies: Penicillins     Meds:   Current Outpatient Medications on File Prior to Visit   Medication Sig Dispense Refill    ALPRAZolam (XANAX) 0.5 MG tablet Take 1 tablet (0.5 mg total) by mouth daily as needed (severe anxiety). 30 tablet 0    ASCORBATE CALCIUM, VITAMIN C, ORAL Take 500 mg by mouth.      aspirin (ECOTRIN) 81 MG EC tablet Take 81 mg by mouth once daily.      atenoloL (TENORMIN) 50 MG tablet Take 1 tablet by mouth once daily.      azithromycin (Z-BEATRIZ) 250 MG tablet TAKE 2 TABLETS BY MOUTH TODAY, THEN TAKE 1 TABLET DAILY FOR 4 DAYS      azithromycin (ZITHROMAX) 500 MG tablet Take 500 mg by mouth once. for one dose      cetirizine-pseudoephedrine 5-120 mg Tb12 cetirizine 5 mg-pseudoephedrine  mg tablet,extended release,12hr   TK 1 T PO BID      cholecalciferol, vitamin D3, (VITAMIN D3) 50 mcg (2,000 unit) Tab Take 2,000 Units by mouth.      ciclopirox 0.77 % Gel Apply topically once daily. To thickened discolored toenails. 30 g 1    clotrimazole (LOTRIMIN) 1 % cream Apply topically 2 (two) times daily. To affected toenails. 45 g 1    CMPD testosterone proprionate 2% in vanicream Apply topically. [facility]      COMPOUND HORMONE REPLACEMENT Take by mouth once daily. ESTROGEN CREAM -- [facility]      EPINEPHrine (EPIPEN) 0.3 mg/0.3 mL AtIn epinephrine 0.3 mg/0.3 mL injection, auto-injector      estradioL (ESTRACE) 2 MG tablet Take 1 tablet (2 mg total) by mouth once daily. 90 tablet 3    ipratropium (ATROVENT) 42 mcg (0.06 %) nasal spray SMARTSI Both Nares 3 Times Daily      MAGNESIUM CARBONATE ORAL Take 1 tablet by mouth once daily. 200MG      methIMAzole (TAPAZOLE) 5 MG Tab TAKE 1/2 TABLET BY MOUTH DAILY. Needs appointment for  future refills. 90 tablet 0    omalizumab (XOLAIR) 75 mg/0.5 mL injection       PENNSAID 20 mg/gram /actuation(2 %) sopm APPLY 1 APPLICATION TOPICALLY 2 (TWO) TIMES DAILY AS NEEDED.::NOT COVERED:: 112 g 10    predniSONE (DELTASONE) 10 MG tablet Take by mouth.      PREMARIN vaginal cream PLACE 0.5 G VAGINALLY ONCE DAILY. 90 g 0    progesterone (PROMETRIUM) 200 MG capsule Take 1 capsule by mouth 30-60 minutes before bed every night 90 capsule 3    propranoloL (INDERAL LA) 120 MG 24 hr capsule Take 1 capsule (120 mg total) by mouth once daily. for 10 days 10 capsule 0    RENOVA 0.02 % Crea RENOVA 0.02 % TOPICAL CREAM APPLY TO AFFECTED AREA EVERY DAY AT NIGHT 40 g 0    rosuvastatin (CRESTOR) 40 MG Tab Take 1 tablet (40 mg total) by mouth once daily. 90 tablet 3    sars-cov-2, covid-19 omicron, (MODERNA COVID BIVAL,18Y UP,-PF) 50 mcg/0.5 mL injection Inject into the muscle. 0.5 mL 0    sulfamethoxazole-trimethoprim 800-160mg (BACTRIM DS) 800-160 mg Tab Take 1 tablet by mouth once daily.      topiramate (TOPAMAX) 25 MG tablet Take 1 tablet (25 mg total) by mouth once daily for 7 days, THEN 2 tablets (50 mg total) once daily. (Patient not taking: Reported on 10/18/2023) 67 tablet 3    UNABLE TO FIND medication name: Testosterone Cream in versabase 20mg/mL Apply 1/4mL to labia minora nightly 22.5 mL 1    urea 20 % Crea Apply 1 application topically once daily. To dry skin on the feet. 75 g 10    valACYclovir (VALTREX) 1000 MG tablet valacyclovir 1 gram tablet      XIIDRA 5 % Dpet       zolpidem (AMBIEN CR) 6.25 MG CR tablet Take 1 tablet (6.25 mg total) by mouth nightly as needed for Insomnia. 30 tablet 2     No current facility-administered medications on file prior to visit.       Exam:  Vital Signs deferred with home visit    * Coord/Movemt/Gait Mild hypophonic speech.  No clear facial masking.  Mild B bradykinesia, overlapping tremor at posture>rest.    No chorea, athetosis, dystonia, myoclonus, or tics.   No motor  [facility].  Gait is deferred for safety.       Medical Record Review:  - Lab Results:  Lab Visit on 11/10/2023   Component Date Value Ref Range Status    Estradiol 11/10/2023 74  See Text pg/mL Final    Testosterone, Free 11/10/2023 0.5  pg/mL Final    WBC 11/10/2023 5.73  3.90 - 12.70 K/uL Final    RBC 11/10/2023 4.10  4.00 - 5.40 M/uL Final    Hemoglobin 11/10/2023 12.5  12.0 - 16.0 g/dL Final    Hematocrit 11/10/2023 37.8  37.0 - 48.5 % Final    MCV 11/10/2023 92  82 - 98 fL Final    MCH 11/10/2023 30.5  27.0 - 31.0 pg Final    MCHC 11/10/2023 33.1  32.0 - 36.0 g/dL Final    RDW 11/10/2023 13.1  11.5 - 14.5 % Final    Platelets 11/10/2023 260  150 - 450 K/uL Final    MPV 11/10/2023 9.2  9.2 - 12.9 fL Final    Immature Granulocytes 11/10/2023 0.2  0.0 - 0.5 % Final    Gran # (ANC) 11/10/2023 3.9  1.8 - 7.7 K/uL Final    Immature Grans (Abs) 11/10/2023 0.01  0.00 - 0.04 K/uL Final    Lymph # 11/10/2023 0.9 (L)  1.0 - 4.8 K/uL Final    Mono # 11/10/2023 0.7  0.3 - 1.0 K/uL Final    Eos # 11/10/2023 0.2  0.0 - 0.5 K/uL Final    Baso # 11/10/2023 0.06  0.00 - 0.20 K/uL Final    nRBC 11/10/2023 0  0 /100 WBC Final    Gran % 11/10/2023 68.2  38.0 - 73.0 % Final    Lymph % 11/10/2023 15.9 (L)  18.0 - 48.0 % Final    Mono % 11/10/2023 11.7  4.0 - 15.0 % Final    Eosinophil % 11/10/2023 3.0  0.0 - 8.0 % Final    Basophil % 11/10/2023 1.0  0.0 - 1.9 % Final    Differential Method 11/10/2023 Automated   Final    Sodium 11/10/2023 138  136 - 145 mmol/L Final    Potassium 11/10/2023 5.4 (H)  3.5 - 5.1 mmol/L Final    Chloride 11/10/2023 105  95 - 110 mmol/L Final    CO2 11/10/2023 28  23 - 29 mmol/L Final    Glucose 11/10/2023 90  70 - 110 mg/dL Final    BUN 11/10/2023 21  8 - 23 mg/dL Final    Creatinine 11/10/2023 1.0  0.5 - 1.4 mg/dL Final    Calcium 11/10/2023 9.5  8.7 - 10.5 mg/dL Final    Total Protein 11/10/2023 7.3  6.0 - 8.4 g/dL Final    Albumin 11/10/2023 4.0  3.5 - 5.2 g/dL Final    Total Bilirubin 11/10/2023  0.5  0.1 - 1.0 mg/dL Final    Alkaline Phosphatase 11/10/2023 40 (L)  55 - 135 U/L Final    AST 11/10/2023 20  10 - 40 U/L Final    ALT 11/10/2023 14  10 - 44 U/L Final    eGFR 11/10/2023 57 (A)  >60 mL/min/1.73 m^2 Final    Anion Gap 11/10/2023 5 (L)  8 - 16 mmol/L Final    Cholesterol 11/10/2023 191  120 - 199 mg/dL Final    Triglycerides 11/10/2023 66  30 - 150 mg/dL Final    HDL 11/10/2023 78 (H)  40 - 75 mg/dL Final    LDL Cholesterol 11/10/2023 99.8  63.0 - 159.0 mg/dL Final    HDL/Cholesterol Ratio 11/10/2023 40.8  20.0 - 50.0 % Final    Total Cholesterol/HDL Ratio 11/10/2023 2.4  2.0 - 5.0 Final    Non-HDL Cholesterol 11/10/2023 113  mg/dL Final    TSH 11/10/2023 2.185  0.400 - 4.000 uIU/mL Final   Hospital Outpatient Visit on 10/18/2023   Component Date Value Ref Range Status    BSA 10/18/2023 1.56  m2 Final    LVOT stroke volume 10/18/2023 65.18  cm3 Final    LVIDd 10/18/2023 3.93  3.5 - 6.0 cm Final    LV Systolic Volume 10/18/2023 24.55  mL Final    LV Systolic Volume Index 10/18/2023 15.6  mL/m2 Final    LVIDs 10/18/2023 2.60  2.1 - 4.0 cm Final    LV Diastolic Volume 10/18/2023 67.20  mL Final    LV Diastolic Volume Index 10/18/2023 42.80  mL/m2 Final    IVS 10/18/2023 1.1  0.6 - 1.1 cm Final    LVOT diameter 10/18/2023 1.9  cm Final    LVOT area 10/18/2023 2.8  cm2 Final    FS 10/18/2023 34  28 - 44 % Final    Left Ventricle Relative Wall Thick* 10/18/2023 0.46  cm Final    Posterior Wall 10/18/2023 0.90  0.6 - 1.1 cm Final    LV mass 10/18/2023 123.59  g Final    LV Mass Index 10/18/2023 79  g/m2 Final    MV Peak E Arnold 10/18/2023 1.26  m/s Final    TDI LATERAL 10/18/2023 0.08  m/s Final    TDI SEPTAL 10/18/2023 0.06  m/s Final    E/E' ratio 10/18/2023 18.00  m/s Final    MV Peak A Arnold 10/18/2023 1.31  m/s Final    TR Max Arnold 10/18/2023 2.61  m/s Final    E/A ratio 10/18/2023 0.96   Final    E wave deceleration time 10/18/2023 370.59  msec Final    LV SEPTAL E/E' RATIO 10/18/2023 21.00  m/s Final     LA Volume Index 10/18/2023 37.9  mL/m2 Final    LV LATERAL E/E' RATIO 10/18/2023 15.75  m/s Final    LA volume 10/18/2023 59.48  cm3 Final    LVOT peak marcel 10/18/2023 1.01  m/s Final    LA size 10/18/2023 3.70  cm Final    Left Atrium Minor Axis 10/18/2023 4.83  cm Final    Left Atrium Major Axis 10/18/2023 4.70  cm Final    LA volume (mod) 10/18/2023 67.22  cm3 Final    LA WIDTH 10/18/2023 3.97  cm Final    LA Volume Index (Mod) 10/18/2023 42.8  mL/m2 Final    RVDD 10/18/2023 3.45  cm Final    TAPSE 10/18/2023 1.96  cm Final    RA Major Axis 10/18/2023 4.86  cm Final    RA Width 10/18/2023 3.36  cm Final    AV mean gradient 10/18/2023 12  mmHg Final    AV peak gradient 10/18/2023 21  mmHg Final    Ao peak marcel 10/18/2023 2.31  m/s Final    Ao VTI 10/18/2023 58.93  cm Final    LVOT peak VTI 10/18/2023 23.00  cm Final    AV valve area 10/18/2023 1.11  cm² Final    AV Velocity Ratio 10/18/2023 0.44   Final    AV index (prosthetic) 10/18/2023 0.39   Final    KENN by Velocity Ratio 10/18/2023 1.24  cm² Final    MV stenosis pressure 1/2 time 10/18/2023 107.47  ms Final    MV valve area p 1/2 method 10/18/2023 2.05  cm2 Final    Triscuspid Valve Regurgitation Pea* 10/18/2023 27  mmHg Final    Sinus 10/18/2023 3.08  cm Final    STJ 10/18/2023 2.44  cm Final    Ascending aorta 10/18/2023 3.24  cm Final    Mean e' 10/18/2023 0.07  m/s Final    ZLVIDS 10/18/2023 -0.58   Final    ZLVIDD 10/18/2023 -1.38   Final    TV resting pulmonary artery pressu* 10/18/2023 30  mmHg Final    RV TB RVSP 10/18/2023 6  mmHg Final    Est. RA pres 10/18/2023 3  mmHg Final    EF 10/18/2023 65  % Final    Mitral Valve Heart Rate 10/18/2023 55  bpm Final    MV mean gradient 10/18/2023 3  mmHg Final   Lab Visit on 09/15/2023   Component Date Value Ref Range Status    Estradiol 09/15/2023 22  See Text pg/mL Final    Testosterone, Free 09/15/2023 1.4  pg/mL Final    Testosterone, Total 09/15/2023 226 (H)  5 - 73 ng/dL Final       Diagnoses:   Essential  tremor, query some parkinsonism with gait? Currently on propranolol 120 mg XR BID with minimal to mild improvement in tremor (would expect significantly more improvement with this high of a dose of propranolol)  Failed topamax, primidone and gabapentin. Some dystonic component -- dystonic head tremor, mild dystonic posturing of L hand and inversion of L foot. Asymmetric tremor, no MRI findings to indicate cerebellar or thalamic outflow tremor --     - discussed amantadine, declined with side effect potential  - discussed ldopa trial, delcined for now but would consider in 6 months  - would like PT again   - no new meds         2. Gait abnormality 2/2 above and cervical spinal stenosis  - PT          Brian Shin MD, MPH  Division of Movement and Memory Disorders  [facility]  [phone]

## 2023-12-18 ENCOUNTER — OFFICE VISIT (OUTPATIENT)
Dept: PODIATRY | Facility: CLINIC | Age: 78
End: 2023-12-18
Payer: MEDICARE

## 2023-12-18 VITALS
SYSTOLIC BLOOD PRESSURE: 138 MMHG | HEIGHT: 63 IN | DIASTOLIC BLOOD PRESSURE: 77 MMHG | HEART RATE: 68 BPM | WEIGHT: 117 LBS | BODY MASS INDEX: 20.73 KG/M2

## 2023-12-18 DIAGNOSIS — L84 CORN OR CALLUS: ICD-10-CM

## 2023-12-18 DIAGNOSIS — M20.42 HAMMER TOE OF LEFT FOOT: Primary | ICD-10-CM

## 2023-12-18 PROCEDURE — 99213 OFFICE O/P EST LOW 20 MIN: CPT | Mod: S$PBB,,, | Performed by: PODIATRIST

## 2023-12-18 PROCEDURE — 99213 PR OFFICE/OUTPT VISIT, EST, LEVL III, 20-29 MIN: ICD-10-PCS | Mod: S$PBB,,, | Performed by: PODIATRIST

## 2023-12-18 PROCEDURE — 99214 OFFICE O/P EST MOD 30 MIN: CPT | Mod: PBBFAC,PN | Performed by: PODIATRIST

## 2023-12-18 PROCEDURE — 99999 PR PBB SHADOW E&M-EST. PATIENT-LVL IV: ICD-10-PCS | Mod: PBBFAC,,, | Performed by: PODIATRIST

## 2023-12-18 PROCEDURE — 99999 PR PBB SHADOW E&M-EST. PATIENT-LVL IV: CPT | Mod: PBBFAC,,, | Performed by: PODIATRIST

## 2023-12-18 NOTE — PROGRESS NOTES
PODIATRY NOTE       CHIEF CONCERN:   Foot Pain            MEDICAL DECISION MAKING:          Problem List Items Addressed This Visit    None  Visit Diagnoses       Hammer toe of left foot    -  Primary    Corn or callus                    I counseled the patient on the patient's conditions, their implications and medical management.    Shoe and activity modification as needed for relief.  Continue extra depth shoes to accommodate hammertoes.    Continue urea cream daily to calluses.     Continue Lotrimin (clotrimazole) to toenails daily.   Offloading pads as needed.    Pads provided   No barefoot walking.         I spent a total of 20 minutes on the day of the visit.  This includes face to face time and non-face to face time preparing to see the patient (eg, review of tests), obtaining and/or reviewing separately obtained history, documenting clinical information in the electronic or other health record, independently interpreting results and communicating results to the patient/family/caregiver, or care coordinator.              HISTORY OF PRESENT ILLNESS:  Sarah Rico is a 78 y.o. female, with history of CKD,  with concerns regarding: painful calluses sub metatarsal heads, chronic.      She has history of bunionectomy bilateral.   Two surgeries on the right and one left foot surgery in [place].   left 2nd toe overlaps the left hallux.         PCP:  Latrice Davidson MD;  Nydia Roth PA-C   Last encounter:   4/17/2023               Patient Active Problem List   Diagnosis    Stress incontinence of urine    Voiding dysfunction    Rectocele, female    Chronic constipation    Vaginal atrophy    Graves' disease    Stage 3a chronic kidney disease    Hyperlipidemia    Aortic valve stenosis    Essential tremor    Essential hypertension    Insomnia    S/P TAVR (transcatheter aortic valve replacement)    Lumbar radiculopathy    S/P hip replacement    Pelvic floor weakness    Decreased functional mobility and  endurance    Disorder of muscle    Preoperative cardiovascular examination    Status post anterior & posterior colporrhaphy w/ MUS     Incomplete bladder emptying    Pelvic floor tension    Pelvic floor dysfunction    Chronic pansinusitis    Atypical lobular hyperplasia (ALH) of right breast    Dyspareunia due to medical condition in female    Vertigo    BPPV (benign paroxysmal positional vertigo)    Aortic atherosclerosis    Cervical spinal stenosis    Ataxia    Chronic diastolic CHF (congestive heart failure), NYHA class 1           Current Outpatient Medications on File Prior to Visit   Medication Sig Dispense Refill    ALPRAZolam (XANAX) 0.5 MG tablet Take 1 tablet (0.5 mg total) by mouth daily as needed (severe anxiety). 30 tablet 0    ASCORBATE CALCIUM, VITAMIN C, ORAL Take 500 mg by mouth.      aspirin (ECOTRIN) 81 MG EC tablet Take 81 mg by mouth once daily.      azithromycin (Z-BEATRIZ) 250 MG tablet TAKE 2 TABLETS BY MOUTH TODAY, THEN TAKE 1 TABLET DAILY FOR 4 DAYS      azithromycin (ZITHROMAX) 500 MG tablet Take 500 mg by mouth once. for one dose      cetirizine-pseudoephedrine 5-120 mg Tb12 cetirizine 5 mg-pseudoephedrine  mg tablet,extended release,12hr   TK 1 T PO BID      cholecalciferol, vitamin D3, (VITAMIN D3) 50 mcg (2,000 unit) Tab Take 2,000 Units by mouth.      ciclopirox 0.77 % Gel Apply topically once daily. To thickened discolored toenails. 30 g 1    clotrimazole (LOTRIMIN) 1 % cream Apply topically 2 (two) times daily. To affected toenails. 45 g 1    CMPD testosterone proprionate 2% in vanicream Apply topically. [facility]      COMPOUND HORMONE REPLACEMENT Take by mouth once daily. ESTROGEN CREAM -- [facility]      EPINEPHrine (EPIPEN) 0.3 mg/0.3 mL AtIn epinephrine 0.3 mg/0.3 mL injection, auto-injector      estradioL (ESTRACE) 2 MG tablet Take 1 tablet (2 mg total) by mouth once daily. 90 tablet 3    ipratropium (ATROVENT) 42 mcg (0.06 %) nasal spray SMARTSI Both  Nares 3 Times Daily      MAGNESIUM CARBONATE ORAL Take 1 tablet by mouth once daily. 200MG      methIMAzole (TAPAZOLE) 5 MG Tab TAKE 1/2 TABLET BY MOUTH DAILY. Needs appointment for future refills. 90 tablet 0    omalizumab (XOLAIR) 75 mg/0.5 mL injection       PENNSAID 20 mg/gram /actuation(2 %) sopm APPLY 1 APPLICATION TOPICALLY 2 (TWO) TIMES DAILY AS NEEDED.::NOT COVERED:: 112 g 10    predniSONE (DELTASONE) 10 MG tablet Take by mouth.      PREMARIN vaginal cream PLACE 0.5 G VAGINALLY ONCE DAILY. 90 g 0    progesterone (PROMETRIUM) 200 MG capsule Take 1 capsule by mouth 30-60 minutes before bed every night 90 capsule 3    RENOVA 0.02 % Crea RENOVA 0.02 % TOPICAL CREAM APPLY TO AFFECTED AREA EVERY DAY AT NIGHT 40 g 0    rosuvastatin (CRESTOR) 40 MG Tab Take 1 tablet (40 mg total) by mouth once daily. 90 tablet 3    sars-cov-2, covid-19 omicron, (MODERNA COVID BIVAL,18Y UP,-PF) 50 mcg/0.5 mL injection Inject into the muscle. 0.5 mL 0    sulfamethoxazole-trimethoprim 800-160mg (BACTRIM DS) 800-160 mg Tab Take 1 tablet by mouth once daily.      UNABLE TO FIND medication name: Testosterone Cream in versabase 20mg/mL Apply 1/4mL to labia minora nightly 22.5 mL 1    urea 20 % Crea Apply 1 application topically once daily. To dry skin on the feet. 75 g 10    valACYclovir (VALTREX) 1000 MG tablet valacyclovir 1 gram tablet      XIIDRA 5 % Dpet       zolpidem (AMBIEN CR) 6.25 MG CR tablet Take 1 tablet (6.25 mg total) by mouth nightly as needed for Insomnia. 30 tablet 2    atenoloL (TENORMIN) 50 MG tablet Take 1 tablet by mouth once daily.      propranoloL (INDERAL LA) 120 MG 24 hr capsule Take 1 capsule (120 mg total) by mouth once daily. for 10 days 10 capsule 0    topiramate (TOPAMAX) 25 MG tablet Take 1 tablet (25 mg total) by mouth once daily for 7 days, THEN 2 tablets (50 mg total) once daily. (Patient not taking: Reported on 10/18/2023) 67 tablet 3     No current facility-administered medications on file prior to  "visit.           Review of patient's allergies indicates:   Allergen Reactions    Penicillins Rash and Hives               ROS:   General ROS: negative for - chills, fever or night sweats  Respiratory ROS: no cough, shortness of breath, or wheezing  Cardiovascular ROS: no chest pain or dyspnea on exertion  Musculoskeletal ROS: positive for - pain in foot - bilateral  Neurological ROS: negative for - impaired coordination/balance and numbness/tingling  Dermatological ROS: negative for pruritus and rash      EXAM:     Vitals:    12/18/23 1155   BP: 138/77   Pulse: 68   Weight: 53.1 kg (117 lb)   Height: 5' 3" (1.6 m)            General:  Alert and Oriented x 3;  No acute distress      Bilateral  Lower extremity exam:    Vascular:   Dorsalis Pedis:  present   Posterior Tibial:  present  Capillary refill time:  3 seconds  Temperature of toes cool to touch  Edema:  Trace and non-pitting       Neurological:     Sharp touch:  normal  Light touch: normal  Tinels Sign:  Absent  Mulders Click:   Absent        Dermatological:   Skin: thin, moist and appropriate for age; calluses sub metatarsal heads  Wounds/Ulcers:  Absent  Bruising:  Absent  Erythema:  Absent  Hair:  decreased  Toenails superficially mycotic, with subungual debris  right hallux         Musculoskeletal:   Metatarsophalangeal range of motion:   diminished range of motion  Subtalar joint range of motion: full range of motion  Ankle joint range of motion:  full range of motion  Bunions:  Present  Hammertoes: Present; left 2nd mainly, overlapping hallux.           "

## 2024-01-03 ENCOUNTER — OFFICE VISIT (OUTPATIENT)
Dept: UROGYNECOLOGY | Facility: CLINIC | Age: 79
End: 2024-01-03
Payer: MEDICARE

## 2024-01-03 DIAGNOSIS — N39.8 VOIDING DYSFUNCTION: ICD-10-CM

## 2024-01-03 DIAGNOSIS — N95.2 VAGINAL ATROPHY: Primary | ICD-10-CM

## 2024-01-03 DIAGNOSIS — R33.9 INCOMPLETE BLADDER EMPTYING: ICD-10-CM

## 2024-01-03 DIAGNOSIS — N39.3 STRESS INCONTINENCE OF URINE: ICD-10-CM

## 2024-01-03 DIAGNOSIS — R30.0 DYSURIA: ICD-10-CM

## 2024-01-03 DIAGNOSIS — R35.1 NOCTURIA MORE THAN TWICE PER NIGHT: ICD-10-CM

## 2024-01-03 PROCEDURE — 99213 OFFICE O/P EST LOW 20 MIN: CPT | Mod: 95,,, | Performed by: OBSTETRICS & GYNECOLOGY

## 2024-01-03 NOTE — PROGRESS NOTES
Urogyn follow up  01/03/2024    [facility]  [address]  [facility] [address]    Sarah Rico  [id number]  1945    The patient location is: home  The chief complaint leading to consultation is:follow up    Visit type: audio only    Face to Face time with patient: 20 min  20 minutes of total time spent on the encounter, which includes face to face time and non-face to face time preparing to see the patient (eg, review of tests), Obtaining and/or reviewing separately obtained history, Documenting clinical information in the electronic or other health record, Independently interpreting results (not separately reported) and communicating results to the patient/family/caregiver, or Care coordination (not separately reported).     Each patient to whom he or she provides medical services by telemedicine is:  (1) informed of the relationship between the physician and patient and the respective role of any other health care provider with respect to management of the patient; and (2) notified that he or she may decline to receive medical services by telemedicine and may withdraw from such care at any time.    Notes:     Sarah Rico is a 78 y.o. here for a follow up.  The patient's last visit with me was on 11/2/2022.    Date of Operation: 11/01/2021     Title of Operation:  1)  Anterior repair  2)  Placement of retropubic tension-free midurethral sling, Advantage Fit (Seeq)  3)  Posterior repair with perineorrhaphy  4)  Cystourethroscopy     Indications for Surgery:  1)  UI:  (+) QUYNH (walking)  > (--) UUI. Using poise impressa: 2-4 PPD, mostly dry.   (+) pads:2-4/day, usually moderate wetness and 1/ not common at night usually variable wetness.  Daytime frequency: Q 3 hours.  Nocturia: Yes: 1/night.   (--) dysuria,  (--) hematuria,  (--) frequent UTIs.  (--) complete bladder emptying. Pushes, Tries maneuvering. Using bladder gaurds which work well.      Gyn in [place] got  "recurrent UTI in 2015, some passed blood. In 2015 started on Bactrim. Seen Urologist for Cystoscopy detected no abnormalities. Urethra was "too skinny, was clogging". Went every three weeks for 6-8 months, for a stretching. No longer doing stretching urethra.      2)  POP:  Absent.(--) vaginal bleeding. (--) vaginal discharge. (+) sexually active.  (--) dyspareunia.   (+)  Vaginal dryness.  (+) vaginal estrogen use: 2x/week.   --POP-Q:  Aa -1; Ba -1; C -6; Ap 0; Bp 0 (moderate); D -7.  Genital hiatus 3, perineal body 2, total vaginal length 10-11 (standing).      3)  BM:  (+) constipation/straining.  (--) chronic diarrhea. (--) hematochezia.  (--) fecal incontinence.  (--) fecal smearing/urgency.  (--) complete evacuation.   Antibiotics caused diarrhea, takes Miralax every three-four days to try and completely empty bowels. Have always have trouble with constipation. Hemorrhoids, causing slight bleeding.      Preoperative Diagnosis:  Stress incontinence of urine    Graves disease    Voiding dysfunction    Chronic constipation    Cystocele, midline    Rectocele, female    Vaginal atrophy                Concern for major voiding dysfunction              Urodynamic stress incontinence              Decreased urethral coaptation     Postoperative Diagnosis:  Stress incontinence of urine    Graves disease    Voiding dysfunction    Chronic constipation    Cystocele, midline    Rectocele, female    Vaginal atrophy                Concern for major voiding dysfunction              Urodynamic stress incontinence              Decreased urethral coaptation    Issues include:  Patient Active Problem List   Diagnosis    Stress incontinence of urine    Voiding dysfunction    Rectocele, female    Chronic constipation    Vaginal atrophy    Graves' disease    Stage 3a chronic kidney disease    Hyperlipidemia    Aortic valve stenosis    Essential tremor    Essential hypertension    Insomnia    S/P TAVR (transcatheter aortic valve " replacement)    Lumbar radiculopathy    S/P hip replacement    Pelvic floor weakness    Decreased functional mobility and endurance    Disorder of muscle    Preoperative cardiovascular examination    Status post anterior & posterior colporrhaphy w/ MUS 11/1    Incomplete bladder emptying    Pelvic floor tension    Pelvic floor dysfunction    Chronic pansinusitis    Atypical lobular hyperplasia (ALH) of right breast    Dyspareunia due to medical condition in female    Vertigo    BPPV (benign paroxysmal positional vertigo)    Aortic atherosclerosis    Cervical spinal stenosis    Ataxia    Chronic diastolic CHF (congestive heart failure), NYHA class 1     Well-woman:  Pap test: 2/21([facility] [place]), normal per report.  History of abnormal paps: Yes - Around 2013, HPV positive, Colposcopy done clear after three years.  History of STIs:  No  Mammogram: 10/2023 benign. S/p breast consult with Ed for calcifications.   Colonoscopy: Date of last: 2022, Result:  polyps. Repeat 2025.   DEXA:  Date of last: 2021. Result: normal. Repeat per GYN.     Last visit:  Has gone to pelvic floor PT  Feels like she is emptying her bladder well.  Denies UI.   Denies constipation or straining.     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 practicing those things  currently  --Bowel issues: easier with stool softener BID; no pain  --UTIs:  1/2022 8/2022  --POP: does feel like something is bulging around anus but not coming from vagina    --pelvic exam 11/2022:  Pelvic  Ext. Genitalia: normal external genitalia. Normal bartholins and skenes glands  Vagina: + mild atrophy. Normal vaginal mucosa without lesions. No discharge noted.   Non-tender bladder base without palpable mass. Sling path nontender.  Sling incision and A&P incisions well-healed.  NT. No mesh visible/palpable. Firm area under urethra but no mesh visualized/palpated.   Cervix: no lesions  Uterus:  uterus is normal size, shape, consistency and nontender   Urethra: no masses or tenderness  Urethral meatus: no lesions, caruncle or prolapse.  POP-Q: deferred.  No POP with valsalva.     Medications:    Current Outpatient Medications:     ALPRAZolam (XANAX) 0.5 MG tablet, Take 1 tablet (0.5 mg total) by mouth daily as needed (severe anxiety)., Disp: 30 tablet, Rfl: 0    ASCORBATE CALCIUM, VITAMIN C, ORAL, Take 500 mg by mouth., Disp: , Rfl:     aspirin (ECOTRIN) 81 MG EC tablet, Take 81 mg by mouth once daily., Disp: , Rfl:     atenoloL (TENORMIN) 50 MG tablet, Take 1 tablet by mouth once daily., Disp: , Rfl:     azithromycin (Z-BEATRIZ) 250 MG tablet, TAKE 2 TABLETS BY MOUTH TODAY, THEN TAKE 1 TABLET DAILY FOR 4 DAYS, Disp: , Rfl:     azithromycin (ZITHROMAX) 500 MG tablet, Take 500 mg by mouth once. for one dose, Disp: , Rfl:     cetirizine-pseudoephedrine 5-120 mg Tb12, cetirizine 5 mg-pseudoephedrine  mg tablet,extended release,12hr  TK 1 T PO BID, Disp: , Rfl:     cholecalciferol, vitamin D3, (VITAMIN D3) 50 mcg (2,000 unit) Tab, Take 2,000 Units by mouth., Disp: , Rfl:     ciclopirox 0.77 % Gel, Apply topically once daily. To thickened discolored toenails., Disp: 30 g, Rfl: 1    clotrimazole (LOTRIMIN) 1 % cream, Apply topically 2 (two) times daily. To affected toenails., Disp: 45 g, Rfl: 1    CMPD  testosterone proprionate 2% in vanicream, Apply topically. [facility], Disp: , Rfl:     COMPOUND HORMONE REPLACEMENT, Take by mouth once daily. ESTROGEN CREAM -- [facility], Disp: , Rfl:     EPINEPHrine (EPIPEN) 0.3 mg/0.3 mL AtIn, epinephrine 0.3 mg/0.3 mL injection, auto-injector, Disp: , Rfl:     estradioL (ESTRACE) 2 MG tablet, Take 1 tablet (2 mg total) by mouth once daily., Disp: 90 tablet, Rfl: 3    ipratropium (ATROVENT) 42 mcg (0.06 %) nasal spray, SMARTSI Both Nares 3 Times Daily, Disp: , Rfl:     MAGNESIUM CARBONATE ORAL, Take 1 tablet by mouth once daily. 200MG, Disp: , Rfl:     methIMAzole (TAPAZOLE) 5 MG Tab, TAKE 1/2 TABLET BY MOUTH DAILY. Needs appointment for future refills., Disp: 90 tablet, Rfl: 0    omalizumab (XOLAIR) 75 mg/0.5 mL injection, , Disp: , Rfl:     PENNSAID 20 mg/gram /actuation(2 %) sopm, APPLY 1 APPLICATION TOPICALLY 2 (TWO) TIMES DAILY AS NEEDED.::NOT COVERED::, Disp: 112 g, Rfl: 10    predniSONE (DELTASONE) 10 MG tablet, Take by mouth., Disp: , Rfl:     PREMARIN vaginal cream, PLACE 0.5 G VAGINALLY ONCE DAILY., Disp: 90 g, Rfl: 0    progesterone (PROMETRIUM) 200 MG capsule, Take 1 capsule by mouth 30-60 minutes before bed every night, Disp: 90 capsule, Rfl: 3    propranoloL (INDERAL LA) 120 MG 24 hr capsule, Take 1 capsule (120 mg total) by mouth once daily. for 10 days, Disp: 10 capsule, Rfl: 0    RENOVA 0.02 % Crea, RENOVA 0.02 % TOPICAL CREAM APPLY TO AFFECTED AREA EVERY DAY AT NIGHT, Disp: 40 g, Rfl: 0    rosuvastatin (CRESTOR) 40 MG Tab, Take 1 tablet (40 mg total) by mouth once daily., Disp: 90 tablet, Rfl: 3    sars-cov-2, covid-19 omicron, (MODERNA COVID BIVAL,18Y UP,-PF) 50 mcg/0.5 mL injection, Inject into the muscle., Disp: 0.5 mL, Rfl: 0    sulfamethoxazole-trimethoprim 800-160mg (BACTRIM DS) 800-160 mg Tab, Take 1 tablet by mouth once daily., Disp: , Rfl:     topiramate (TOPAMAX) 25 MG tablet, Take 1 tablet (25 mg total) by mouth once daily for 7  days, THEN 2 tablets (50 mg total) once daily. (Patient not taking: Reported on 10/18/2023), Disp: 67 tablet, Rfl: 3    UNABLE TO FIND, medication name: Testosterone Cream in versabase 20mg/mL Apply 1/4mL to labia minora nightly, Disp: 22.5 mL, Rfl: 1    urea 20 % Crea, Apply 1 application topically once daily. To dry skin on the feet., Disp: 75 g, Rfl: 10    valACYclovir (VALTREX) 1000 MG tablet, valacyclovir 1 gram tablet, Disp: , Rfl:     XIIDRA 5 % Dpet, , Disp: , Rfl:     zolpidem (AMBIEN CR) 6.25 MG CR tablet, Take 1 tablet (6.25 mg total) by mouth nightly as needed for Insomnia., Disp: 30 tablet, Rfl: 2    ROS:  As per HPI.      Exam  LMP  (LMP Unknown) Comment:    1996  General: alert and oriented, no acute distress  Remainder of PE deferred as visit was virtual.     Impression  1. Vaginal atrophy        2. s/p MUS, A&P, cysto 2021        3. Voiding dysfunction        4. Dysuria        5. Incomplete bladder emptying        6. Nocturia more than twice per night              We reviewed the above issues and discussed options for short-term versus long-term management of her problems.   Plan:   Postop state: well healed. No lifting > 50 pounds without help   History of constipation:  Try to minimize straining.   Continue daily stool softener 2x/day.   Take miralax as daily as needed.   H/o Incomplete bladder emptying:  DO NOT SQUEEZE OR DO ANY KEGELS/STRENGTHENING EXERCISES.   RELAX WHEN YOU URINATE--PULL OUT ONLY RELAXATION PT EXERCISES AND PRACTICE.   Urine C&S sent to make sure  No UTI.   Standing orders placed for urine culture/ urinalysis  If you have symptoms of uti, please go to nearest [facility] lab to bring a specimen   Perform self catheterization if you are not able to empty your bladder--if you do need to self cath, let us know  If you have to self cath, take one macrobid 100 mg   Continue to use vaginal estrogen cream.   Nocturnal enuresis:  New onset x 1 year.   Urine C&S.   RTC tomorrow for PE  and PVR check. Will also assess for POP.   stop fluids 2 hours before bed  No water by bed  If have leg swelling:  Elevate feet above chest x 1 hour before bed to get excess fluid off.  Can also use support hose (knee highs).    Consider need to set alarm mid-sleep.   RTC 1/4.     Approx 20 min spent in consult, 100% in discussion.      Hodan Goldberg MD  [facility]  Division of Female Pelvic Medicine and Reconstructive Surgery  Department of Obstetrics & Gynecology

## 2024-01-04 ENCOUNTER — OFFICE VISIT (OUTPATIENT)
Dept: UROGYNECOLOGY | Facility: CLINIC | Age: 79
End: 2024-01-04
Payer: MEDICARE

## 2024-01-04 VITALS
DIASTOLIC BLOOD PRESSURE: 68 MMHG | WEIGHT: 118.38 LBS | HEART RATE: 58 BPM | SYSTOLIC BLOOD PRESSURE: 137 MMHG | HEIGHT: 63 IN | BODY MASS INDEX: 20.98 KG/M2

## 2024-01-04 DIAGNOSIS — K59.09 CHRONIC CONSTIPATION: ICD-10-CM

## 2024-01-04 DIAGNOSIS — N39.3 STRESS INCONTINENCE OF URINE: ICD-10-CM

## 2024-01-04 DIAGNOSIS — R35.1 NOCTURIA: Primary | ICD-10-CM

## 2024-01-04 DIAGNOSIS — N39.8 VOIDING DYSFUNCTION: ICD-10-CM

## 2024-01-04 DIAGNOSIS — N95.2 VAGINAL ATROPHY: ICD-10-CM

## 2024-01-04 PROCEDURE — 51701 INSERT BLADDER CATHETER: CPT | Mod: PBBFAC | Performed by: OBSTETRICS & GYNECOLOGY

## 2024-01-04 PROCEDURE — 99999 PR PBB SHADOW E&M-EST. PATIENT-LVL V: CPT | Mod: PBBFAC,,, | Performed by: OBSTETRICS & GYNECOLOGY

## 2024-01-04 PROCEDURE — 51701 INSERT BLADDER CATHETER: CPT | Mod: S$PBB,,, | Performed by: OBSTETRICS & GYNECOLOGY

## 2024-01-04 PROCEDURE — 87086 URINE CULTURE/COLONY COUNT: CPT | Performed by: OBSTETRICS & GYNECOLOGY

## 2024-01-04 PROCEDURE — 99213 OFFICE O/P EST LOW 20 MIN: CPT | Mod: S$PBB,25,, | Performed by: OBSTETRICS & GYNECOLOGY

## 2024-01-04 PROCEDURE — 99215 OFFICE O/P EST HI 40 MIN: CPT | Mod: PBBFAC | Performed by: OBSTETRICS & GYNECOLOGY

## 2024-01-04 RX ORDER — TROSPIUM CHLORIDE 20 MG/1
20 TABLET, FILM COATED ORAL 2 TIMES DAILY PRN
Qty: 60 TABLET | Refills: 11 | Status: SHIPPED | OUTPATIENT
Start: 2024-01-04 | End: 2025-01-03

## 2024-01-04 NOTE — PATIENT INSTRUCTIONS
Postop state: well healed. No lifting > 50 pounds without help   History of constipation:  Try to minimize straining.   Continue daily stool softener 2x/day.   Take miralax as daily as needed.   H/o Incomplete bladder emptying:  DO NOT SQUEEZE OR DO ANY KEGELS/STRENGTHENING EXERCISES.   RELAX WHEN YOU URINATE--PULL OUT ONLY RELAXATION PT EXERCISES AND PRACTICE.   Urine C&S sent to make sure  No UTI.   Standing orders placed for urine culture/ urinalysis  If you have symptoms of uti, please go to nearest [facility] lab to bring a specimen   Perform self catheterization if you are not able to empty your bladder--if you do need to self cath, let us know  If you have to self cath, take one macrobid 100 mg   Continue to use vaginal estrogen cream.   Nocturnal enuresis:  New onset x 1 year.   Urine C&S.   Good pelvic support/no prolapse. PVR normal today.   stop fluids 2 hours before bed  No water by bed  If have leg swelling:  Elevate feet above chest x 1 hour before bed to get excess fluid off.  Can also use support hose (knee highs).    Start pelvic floor PT.    KAYLEE Carrillotist: (p) [phone].  Or [phone].   KAYLEE Paul. (p) [phone].    Set alarm for 12 midnight - 2 AM (somewhere in that interval) to meaningfully urinate to see if helps.   Try trospium 20 mg before bed.   RTC 3-4 months.  VV or in-person ok.

## 2024-01-04 NOTE — PROGRESS NOTES
Urogyn follow up  01/07/2024    [facility]  [address] [address]    Sarah Rico  [id number]  1945    The patient location is: home  The chief complaint leading to consultation is:follow up    Visit type: audio only    Face to Face time with patient: 20 min  20 minutes of total time spent on the encounter, which includes face to face time and non-face to face time preparing to see the patient (eg, review of tests), Obtaining and/or reviewing separately obtained history, Documenting clinical information in the electronic or other health record, Independently interpreting results (not separately reported) and communicating results to the patient/family/caregiver, or Care coordination (not separately reported).     Each patient to whom he or she provides medical services by telemedicine is:  (1) informed of the relationship between the physician and patient and the respective role of any other health care provider with respect to management of the patient; and (2) notified that he or she may decline to receive medical services by telemedicine and may withdraw from such care at any time.    Notes:     Sarah Rico is a 78 y.o. here for a follow up.  The patient's last visit with me was on 11/2/2022.    Date of Operation: 11/01/2021     Title of Operation:  1)  Anterior repair  2)  Placement of retropubic tension-free midurethral sling, Advantage Fit (Qnary)  3)  Posterior repair with perineorrhaphy  4)  Cystourethroscopy     Indications for Surgery:  1)  UI:  (+) QUYNH (walking)  > (--) UUI. Using poise impressa: 2-4 PPD, mostly dry.   (+) pads:2-4/day, usually moderate wetness and 1/ not common at night usually variable wetness.  Daytime frequency: Q 3 hours.  Nocturia: Yes: 1/night.   (--) dysuria,  (--) hematuria,  (--) frequent UTIs.  (--) complete bladder emptying. Pushes, Tries maneuvering. Using bladder gaurds which work well.      Gyn in  "[place] got recurrent UTI in 2015, some passed blood. In 2015 started on Bactrim. Seen Urologist for Cystoscopy detected no abnormalities. Urethra was "too skinny, was clogging". Went every three weeks for 6-8 months, for a stretching. No longer doing stretching urethra.      2)  POP:  Absent.(--) vaginal bleeding. (--) vaginal discharge. (+) sexually active.  (--) dyspareunia.   (+)  Vaginal dryness.  (+) vaginal estrogen use: 2x/week.   --POP-Q:  Aa -1; Ba -1; C -6; Ap 0; Bp 0 (moderate); D -7.  Genital hiatus 3, perineal body 2, total vaginal length 10-11 (standing).      3)  BM:  (+) constipation/straining.  (--) chronic diarrhea. (--) hematochezia.  (--) fecal incontinence.  (--) fecal smearing/urgency.  (--) complete evacuation.   Antibiotics caused diarrhea, takes Miralax every three-four days to try and completely empty bowels. Have always have trouble with constipation. Hemorrhoids, causing slight bleeding.      Preoperative Diagnosis:  Stress incontinence of urine    Graves disease    Voiding dysfunction    Chronic constipation    Cystocele, midline    Rectocele, female    Vaginal atrophy                Concern for major voiding dysfunction              Urodynamic stress incontinence              Decreased urethral coaptation     Postoperative Diagnosis:  Stress incontinence of urine    Graves disease    Voiding dysfunction    Chronic constipation    Cystocele, midline    Rectocele, female    Vaginal atrophy                Concern for major voiding dysfunction              Urodynamic stress incontinence              Decreased urethral coaptation    Issues include:  Patient Active Problem List   Diagnosis    Stress incontinence of urine    Voiding dysfunction    Rectocele, female    Chronic constipation    Vaginal atrophy    Graves' disease    Stage 3a chronic kidney disease    Hyperlipidemia    Aortic valve stenosis    Essential tremor    Essential hypertension    Insomnia    S/P TAVR (transcatheter aortic " valve replacement)    Lumbar radiculopathy    S/P hip replacement    Pelvic floor weakness    Decreased functional mobility and endurance    Disorder of muscle    Preoperative cardiovascular examination    Status post anterior & posterior colporrhaphy w/ MUS 11/1    Incomplete bladder emptying    Pelvic floor tension    Pelvic floor dysfunction    Chronic pansinusitis    Atypical lobular hyperplasia (ALH) of right breast    Dyspareunia due to medical condition in female    Vertigo    BPPV (benign paroxysmal positional vertigo)    Aortic atherosclerosis    Cervical spinal stenosis    Ataxia    Chronic diastolic CHF (congestive heart failure), NYHA class 1     Well-woman:  Pap test: 2/21([facility]), normal per report.  History of abnormal paps: Yes - Around 2013, HPV positive, Colposcopy done clear after three years.  History of STIs:  No  Mammogram: 10/2023 benign. S/p breast consult with Ed for calcifications.   Colonoscopy: Date of last: 2022, Result:  polyps. Repeat 2025.   DEXA:  Date of last: 2021. Result: normal. Repeat per GYN.     Last visit:  Has gone to pelvic floor PT  Feels like she is emptying her bladder well.  Denies UI.   Denies constipation or straining.     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 practicing those  things currently  --Bowel issues: easier with stool softener BID; no pain  --UTIs:  1/2022 8/2022  --POP: does feel like something is bulging around anus but not coming from vagina    --pelvic exam 11/2022:  Pelvic  Ext. Genitalia: normal external genitalia. Normal bartholins and skenes glands  Vagina: + mild atrophy. Normal vaginal mucosa without lesions. No discharge noted.   Non-tender bladder base without palpable mass. Sling path nontender.  Sling incision and A&P incisions well-healed.  NT. No mesh visible/palpable. Firm area under urethra but no mesh visualized/palpated.   Cervix: no lesions  Uterus:  uterus is normal size, shape, consistency and nontender   Urethra: no masses or tenderness  Urethral meatus: no lesions, caruncle or prolapse.  POP-Q: deferred.  No POP with valsalva.     Medications:    Current Outpatient Medications:     ALPRAZolam (XANAX) 0.5 MG tablet, Take 1 tablet (0.5 mg total) by mouth daily as needed (severe anxiety)., Disp: 30 tablet, Rfl: 0    ASCORBATE CALCIUM, VITAMIN C, ORAL, Take 500 mg by mouth., Disp: , Rfl:     aspirin (ECOTRIN) 81 MG EC tablet, Take 81 mg by mouth once daily., Disp: , Rfl:     cetirizine-pseudoephedrine 5-120 mg Tb12, cetirizine 5 mg-pseudoephedrine  mg tablet,extended release,12hr  TK 1 T PO BID, Disp: , Rfl:     cholecalciferol, vitamin D3, (VITAMIN D3) 50 mcg (2,000 unit) Tab, Take 2,000 Units by mouth., Disp: , Rfl:     ciclopirox 0.77 % Gel, Apply topically once daily. To thickened discolored toenails., Disp: 30 g, Rfl: 1    clotrimazole (LOTRIMIN) 1 % cream, Apply topically 2 (two) times daily. To affected toenails., Disp: 45 g, Rfl: 1    CMPD testosterone proprionate 2% in vanicream, Apply topically. [facility], Disp: , Rfl:     COMPOUND HORMONE REPLACEMENT, Take by mouth once daily. ESTROGEN CREAM -- [facility], Disp: , Rfl:     EPINEPHrine (EPIPEN) 0.3 mg/0.3 mL AtIn, epinephrine 0.3 mg/0.3 mL injection, auto-injector, Disp: ,  Rfl:     estradioL (ESTRACE) 2 MG tablet, Take 1 tablet (2 mg total) by mouth once daily., Disp: 90 tablet, Rfl: 3    ipratropium (ATROVENT) 42 mcg (0.06 %) nasal spray, SMARTSI Both Nares 3 Times Daily, Disp: , Rfl:     MAGNESIUM CARBONATE ORAL, Take 1 tablet by mouth once daily. 200MG, Disp: , Rfl:     methIMAzole (TAPAZOLE) 5 MG Tab, TAKE 1/2 TABLET BY MOUTH DAILY. Needs appointment for future refills., Disp: 90 tablet, Rfl: 0    omalizumab (XOLAIR) 75 mg/0.5 mL injection, , Disp: , Rfl:     PENNSAID 20 mg/gram /actuation(2 %) sopm, APPLY 1 APPLICATION TOPICALLY 2 (TWO) TIMES DAILY AS NEEDED.::NOT COVERED::, Disp: 112 g, Rfl: 10    predniSONE (DELTASONE) 10 MG tablet, Take by mouth., Disp: , Rfl:     PREMARIN vaginal cream, PLACE 0.5 G VAGINALLY ONCE DAILY., Disp: 90 g, Rfl: 0    progesterone (PROMETRIUM) 200 MG capsule, Take 1 capsule by mouth 30-60 minutes before bed every night, Disp: 90 capsule, Rfl: 3    RENOVA 0.02 % Crea, RENOVA 0.02 % TOPICAL CREAM APPLY TO AFFECTED AREA EVERY DAY AT NIGHT, Disp: 40 g, Rfl: 0    rosuvastatin (CRESTOR) 40 MG Tab, Take 1 tablet (40 mg total) by mouth once daily., Disp: 90 tablet, Rfl: 3    sars-cov-2, covid-19 omicron, (MODERNA COVID BIVAL,18Y UP,-PF) 50 mcg/0.5 mL injection, Inject into the muscle., Disp: 0.5 mL, Rfl: 0    sulfamethoxazole-trimethoprim 800-160mg (BACTRIM DS) 800-160 mg Tab, Take 1 tablet by mouth once daily., Disp: , Rfl:     UNABLE TO FIND, medication name: Testosterone Cream in versabase 20mg/mL Apply 1/4mL to labia minora nightly, Disp: 22.5 mL, Rfl: 1    urea 20 % Crea, Apply 1 application topically once daily. To dry skin on the feet., Disp: 75 g, Rfl: 10    valACYclovir (VALTREX) 1000 MG tablet, valacyclovir 1 gram tablet, Disp: , Rfl:     XIIDRA 5 % Dpet, , Disp: , Rfl:     zolpidem (AMBIEN CR) 6.25 MG CR tablet, Take 1 tablet (6.25 mg total) by mouth nightly as needed for Insomnia., Disp: 30 tablet, Rfl: 2    azithromycin (Z-BEATRIZ) 250 MG tablet,  "TAKE 2 TABLETS BY MOUTH TODAY, THEN TAKE 1 TABLET DAILY FOR 4 DAYS, Disp: , Rfl:     azithromycin (ZITHROMAX) 500 MG tablet, Take 500 mg by mouth once. for one dose, Disp: , Rfl:     propranoloL (INDERAL LA) 120 MG 24 hr capsule, Take 1 capsule (120 mg total) by mouth once daily. for 10 days, Disp: 10 capsule, Rfl: 0    topiramate (TOPAMAX) 25 MG tablet, Take 1 tablet (25 mg total) by mouth once daily for 7 days, THEN 2 tablets (50 mg total) once daily. (Patient not taking: Reported on 10/18/2023), Disp: 67 tablet, Rfl: 3    trospium (SANCTURA) 20 mg Tab tablet, Take 1 tablet (20 mg total) by mouth 2 (two) times daily as needed (urinary incontinence)., Disp: 60 tablet, Rfl: 11    ROS:  As per HPI.      Exam  /68 (BP Location: Right arm, Patient Position: Sitting, BP Method: Medium (Automatic))   Pulse (!) 58   Ht 5' 3" (1.6 m)   Wt 53.7 kg (118 lb 6.2 oz)   LMP  (LMP Unknown) Comment:    1996  BMI 20.97 kg/m²   General: alert and oriented, no acute distress  Abd: soft, non-tender, non-distended     Pelvic  Ext. Genitalia: normal external genitalia. Normal bartholins and skenes glands  Vagina: + mild atrophy. Normal vaginal mucosa without lesions. No discharge noted.   Non-tender bladder base without palpable mass. Sling path nontender.  Sling incision and A&P incisions well-healed.  NT. No mesh visible/palpable.   Cervix: no lesions  Uterus:  uterus is normal size, shape, consistency and nontender   Urethra: no masses or tenderness  Urethral meatus: no lesions, caruncle or prolapse.     POP-Q: deferred.  No POP with valsalva.      kegel 1/5  PVR 50 mL    Impression  1. Nocturia  Urine culture    trospium (SANCTURA) 20 mg Tab tablet    Ambulatory referral/consult to Physical/Occupational Therapy      2. Vaginal atrophy        3. s/p MUS, A&P, cysto 2021        4. Voiding dysfunction        5. Chronic constipation            We reviewed the above issues and discussed options for short-term versus " long-term management of her problems.   Plan:   Postop state: well healed. No lifting > 50 pounds without help   History of constipation:  Try to minimize straining.   Continue daily stool softener 2x/day.   Take miralax as daily as needed.   H/o Incomplete bladder emptying:  DO NOT SQUEEZE OR DO ANY KEGELS/STRENGTHENING EXERCISES.   RELAX WHEN YOU URINATE--PULL OUT ONLY RELAXATION PT EXERCISES AND PRACTICE.   Urine C&S sent to make sure  No UTI.   Standing orders placed for urine culture/ urinalysis  If you have symptoms of uti, please go to nearest [facility] lab to bring a specimen   Perform self catheterization if you are not able to empty your bladder--if you do need to self cath, let us know  If you have to self cath, take one macrobid 100 mg   Continue to use vaginal estrogen cream.   Nocturnal enuresis:  New onset x 1 year.   Urine C&S.   Good pelvic support/no prolapse. PVR normal today.   stop fluids 2 hours before bed  No water by bed  If have leg swelling:  Elevate feet above chest x 1 hour before bed to get excess fluid off.  Can also use support hose (knee highs).    Start pelvic floor PT.    [facility]: (p) [phone].  Or [phone].   KAYLEE Paul. (p) [phone].    Set alarm for 12 midnight - 2 AM (somewhere in that interval) to meaningfully urinate to see if helps.   Try trospium 20 mg before bed.   RTC 3-4 months.  VV or in-person ok.     Approx 20 min spent in consult, 100% in discussion.      Hodan Goldberg MD  [facility]  Division of Female Pelvic Medicine and Reconstructive Surgery  Department of Obstetrics & Gynecology

## 2024-01-05 ENCOUNTER — PATIENT MESSAGE (OUTPATIENT)
Dept: UROGYNECOLOGY | Facility: CLINIC | Age: 79
End: 2024-01-05
Payer: MEDICARE

## 2024-01-05 LAB — BACTERIA UR CULT: NO GROWTH

## 2024-01-08 DIAGNOSIS — G25.0 ESSENTIAL TREMOR: ICD-10-CM

## 2024-01-10 RX ORDER — PROPRANOLOL HYDROCHLORIDE 120 MG/1
120 CAPSULE, EXTENDED RELEASE ORAL
Qty: 90 CAPSULE | Refills: 3 | Status: SHIPPED | OUTPATIENT
Start: 2024-01-10

## 2024-01-11 DIAGNOSIS — Z00.00 ENCOUNTER FOR MEDICARE ANNUAL WELLNESS EXAM: ICD-10-CM

## 2024-01-13 DIAGNOSIS — E78.5 HYPERLIPIDEMIA, UNSPECIFIED HYPERLIPIDEMIA TYPE: ICD-10-CM

## 2024-01-16 RX ORDER — ROSUVASTATIN CALCIUM 40 MG/1
40 TABLET, COATED ORAL
Qty: 90 TABLET | Refills: 3 | Status: SHIPPED | OUTPATIENT
Start: 2024-01-16

## 2024-01-29 ENCOUNTER — OFFICE VISIT (OUTPATIENT)
Dept: PODIATRY | Facility: CLINIC | Age: 79
End: 2024-01-29
Payer: MEDICARE

## 2024-01-29 VITALS
BODY MASS INDEX: 20.82 KG/M2 | HEIGHT: 63 IN | DIASTOLIC BLOOD PRESSURE: 67 MMHG | SYSTOLIC BLOOD PRESSURE: 156 MMHG | WEIGHT: 117.5 LBS | HEART RATE: 55 BPM

## 2024-01-29 DIAGNOSIS — B35.1 DERMATOPHYTOSIS OF NAIL: ICD-10-CM

## 2024-01-29 DIAGNOSIS — M77.42 METATARSALGIA OF BOTH FEET: ICD-10-CM

## 2024-01-29 DIAGNOSIS — Z98.890 HISTORY OF FOOT SURGERY: ICD-10-CM

## 2024-01-29 DIAGNOSIS — L84 CORN OR CALLUS: Primary | ICD-10-CM

## 2024-01-29 DIAGNOSIS — M77.41 METATARSALGIA OF BOTH FEET: ICD-10-CM

## 2024-01-29 PROCEDURE — 99214 OFFICE O/P EST MOD 30 MIN: CPT | Mod: PBBFAC,PN | Performed by: PODIATRIST

## 2024-01-29 PROCEDURE — 99213 OFFICE O/P EST LOW 20 MIN: CPT | Mod: S$PBB,,, | Performed by: PODIATRIST

## 2024-01-29 PROCEDURE — 99999 PR PBB SHADOW E&M-EST. PATIENT-LVL IV: CPT | Mod: PBBFAC,,, | Performed by: PODIATRIST

## 2024-01-29 NOTE — PROGRESS NOTES
PODIATRY NOTE       CHIEF CONCERN:   Callouses (Bilateral ball of feet )            MEDICAL DECISION MAKING:          Problem List Items Addressed This Visit    None  Visit Diagnoses       Corn or callus    -  Primary    History of foot surgery        Dermatophytosis of nail        Metatarsalgia of both feet                  I counseled the patient on the patient's conditions, their implications and medical management.    Shoe and activity modification as needed for relief.  Continue extra depth shoes to accommodate hammertoes.  No barefoot walking.   Continue urea cream daily to calluses.     Continue Lotrimin (clotrimazole) to toenails daily.   Offloading pads as needed.    Pads provided         I spent a total of 20 minutes on the day of the visit.  This includes face to face time and non-face to face time preparing to see the patient (eg, review of tests), obtaining and/or reviewing separately obtained history, documenting clinical information in the electronic or other health record, independently interpreting results and communicating results to the patient/family/caregiver, or care coordinator.              HISTORY OF PRESENT ILLNESS:  Sarah Rico is a 78 y.o. female, with history of CKD,  with concerns regarding: painful calluses sub metatarsal heads, chronic.      She has history of bunionectomy bilateral.   Two surgeries on the right and one left foot surgery in [place].   left 2nd toe overlaps the left hallux.    Using lotrimin on toenails.    And paddings to offload calluses.         PCP:  Latrice Davidson MD;  Nydia Roth PA-C   Last encounter:   4/17/2023               Patient Active Problem List   Diagnosis    Stress incontinence of urine    Voiding dysfunction    Rectocele, female    Chronic constipation    Vaginal atrophy    Graves' disease    Stage 3a chronic kidney disease    Hyperlipidemia    Aortic valve stenosis    Essential tremor    Essential hypertension    Insomnia    S/P  TAVR (transcatheter aortic valve replacement)    Lumbar radiculopathy    S/P hip replacement    Pelvic floor weakness    Decreased functional mobility and endurance    Disorder of muscle    Preoperative cardiovascular examination    Status post anterior & posterior colporrhaphy w/ MUS 11/1    Incomplete bladder emptying    Pelvic floor tension    Pelvic floor dysfunction    Chronic pansinusitis    Atypical lobular hyperplasia (ALH) of right breast    Dyspareunia due to medical condition in female    Vertigo    BPPV (benign paroxysmal positional vertigo)    Aortic atherosclerosis    Cervical spinal stenosis    Ataxia    Chronic diastolic CHF (congestive heart failure), NYHA class 1           Current Outpatient Medications on File Prior to Visit   Medication Sig Dispense Refill    ALPRAZolam (XANAX) 0.5 MG tablet Take 1 tablet (0.5 mg total) by mouth daily as needed (severe anxiety). 30 tablet 0    ASCORBATE CALCIUM, VITAMIN C, ORAL Take 500 mg by mouth.      aspirin (ECOTRIN) 81 MG EC tablet Take 81 mg by mouth once daily.      azithromycin (Z-BEATRIZ) 250 MG tablet TAKE 2 TABLETS BY MOUTH TODAY, THEN TAKE 1 TABLET DAILY FOR 4 DAYS      cetirizine-pseudoephedrine 5-120 mg Tb12 cetirizine 5 mg-pseudoephedrine  mg tablet,extended release,12hr   TK 1 T PO BID      cholecalciferol, vitamin D3, (VITAMIN D3) 50 mcg (2,000 unit) Tab Take 2,000 Units by mouth.      ciclopirox 0.77 % Gel Apply topically once daily. To thickened discolored toenails. 30 g 1    clotrimazole (LOTRIMIN) 1 % cream Apply topically 2 (two) times daily. To affected toenails. 45 g 1    CMPD testosterone proprionate 2% in vanicream Apply topically. [facility]      COMPOUND HORMONE REPLACEMENT Take by mouth once daily. ESTROGEN CREAM -- [facility]      EPINEPHrine (EPIPEN) 0.3 mg/0.3 mL AtIn epinephrine 0.3 mg/0.3 mL injection, auto-injector      estradioL (ESTRACE) 2 MG tablet Take 1 tablet (2 mg total) by mouth once daily. 90 tablet 3     ipratropium (ATROVENT) 42 mcg (0.06 %) nasal spray SMARTSI Both Nares 3 Times Daily      MAGNESIUM CARBONATE ORAL Take 1 tablet by mouth once daily. 200MG      methIMAzole (TAPAZOLE) 5 MG Tab TAKE 1/2 TABLET BY MOUTH DAILY. Needs appointment for future refills. 90 tablet 0    omalizumab (XOLAIR) 75 mg/0.5 mL injection       PENNSAID 20 mg/gram /actuation(2 %) sopm APPLY 1 APPLICATION TOPICALLY 2 (TWO) TIMES DAILY AS NEEDED.::NOT COVERED:: 112 g 10    predniSONE (DELTASONE) 10 MG tablet Take by mouth.      progesterone (PROMETRIUM) 200 MG capsule Take 1 capsule by mouth 30-60 minutes before bed every night 90 capsule 3    propranoloL (INDERAL LA) 120 MG 24 hr capsule TAKE 1 CAPSULE BY MOUTH ONCE DAILY 90 capsule 3    RENOVA 0.02 % Crea RENOVA 0.02 % TOPICAL CREAM APPLY TO AFFECTED AREA EVERY DAY AT NIGHT 40 g 0    rosuvastatin (CRESTOR) 40 MG Tab TAKE 1 TABLET BY MOUTH EVERY DAY 90 tablet 3    sars-cov-2, covid-19 omicron, (MODERNA COVID BIVAL,18Y UP,-PF) 50 mcg/0.5 mL injection Inject into the muscle. 0.5 mL 0    sulfamethoxazole-trimethoprim 800-160mg (BACTRIM DS) 800-160 mg Tab Take 1 tablet by mouth once daily.      trospium (SANCTURA) 20 mg Tab tablet Take 1 tablet (20 mg total) by mouth 2 (two) times daily as needed (urinary incontinence). 60 tablet 11    UNABLE TO FIND medication name: Testosterone Cream in versabase 20mg/mL Apply 1/4mL to labia minora nightly 22.5 mL 1    urea 20 % Crea Apply 1 application topically once daily. To dry skin on the feet. 75 g 10    valACYclovir (VALTREX) 1000 MG tablet valacyclovir 1 gram tablet      XIIDRA 5 % Dpet       zolpidem (AMBIEN CR) 6.25 MG CR tablet Take 1 tablet (6.25 mg total) by mouth nightly as needed for Insomnia. 30 tablet 2    azithromycin (ZITHROMAX) 500 MG tablet Take 500 mg by mouth once. for one dose      PREMARIN vaginal cream PLACE 0.5 G VAGINALLY ONCE DAILY. (Patient not taking: Reported on 2024) 90 g 0    topiramate (TOPAMAX) 25 MG tablet  "Take 1 tablet (25 mg total) by mouth once daily for 7 days, THEN 2 tablets (50 mg total) once daily. (Patient not taking: Reported on 10/18/2023) 67 tablet 3     No current facility-administered medications on file prior to visit.           Review of patient's allergies indicates:   Allergen Reactions    Penicillins Rash and Hives               ROS:   General ROS: negative for - chills, fever or night sweats  Respiratory ROS: no cough, shortness of breath, or wheezing  Cardiovascular ROS: no chest pain or dyspnea on exertion  Musculoskeletal ROS: positive for - pain in foot - bilateral  Neurological ROS: negative for - impaired coordination/balance and numbness/tingling  Dermatological ROS: negative for pruritus and rash      EXAM:     Vitals:    01/29/24 1038   BP: (!) 156/67   Pulse: (!) 55   Weight: 53.3 kg (117 lb 8.1 oz)   Height: 5' 3" (1.6 m)            General:  Alert and Oriented x 3;  No acute distress      Bilateral  Lower extremity exam:    Vascular:   Dorsalis Pedis:  present   Posterior Tibial:  present  Capillary refill time:  3 seconds  Temperature of toes cool to touch  Edema:  Trace and non-pitting       Neurological:     Sharp touch:  normal  Light touch: normal  Tinels Sign:  Absent  Mulders Click:   Absent        Dermatological:   Skin: thin, moist and appropriate for age; calluses sub metatarsal heads  Wounds/Ulcers:  Absent  Bruising:  Absent  Erythema:  Absent  Hair:  decreased  Toenails superficially mycotic, with subungual debris  right hallux         Musculoskeletal:   Metatarsophalangeal range of motion:   diminished range of motion  Subtalar joint range of motion: full range of motion  Ankle joint range of motion:  full range of motion  Bunions:  Present  Hammertoes: Present; left 2nd mainly, overlapping hallux.           "

## 2024-03-21 ENCOUNTER — OFFICE VISIT (OUTPATIENT)
Dept: PODIATRY | Facility: CLINIC | Age: 79
End: 2024-03-21
Payer: MEDICARE

## 2024-03-21 VITALS
HEART RATE: 57 BPM | WEIGHT: 117.5 LBS | SYSTOLIC BLOOD PRESSURE: 130 MMHG | BODY MASS INDEX: 20.82 KG/M2 | HEIGHT: 63 IN | DIASTOLIC BLOOD PRESSURE: 56 MMHG

## 2024-03-21 DIAGNOSIS — Z98.890 HISTORY OF FOOT SURGERY: ICD-10-CM

## 2024-03-21 DIAGNOSIS — M77.42 METATARSALGIA OF BOTH FEET: Primary | Chronic | ICD-10-CM

## 2024-03-21 DIAGNOSIS — L90.9 PLANTAR FAT PAD ATROPHY: Chronic | ICD-10-CM

## 2024-03-21 DIAGNOSIS — L84 CORN OR CALLUS: Chronic | ICD-10-CM

## 2024-03-21 DIAGNOSIS — M77.41 METATARSALGIA OF BOTH FEET: Primary | Chronic | ICD-10-CM

## 2024-03-21 PROCEDURE — 99213 OFFICE O/P EST LOW 20 MIN: CPT | Mod: S$PBB,,, | Performed by: PODIATRIST

## 2024-03-21 PROCEDURE — 99215 OFFICE O/P EST HI 40 MIN: CPT | Mod: PBBFAC,PN | Performed by: PODIATRIST

## 2024-03-21 PROCEDURE — 99999 PR PBB SHADOW E&M-EST. PATIENT-LVL V: CPT | Mod: PBBFAC,,, | Performed by: PODIATRIST

## 2024-03-21 NOTE — PROGRESS NOTES
PODIATRY NOTE       CHIEF CONCERN:   Routine Foot Care (Foot Exam/PCP Brian Shin MD  12/14/23)            MEDICAL DECISION MAKING:      Problem List Items Addressed This Visit    None  Visit Diagnoses       Metatarsalgia of both feet  (Chronic)   -  Primary    Corn or callus  (Chronic)       Plantar fat pad atrophy  (Chronic)       History of foot surgery              I counseled the patient on the patient's conditions, their implications and medical management.    Shoe and activity modification as needed for relief.  Continue extra depth shoes to accommodate hammertoes.  No barefoot walking.   Continue urea cream daily to calluses.     Continue Lotrimin (clotrimazole) to toenails daily.   Offloading pads as needed.                HISTORY OF PRESENT ILLNESS:  Sarah Rico is a 78 y.o. female, with history of CKD,  with concerns regarding: painful calluses sub metatarsal heads, chronic.      She has history of bunionectomy bilateral.   Two surgeries on the right and one left foot surgery in [place].   left 2nd toe overlaps the left hallux.    Using lotrimin on toenails.    And paddings to offload calluses.         PCP:  Latrice Davidson MD;  Nydia Roth PA-C   Last encounter:   4/17/2023               Patient Active Problem List   Diagnosis    Stress incontinence of urine    Voiding dysfunction    Rectocele, female    Chronic constipation    Vaginal atrophy    Graves' disease    Stage 3a chronic kidney disease    Hyperlipidemia    Aortic valve stenosis    Essential tremor    Essential hypertension    Insomnia    S/P TAVR (transcatheter aortic valve replacement)    Lumbar radiculopathy    S/P hip replacement    Pelvic floor weakness    Decreased functional mobility and endurance    Disorder of muscle    Preoperative cardiovascular examination    Status post anterior & posterior colporrhaphy w/ MUS 11/1    Incomplete bladder emptying    Pelvic floor tension    Pelvic floor dysfunction     Chronic pansinusitis    Atypical lobular hyperplasia (ALH) of right breast    Dyspareunia due to medical condition in female    Vertigo    BPPV (benign paroxysmal positional vertigo)    Aortic atherosclerosis    Cervical spinal stenosis    Ataxia    Chronic diastolic CHF (congestive heart failure), NYHA class 1           Current Outpatient Medications on File Prior to Visit   Medication Sig Dispense Refill    ALPRAZolam (XANAX) 0.5 MG tablet Take 1 tablet (0.5 mg total) by mouth daily as needed (severe anxiety). 30 tablet 0    ASCORBATE CALCIUM, VITAMIN C, ORAL Take 500 mg by mouth.      aspirin (ECOTRIN) 81 MG EC tablet Take 81 mg by mouth once daily.      azithromycin (Z-BEATRIZ) 250 MG tablet TAKE 2 TABLETS BY MOUTH TODAY, THEN TAKE 1 TABLET DAILY FOR 4 DAYS      azithromycin (ZITHROMAX) 500 MG tablet Take 500 mg by mouth once. for one dose      cetirizine-pseudoephedrine 5-120 mg Tb12 cetirizine 5 mg-pseudoephedrine  mg tablet,extended release,12hr   TK 1 T PO BID      cholecalciferol, vitamin D3, (VITAMIN D3) 50 mcg (2,000 unit) Tab Take 2,000 Units by mouth.      ciclopirox 0.77 % Gel Apply topically once daily. To thickened discolored toenails. 30 g 1    clotrimazole (LOTRIMIN) 1 % cream Apply topically 2 (two) times daily. To affected toenails. 45 g 1    CMPD testosterone proprionate 2% in vanicream Apply topically. [facility]      COMPOUND HORMONE REPLACEMENT Take by mouth once daily. ESTROGEN CREAM -- [facility]      EPINEPHrine (EPIPEN) 0.3 mg/0.3 mL AtIn epinephrine 0.3 mg/0.3 mL injection, auto-injector      estradioL (ESTRACE) 2 MG tablet Take 1 tablet (2 mg total) by mouth once daily. 90 tablet 3    ipratropium (ATROVENT) 42 mcg (0.06 %) nasal spray SMARTSI Both Nares 3 Times Daily      MAGNESIUM CARBONATE ORAL Take 1 tablet by mouth once daily. 200MG      methIMAzole (TAPAZOLE) 5 MG Tab TAKE 1/2 TABLET BY MOUTH DAILY. Needs appointment for future refills. 90 tablet 0     omalizumab (XOLAIR) 75 mg/0.5 mL injection       PENNSAID 20 mg/gram /actuation(2 %) sopm APPLY 1 APPLICATION TOPICALLY 2 (TWO) TIMES DAILY AS NEEDED.::NOT COVERED:: 112 g 10    predniSONE (DELTASONE) 10 MG tablet Take by mouth.      PREMARIN vaginal cream PLACE 0.5 G VAGINALLY ONCE DAILY. 90 g 0    progesterone (PROMETRIUM) 200 MG capsule Take 1 capsule by mouth 30-60 minutes before bed every night 90 capsule 3    propranoloL (INDERAL LA) 120 MG 24 hr capsule TAKE 1 CAPSULE BY MOUTH ONCE DAILY 90 capsule 3    RENOVA 0.02 % Crea RENOVA 0.02 % TOPICAL CREAM APPLY TO AFFECTED AREA EVERY DAY AT NIGHT 40 g 0    rosuvastatin (CRESTOR) 40 MG Tab TAKE 1 TABLET BY MOUTH EVERY DAY 90 tablet 3    sars-cov-2, covid-19 omicron, (MODERNA COVID BIVAL,18Y UP,-PF) 50 mcg/0.5 mL injection Inject into the muscle. 0.5 mL 0    sulfamethoxazole-trimethoprim 800-160mg (BACTRIM DS) 800-160 mg Tab Take 1 tablet by mouth once daily.      trospium (SANCTURA) 20 mg Tab tablet Take 1 tablet (20 mg total) by mouth 2 (two) times daily as needed (urinary incontinence). 60 tablet 11    UNABLE TO FIND medication name: Testosterone Cream in versabase 20mg/mL Apply 1/4mL to labia minora nightly 22.5 mL 1    urea 20 % Crea Apply 1 application topically once daily. To dry skin on the feet. 75 g 10    valACYclovir (VALTREX) 1000 MG tablet valacyclovir 1 gram tablet      XIIDRA 5 % Dpet       zolpidem (AMBIEN CR) 6.25 MG CR tablet Take 1 tablet (6.25 mg total) by mouth nightly as needed for Insomnia. 30 tablet 2    topiramate (TOPAMAX) 25 MG tablet Take 1 tablet (25 mg total) by mouth once daily for 7 days, THEN 2 tablets (50 mg total) once daily. (Patient not taking: Reported on 10/18/2023) 67 tablet 3     No current facility-administered medications on file prior to visit.           Review of patient's allergies indicates:   Allergen Reactions    Penicillins Rash and Hives               ROS:   General ROS: negative for - chills, fever or night  "sweats  Respiratory ROS: no cough, shortness of breath, or wheezing  Cardiovascular ROS: no chest pain or dyspnea on exertion  Musculoskeletal ROS: positive for - pain in foot - bilateral  Neurological ROS: negative for - impaired coordination/balance and numbness/tingling  Dermatological ROS: negative for pruritus and rash      EXAM:     Vitals:    03/21/24 1440   BP: (!) 130/56   Pulse: (!) 57   Weight: 53.3 kg (117 lb 8.1 oz)   Height: 5' 3" (1.6 m)            General:  Alert and Oriented x 3;  No acute distress      Bilateral  Lower extremity exam:    Vascular:   Dorsalis Pedis:  present   Posterior Tibial:  present  Capillary refill time:  3 seconds  Temperature of toes cool to touch  Edema:  Trace and non-pitting       Neurological:     Sharp touch:  normal  Light touch: normal  Tinels Sign:  Absent  Mulders Click:   Absent        Dermatological:   Skin: thin, moist and appropriate for age; calluses sub metatarsal heads  Wounds/Ulcers:  Absent  Bruising:  Absent  Erythema:  Absent  Hair:  decreased  Toenails superficially mycotic, with subungual debris  right hallux         Musculoskeletal:   Metatarsophalangeal range of motion:   diminished range of motion  Subtalar joint range of motion: full range of motion  Ankle joint range of motion:  full range of motion  Bunions:  Present  Hammertoes: Present; left 2nd mainly, overlapping hallux.           "

## 2024-04-14 DIAGNOSIS — B35.1 DERMATOPHYTOSIS OF NAIL: ICD-10-CM

## 2024-04-15 RX ORDER — CLOTRIMAZOLE 1 %
CREAM (GRAM) TOPICAL 2 TIMES DAILY
Qty: 45 G | Refills: 1 | Status: SHIPPED | OUTPATIENT
Start: 2024-04-15

## 2024-04-17 NOTE — PROGRESS NOTES
Urogyn follow up  04/18/2024    [facility]  [address] [address]    Sarah Rico  [id number]  1945    The patient location is: home  The chief complaint leading to consultation is:follow up    Visit type: audio only    Face to Face time with patient: 20 min  20 minutes of total time spent on the encounter, which includes face to face time and non-face to face time preparing to see the patient (eg, review of tests), Obtaining and/or reviewing separately obtained history, Documenting clinical information in the electronic or other health record, Independently interpreting results (not separately reported) and communicating results to the patient/family/caregiver, or Care coordination (not separately reported).     Each patient to whom he or she provides medical services by telemedicine is:  (1) informed of the relationship between the physician and patient and the respective role of any other health care provider with respect to management of the patient; and (2) notified that he or she may decline to receive medical services by telemedicine and may withdraw from such care at any time.    Notes:     Sarah Rico is a 78 y.o. here for a follow up.  The patient's last visit with me was on 1/4/2024.    Date of Operation: 11/01/2021     Title of Operation:  1)  Anterior repair  2)  Placement of retropubic tension-free midurethral sling, [employer] ([employer])  3)  Posterior repair with perineorrhaphy  4)  Cystourethroscopy     Indications for Surgery:  1)  UI:  (+) QUYNH (walking)  > (--) UUI. Using poise impressa: 2-4 PPD, mostly dry.   (+) pads:2-4/day, usually moderate wetness and 1/ not common at night usually variable wetness.  Daytime frequency: Q 3 hours.  Nocturia: Yes: 1/night.   (--) dysuria,  (--) hematuria,  (--) frequent UTIs.  (--) complete bladder emptying. Pushes, Tries maneuvering. Using bladder gaurds which work well.      Gyn in [place]  "got recurrent UTI in 2015, some passed blood. In 2015 started on Bactrim. Seen Urologist for Cystoscopy detected no abnormalities. Urethra was "too skinny, was clogging". Went every three weeks for 6-8 months, for a stretching. No longer doing stretching urethra.      2)  POP:  Absent.(--) vaginal bleeding. (--) vaginal discharge. (+) sexually active.  (--) dyspareunia.   (+)  Vaginal dryness.  (+) vaginal estrogen use: 2x/week.   --POP-Q:  Aa -1; Ba -1; C -6; Ap 0; Bp 0 (moderate); D -7.  Genital hiatus 3, perineal body 2, total vaginal length 10-11 (standing).      3)  BM:  (+) constipation/straining.  (--) chronic diarrhea. (--) hematochezia.  (--) fecal incontinence.  (--) fecal smearing/urgency.  (--) complete evacuation.   Antibiotics caused diarrhea, takes Miralax every three-four days to try and completely empty bowels. Have always have trouble with constipation. Hemorrhoids, causing slight bleeding.      Preoperative Diagnosis:  Stress incontinence of urine    Graves disease    Voiding dysfunction    Chronic constipation    Cystocele, midline    Rectocele, female    Vaginal atrophy                Concern for major voiding dysfunction              Urodynamic stress incontinence              Decreased urethral coaptation     Postoperative Diagnosis:  Stress incontinence of urine    Graves disease    Voiding dysfunction    Chronic constipation    Cystocele, midline    Rectocele, female    Vaginal atrophy                Concern for major voiding dysfunction              Urodynamic stress incontinence              Decreased urethral coaptation    Issues include:  Patient Active Problem List   Diagnosis    Stress incontinence of urine    Voiding dysfunction    Rectocele, female    Chronic constipation    Vaginal atrophy    Graves' disease    Stage 3a chronic kidney disease    Hyperlipidemia    Aortic valve stenosis    Essential tremor    Essential hypertension    Insomnia    S/P TAVR (transcatheter aortic valve " replacement)    Lumbar radiculopathy    S/P hip replacement    Pelvic floor weakness    Decreased functional mobility and endurance    Disorder of muscle    Preoperative cardiovascular examination    Status post anterior & posterior colporrhaphy w/ MUS 11/1    Incomplete bladder emptying    Pelvic floor tension    Pelvic floor dysfunction    Chronic pansinusitis    Atypical lobular hyperplasia (ALH) of right breast    Dyspareunia due to medical condition in female    Vertigo    BPPV (benign paroxysmal positional vertigo)    Aortic atherosclerosis    Cervical spinal stenosis    Ataxia    Chronic diastolic CHF (congestive heart failure), NYHA class 1    Nocturia     Well-woman:  Pap test: 8/2023 ASCUS/HPV neg. History of abnormal paps: Yes - Around 2013, HPV positive, Colposcopy done clear after three years.  History of STIs:  No  Mammogram: 10/2023 benign. S/p breast consult with Ed for calcifications.   Colonoscopy: Date of last: 2022, Result:  polyps. Repeat 2025.   DEXA:  Date of last: 2021. Result: normal. Repeat per GYN.     Last visit:  Has gone to pelvic floor PT  Feels like she is emptying her bladder well.  Denies UI.   Denies constipation or straining.     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 practicing those things currently    --this is better   --filled trospium -- hasn't tried  --Bowel issues: easier with stool softener BID + magnesium; no pain  --UTIs:  1/2022 8/2022  No recent UTIs  --POP: no s/sx     --pelvic exam 11/2022:  Pelvic  Ext. Genitalia: normal external genitalia. Normal bartholins and skenes glands  Vagina: + mild atrophy. Normal vaginal mucosa without lesions. No discharge noted.   Non-tender bladder base without palpable mass. Sling path nontender.  Sling incision and A&P incisions well-healed.  NT. No mesh visible/palpable. Firm area under urethra but no mesh visualized/palpated.   Cervix: no lesions  Uterus:  uterus is normal size, shape, consistency and nontender   Urethra: no masses or tenderness  Urethral meatus: no lesions, caruncle or prolapse.  POP-Q: deferred.  No POP with valsalva.     Medications:    Current Outpatient Medications:     ALPRAZolam (XANAX) 0.5 MG tablet, Take 1 tablet (0.5 mg total) by mouth daily as needed (severe anxiety)., Disp: 30 tablet, Rfl: 0    ASCORBATE CALCIUM, VITAMIN C, ORAL, Take 500 mg by mouth., Disp: , Rfl:     aspirin (ECOTRIN) 81 MG EC tablet, Take 81 mg by mouth once daily., Disp: , Rfl:     azithromycin (Z-BEATRIZ) 250 MG tablet, TAKE 2 TABLETS BY MOUTH TODAY, THEN TAKE 1 TABLET DAILY FOR 4 DAYS, Disp: , Rfl:     azithromycin (ZITHROMAX) 500 MG tablet, Take 500 mg by mouth once. for one dose, Disp: , Rfl:     cetirizine-pseudoephedrine 5-120 mg Tb12, cetirizine 5 mg-pseudoephedrine  mg tablet,extended release,12hr  TK 1 T PO BID, Disp: , Rfl:     cholecalciferol, vitamin D3, (VITAMIN D3) 50 mcg (2,000 unit) Tab, Take 2,000 Units by mouth., Disp: , Rfl:     ciclopirox 0.77 % Gel, Apply topically once daily. To thickened discolored toenails., Disp: 30 g, Rfl: 1    clotrimazole (LOTRIMIN) 1 % cream, APPLY TOPICALLY 2 (TWO) TIMES DAILY. TO AFFECTED TOENAILS., Disp: 45 g, Rfl: 1     CMPD testosterone proprionate 2% in vanicream, Apply topically. [facility], Disp: , Rfl:     COMPOUND HORMONE REPLACEMENT, Take by mouth once daily. ESTROGEN CREAM -- [facility], Disp: , Rfl:     EPINEPHrine (EPIPEN) 0.3 mg/0.3 mL AtIn, epinephrine 0.3 mg/0.3 mL injection, auto-injector, Disp: , Rfl:     estradioL (ESTRACE) 2 MG tablet, Take 1 tablet (2 mg total) by mouth once daily., Disp: 90 tablet, Rfl: 3    ipratropium (ATROVENT) 42 mcg (0.06 %) nasal spray, SMARTSI Both Nares 3 Times Daily, Disp: , Rfl:     MAGNESIUM CARBONATE ORAL, Take 1 tablet by mouth once daily. 200MG, Disp: , Rfl:     methIMAzole (TAPAZOLE) 5 MG Tab, TAKE 1/2 TABLET BY MOUTH DAILY. Needs appointment for future refills., Disp: 90 tablet, Rfl: 0    omalizumab (XOLAIR) 75 mg/0.5 mL injection, , Disp: , Rfl:     PENNSAID 20 mg/gram /actuation(2 %) sopm, APPLY 1 APPLICATION TOPICALLY 2 (TWO) TIMES DAILY AS NEEDED.::NOT COVERED::, Disp: 112 g, Rfl: 10    predniSONE (DELTASONE) 10 MG tablet, Take by mouth., Disp: , Rfl:     PREMARIN vaginal cream, PLACE 0.5 G VAGINALLY ONCE DAILY., Disp: 90 g, Rfl: 0    progesterone (PROMETRIUM) 200 MG capsule, Take 1 capsule by mouth 30-60 minutes before bed every night, Disp: 90 capsule, Rfl: 3    propranoloL (INDERAL LA) 120 MG 24 hr capsule, TAKE 1 CAPSULE BY MOUTH ONCE DAILY, Disp: 90 capsule, Rfl: 3    RENOVA 0.02 % Crea, RENOVA 0.02 % TOPICAL CREAM APPLY TO AFFECTED AREA EVERY DAY AT NIGHT, Disp: 40 g, Rfl: 0    rosuvastatin (CRESTOR) 40 MG Tab, TAKE 1 TABLET BY MOUTH EVERY DAY, Disp: 90 tablet, Rfl: 3    sars-cov-2, covid-19 omicron, (MODERNA COVID BIVAL,18Y UP,-PF) 50 mcg/0.5 mL injection, Inject into the muscle., Disp: 0.5 mL, Rfl: 0    sulfamethoxazole-trimethoprim 800-160mg (BACTRIM DS) 800-160 mg Tab, Take 1 tablet by mouth once daily., Disp: , Rfl:     topiramate (TOPAMAX) 25 MG tablet, Take 1 tablet (25 mg total) by mouth once daily for 7 days, THEN 2 tablets (50 mg total) once  daily. (Patient not taking: Reported on 10/18/2023), Disp: 67 tablet, Rfl: 3    trospium (SANCTURA) 20 mg Tab tablet, Take 1 tablet (20 mg total) by mouth 2 (two) times daily as needed (urinary incontinence)., Disp: 60 tablet, Rfl: 11    UNABLE TO FIND, medication name: Testosterone Cream in versabase 20mg/mL Apply 1/4mL to labia minora nightly, Disp: 22.5 mL, Rfl: 1    urea 20 % Crea, Apply 1 application topically once daily. To dry skin on the feet., Disp: 75 g, Rfl: 10    valACYclovir (VALTREX) 1000 MG tablet, valacyclovir 1 gram tablet, Disp: , Rfl:     XIIDRA 5 % Dpet, , Disp: , Rfl:     zolpidem (AMBIEN CR) 6.25 MG CR tablet, Take 1 tablet (6.25 mg total) by mouth nightly as needed for Insomnia., Disp: 30 tablet, Rfl: 2    ROS:  As per HPI.      Exam  LMP  (LMP Unknown) Comment:    1996  General: alert and oriented, no acute distress  Remainder of PE deferred due to VV.     Impression  1. s/p A&P, retro MUS 11-1-2021        2. Vaginal atrophy        3. Pelvic floor dysfunction        4. Nocturia        5. Stress incontinence of urine              We reviewed the above issues and discussed options for short-term versus long-term management of her problems.   Plan:   Postop state: well healed. No lifting > 50 pounds without help   History of constipation:  Try to minimize straining.   Continue daily stool softener 2x/day.   Take miralax as daily as needed.   H/o Incomplete bladder emptying:  DO NOT SQUEEZE OR DO ANY KEGELS/STRENGTHENING EXERCISES.   RELAX WHEN YOU URINATE--PULL OUT ONLY RELAXATION PT EXERCISES AND PRACTICE.   Urine C&S sent to make sure  No UTI.   Standing orders placed for urine culture/ urinalysis  If you have symptoms of uti, please go to nearest [facility] lab to bring a specimen   Perform self catheterization if you are not able to empty your bladder--if you do need to self cath, let us know  If you have to self cath, take one macrobid 100 mg   Continue to use vaginal estrogen cream.    Nocturnal enuresis:  New onset x 1 year.   Good pelvic support/no prolapse. PVR normal today.   stop fluids 2 hours before bed  No water by bed  If have leg swelling:  Elevate feet above chest x 1 hour before bed to get excess fluid off.  Can also use support hose (knee highs).    Continue PT exercises at home. Do sets, saundra Kegels, at least a few times/week.   Set alarm for 1 AM to meaningfully urinate to see if helps.   Try trospium 20 mg before bed.   ASCUS/HPV NEG pap: see Dr. Madrid 8/2024 for repeat.  Let me know how things are doing in 3-6 months (portal message or call us).     Approx 20 min spent in consult, 100% in discussion.      Hodan Goldberg MD  [facility]  Division of Female Pelvic Medicine and Reconstructive Surgery  Department of Obstetrics & Gynecology

## 2024-04-18 ENCOUNTER — OFFICE VISIT (OUTPATIENT)
Dept: UROGYNECOLOGY | Facility: CLINIC | Age: 79
End: 2024-04-18
Payer: MEDICARE

## 2024-04-18 ENCOUNTER — PATIENT MESSAGE (OUTPATIENT)
Dept: UROGYNECOLOGY | Facility: CLINIC | Age: 79
End: 2024-04-18

## 2024-04-18 DIAGNOSIS — R35.1 NOCTURIA: ICD-10-CM

## 2024-04-18 DIAGNOSIS — N39.3 STRESS INCONTINENCE OF URINE: ICD-10-CM

## 2024-04-18 DIAGNOSIS — K59.09 CHRONIC CONSTIPATION: Primary | ICD-10-CM

## 2024-04-18 DIAGNOSIS — N95.2 VAGINAL ATROPHY: ICD-10-CM

## 2024-04-18 DIAGNOSIS — M62.89 PELVIC FLOOR DYSFUNCTION: ICD-10-CM

## 2024-04-18 PROCEDURE — 99213 OFFICE O/P EST LOW 20 MIN: CPT | Mod: 95,,, | Performed by: OBSTETRICS & GYNECOLOGY

## 2024-04-29 ENCOUNTER — OFFICE VISIT (OUTPATIENT)
Dept: PODIATRY | Facility: CLINIC | Age: 79
End: 2024-04-29
Payer: MEDICARE

## 2024-04-29 VITALS
WEIGHT: 117.5 LBS | DIASTOLIC BLOOD PRESSURE: 79 MMHG | BODY MASS INDEX: 20.82 KG/M2 | HEART RATE: 60 BPM | HEIGHT: 63 IN | SYSTOLIC BLOOD PRESSURE: 126 MMHG

## 2024-04-29 DIAGNOSIS — M20.42 HAMMER TOE OF LEFT FOOT: Primary | ICD-10-CM

## 2024-04-29 DIAGNOSIS — L84 PRE-ULCERATIVE CORN OR CALLOUS: ICD-10-CM

## 2024-04-29 PROCEDURE — 99215 OFFICE O/P EST HI 40 MIN: CPT | Mod: PBBFAC,PN | Performed by: PODIATRIST

## 2024-04-29 PROCEDURE — 99213 OFFICE O/P EST LOW 20 MIN: CPT | Mod: S$PBB,,, | Performed by: PODIATRIST

## 2024-04-29 PROCEDURE — 99999 PR PBB SHADOW E&M-EST. PATIENT-LVL V: CPT | Mod: PBBFAC,,, | Performed by: PODIATRIST

## 2024-04-29 NOTE — PROGRESS NOTES
PODIATRY NOTE       CHIEF CONCERN:   Routine Foot Care (Foot Exam/PCP Nydia Roth PA-C)         MEDICAL DECISION MAKING:      Problem List Items Addressed This Visit    None  Visit Diagnoses       Hammer toe of left foot    -  Primary    Relevant Orders    X-Ray Foot Complete Left    Pre-ulcerative corn or callous              I counseled the patient on the patient's conditions, their implications and medical management.    Shoe and activity modification as needed for relief.  Continue extra depth shoes to accommodate hammertoes.  No barefoot walking.   Consider Budin splint for hammertoe.  Offloading pads as needed.    LEFT  foot xrays   Continue urea cream daily to calluses.     Call or return to clinic prn if these symptoms worsen or fail to improve as anticipated.           I spent a total of 20 minutes on the day of the visit.  This includes face to face time and non-face to face time preparing to see the patient (eg, review of tests), obtaining and/or reviewing separately obtained history, documenting clinical information in the electronic or other health record, independently interpreting results and communicating results to the patient/family/caregiver, or care coordinator.          HISTORY OF PRESENT ILLNESS:  Sarah Rico is a 78 y.o. female, with history of CKD,  with concerns regarding: painful calluses sub metatarsal heads, chronic.      She has history of bunionectomy bilateral.   Two surgeries on the right and one left foot surgery in [place].   left 2nd toe overlaps the left hallux.    LEFT 2nd hammertoe has been bothering her lately.    And paddings to offload calluses.         PCP:  Latrice Davidson MD;  Nydia Roth, FLAVIA   Last encounter:   4/17/2023           Patient Active Problem List   Diagnosis    Stress incontinence of urine    Voiding dysfunction    Rectocele, female    Chronic constipation    Vaginal atrophy    Graves' disease    Stage 3a chronic kidney disease     Hyperlipidemia    Aortic valve stenosis    Essential tremor    Essential hypertension    Insomnia    S/P TAVR (transcatheter aortic valve replacement)    Lumbar radiculopathy    S/P hip replacement    Pelvic floor weakness    Decreased functional mobility and endurance    Disorder of muscle    Preoperative cardiovascular examination    Status post anterior & posterior colporrhaphy w/ MUS 11/1    Incomplete bladder emptying    Pelvic floor tension    Pelvic floor dysfunction    Chronic pansinusitis    Atypical lobular hyperplasia (ALH) of right breast    Dyspareunia due to medical condition in female    Vertigo    BPPV (benign paroxysmal positional vertigo)    Aortic atherosclerosis    Cervical spinal stenosis    Ataxia    Chronic diastolic CHF (congestive heart failure), NYHA class 1    Nocturia           Current Outpatient Medications on File Prior to Visit   Medication Sig Dispense Refill    ALPRAZolam (XANAX) 0.5 MG tablet Take 1 tablet (0.5 mg total) by mouth daily as needed (severe anxiety). 30 tablet 0    ASCORBATE CALCIUM, VITAMIN C, ORAL Take 500 mg by mouth.      aspirin (ECOTRIN) 81 MG EC tablet Take 81 mg by mouth once daily.      azithromycin (Z-BEATRIZ) 250 MG tablet TAKE 2 TABLETS BY MOUTH TODAY, THEN TAKE 1 TABLET DAILY FOR 4 DAYS      azithromycin (ZITHROMAX) 500 MG tablet Take 500 mg by mouth once. for one dose      cetirizine-pseudoephedrine 5-120 mg Tb12 cetirizine 5 mg-pseudoephedrine  mg tablet,extended release,12hr   TK 1 T PO BID      cholecalciferol, vitamin D3, (VITAMIN D3) 50 mcg (2,000 unit) Tab Take 2,000 Units by mouth.      ciclopirox 0.77 % Gel Apply topically once daily. To thickened discolored toenails. 30 g 1    clotrimazole (LOTRIMIN) 1 % cream APPLY TOPICALLY 2 (TWO) TIMES DAILY. TO AFFECTED TOENAILS. 45 g 1    CMPD testosterone proprionate 2% in vanicream Apply topically. [facility]      COMPOUND HORMONE REPLACEMENT Take by mouth once daily. ESTROGEN CREAM -- [facility]      EPINEPHrine (EPIPEN) 0.3 mg/0.3 mL AtIn epinephrine 0.3 mg/0.3 mL injection, auto-injector      estradioL (ESTRACE) 2 MG tablet Take 1 tablet (2 mg total) by mouth once daily. 90 tablet 3    ipratropium (ATROVENT) 42 mcg (0.06 %) nasal spray SMARTSI Both Nares 3 Times Daily      MAGNESIUM CARBONATE ORAL Take 1 tablet by mouth once daily. 200MG      methIMAzole (TAPAZOLE) 5 MG Tab TAKE 1/2 TABLET BY MOUTH DAILY. Needs appointment for future refills. 90 tablet 0    omalizumab (XOLAIR) 75 mg/0.5 mL injection       PENNSAID 20 mg/gram /actuation(2 %) sopm APPLY 1 APPLICATION TOPICALLY 2 (TWO) TIMES DAILY AS NEEDED.::NOT COVERED:: 112 g 10    predniSONE (DELTASONE) 10 MG tablet Take by mouth.      PREMARIN vaginal cream PLACE 0.5 G VAGINALLY ONCE DAILY. 90 g 0    progesterone (PROMETRIUM) 200 MG capsule Take 1 capsule by mouth 30-60 minutes before bed every night 90 capsule 3    propranoloL (INDERAL LA) 120 MG 24 hr capsule TAKE 1 CAPSULE BY MOUTH ONCE DAILY 90 capsule 3    RENOVA 0.02 % Crea RENOVA 0.02 % TOPICAL CREAM APPLY TO AFFECTED AREA EVERY DAY AT NIGHT 40 g 0    rosuvastatin (CRESTOR) 40 MG Tab TAKE 1 TABLET BY MOUTH EVERY DAY 90 tablet 3    sars-cov-2, covid-19 omicron, (MODERNA COVID BIVAL,18Y UP,-PF) 50 mcg/0.5 mL injection Inject into the muscle. 0.5 mL 0    sulfamethoxazole-trimethoprim 800-160mg (BACTRIM DS) 800-160 mg Tab Take 1 tablet by mouth once daily.      trospium (SANCTURA) 20 mg Tab tablet Take 1 tablet (20 mg total) by mouth 2 (two) times daily as needed (urinary incontinence). 60 tablet 11    UNABLE TO FIND medication name: Testosterone Cream in versabase 20mg/mL Apply 1/4mL to labia minora nightly 22.5 mL 1    urea 20 % Crea Apply 1 application topically once daily. To dry skin on the feet. 75 g 10    valACYclovir (VALTREX) 1000 MG tablet valacyclovir 1 gram tablet      XIIDRA 5 % Dpet       zolpidem (AMBIEN CR) 6.25 MG CR tablet Take 1 tablet (6.25 mg total) by mouth nightly  "as needed for Insomnia. 30 tablet 2    topiramate (TOPAMAX) 25 MG tablet Take 1 tablet (25 mg total) by mouth once daily for 7 days, THEN 2 tablets (50 mg total) once daily. (Patient not taking: Reported on 10/18/2023) 67 tablet 3     No current facility-administered medications on file prior to visit.           Review of patient's allergies indicates:   Allergen Reactions    Penicillins Rash and Hives               ROS:   General ROS: negative for - chills, fever or night sweats  Respiratory ROS: no cough, shortness of breath, or wheezing  Cardiovascular ROS: no chest pain or dyspnea on exertion  Musculoskeletal ROS: positive for - pain in foot - bilateral  Neurological ROS: negative for - impaired coordination/balance and numbness/tingling  Dermatological ROS: negative for pruritus and rash      EXAM:     Vitals:    04/29/24 0859   BP: 126/79   Pulse: 60   Weight: 53.3 kg (117 lb 8.1 oz)   Height: 5' 3" (1.6 m)            General:  Alert and Oriented x 3;  No acute distress      Bilateral  Lower extremity exam:    Vascular:   Dorsalis Pedis:  present   Posterior Tibial:  present  Capillary refill time:  3 seconds  Temperature of toes cool to touch  Edema:  Trace and non-pitting       Neurological:     Sharp touch:  normal  Light touch: normal  Tinels Sign:  Absent  Mulders Click:   Absent        Dermatological:   Skin: thin, moist and appropriate for age; calluses sub metatarsal heads  Wounds/Ulcers:  Absent  Bruising:  Absent  Erythema:  Absent  Hair:  decreased  Toenails superficially mycotic, with subungual debris  right hallux         Musculoskeletal:   Metatarsophalangeal range of motion:   diminished range of motion  Subtalar joint range of motion: full range of motion  Ankle joint range of motion:  full range of motion  Bunions:  Present  Hammertoes: Present; left 2nd mainly, overlapping hallux.           "

## 2024-05-06 DIAGNOSIS — I10 ESSENTIAL HYPERTENSION: Primary | ICD-10-CM

## 2024-05-06 DIAGNOSIS — N39.3 STRESS INCONTINENCE OF URINE: ICD-10-CM

## 2024-05-06 DIAGNOSIS — N18.31 STAGE 3A CHRONIC KIDNEY DISEASE: ICD-10-CM

## 2024-05-09 ENCOUNTER — OFFICE VISIT (OUTPATIENT)
Dept: INTERNAL MEDICINE | Facility: CLINIC | Age: 79
End: 2024-05-09
Payer: MEDICARE

## 2024-05-09 ENCOUNTER — LAB VISIT (OUTPATIENT)
Dept: LAB | Facility: HOSPITAL | Age: 79
End: 2024-05-09
Attending: STUDENT IN AN ORGANIZED HEALTH CARE EDUCATION/TRAINING PROGRAM
Payer: MEDICARE

## 2024-05-09 VITALS
HEART RATE: 56 BPM | WEIGHT: 115.31 LBS | SYSTOLIC BLOOD PRESSURE: 137 MMHG | HEIGHT: 63 IN | BODY MASS INDEX: 20.43 KG/M2 | DIASTOLIC BLOOD PRESSURE: 74 MMHG

## 2024-05-09 DIAGNOSIS — Z78.0 ASYMPTOMATIC MENOPAUSAL STATE: ICD-10-CM

## 2024-05-09 DIAGNOSIS — I10 ESSENTIAL HYPERTENSION: ICD-10-CM

## 2024-05-09 DIAGNOSIS — Z91.81 AT HIGH RISK FOR FALLS: ICD-10-CM

## 2024-05-09 DIAGNOSIS — I50.32 CHRONIC DIASTOLIC CHF (CONGESTIVE HEART FAILURE), NYHA CLASS 1: ICD-10-CM

## 2024-05-09 DIAGNOSIS — I70.0 AORTIC ATHEROSCLEROSIS: ICD-10-CM

## 2024-05-09 DIAGNOSIS — R27.0 ATAXIA: ICD-10-CM

## 2024-05-09 DIAGNOSIS — E05.00 GRAVES' DISEASE: ICD-10-CM

## 2024-05-09 DIAGNOSIS — N18.31 STAGE 3A CHRONIC KIDNEY DISEASE: ICD-10-CM

## 2024-05-09 DIAGNOSIS — Z00.00 HEALTHCARE MAINTENANCE: ICD-10-CM

## 2024-05-09 DIAGNOSIS — Z00.00 ENCOUNTER FOR MEDICAL EXAMINATION TO ESTABLISH CARE: Primary | ICD-10-CM

## 2024-05-09 DIAGNOSIS — J45.20 MILD INTERMITTENT ASTHMA WITHOUT COMPLICATION: ICD-10-CM

## 2024-05-09 PROBLEM — I50.33 ACUTE ON CHRONIC DIASTOLIC HF (HEART FAILURE): Status: ACTIVE | Noted: 2024-05-09

## 2024-05-09 PROBLEM — D69.2 OTHER NONTHROMBOCYTOPENIC PURPURA: Status: ACTIVE | Noted: 2024-05-09

## 2024-05-09 PROBLEM — D69.2 OTHER NONTHROMBOCYTOPENIC PURPURA: Status: RESOLVED | Noted: 2024-05-09 | Resolved: 2024-05-09

## 2024-05-09 PROBLEM — I50.33 ACUTE ON CHRONIC DIASTOLIC HF (HEART FAILURE): Status: RESOLVED | Noted: 2024-05-09 | Resolved: 2024-05-09

## 2024-05-09 PROBLEM — N18.32 STAGE 3B CHRONIC KIDNEY DISEASE: Status: RESOLVED | Noted: 2024-05-09 | Resolved: 2024-05-09

## 2024-05-09 PROBLEM — N18.32 STAGE 3B CHRONIC KIDNEY DISEASE: Status: ACTIVE | Noted: 2024-05-09

## 2024-05-09 LAB
ALBUMIN SERPL BCP-MCNC: 4.2 G/DL (ref 3.5–5.2)
ALP SERPL-CCNC: 31 U/L (ref 55–135)
ALT SERPL W/O P-5'-P-CCNC: 22 U/L (ref 10–44)
ANION GAP SERPL CALC-SCNC: 9 MMOL/L (ref 8–16)
AST SERPL-CCNC: 24 U/L (ref 10–40)
BASOPHILS # BLD AUTO: 0.05 K/UL (ref 0–0.2)
BASOPHILS NFR BLD: 0.8 % (ref 0–1.9)
BILIRUB SERPL-MCNC: 0.6 MG/DL (ref 0.1–1)
BUN SERPL-MCNC: 24 MG/DL (ref 8–23)
CALCIUM SERPL-MCNC: 9.7 MG/DL (ref 8.7–10.5)
CHLORIDE SERPL-SCNC: 102 MMOL/L (ref 95–110)
CO2 SERPL-SCNC: 24 MMOL/L (ref 23–29)
CREAT SERPL-MCNC: 1 MG/DL (ref 0.5–1.4)
DIFFERENTIAL METHOD BLD: ABNORMAL
EOSINOPHIL # BLD AUTO: 0.2 K/UL (ref 0–0.5)
EOSINOPHIL NFR BLD: 2.8 % (ref 0–8)
ERYTHROCYTE [DISTWIDTH] IN BLOOD BY AUTOMATED COUNT: 12.9 % (ref 11.5–14.5)
EST. GFR  (NO RACE VARIABLE): 57.3 ML/MIN/1.73 M^2
GLUCOSE SERPL-MCNC: 82 MG/DL (ref 70–110)
HCT VFR BLD AUTO: 38.9 % (ref 37–48.5)
HCV AB SERPL QL IA: NORMAL
HGB BLD-MCNC: 13.2 G/DL (ref 12–16)
IMM GRANULOCYTES # BLD AUTO: 0.02 K/UL (ref 0–0.04)
IMM GRANULOCYTES NFR BLD AUTO: 0.3 % (ref 0–0.5)
LYMPHOCYTES # BLD AUTO: 0.9 K/UL (ref 1–4.8)
LYMPHOCYTES NFR BLD: 15 % (ref 18–48)
MCH RBC QN AUTO: 31.3 PG (ref 27–31)
MCHC RBC AUTO-ENTMCNC: 33.9 G/DL (ref 32–36)
MCV RBC AUTO: 92 FL (ref 82–98)
MONOCYTES # BLD AUTO: 0.6 K/UL (ref 0.3–1)
MONOCYTES NFR BLD: 9.9 % (ref 4–15)
NEUTROPHILS # BLD AUTO: 4.4 K/UL (ref 1.8–7.7)
NEUTROPHILS NFR BLD: 71.2 % (ref 38–73)
NRBC BLD-RTO: 0 /100 WBC
PLATELET # BLD AUTO: 231 K/UL (ref 150–450)
PMV BLD AUTO: 9.4 FL (ref 9.2–12.9)
POTASSIUM SERPL-SCNC: 4.6 MMOL/L (ref 3.5–5.1)
PROT SERPL-MCNC: 7.4 G/DL (ref 6–8.4)
RBC # BLD AUTO: 4.22 M/UL (ref 4–5.4)
SODIUM SERPL-SCNC: 135 MMOL/L (ref 136–145)
WBC # BLD AUTO: 6.18 K/UL (ref 3.9–12.7)

## 2024-05-09 PROCEDURE — 80053 COMPREHEN METABOLIC PANEL: CPT | Performed by: STUDENT IN AN ORGANIZED HEALTH CARE EDUCATION/TRAINING PROGRAM

## 2024-05-09 PROCEDURE — 99214 OFFICE O/P EST MOD 30 MIN: CPT | Mod: S$PBB,,, | Performed by: STUDENT IN AN ORGANIZED HEALTH CARE EDUCATION/TRAINING PROGRAM

## 2024-05-09 PROCEDURE — 86803 HEPATITIS C AB TEST: CPT | Performed by: STUDENT IN AN ORGANIZED HEALTH CARE EDUCATION/TRAINING PROGRAM

## 2024-05-09 PROCEDURE — 99214 OFFICE O/P EST MOD 30 MIN: CPT | Mod: PBBFAC | Performed by: STUDENT IN AN ORGANIZED HEALTH CARE EDUCATION/TRAINING PROGRAM

## 2024-05-09 PROCEDURE — 85025 COMPLETE CBC W/AUTO DIFF WBC: CPT | Performed by: STUDENT IN AN ORGANIZED HEALTH CARE EDUCATION/TRAINING PROGRAM

## 2024-05-09 PROCEDURE — 36415 COLL VENOUS BLD VENIPUNCTURE: CPT | Performed by: STUDENT IN AN ORGANIZED HEALTH CARE EDUCATION/TRAINING PROGRAM

## 2024-05-09 PROCEDURE — 99999 PR PBB SHADOW E&M-EST. PATIENT-LVL IV: CPT | Mod: PBBFAC,,, | Performed by: STUDENT IN AN ORGANIZED HEALTH CARE EDUCATION/TRAINING PROGRAM

## 2024-05-09 NOTE — ASSESSMENT & PLAN NOTE
TREATED WITH OMALIZUMAB  Uncomplicated.  Last flare 2014.    No needing any other treatment at this point, no inhalers.  Stable

## 2024-05-09 NOTE — ASSESSMENT & PLAN NOTE
Multifactorial.    Patient is followed by Neurology.    At high risk of falls.    Have recommended to walk assisted with a cane or a walker, make sure her footwear is safe and provides additional balance

## 2024-05-09 NOTE — ASSESSMENT & PLAN NOTE
Health care maintenance and core gaps reviewed and assessed with patient.  Vaccinations recommended:        Tetanus - 2024       Shingles - 2018       Influenza - 2023       Pneumonia - 2016       COVID - completed x 3  Colon cancer screening:   Colonoscopy: 2022  Lifestyle recommendations given.  Physical activity recommended.    Legend: Ordered (O), Declined (D), Current (C)

## 2024-05-09 NOTE — ASSESSMENT & PLAN NOTE
Patient requests to transfer healthcare services to our practice.  We are pleased to continue provided primary care services as requested.

## 2024-05-09 NOTE — ASSESSMENT & PLAN NOTE
Patient has ataxia and dizziness  I have recommended to always walk assisted with a walker or cane (support)  Make sure she has good footware to provide additional support.

## 2024-05-09 NOTE — PROGRESS NOTES
Subjective:       Patient ID: Sarah Rico is a 78 y.o. female.    Chief Complaint: Establish Care    HPI    Sarah Rico is a 78 y.o. female , English speaking, with a history of:  Aortic valve stenosis s/p TAVR 2016  Chronic diastolic HFpEF, NYHA class 1, EF 65%  HTN  HLD  CKD IIIb  Asthma  Stress incontinence  Vaginal atrophy  Pelvic floor dysfunction  Nonthrombocytopenic purpura  H/o Grave's disease  Chronic constipation  Essential tremor  Cervical spinal stenosis  Lumbar radiculopathy  Ataxia  H/o Graves disease  Chronic pansinusitis  Hammer toe of the left foot  Herpes labialis  Remote h/o tobacco use (23 ppy)      [Local Patient]  Originally from [facility]  Lives in: [place] [address]   Referred by: Hodan Goldberg    Cardiology: Dr. Velasquez  Neurology: Dr. Shin  Urogyn: Dr. Goldberg  Podiatry: Dr. Roblero  Endocrinology: Dr. Vera      Patient comes to the clinic a general medical health examination and to establish care.    Patient has not extensive medical history listed above, for which she follows with multiple subspecialties.      Patient states she is doing well and she feels like her health is good at this time.      She had back surgery couple of years ago and had osteomyelitis for which she was hospitalized for 68 days.    At that time she was diagnosed with TERRENCE on CKD and her failure with preserved ejection fraction and diastolic dysfunction.      Her EF has remained greater than 60% in her latest follow-ups, and she is following with Interventional Cardiology that is managing with beta-blocker alone (propranolol).      She also has a history of essential tremor, as well as ataxia and problems with her balance that are consequences of the back issues .  This is all managed by Neurology.      Essential tremor time or is mainly of the left upper extremity, mild.    She has been on Xolair shots every month since 2014 and since then she hasn't had any asthma exacerbations. No  inhalers.    Patient denies CV symptoms, CP, SOB, palpitations.  Patient denies constitutional symptoms, fever, changes in the urine or stool.    Past Medical History:   Diagnosis Date    Asthma     Ataxia     Cervical stenosis of spinal canal     Chronic heart failure with preserved ejection fraction (HFpEF)     diastolic    Chronic sinusitis, unspecified     CKD (chronic kidney disease), stage III     Essential tremor     left hand    Graves disease     ESSENTIAL TREMORS    Herpes labialis     History of aortic stenosis     History of back surgery     Hormone replacement therapy (HRT)     Hyperlipidemia     Hypertension     Infection of spine     Menopause 1996    Pelvic floor dysfunction     s/p surgical correction 2018    Stress incontinence (female) (male)     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     Graves disease       Past Surgical History:   Procedure Laterality Date    AUGMENTATION OF BREAST Bilateral     BACK SURGERY      BALLOON SINUPLASTY OF PARANASAL SINUS Bilateral 08/23/2022    Procedure: SINUPLASTY, USING BALLOON  frontal ans sphenoid;  Surgeon: Samantha Ridley MD;  Location: [facility];  Service: ENT;  Laterality: Bilateral;    BREAST BIOPSY Right 08/29/2022    atypical lobular hyperplasia    CARDIAC VALVE REPLACEMENT  05/2016    - aortic valve stenosis / COW  VALVE    FUNCTIONAL ENDOSCOPIC SINUS SURGERY (FESS) USING COMPUTER-ASSISTED NAVIGATION N/A 08/23/2022    Procedure: FESS, USING COMPUTER-ASSISTED NAVIGATION - MAXILLARY, ETHMOIDS, FRONTAL;  Surgeon: Samantha Ridley MD;  Location: [facility] OR;  Service: ENT;  Laterality: N/A;    HIP REPLACEMENT ARTHROPLASTY Left 2016    INSERTION OF MIDURETHRAL SLING N/A 11/01/2021    Procedure: SLING, MIDURETHRAL;  Surgeon: Hodan Goldberg MD;  Location: [facility];  Service: OB/GYN;  Laterality: N/A;    JOINT REPLACEMENT      LUMBAR FUSION      NASAL ENDOSCOPY N/A 08/23/2022    Procedure: ENDOSCOPY, NOSE - SPHENOIDOTOMY;  Surgeon: Samantha WALTON  MD [place];  Location: [facility] [place] OR;  Service: ENT;  Laterality: N/A;       Family History   Problem Relation Name Age of Onset    Colon cancer Father      Leukemia Mother      Hypothyroidism Sister      Breast cancer Neg Hx      Ovarian cancer Neg Hx      Stroke Neg Hx      Diabetes Neg Hx         Allergies:   Review of patient's allergies indicates:   Allergen Reactions    Penicillins Rash and Hives       Current Outpatient Medications:     ASCORBATE CALCIUM, VITAMIN C, ORAL, Take 500 mg by mouth., Disp: , Rfl:     aspirin (ECOTRIN) 81 MG EC tablet, Take 81 mg by mouth once daily., Disp: , Rfl:     cholecalciferol, vitamin D3, (VITAMIN D3) 50 mcg (2,000 unit) Tab, Take 2,000 Units by mouth., Disp: , Rfl:     clotrimazole (LOTRIMIN) 1 % cream, APPLY TOPICALLY 2 (TWO) TIMES DAILY. TO AFFECTED TOENAILS., Disp: 45 g, Rfl: 1    COMPOUND HORMONE REPLACEMENT, Take by mouth once daily. ESTROGEN CREAM -- [employer] ([employer], Disp: , Rfl:     estradioL (ESTRACE) 2 MG tablet, Take 1 tablet (2 mg total) by mouth once daily., Disp: 90 tablet, Rfl: 3    MAGNESIUM CARBONATE ORAL, Take 1 tablet by mouth once daily. 200MG, Disp: , Rfl:     rosuvastatin (CRESTOR) 40 MG Tab, TAKE 1 TABLET BY MOUTH EVERY DAY, Disp: 90 tablet, Rfl: 3    valACYclovir (VALTREX) 1000 MG tablet, valacyclovir 1 gram tablet, Disp: , Rfl:     omalizumab (XOLAIR) 75 mg/0.5 mL injection, , Disp: , Rfl:     PREMARIN vaginal cream, PLACE 0.5 G VAGINALLY ONCE DAILY. (Patient not taking: Reported on 5/9/2024), Disp: 90 g, Rfl: 0    progesterone (PROMETRIUM) 200 MG capsule, Take 1 capsule by mouth 30-60 minutes before bed every night, Disp: 90 capsule, Rfl: 3    propranoloL (INDERAL LA) 120 MG 24 hr capsule, TAKE 1 CAPSULE BY MOUTH ONCE DAILY, Disp: 90 capsule, Rfl: 3    UNABLE TO FIND, medication name: Testosterone Cream in versabase 20mg/mL Apply 1/4mL to labia minora nightly, Disp: 22.5 mL, Rfl: 1    Social history:  , has one son  's name is  Pablito      Exercise:   She works out on the treadmill and uses an elliptical machine  Uses weights    Diet:   Regular with no restrictions    Tobacco use: Former. 23 ppy    Tobacco Use: Medium Risk (5/9/2024)    Patient History     Smoking Tobacco Use: Former     Smokeless Tobacco Use: Never     Passive Exposure: Not on file        Alcohol use: Denies    Recreational drug use: Denies    Recent travel: Denies      Most recent laboratories reviewed:    Recent Labs   Lab 05/31/22  0945 11/10/23  1050 05/09/24  0942   WBC 5.08 5.73 6.18   Hemoglobin 12.3 12.5 13.2   Hematocrit 37.2 37.8 38.9   MCV 95 92 92   Platelets 263 260 231       Recent Labs   Lab 07/22/21  1240 09/28/21  1207 05/31/22  0945 11/10/23  1050 05/09/24  0942   Glucose 84 88 95 90 82   Sodium 134 L 135 L 138 138 135 L   Potassium 4.9 4.9 4.0 5.4 H 4.6   BUN 21 22 22 21 24 H   Creatinine 1.0 1.1 0.9 1.0 1.0   eGFR if non  54.9 A 48.9 A >60  --   --    Total Bilirubin 0.5 0.3 0.4 0.5 0.6   AST 19 24 23 20 24   ALT 16 21 15 14 22       Recent Labs   Lab 06/10/21  1313 05/31/22  0945   Hemoglobin A1C 5.3 5.3       Recent Labs   Lab 07/22/21  1240 05/31/22  0945 11/10/23  1050   Cholesterol 177 167 191   Triglycerides 57 63 66   HDL 75 76 H 78 H   LDL Cholesterol 90.6 78.4 99.8   Non-HDL Cholesterol 102 91 113       Recent Labs   Lab 05/31/22  0945 08/08/22  0823 11/10/23  1050   TSH 2.262 1.819 2.185       Recent Labs   Lab 05/09/24  0942   Hepatitis C Ab Non-reactive         Latest ECG results:  Results for orders placed or performed in visit on 09/28/21   EKG 12-lead    Collection Time: 09/28/21 11:18 AM    Narrative    Test Reason : Z01.818    Vent. Rate : 056 BPM     Atrial Rate : 056 BPM     P-R Int : 208 ms          QRS Dur : 076 ms      QT Int : 454 ms       P-R-T Axes : 026 029 044 degrees     QTc Int : 438 ms    Sinus bradycardia  Cannot exclude Anteroseptal infarct ,age undetermined  Abnormal ECG  No previous ECGs  "available  Confirmed by Ryan Maier MD ([id number]) on 2021 10:41:16 AM    Referred By: [id number] DAVIDLALO           Confirmed By:Ryan Maier MD     Results for orders placed during the hospital encounter of 10/18/23    Echo    Interpretation Summary    Left Ventricle: The left ventricle is normal in size. Normal wall thickness. There is concentric remodeling. Normal wall motion. There is normal systolic function. Ejection fraction by visual approximation is 65%. Diastolic function cannot be reliably determined in the presence of significant mitral annular calcification.    Left Atrium: Left atrium is mildly dilated.    Right Ventricle: Normal right ventricular cavity size. Wall thickness is normal. Right ventricle wall motion  is normal. Systolic function is normal.    Aortic Valve: There is a transcatheter valve in the aortic position. It appears to be an Schmidt Anderson valve. The effective orifice area by VTI is 1.11 cm². Aortic valve peak velocity is 2.31 m/s. Mean gradient is 12 mmHg. The dimensionless index is 0.39. LVOT diameter 1.9 cm. There is no significant regurgitation.    Mitral Valve: There is moderate mitral annular calcification present. There is mild stenosis. The mean pressure gradient across the mitral valve is 3 mmHg at a heart rate of 55 bpm. There is mild to moderate regurgitation.    Tricuspid Valve: There is mild regurgitation.    Pulmonary Artery: The estimated pulmonary artery systolic pressure is 30 mmHg.    IVC/SVC: Normal venous pressure at 3 mmHg.        Healthcare Maintenance:  Colon cancer screenin    Vaccinations:        Tetanus - 4       Shingles - 2018       Influenza - 3       Pneumonia - 2016       COVID - completed x 3    Depression screening: PHQ2 score = 0    Annual visit approx. Month: MAY    ROS  11-point review of systems done. Negative except for detailed in the HPI.          Objective:      /74   Pulse (!) 56   Ht 5' 3" (1.6 m)   Wt 52.3 kg " (115 lb 4.8 oz)   LMP  (LMP Unknown) Comment:    1996  BMI 20.42 kg/m²      Physical Exam   General appearance: Good general aspect, NAD, conversant    present during the visit  Eyes and HEENT: Normal sclerae, moist mucous membranes, no thyromegaly or lymphadenopathy  Respiratory: No work of breathing, clear to auscultation bilaterally. No rales, rhonchi, wheezing, or rubs.  Cardiovascular: PMI not displaced. RRR. Normal S1, S2. No murmurs, rubs or gallops.  Abdomen and : Soft, non-tender, nondistended, BS, no hepatosplenomegaly, no masses.  Extremities: no edemas, no extremity lymphadenopathy, no clubbing, no cyanosis.  Nervous System: Alert and oriented x 3, good concentration, no deficits. Wide base gait. Impaired balance.  Skin: Normal temperature, turgor and texture; no rash, ulcers or subcutaneous nodules  Psych: Appropriate affect, alert and oriented to person, place and time          Assessment:       1. Encounter for medical examination to establish care  Assessment & Plan:  Patient requests to transfer healthcare services to our practice.  We are pleased to continue provided primary care services as requested.          2. Essential hypertension  Assessment & Plan:  Controlled.  Will continue same management:  Propranolol 120 mg daily    Recommended to monitor blood pressure regularly, eat a well-balanced diet that's low in salt, DASH diet, enjoy regular physical activity, manage stress, maintain a healthy weight, take medications properly.        3. Chronic diastolic CHF (congestive heart failure), NYHA class 1  Overview:  EF 65% (2023)    Assessment & Plan:  Patient is identified as having Diastolic (HFpEF) heart failure that is Chronic. CHF is currently controlled. Latest ECHO performed and demonstrates- Results for orders placed during the hospital encounter of 10/18/23    Echo    Interpretation Summary    Left Ventricle: The left ventricle is normal in size. Normal wall thickness. There  is concentric remodeling. Normal wall motion. There is normal systolic function. Ejection fraction by visual approximation is 65%. Diastolic function cannot be reliably determined in the presence of significant mitral annular calcification.    Left Atrium: Left atrium is mildly dilated.    Right Ventricle: Normal right ventricular cavity size. Wall thickness is normal. Right ventricle wall motion  is normal. Systolic function is normal.    Aortic Valve: There is a transcatheter valve in the aortic position. It appears to be an Schmidt Anderson valve. The effective orifice area by VTI is 1.11 cm². Aortic valve peak velocity is 2.31 m/s. Mean gradient is 12 mmHg. The dimensionless index is 0.39. LVOT diameter 1.9 cm. There is no significant regurgitation.    Mitral Valve: There is moderate mitral annular calcification present. There is mild stenosis. The mean pressure gradient across the mitral valve is 3 mmHg at a heart rate of 55 bpm. There is mild to moderate regurgitation.    Tricuspid Valve: There is mild regurgitation.    Pulmonary Artery: The estimated pulmonary artery systolic pressure is 30 mmHg.    IVC/SVC: Normal venous pressure at 3 mmHg.  . Continue Beta Blocker  CONTINUE FOLLOW-UP WITH CARDIOLOGY  Patient is asymptomatic, stable      4. Stage 3a chronic kidney disease  Assessment & Plan:  Stable, at baseline.    Cr. 1.0, GFR 57  Blood pressure is controlled and at goal.  We will reassess in next AWV for the need of starting patient on ACE or ARB at low dose.      5. Aortic atherosclerosis  Assessment & Plan:  On statin.  Continue      6. Mild intermittent asthma without complication  Overview:  Last exacerbation in 2014. On Xolair shots.    Assessment & Plan:  TREATED WITH OMALIZUMAB  Uncomplicated.  Last flare 2014.    No needing any other treatment at this point, no inhalers.  Stable       7. Asymptomatic menopausal state  -     DXA Bone Density Axial Skeleton 1 or more sites; Future; Expected date:  05/09/2024    8. At high risk for falls  Assessment & Plan:  Patient has ataxia and dizziness  I have recommended to always walk assisted with a walker or cane (support)  Make sure she has good footware to provide additional support.      9. Graves' disease  Assessment & Plan:  Stable. Euthyroid at this time  Managed by Endocrinology  Currently not in medication  Lab Results   Component Value Date    TSH 2.185 11/10/2023           10. Ataxia  Assessment & Plan:  Multifactorial.    Patient is followed by Neurology.    At high risk of falls.    Have recommended to walk assisted with a cane or a walker, make sure her footwear is safe and provides additional balance      11. Healthcare maintenance  Assessment & Plan:  Health care maintenance and core gaps reviewed and assessed with patient.  Vaccinations recommended:        Tetanus - 2024       Shingles - 2018       Influenza - 2023       Pneumonia - 2016       COVID - completed x 3  Colon cancer screening:   Colonoscopy: 2022  Lifestyle recommendations given.  Physical activity recommended.    Legend: Ordered (O), Declined (D), Current (C)      Orders:  -     DXA Bone Density Axial Skeleton 1 or more sites; Future; Expected date: 05/09/2024       Plan:       Continue same management for now  DEXA  Fall precautions      I will assume as PCP.          There are no Patient Instructions on file for this visit.       Problem list updated  Medications reconciled  Education provided  Lifestyle recommendations given  AVS generated, explained, and sent to the patient.  RTC in : November for AWV, labs not ordered yet.          VICKI BLOUNT MD, MPH  Internal Medicine  [facility]  [facility]

## 2024-05-09 NOTE — ASSESSMENT & PLAN NOTE
Stable. Euthyroid at this time  Managed by Endocrinology  Currently not in medication  Lab Results   Component Value Date    TSH 2.185 11/10/2023

## 2024-05-09 NOTE — ASSESSMENT & PLAN NOTE
Controlled.  Will continue same management:  Propranolol 120 mg daily    Recommended to monitor blood pressure regularly, eat a well-balanced diet that's low in salt, DASH diet, enjoy regular physical activity, manage stress, maintain a healthy weight, take medications properly.

## 2024-05-09 NOTE — ASSESSMENT & PLAN NOTE
Stable, at baseline.    Cr. 1.0, GFR 57  Blood pressure is controlled and at goal.  We will reassess in next AWV for the need of starting patient on ACE or ARB at low dose.

## 2024-05-09 NOTE — ASSESSMENT & PLAN NOTE
Patient is identified as having Diastolic (HFpEF) heart failure that is Chronic. CHF is currently controlled. Latest ECHO performed and demonstrates- Results for orders placed during the hospital encounter of 10/18/23    Echo    Interpretation Summary    Left Ventricle: The left ventricle is normal in size. Normal wall thickness. There is concentric remodeling. Normal wall motion. There is normal systolic function. Ejection fraction by visual approximation is 65%. Diastolic function cannot be reliably determined in the presence of significant mitral annular calcification.    Left Atrium: Left atrium is mildly dilated.    Right Ventricle: Normal right ventricular cavity size. Wall thickness is normal. Right ventricle wall motion  is normal. Systolic function is normal.    Aortic Valve: There is a transcatheter valve in the aortic position. It appears to be an Schmidt Anderson valve. The effective orifice area by VTI is 1.11 cm². Aortic valve peak velocity is 2.31 m/s. Mean gradient is 12 mmHg. The dimensionless index is 0.39. LVOT diameter 1.9 cm. There is no significant regurgitation.    Mitral Valve: There is moderate mitral annular calcification present. There is mild stenosis. The mean pressure gradient across the mitral valve is 3 mmHg at a heart rate of 55 bpm. There is mild to moderate regurgitation.    Tricuspid Valve: There is mild regurgitation.    Pulmonary Artery: The estimated pulmonary artery systolic pressure is 30 mmHg.    IVC/SVC: Normal venous pressure at 3 mmHg.  . Continue Beta Blocker  CONTINUE FOLLOW-UP WITH CARDIOLOGY  Patient is asymptomatic, stable

## 2024-05-13 ENCOUNTER — HOSPITAL ENCOUNTER (OUTPATIENT)
Dept: RADIOLOGY | Facility: OTHER | Age: 79
Discharge: HOME | End: 2024-05-13
Attending: STUDENT IN AN ORGANIZED HEALTH CARE EDUCATION/TRAINING PROGRAM
Payer: MEDICARE

## 2024-05-13 DIAGNOSIS — Z00.00 HEALTHCARE MAINTENANCE: ICD-10-CM

## 2024-05-13 DIAGNOSIS — Z78.0 ASYMPTOMATIC MENOPAUSAL STATE: ICD-10-CM

## 2024-05-13 PROCEDURE — 77080 DXA BONE DENSITY AXIAL: CPT | Mod: TC

## 2024-05-13 PROCEDURE — 77080 DXA BONE DENSITY AXIAL: CPT | Mod: 26,,, | Performed by: RADIOLOGY

## 2024-05-14 ENCOUNTER — HOSPITAL ENCOUNTER (OUTPATIENT)
Dept: RADIOLOGY | Facility: OTHER | Age: 79
Discharge: HOME | End: 2024-05-14
Attending: PODIATRIST
Payer: MEDICARE

## 2024-05-14 DIAGNOSIS — M20.42 HAMMER TOE OF LEFT FOOT: ICD-10-CM

## 2024-05-22 DIAGNOSIS — N95.1 MENOPAUSAL SYMPTOM: ICD-10-CM

## 2024-05-22 NOTE — TELEPHONE ENCOUNTER
----- Message from Toña Worthy sent at 5/22/2024  2:54 PM CDT -----  Type: RX Refill Request    Who Called: [facility] [phone]    Have you contacted your pharmacy:    Refill or New Rx:testosterone 20 mg per ml    RX Name and Strength:    How is the patient currently taking it? (ex. 1XDay):    Is this a 30 day or 90 day RX:    Preferred Pharmacy with phone number:    Local or Mail Order:    Ordering Provider:    Would the patient rather a call back or a response via [facility]?     Best Call Back Number:    Additional Information:

## 2024-06-13 ENCOUNTER — OFFICE VISIT (OUTPATIENT)
Dept: PODIATRY | Facility: CLINIC | Age: 79
End: 2024-06-13
Payer: MEDICARE

## 2024-06-13 VITALS
SYSTOLIC BLOOD PRESSURE: 124 MMHG | BODY MASS INDEX: 20.43 KG/M2 | HEART RATE: 76 BPM | WEIGHT: 115.31 LBS | DIASTOLIC BLOOD PRESSURE: 64 MMHG | HEIGHT: 63 IN

## 2024-06-13 DIAGNOSIS — L84 CORN OR CALLUS: ICD-10-CM

## 2024-06-13 DIAGNOSIS — M79.672 BILATERAL FOOT PAIN: ICD-10-CM

## 2024-06-13 DIAGNOSIS — M79.671 BILATERAL FOOT PAIN: ICD-10-CM

## 2024-06-13 DIAGNOSIS — L90.9 PLANTAR FAT PAD ATROPHY: ICD-10-CM

## 2024-06-13 DIAGNOSIS — M77.42 METATARSALGIA OF BOTH FEET: Primary | Chronic | ICD-10-CM

## 2024-06-13 DIAGNOSIS — M77.41 METATARSALGIA OF BOTH FEET: Primary | Chronic | ICD-10-CM

## 2024-06-13 PROCEDURE — 99213 OFFICE O/P EST LOW 20 MIN: CPT | Mod: PBBFAC,PN | Performed by: PODIATRIST

## 2024-06-13 PROCEDURE — 99213 OFFICE O/P EST LOW 20 MIN: CPT | Mod: S$PBB,,, | Performed by: PODIATRIST

## 2024-06-13 PROCEDURE — 99999 PR PBB SHADOW E&M-EST. PATIENT-LVL III: CPT | Mod: PBBFAC,,, | Performed by: PODIATRIST

## 2024-06-13 NOTE — PROGRESS NOTES
PODIATRY NOTE       CHIEF CONCERN:   Routine Foot Care         MEDICAL DECISION MAKING:      Problem List Items Addressed This Visit    None  Visit Diagnoses       Metatarsalgia of both feet  (Chronic)   -  Primary    Plantar fat pad atrophy        Bilateral foot pain        Corn or callus                I counseled the patient on the patient's conditions, their implications and medical management.    Shoe and activity modification as needed for relief.  Continue extra depth shoes to accommodate hammertoes.  No barefoot walking.   Continue budin splint for hammertoe as needed.  Offloading pads as needed.    Continue urea cream daily to calluses.     Call or return to clinic prn if these symptoms worsen or fail to improve as anticipated.           I spent a total of 20 minutes on the day of the visit.  This includes face to face time and non-face to face time preparing to see the patient (eg, review of tests), obtaining and/or reviewing separately obtained history, documenting clinical information in the electronic or other health record, independently interpreting results and communicating results to the patient/family/caregiver, or care coordinator.          HISTORY OF PRESENT ILLNESS:  Sarah Rico is a 78 y.o. female, with history of CKD,  with concerns regarding: painful calluses sub metatarsal heads, chronic.      She has history of bunionectomy bilateral.   Two surgeries on the right and one left foot surgery in [place].   left 2nd toe overlaps the left hallux.  Using paddings to offload calluses.   She got a salon pedicure this morning.         PCP:  Latrice Davidson MD;  Charo Lane MD   Last encounter:   4/17/2023           Patient Active Problem List   Diagnosis    Stress incontinence of urine    Voiding dysfunction    Rectocele, female    Chronic constipation    Vaginal atrophy    Graves' disease    Stage 3a chronic kidney disease    Hyperlipidemia    Essential tremor    Essential  hypertension    Insomnia    S/P TAVR (transcatheter aortic valve replacement)    Lumbar radiculopathy    S/P hip replacement    Pelvic floor weakness    Decreased functional mobility and endurance    Disorder of muscle    Preoperative cardiovascular examination    Status post anterior & posterior colporrhaphy w/ MUS 11/1    Incomplete bladder emptying    Pelvic floor tension    Pelvic floor dysfunction    Chronic pansinusitis    Atypical lobular hyperplasia (ALH) of right breast    Dyspareunia due to medical condition in female    Vertigo    BPPV (benign paroxysmal positional vertigo)    Aortic atherosclerosis    Cervical spinal stenosis    Ataxia    Chronic diastolic CHF (congestive heart failure), NYHA class 1    Nocturia    Encounter for medical examination to establish care    Healthcare maintenance    Annual physical exam    Mild intermittent asthma    At high risk for falls           Current Outpatient Medications on File Prior to Visit   Medication Sig Dispense Refill    ASCORBATE CALCIUM, VITAMIN C, ORAL Take 500 mg by mouth.      aspirin (ECOTRIN) 81 MG EC tablet Take 81 mg by mouth once daily.      cholecalciferol, vitamin D3, (VITAMIN D3) 50 mcg (2,000 unit) Tab Take 2,000 Units by mouth.      clotrimazole (LOTRIMIN) 1 % cream APPLY TOPICALLY 2 (TWO) TIMES DAILY. TO AFFECTED TOENAILS. 45 g 1    COMPOUND HORMONE REPLACEMENT Take by mouth once daily. ESTROGEN CREAM -- [facility]      estradioL (ESTRACE) 2 MG tablet Take 1 tablet (2 mg total) by mouth once daily. 90 tablet 3    MAGNESIUM CARBONATE ORAL Take 1 tablet by mouth once daily. 200MG      omalizumab (XOLAIR) 75 mg/0.5 mL injection       PREMARIN vaginal cream PLACE 0.5 G VAGINALLY ONCE DAILY. 90 g 0    progesterone (PROMETRIUM) 200 MG capsule Take 1 capsule by mouth 30-60 minutes before bed every night 90 capsule 3    propranoloL (INDERAL LA) 120 MG 24 hr capsule TAKE 1 CAPSULE BY MOUTH ONCE DAILY 90 capsule 3    rosuvastatin (CRESTOR) 40 MG  "Tab TAKE 1 TABLET BY MOUTH EVERY DAY 90 tablet 3    UNABLE TO FIND medication name: Testosterone Cream in versabase 20mg/mL Apply 1/4mL to labia minora nightly 22.5 mL 1    valACYclovir (VALTREX) 1000 MG tablet valacyclovir 1 gram tablet       No current facility-administered medications on file prior to visit.           Review of patient's allergies indicates:   Allergen Reactions    Penicillins Rash and Hives               ROS:   General ROS: negative for - chills, fever or night sweats  Respiratory ROS: no cough, shortness of breath, or wheezing  Cardiovascular ROS: no chest pain or dyspnea on exertion  Musculoskeletal ROS: positive for - pain in foot - bilateral  Neurological ROS: negative for - impaired coordination/balance and numbness/tingling  Dermatological ROS: negative for pruritus and rash      EXAM:     Vitals:    06/13/24 1132   BP: 124/64   Pulse: 76   Weight: 52.3 kg (115 lb 4.8 oz)   Height: 5' 3" (1.6 m)            General:  Alert and Oriented x 3;  No acute distress      Bilateral  Lower extremity exam:    Vascular:   Dorsalis Pedis:  present   Posterior Tibial:  present  Capillary refill time:  3 seconds  Temperature of toes cool to touch  Edema:  Trace and non-pitting       Neurological:     Sharp touch:  normal  Light touch: normal  Tinels Sign:  Absent  Mulders Click:   Absent        Dermatological:   Skin: thin, moist and appropriate for age; calluses sub metatarsal heads  Wounds/Ulcers:  Absent  Bruising:  Absent  Erythema:  Absent  Hair:  decreased  ++fat pad atrophy      Musculoskeletal:   Metatarsophalangeal range of motion:   diminished range of motion  Subtalar joint range of motion: full range of motion  Ankle joint range of motion:  full range of motion  Bunions:  Present  Hammertoes: Present; left 2nd mainly, overlapping hallux.           "

## 2024-06-22 ENCOUNTER — HOSPITAL ENCOUNTER (EMERGENCY)
Facility: HOSPITAL | Age: 79
Discharge: HOME | End: 2024-06-22
Attending: STUDENT IN AN ORGANIZED HEALTH CARE EDUCATION/TRAINING PROGRAM
Payer: MEDICARE

## 2024-06-22 VITALS
WEIGHT: 114 LBS | TEMPERATURE: 98 F | HEIGHT: 63 IN | RESPIRATION RATE: 15 BRPM | BODY MASS INDEX: 20.2 KG/M2 | OXYGEN SATURATION: 97 % | DIASTOLIC BLOOD PRESSURE: 74 MMHG | SYSTOLIC BLOOD PRESSURE: 173 MMHG | HEART RATE: 60 BPM

## 2024-06-22 DIAGNOSIS — S01.01XA LACERATION OF SCALP, INITIAL ENCOUNTER: ICD-10-CM

## 2024-06-22 DIAGNOSIS — S09.90XA CLOSED HEAD INJURY, INITIAL ENCOUNTER: Primary | ICD-10-CM

## 2024-06-22 PROCEDURE — 63600175 PHARM REV CODE 636 W HCPCS: Performed by: NURSE PRACTITIONER

## 2024-06-22 PROCEDURE — 72125 CT NECK SPINE W/O DYE: CPT | Mod: TC

## 2024-06-22 PROCEDURE — 70450 CT HEAD/BRAIN W/O DYE: CPT | Mod: TC

## 2024-06-22 PROCEDURE — 90715 TDAP VACCINE 7 YRS/> IM: CPT | Performed by: NURSE PRACTITIONER

## 2024-06-22 PROCEDURE — 99285 EMERGENCY DEPT VISIT HI MDM: CPT | Mod: 25

## 2024-06-22 PROCEDURE — 90471 IMMUNIZATION ADMIN: CPT | Performed by: NURSE PRACTITIONER

## 2024-06-22 PROCEDURE — 12013 RPR F/E/E/N/L/M 2.6-5.0 CM: CPT

## 2024-06-22 PROCEDURE — 25000003 PHARM REV CODE 250: Performed by: STUDENT IN AN ORGANIZED HEALTH CARE EDUCATION/TRAINING PROGRAM

## 2024-06-22 RX ORDER — LIDOCAINE HYDROCHLORIDE 20 MG/ML
10 INJECTION, SOLUTION EPIDURAL; INFILTRATION; INTRACAUDAL; PERINEURAL
Status: COMPLETED | OUTPATIENT
Start: 2024-06-22 | End: 2024-06-22

## 2024-06-22 RX ORDER — ACETAMINOPHEN 500 MG
1000 TABLET ORAL
Status: COMPLETED | OUTPATIENT
Start: 2024-06-22 | End: 2024-06-22

## 2024-06-22 RX ORDER — OMALIZUMAB 300 MG/2ML
INJECTION, SOLUTION SUBCUTANEOUS
COMMUNITY
Start: 2024-04-23

## 2024-06-22 RX ORDER — BACITRACIN 500 [USP'U]/G
14 OINTMENT TOPICAL
Status: COMPLETED | OUTPATIENT
Start: 2024-06-22 | End: 2024-06-22

## 2024-06-22 RX ADMIN — LIDOCAINE HYDROCHLORIDE 200 MG: 20 INJECTION, SOLUTION EPIDURAL; INFILTRATION; INTRACAUDAL; PERINEURAL at 11:06

## 2024-06-22 RX ADMIN — ACETAMINOPHEN 1000 MG: 500 TABLET ORAL at 11:06

## 2024-06-22 RX ADMIN — TETANUS TOXOID, REDUCED DIPHTHERIA TOXOID AND ACELLULAR PERTUSSIS VACCINE, ADSORBED 0.5 ML: 5; 2.5; 8; 8; 2.5 SUSPENSION INTRAMUSCULAR at 01:06

## 2024-06-22 RX ADMIN — BACITRACIN 14 G: 500 OINTMENT TOPICAL at 01:06

## 2024-06-22 NOTE — DISCHARGE INSTRUCTIONS
May take Tylenol p.r.n.   Return to the ED within 10 days for suture removal  Rest your body. Get plenty of sleep. Alternate rest with light activity like walking. Avoid heavy exercise if it makes you feel worse.  Rest your brain. For the first day, stay away from doing things that need a lot of thought or focus. Stay away from TV, computers, phone screens, and video games. After the first day, slowly introduce these activities. Stop them if they make you feel worse.

## 2024-06-22 NOTE — ED NOTES
"Patient reports tripping on ground while ambulating causing her to fall forward to concrete striking right knee with abrasion noted, right elbow with abrasion noted, and right forehead with laceration noted.  Laceration is not activity bleeding and is not well approximated at this time.  Patient denies LOC.  Denies neck and back pain.  No C-spine tenderness or step offs appreciated.     Pain:  "No real pain but I feel it so I will rated about a five."     Psychosocial:  Patient is calm and cooperative.  Patients insight and judgement are appropriate to situation.  Appears clean, well maintained, with clothing appropriate to environment.  No evidence of delusions, hallucinations, or psychosis.     Neuro:  Eyes open spontaneously.  Awake, alert, oriented x 4.  Speech clear and appropriate.  Tolerating saliva secretions well.  Able to follow commands, demonstrating ability to actively and appropriately communicate within context of current conversation.  Symmetrical facial muscles.  Moving all extremities well with no noted weakness.  Adequate muscle tone present.  Movement is purposeful.        Airway:  Bilateral chest rise and fall.  RR regular and non-labored.         Circulatory:  Skin warm, dry, and pink.  Capillary refill/skin blanching less than 3 seconds to distal of upper extremities.     Extremities:  See notes.     Skin:  See notes.    "

## 2024-06-22 NOTE — ED NOTES
Wounds cleansed with NS on elbow and knee with bandage applied.  Wound to forehead cleansed with flushing NS.  4x4's applied.

## 2024-06-22 NOTE — ED NOTES
Patient received for discharge.  Patient is awake, alert, and oriented x 4.  Speech clear and appropriate.  RR regular and non labored.  Skin W/D/P.  Given written and verbal discharge instruction, with verbal understanding.  Patient given the opportunity to ask questions, with answers to meet needs.  No complaints or noted concerns.  Headache down to 3/10.  Ambulates with steady gait holding husbands hand out of ED.

## 2024-06-22 NOTE — ED NOTES
Resting quietly at this time.  RR regular and non labored.  Skin W/D/P.  Scant drainage from laceration.  Patient alert.  Oriented x 4.  TANG well.  Family at bedside.  Updated on care plan.  Safety maintained with bed locked an in low position.

## 2024-06-22 NOTE — ED PROVIDER NOTES
Encounter Date: 6/22/2024       History     Chief Complaint   Patient presents with    Head Injury     Trip and fall this morning on cement walkway at approx 0915. +Laceration to R side forehead. Denies LOC. Takes 81mg ASA daily.     78-year-old female with chief complaint of headache status post mechanical ground level fall.  Laceration to right frontal temporal region, with minimal active bleed. No associated LOC. Hemodynamically stable.    The history is provided by the patient. No  was used.     Review of patient's allergies indicates:   Allergen Reactions    Penicillins Rash and Hives     Past Medical History:   Diagnosis Date    Asthma     Ataxia     Cervical stenosis of spinal canal     Chronic heart failure with preserved ejection fraction (HFpEF)     diastolic    Chronic sinusitis, unspecified     CKD (chronic kidney disease), stage III     Essential tremor     left hand    Graves disease     ESSENTIAL TREMORS    Herpes labialis     History of aortic stenosis     History of back surgery     Hormone replacement therapy (HRT)     Hyperlipidemia     Hypertension     Infection of spine     Menopause 1996    Pelvic floor dysfunction     s/p surgical correction 2018    Stress incontinence (female) (male)     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     Graves disease     Past Surgical History:   Procedure Laterality Date    AUGMENTATION OF BREAST Bilateral     BACK SURGERY      BALLOON SINUPLASTY OF PARANASAL SINUS Bilateral 08/23/2022    Procedure: SINUPLASTY, USING BALLOON  frontal ans sphenoid;  Surgeon: Samantha Ridley MD;  Location: [facility];  Service: ENT;  Laterality: Bilateral;    BREAST BIOPSY Right 08/29/2022    atypical lobular hyperplasia    CARDIAC VALVE REPLACEMENT  05/2016    - aortic valve stenosis / COW  VALVE    FUNCTIONAL ENDOSCOPIC SINUS SURGERY (FESS) USING COMPUTER-ASSISTED NAVIGATION N/A 08/23/2022    Procedure: FESS, USING COMPUTER-ASSISTED NAVIGATION -  MAXILLARY, ETHMOIDS, FRONTAL;  Surgeon: Samantha Ridley MD;  Location: [facility] OR;  Service: ENT;  Laterality: N/A;    HIP REPLACEMENT ARTHROPLASTY Left 2016    INSERTION OF MIDURETHRAL SLING N/A 2021    Procedure: SLING, MIDURETHRAL;  Surgeon: Hodan Goldberg MD;  Location: [facility] OR;  Service: OB/GYN;  Laterality: N/A;    JOINT REPLACEMENT      LUMBAR FUSION      NASAL ENDOSCOPY N/A 2022    Procedure: ENDOSCOPY, NOSE - SPHENOIDOTOMY;  Surgeon: Samantha Ridley MD;  Location: [facility] OR;  Service: ENT;  Laterality: N/A;     Family History   Problem Relation Name Age of Onset    Colon cancer Father      Leukemia Mother      Hypothyroidism Sister      Breast cancer Neg Hx      Ovarian cancer Neg Hx      Stroke Neg Hx      Diabetes Neg Hx       Social History     Tobacco Use    Smoking status: Former     Current packs/day: 0.00     Average packs/day: 1 pack/day for 23.0 years (23.0 ttl pk-yrs)     Types: Cigarettes     Start date:      Quit date:      Years since quittin.4    Smokeless tobacco: Never   Substance Use Topics    Alcohol use: Not Currently     Alcohol/week: 2.0 standard drinks of alcohol     Types: 1 Glasses of wine, 1 Shots of liquor per week     Comment: weekends     Drug use: Never     Review of Systems   Constitutional: Negative.    HENT: Negative.     Eyes: Negative.    Respiratory: Negative.     Cardiovascular: Negative.    Gastrointestinal: Negative.    Genitourinary: Negative.    Musculoskeletal: Negative.    Skin: Negative.    Neurological:  Positive for headaches.   Hematological: Negative.    Psychiatric/Behavioral: Negative.     All other systems reviewed and are negative.      Physical Exam     Initial Vitals   BP Pulse Resp Temp SpO2   24 1000 24 0958 24 0958 24 0958 24 0958   135/62 61 20 98.2 °F (36.8 °C) 95 %      MAP       --                Physical Exam    Nursing note and vitals reviewed.  Constitutional: She appears  well-developed and well-nourished.   HENT:   Head: Normocephalic.   Right Ear: External ear normal.   Left Ear: External ear normal.   Approximately 4 cm laceration to right frontotemporal region with minimal active bleeding   Eyes: Pupils are equal, round, and reactive to light.   Neck:   Normal range of motion.  Cardiovascular:  Normal rate.           Pulmonary/Chest: Breath sounds normal.   Abdominal: Abdomen is soft.   Musculoskeletal:         General: Normal range of motion.      Cervical back: Normal range of motion.     Neurological: She is alert and oriented to person, place, and time. GCS score is 15. GCS eye subscore is 4. GCS verbal subscore is 5. GCS motor subscore is 6.   Skin: Skin is warm. Capillary refill takes less than 2 seconds.   Psychiatric: She has a normal mood and affect.         ED Course   Lac Repair    Date/Time: 6/22/2024 1:23 PM    Performed by: Denia Jensen FNP  Authorized by: J Luis Ash MD    Consent:     Consent obtained:  Verbal    Consent given by:  Patient    Risks, benefits, and alternatives were discussed: yes      Risks discussed:  Infection, need for additional repair and poor cosmetic result    Alternatives discussed:  No treatment  Universal protocol:     Procedure explained and questions answered to patient or proxy's satisfaction: yes      Relevant documents present and verified: yes      Patient identity confirmed:  Verbally with patient and arm band  Anesthesia:     Anesthesia method:  Local infiltration    Local anesthetic:  Lidocaine 2% w/o epi  Laceration details:     Location:  Face    Face location:  Forehead    Length (cm):  3  Pre-procedure details:     Preparation:  Patient was prepped and draped in usual sterile fashion  Treatment:     Area cleansed with:  Povidone-iodine and saline    Amount of cleaning:  Standard    Irrigation solution:  Sterile saline    Irrigation method:  Pressure wash  Skin repair:     Repair method:  Sutures    Suture size:   4-0    Suture material:  Nylon    Suture technique:  Simple interrupted    Number of sutures:  12  Approximation:     Approximation:  Close  Repair type:     Repair type:  Simple  Post-procedure details:     Dressing:  Non-adherent dressing    Procedure completion:  Tolerated well, no immediate complications    Labs Reviewed - No data to display       Imaging Results              CT Head Without Contrast (Final result)  Result time 06/22/24 10:39:46      Final result by Anna Braga MD (06/22/24 10:39:46)                   Impression:      No acute abnormality.    Atrophy, chronic microvascular ischemia, chronic infarcts, chronic sinusitis.      Electronically signed by: Anna Braga  Date:    06/22/2024  Time:    10:39               Narrative:    EXAMINATION:  CT HEAD WITHOUT CONTRAST    CLINICAL HISTORY:  Head trauma, moderate-severe;    TECHNIQUE:  Low dose axial images were obtained through the head.  Coronal and sagittal reformations were also performed. Contrast was not administered.    COMPARISON:  MRI 10/07/2022    FINDINGS:  There is no intra or extra-axial hemorrhage.  No mass effect, edema or midline shift is present.  Generalized cerebral and cerebellar atrophy is present.  Ventricles are mildly prominent in size but unchanged, likely on the basis of central atrophy.  There are advanced periventricular low densities of chronic microvascular ischemia.  There is a chronic lacunar infarct in the left basal ganglia, and another in the left thalamus.  The infarct in the basal ganglia has occurred since the previous MRI.    There is chronic bilateral maxillary sinusitis with hyperostosis of the walls of the sinuses.  There are postsurgical changes of medial antral windows.  There is mucosal thickening in the left side of the sphenoid sinus, unchanged, also in the left frontal sinus and right ethmoid air cells.    There is no skull fracture.                                       CT Cervical Spine  Without Contrast (Final result)  Result time 06/22/24 10:52:29      Final result by Anna Braga MD (06/22/24 10:52:29)                   Impression:      No acute traumatic abnormality and no significant interval changes.  Moderate to severe spinal stenosis at C5/6 with less severe degenerative changes noted at multiple additional levels similar to prior MRI.      Electronically signed by: Anna Braga  Date:    06/22/2024  Time:    10:52               Narrative:    EXAMINATION:  CT CERVICAL SPINE WITHOUT CONTRAST    CLINICAL HISTORY:  Neck trauma (Age >= 65y);    TECHNIQUE:  Low dose axial images, sagittal and coronal reformations were performed though the cervical spine.  Contrast was not administered.    COMPARISON:  MRI 10/07/2022    FINDINGS:  There is no hematoma formation in the soft tissues in the neck.    The skull base is intact.    There is no fracture in the cervical spine.    There is reversal of the normal cervical lordosis.  There is anterolisthesis of C2 on C3, C3 on C4, C4 on C5.  There is retrolisthesis of C5 on C6.  These findings are similar to the patient's previous examination.    There is marked disc space narrowing and osteophyte formation at C3/4 and C5/6, to lesser extent at C6/7.  There are multilevel advanced facet degenerative changes, particularly noted on the right.  There is spinal stenosis which is most severe at C5/6, and there is multi level neural foraminal narrowing.    There are large calcific plaques in the coronary arteries.                                       Medications   bacitracin ointment 14 g (has no administration in time range)   Tdap (BOOSTRIX) vaccine injection 0.5 mL (has no administration in time range)   acetaminophen tablet 1,000 mg (1,000 mg Oral Given 6/22/24 1157)   LIDOcaine (PF) 20 mg/mL (2%) injection 200 mg (200 mg Infiltration Given 6/22/24 1145)     Medical Decision Making  78-year-old female with chief complaint of headache status post  mechanical ground level fall.  No associated LOC. Hemodynamically stable.  CT head no acute head bleed or fracture  CT cervical spine no fracture  Given Tylenol in the ED  Advised Tylenol p.r.n, increased rest.  Patient was instructed to follow up with PCP and was given strict return precautions to the ED. The patient voiced understanding and agreed with the plan.       Amount and/or Complexity of Data Reviewed  Radiology: ordered and independent interpretation performed.    Risk  OTC drugs.  Prescription drug management.                                      Clinical Impression:  Final diagnoses:  [S09.90XA] Closed head injury, initial encounter (Primary)                     Denia Jensen, [facility]  06/22/24 2298

## 2024-06-24 ENCOUNTER — PATIENT OUTREACH (OUTPATIENT)
Dept: EMERGENCY MEDICINE | Facility: HOSPITAL | Age: 79
End: 2024-06-24

## 2024-06-27 ENCOUNTER — TELEPHONE (OUTPATIENT)
Dept: NEUROLOGY | Facility: CLINIC | Age: 79
End: 2024-06-27
Payer: MEDICARE

## 2024-06-27 NOTE — TELEPHONE ENCOUNTER
----- Message from Raheem Barber sent at 6/26/2024  4:50 PM CDT -----  Regarding: Missed call  Contact: Pablito/ [phone]  Patient's  Pablito calling is returning a missed called from office. Please call back as soon as possible.

## 2024-06-27 NOTE — TELEPHONE ENCOUNTER
----- Message from Raheem Barber sent at 6/26/2024  4:50 PM CDT -----  Regarding: Missed call  Contact: Pablito/ [phone]  Patient's  Pablito calling is returning a missed called from office. Please call back as soon as possible.

## 2024-06-27 NOTE — TELEPHONE ENCOUNTER
Late entry 1646 6/26/2024    After attempting to contact and leaving  around noon, spoke with pt's  regarding recent visit to ED. Pt reported he'd like to get his wife in to see Nydia or Dr. Shin as soon as possible. Advised Pablito I'd discuss with DORI and get back with him in Thursday am.

## 2024-06-27 NOTE — TELEPHONE ENCOUNTER
LVM for pt's , Pablito. Offered to assist with next available appointment. Left direct contact information.

## 2024-06-27 NOTE — TELEPHONE ENCOUNTER
Pablito reports he has grave concern about pt's drastic decline in health since last seen in clinic. Pt had 2 falls Memorial Day weekend at a friend's home in [place] then another last weekend at same friend's home. The fall this weekend, resulted in head lac and ED visit. Pt has become increasingly forgetful, with a notable decline in ST memory, e.g doesn't recall falling. She is exhibiting a worsening left hand tremor as well as a new head tremor     Offered next available appointment with [facility] Monday July 1. Pablito accepted next available, verbalized understanding, and repeated back date/time/location of scheduled appointment.

## 2024-06-27 NOTE — TELEPHONE ENCOUNTER
----- Message from Nathalie Rico MA sent at 6/27/2024  9:36 AM CDT -----  Regarding: RE: Missed call  Contact: Pablito/ [phone]  Hi,     I did not call them.     Thanks  ----- Message -----  From: Samia Garcia MA  Sent: 6/27/2024   9:12 AM CDT  To: Nathalie Rico MA  Subject: FW: Missed call                                    ----- Message -----  From: Raheem Barber  Sent: 6/26/2024   4:52 PM CDT  To: Ira MICHAEL Staff; Aleida Torres Staff  Subject: Missed call                                      Patient's  Pablito calling is returning a missed called from office. Please call back as soon as possible.

## 2024-07-01 ENCOUNTER — OFFICE VISIT (OUTPATIENT)
Dept: NEUROLOGY | Facility: CLINIC | Age: 79
End: 2024-07-01
Payer: MEDICARE

## 2024-07-01 VITALS
BODY MASS INDEX: 21.02 KG/M2 | HEART RATE: 71 BPM | HEIGHT: 63 IN | WEIGHT: 118.63 LBS | DIASTOLIC BLOOD PRESSURE: 68 MMHG | SYSTOLIC BLOOD PRESSURE: 109 MMHG

## 2024-07-01 DIAGNOSIS — R41.3 OTHER AMNESIA: Primary | ICD-10-CM

## 2024-07-01 PROCEDURE — G2211 COMPLEX E/M VISIT ADD ON: HCPCS | Mod: S$PBB,,, | Performed by: PSYCHIATRY & NEUROLOGY

## 2024-07-01 PROCEDURE — 99999 PR PBB SHADOW E&M-EST. PATIENT-LVL IV: CPT | Mod: PBBFAC,,, | Performed by: PSYCHIATRY & NEUROLOGY

## 2024-07-01 PROCEDURE — 99214 OFFICE O/P EST MOD 30 MIN: CPT | Mod: PBBFAC | Performed by: PSYCHIATRY & NEUROLOGY

## 2024-07-01 PROCEDURE — 99214 OFFICE O/P EST MOD 30 MIN: CPT | Mod: S$PBB,,, | Performed by: PSYCHIATRY & NEUROLOGY

## 2024-07-01 NOTE — PROGRESS NOTES
"Name: Sarah Rico  MRN: [id number]   CSN: [id number]            Chief Complaint: tremor    Interval History:  - here for urgent visit  - has worsened significantly in recent weeks  - in ER last week with fall and head trauma, negative HCT for acute - but clear new stroke(s) since the last MRI from 2022  -  helps with the history, has had more forgetfulness in general, "conflating" events, poor recall of events  - is also having some issues with recognition of things in front of her (gives examples of not recognizing jey slow with expecting tartart   - still driving, mostly to Gym and around town - says she doesn't want to give up driving yet          From Dec 2023:  - hand tremor is worse, head tremor is worse (mostly yes yes)  - more imbalance, had a bad fall and hit face about 2 months ago (PT helped with balance in the past)  - no other issues with [email or web address] recently   - prefer no new drugs for now  - willing ot do pt  - discussed amantadine and ldopa    From April 2023:  - trial of topamax last visit   - did not tolerate due to side effects   - no falls   - did PT for imbalance     - on propranolol 120 mg daily   - got better with PT from a balance standpoint, vestibular rehab   - still incorporates balance exercises at home   - tremor is affecting qol but living with it     From Dec 2022  - intolerable side effects with primidone, now off of it    - referral to nsgy with cervical spine ddd, held off on seeing them   - brain mri  "Left caudate and left thalamic remote lacunar type infarcts.  Left thalamic and right cerebellar remote microhemorrhages may be hypertensive in etiology.  Bilateral tiny cerebellar remote infarcts. "  - on propranolol 120 mg daily   - gabapentin caused nausea in the past   - does epley at home for BPPV   - denies history of kidney stones or glaucoma   - tremor affects her whenever shes eating, holding a plate or glass  - no improvement with EtOH       From Sept 2022: " Sarah Rico is a R HANDED 78 y.o. female with a medical issues significant for essential tremor, lumbar radiculopathy, CKDIII, graves disease, HTN, HLD, who presents for tremors. Accompanied by spouse. Currently on propranolol 120 mg capsule XR 1 capsule BID. This does not cause lightheaded or dizziness. Previously on atenolol but didn't feel this helped, switched to propranolol. Helps somewhat. Tremor is in her left hand, present for about 8 years, around the same time she was diagnosed with graves disease. Whenever she is almost due for her next dose of propranolol, tremors are worse. She has not tried any other medications for the tremor. Only in the left hand, no tremor in the right hand. Gradual onset 8 years ago, noticeable after a few months. Head tremor, involuntary, yes-yes. Tremor with posture and action, no tremor at rest. Had back surgery ( fusion from T10 to sacrum) 5 years ago in 2017, took a while for her to get balance back. Now able to stand on one leg but still with some difficulty. No falls. Denies shuffling. She doesn't think that she walks great. Tends to veer to the left or right with longer distances. Has a hard time keeping up with other people, this started after her back surgery.   Thinks head tremor stops whenever she lies down in bed.  notices it in certain positions. She has noticed that she walks with a wider based to keep her balance.     She had cervical MRI in 2018/2019.     If she turns to the left, gets vertigo. Improves with epley maneuver.     She has not noticed that tremor improves with etoh. Does not drink much.     Family History: grandmother had a head tremor, mother possibly had tremor in hands?     Neuroleptic Exposure: none     Nonmotor/Premotor ROS:  Anosmia: hyposmia -- attributes to allergies   Dysarthria/Hypophonia: more hoarse   Dysphagia/Sialorrhea: none   Depression: none   Cognitive slowing: infrequently forgets names   Hallucinations: none    Urinary changes: history of incontinence for many years (at least 7), follows with Dr. Goldberg  Constipation: none   Orthostasis:    Falls: none   Micrographia: none  Sleep issues:  -RBD: screams in sleep       Review of Systems:   Review of Systems   Constitutional:  Negative for chills, fever and malaise/fatigue.   HENT:  Negative for hearing loss.    Eyes:  Negative for blurred vision and double vision.   Respiratory:  Negative for cough, shortness of breath and stridor.    Cardiovascular:  Negative for chest pain and leg swelling.   Gastrointestinal:  Negative for constipation, diarrhea and nausea.   Genitourinary:  Negative for frequency and urgency.   Musculoskeletal:  Negative for falls.   Skin:  Negative for itching and rash.   Neurological:  Positive for tremors. Negative for dizziness, loss of consciousness and weakness.   Psychiatric/Behavioral:  Negative for hallucinations and memory loss.        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    Social History: The patient  reports that she quit smoking about 38 years ago. Her smoking use included cigarettes. She started smoking about 61 years ago. She has a 23 pack-year smoking history. She has never used smokeless tobacco. She reports that she does not currently use alcohol after a past usage of about 2.0 standard drinks of alcohol per week. She reports that she does not use drugs.    Family History: Their family history includes  Colon cancer in her father; Hypothyroidism in her sister; Leukemia in her mother.    Allergies: Penicillins     Meds:   Current Outpatient Medications on File Prior to Visit   Medication Sig Dispense Refill    ASCORBATE CALCIUM, VITAMIN C, ORAL Take 500 mg by mouth.      aspirin (ECOTRIN) 81 MG EC tablet Take 81 mg by mouth once daily.      cholecalciferol, vitamin D3, (VITAMIN D3) 50 mcg (2,000 unit) Tab Take 2,000 Units by mouth.      clotrimazole (LOTRIMIN) 1 % cream APPLY TOPICALLY 2 (TWO) TIMES DAILY. TO AFFECTED TOENAILS. 45 g 1    COMPOUND HORMONE REPLACEMENT Take by mouth once daily. ESTROGEN CREAM -- [facility]      estradioL (ESTRACE) 2 MG tablet Take 1 tablet (2 mg total) by mouth once daily. 90 tablet 3    MAGNESIUM CARBONATE ORAL Take 1 tablet by mouth once daily. 200MG      omalizumab (XOLAIR) 75 mg/0.5 mL injection       PREMARIN vaginal cream PLACE 0.5 G VAGINALLY ONCE DAILY. 90 g 0    progesterone (PROMETRIUM) 200 MG capsule Take 1 capsule by mouth 30-60 minutes before bed every night 90 capsule 3    propranoloL (INDERAL LA) 120 MG 24 hr capsule TAKE 1 CAPSULE BY MOUTH ONCE DAILY 90 capsule 3    rosuvastatin (CRESTOR) 40 MG Tab TAKE 1 TABLET BY MOUTH EVERY DAY 90 tablet 3    UNABLE TO FIND medication name: Testosterone Cream in versabase 20mg/mL Apply 1/4mL to labia minora nightly 22.5 mL 1    valACYclovir (VALTREX) 1000 MG tablet valacyclovir 1 gram tablet      XOLAIR 300 mg/2 mL Syrg Inject into the skin.       No current facility-administered medications on file prior to visit.       Exam:  LMP  (LMP Unknown) Comment:    1996    Constitutional  Well-developed, well-nourished, appears stated age   Ophthalmoscopic  No papilledema with no hemorrhages or exudates bilaterally   Cardiovascular  Radial pulses 2+ and symmetric, no LE edema bilaterally   Neurological    * Mental status  MOCA = see further testing, looking to  for answers, struggles with focus                                * Cranial nerves       - CN II  PERRL, visual fields full to confrontation     - CN III, IV, VI  Extraocular movements full, normal pursuits and saccades   Overshoot with horizontal pursuits with left gaze      - CN V  Sensation V1 - V3 intact     - CN VII  Face strong and symmetric bilaterally     - CN VIII  Hearing intact bilaterally     - CN IX, X  Palate raises midline and symmetric     - CN XI  SCM and trapezius 5/5 bilaterally     - CN XII  Tongue midline   * Motor  Muscle bulk normal, strength 5/5 throughout   * Sensory   Intact to temperature    * Coordination  Dysmetria with finger to nose and heel-to-shin on the L    * Gait  See below.   * Deep tendon reflexes  3+ and symmetric throughout   Abnormal jaw jerk    Left > R gomes    Babinski equivocal on the R, possibly upgoing on the R?    * Specialized movement exam  No hypophonic speech.    No facial masking.   No cogwheel rigidity.     No bradykinesia.   Resting tremor of L hand, dystonic posturing    Tremor worse in L hand in wing-beating position    Overshoot with L hand    Intermittent tremor of R pinky as well    No other dystonia, chorea, athetosis, myoclonus, or tics.   No motor impersistence.   No shortened stride length.   Spastic/ataxic gait? L foot inversion, some circumduction, some improvement with walking backwards ? Dystonic?    No postural instability.   Yes-yes head tremor, titubation      Laboratory/Radiological:  - Results:  Lab Visit on 05/09/2024   Component Date Value Ref Range Status    Specimen UA 05/09/2024 Urine, Unspecified   Final    Color, UA 05/09/2024 Yellow  Yellow, Straw, Katie Final    Appearance, UA 05/09/2024 Hazy (A)  Clear Final    pH, UA 05/09/2024 6.0  5.0 - 8.0 Final    Specific Gravity, UA 05/09/2024 1.025  1.005 - 1.030 Final    Protein, UA 05/09/2024 Trace (A)  Negative Final    Glucose, UA 05/09/2024 Negative  Negative Final    Ketones, UA 05/09/2024 Negative  Negative Final    Bilirubin (UA) 05/09/2024  Negative  Negative Final    Occult Blood UA 05/09/2024 Negative  Negative Final    Nitrite, UA 05/09/2024 Negative  Negative Final    Leukocytes, UA 05/09/2024 Negative  Negative Final   Lab Visit on 05/09/2024   Component Date Value Ref Range Status    Hepatitis C Ab 05/09/2024 Non-reactive  Non-reactive Final    WBC 05/09/2024 6.18  3.90 - 12.70 K/uL Final    RBC 05/09/2024 4.22  4.00 - 5.40 M/uL Final    Hemoglobin 05/09/2024 13.2  12.0 - 16.0 g/dL Final    Hematocrit 05/09/2024 38.9  37.0 - 48.5 % Final    MCV 05/09/2024 92  82 - 98 fL Final    MCH 05/09/2024 31.3 (H)  27.0 - 31.0 pg Final    MCHC 05/09/2024 33.9  32.0 - 36.0 g/dL Final    RDW 05/09/2024 12.9  11.5 - 14.5 % Final    Platelets 05/09/2024 231  150 - 450 K/uL Final    MPV 05/09/2024 9.4  9.2 - 12.9 fL Final    Immature Granulocytes 05/09/2024 0.3  0.0 - 0.5 % Final    Gran # (ANC) 05/09/2024 4.4  1.8 - 7.7 K/uL Final    Immature Grans (Abs) 05/09/2024 0.02  0.00 - 0.04 K/uL Final    Lymph # 05/09/2024 0.9 (L)  1.0 - 4.8 K/uL Final    Mono # 05/09/2024 0.6  0.3 - 1.0 K/uL Final    Eos # 05/09/2024 0.2  0.0 - 0.5 K/uL Final    Baso # 05/09/2024 0.05  0.00 - 0.20 K/uL Final    nRBC 05/09/2024 0  0 /100 WBC Final    Gran % 05/09/2024 71.2  38.0 - 73.0 % Final    Lymph % 05/09/2024 15.0 (L)  18.0 - 48.0 % Final    Mono % 05/09/2024 9.9  4.0 - 15.0 % Final    Eosinophil % 05/09/2024 2.8  0.0 - 8.0 % Final    Basophil % 05/09/2024 0.8  0.0 - 1.9 % Final    Differential Method 05/09/2024 Automated   Final    Sodium 05/09/2024 135 (L)  136 - 145 mmol/L Final    Potassium 05/09/2024 4.6  3.5 - 5.1 mmol/L Final    Chloride 05/09/2024 102  95 - 110 mmol/L Final    CO2 05/09/2024 24  23 - 29 mmol/L Final    Glucose 05/09/2024 82  70 - 110 mg/dL Final    BUN 05/09/2024 24 (H)  8 - 23 mg/dL Final    Creatinine 05/09/2024 1.0  0.5 - 1.4 mg/dL Final    Calcium 05/09/2024 9.7  8.7 - 10.5 mg/dL Final    Total Protein 05/09/2024 7.4  6.0 - 8.4 g/dL Final    Albumin  05/09/2024 4.2  3.5 - 5.2 g/dL Final    Total Bilirubin 05/09/2024 0.6  0.1 - 1.0 mg/dL Final    Alkaline Phosphatase 05/09/2024 31 (L)  55 - 135 U/L Final    AST 05/09/2024 24  10 - 40 U/L Final    ALT 05/09/2024 22  10 - 44 U/L Final    eGFR 05/09/2024 57.3 (A)  >60 mL/min/1.73 m^2 Final    Anion Gap 05/09/2024 9  8 - 16 mmol/L Final       - Independent review of images:      - Independent review of consultant's notes: Lety    ASSESSMENT/PLAN:  Tremor   - currently on propranolol 120 mg XR BID with minimal to mild improvement in tremor (would expect significantly more improvement with this high of a dose of propranolol)  - did not tolerate primidone   - some dystonic component -- dystonic head tremor, mild dystonic posturing of L hand and inversion of L foot. Asymmetric tremor, no MRI findings to indicate cerebellar or thalamic outflow tremor -- could consider dystonic tremor med, amantadine vs baclofen   - failed topamax, primidone and gabapentin   - no new meds       2. Gait abnormality  - has been attributed to lumbar DDD in the past however gait is not classic of lumbar DDD  - PT        3. Cervical spinal stenosis   - continue to monitor gait changes   - PT now     4. New issue of cognitive impairment.  - reversible labs ordered  - re-imaging MRi brain and c-spine give long tract signs        Brian Shin MD, MPH  Division of Movement and Memory Disorders  [facility]    This is a patient with a serious and complex neurologic diagnosis whose overall, ongoing care is being managed and monitored by me and our Neurology clinic.   As such, since 2024,  is the appropriate add-on code to accompany the other E/M billing for this visit.

## 2024-07-08 ENCOUNTER — TELEPHONE (OUTPATIENT)
Dept: NEUROLOGY | Facility: CLINIC | Age: 79
End: 2024-07-08
Payer: MEDICARE
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
## 2024-07-08 NOTE — TELEPHONE ENCOUNTER
Incoming call received from pt's spouse, Pablito, requesting assistance getting pt in for neuropsych testing after seeing Dr Shin 7/1. Dr Shin suggested pt has likely had mild stroke. Pt may not be fit to drive and [facility] discussed with pt whether she'd agree to stop driving if psychologist felt it is necessary. Pt agreed.   is concerned about pt's safety and safety of others. ST memory is declining and pt is in denial.  Spoke with Oneyda in [facility], who was able to get pt in on a cancellation tomorrow at 8am.Family is thrilled by this accomodation as they are going oot 7/9 to return from 7/16-30 and then oot again until 8/7.

## 2024-07-09 ENCOUNTER — OFFICE VISIT (OUTPATIENT)
Dept: NEUROLOGY | Facility: CLINIC | Age: 79
End: 2024-07-09
Payer: MEDICARE

## 2024-07-09 ENCOUNTER — TELEPHONE (OUTPATIENT)
Dept: NEUROLOGY | Facility: CLINIC | Age: 79
End: 2024-07-09
Payer: MEDICARE

## 2024-07-09 DIAGNOSIS — G31.84 MILD COGNITIVE IMPAIRMENT: Primary | ICD-10-CM

## 2024-07-09 DIAGNOSIS — R41.3 OTHER AMNESIA: ICD-10-CM

## 2024-07-09 DIAGNOSIS — R41.89 COGNITIVE CHANGE: Primary | ICD-10-CM

## 2024-07-09 PROCEDURE — 96116 NUBHVL XM PHYS/QHP 1ST HR: CPT | Mod: S$PBB,,, | Performed by: CLINICAL NEUROPSYCHOLOGIST

## 2024-07-09 PROCEDURE — 96133 NRPSYC TST EVAL PHYS/QHP EA: CPT | Mod: ,,, | Performed by: CLINICAL NEUROPSYCHOLOGIST

## 2024-07-09 PROCEDURE — 99499 UNLISTED E&M SERVICE: CPT | Mod: S$PBB,,, | Performed by: CLINICAL NEUROPSYCHOLOGIST

## 2024-07-09 PROCEDURE — 99211 OFF/OP EST MAY X REQ PHY/QHP: CPT | Mod: PBBFAC,25

## 2024-07-09 PROCEDURE — 96139 PSYCL/NRPSYC TST TECH EA: CPT | Mod: ,,, | Performed by: CLINICAL NEUROPSYCHOLOGIST

## 2024-07-09 PROCEDURE — 99999 PR PBB SHADOW E&M-EST. PATIENT-LVL I: CPT | Mod: PBBFAC,,,

## 2024-07-09 PROCEDURE — 96116 NUBHVL XM PHYS/QHP 1ST HR: CPT | Mod: PBBFAC | Performed by: CLINICAL NEUROPSYCHOLOGIST

## 2024-07-09 PROCEDURE — 96138 PSYCL/NRPSYC TECH 1ST: CPT | Mod: ,,, | Performed by: CLINICAL NEUROPSYCHOLOGIST

## 2024-07-09 PROCEDURE — 96132 NRPSYC TST EVAL PHYS/QHP 1ST: CPT | Mod: ,,, | Performed by: CLINICAL NEUROPSYCHOLOGIST

## 2024-07-09 NOTE — PROGRESS NOTES
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 Standardized Score Standardized Score Percentile/CP Descriptor   RBANS Naming 8 ss - 3-9 Below Average   RBANS Semantic Fluency 12 ss 5 5 Below Average   TOPF Word Reading 48  66 Average   NAB Naming 26 Tscore 31 3 Below Average   FAS 25 Tscore 33 4 Below Average   Animal Naming 7 Tscore 17 <0.1 Exceptionally Low   VISUOSPATIAL FUNCTIONING Raw Score Type of Standardized Score Standardized Score Percentile/CP Descriptor   RBANS Line Orientation  16 ss - 26-50 Average   RBANS Figure Copy 17 ss 9 37 Average   RCFT Copy 28 - - 11-16 Low Average   RCFT Time to Copy 269 - - >16 WNL   LEARNING & MEMORY Raw Score Type of Standardized Score Standardized Score Percentile/CP Descriptor   RBANS         Immediate Memory - SS 65 1 Exceptionally Low    Delayed Memory - SS 95 37 Average   List Learning (2, 3, 5, 6) 16 ss 4 2 Below Average   List Recall 3 ss - 17-25 Low Average   List Recognition 20 ss - 51-75 Average   Story Memory (2, 6)  8 ss 4 2 Below Average   Story Recall 5 ss 6 9 Low Average   Story Recognition  10 - - 27-46 Average   Figure Recall 10 ss 8 25 Average   Figure Recognition 1 - - - -    ATTENTION/WORKING MEMORY Raw Score Type of Standardized Score Standardized Score Percentile/CP Descriptor   WAIS-IV WMI - SS 83 13 Low Average   WAIS-IV Digit Span 17 ss 6 9 Low Average         DS Forward 9 ss 9 37 Average         DS Backward 7 ss 9 37 Average         DS Sequence 1 ss 2 0.4 Exceptionally Low          Longest Digit Forward 5 - - - -         Longest Digit Backward 4 - - - -         Longest Digit Sequence 2 - - - -   WAIS-IV Arithmetic 10 ss 8 25 Average   RBANS Digit Span  8 ss 7 16 Low Average   MENTAL PROCESSING SPEED Raw Score Type of Standardized Score Standardized Score Percentile/CP Descriptor   SMDT Written 20 zscore -0.75 23 Low Average   SMDT Oral 24 zscore - - -   RBANS Coding 20 ss 4 2 Below Average   TMT A  112 Tscore 18 <0.1 Exceptionally Low    TMT A errors 0 - - - -   EXECUTIVE FUNCTIONING Raw Score Type of Standardized Score Standardized Score Percentile/CP Descriptor   TMT B 391 Tscore 16 <0.1 Exceptionally Low    TMT B errors 3 - - - -   WCST-64         Total Correct 27 SS - - -   Total Errors 37 SS 76 5 Below Average   Perseverative Resp. 31 SS 74 4 Below Average   Perseverative Err. 23 SS 78 7 Below Average   Nonperseverative Err. 14 SS 80 9 Low Average   Concept. Level Response 17 SS 74 4 Below Average   Categories Completed 1 - - 11-16 Low Average   FMS 0 - - - -   Learning to Learn N/A - - - -   MOOD & PERSONALITY Raw Score Type of Standardized Score Standardized Score Percentile/CP Descriptor   GDS-30 1 - - - WNL   POLO-7 4 - - - WNL   ss = scaled score (mean = 10, SD = 3); SS = standard score (mean = 100, SD = 15); Tscore mean = 50, SD = 10; zscore (mean = 0.00, SD = 1)       Billing  Code Description Minutes Units   28317 Psychiatric Interview 0    43724 Nubhvl xm phys/qhp 1st hr 0    79140 Nubhvl xm phy/qhp ea addl hr 0    16688 Psycl tst eval phys/qhp 1st 0    92575 Psycl tst eval phys/qhp ea 0    21180 Nrpsyc tst eval phys/qhp 1st 60 1   38279 Nrpsyc tst eval phys/qhp ea  97 2     Referral review/test selection 30      Tech consult/test review/modifications 10      Patient limitation management 0      Patient behavior management 0      Patient symptom monitoring 0      Record Review/Integration/Report Generation 86      Face-to-Face interpretive Feedback 31    07127 Psycl/nrpsyc tst phy/qhp 1st 0    08442 Psycl/nrpsyc tst phy/qhp ea 0    60230 Psycl/nrpsyc tech 1st 30 1   91606 Psycl/nrpsyc tst tech ea 151 5

## 2024-07-09 NOTE — TELEPHONE ENCOUNTER
Incoming call recv'd from pt's , Pablito, requesting follow up appt, with [facility]. Repeated back dates pt will be in town 7/16-7/30, then out of town from 7/31 until 8/7.  Spouse requested appt 7/17 following MRI at [facility]. Instructed Pablito that clinical team will get back to him with follow up appt availability.

## 2024-07-12 ENCOUNTER — TELEPHONE (OUTPATIENT)
Dept: NEUROLOGY | Facility: CLINIC | Age: 79
End: 2024-07-12
Payer: MEDICARE

## 2024-07-12 NOTE — TELEPHONE ENCOUNTER
----- Message from Sarah Duckworth, RN sent at 7/9/2024  2:34 PM CDT -----  Regarding: fu needed  Pt's  called to schedule follow up, requests 7/17 following MRI brain and spine.     (I was able to get NPSY testing completed this morning with Dr Burnette.)    Requested 7/17

## 2024-07-16 ENCOUNTER — TELEPHONE (OUTPATIENT)
Dept: NEUROLOGY | Facility: CLINIC | Age: 79
End: 2024-07-16
Payer: MEDICARE

## 2024-07-16 ENCOUNTER — TELEPHONE (OUTPATIENT)
Dept: NEUROSURGERY | Facility: CLINIC | Age: 79
End: 2024-07-16
Payer: MEDICARE

## 2024-07-16 NOTE — TELEPHONE ENCOUNTER
Incoming call recv'd from pt's , Jacky, who calls me often. Today, he is concerned that Dr. Shin will not have the results, from the NPSY testing, to make clinical decisions for Ms. Loo at tomorrow's appointment. I initially suggested they follow up with Dr Burnette, as scheduled 7/18, and then see Dr Shin at the end of the month. However, they are scheduled with Dr Shin tomorrow at 4 pm. Jacky inquired whether [facility] will have NPSY testing results prior to appt tomorrow. Message sent to Dr Burnette for clarification.

## 2024-07-17 ENCOUNTER — PATIENT MESSAGE (OUTPATIENT)
Dept: NEUROLOGY | Facility: CLINIC | Age: 79
End: 2024-07-17
Payer: MEDICARE

## 2024-07-17 ENCOUNTER — HOSPITAL ENCOUNTER (OUTPATIENT)
Facility: HOSPITAL | Age: 79
Discharge: HOME | End: 2024-07-18
Attending: EMERGENCY MEDICINE | Admitting: PSYCHIATRY & NEUROLOGY
Payer: MEDICARE

## 2024-07-17 ENCOUNTER — TELEPHONE (OUTPATIENT)
Dept: NEUROLOGY | Facility: CLINIC | Age: 79
End: 2024-07-17
Payer: MEDICARE

## 2024-07-17 ENCOUNTER — HOSPITAL ENCOUNTER (OUTPATIENT)
Dept: RADIOLOGY | Facility: OTHER | Age: 79
Discharge: HOME | End: 2024-07-17
Attending: PSYCHIATRY & NEUROLOGY
Payer: MEDICARE

## 2024-07-17 DIAGNOSIS — R41.3 OTHER AMNESIA: ICD-10-CM

## 2024-07-17 DIAGNOSIS — I63.9 CEREBELLAR STROKE, ACUTE: ICD-10-CM

## 2024-07-17 DIAGNOSIS — I63.9 ACUTE CEREBROVASCULAR ACCIDENT (CVA) OF CEREBELLUM: Primary | ICD-10-CM

## 2024-07-17 DIAGNOSIS — I63.9 STROKE: ICD-10-CM

## 2024-07-17 PROBLEM — Z78.0 MENOPAUSE: Status: ACTIVE | Noted: 2024-07-17

## 2024-07-17 PROBLEM — D64.9 ANEMIA: Status: ACTIVE | Noted: 2024-07-17

## 2024-07-17 PROBLEM — I69.30 HISTORY OF STROKE WITH RESIDUAL EFFECTS: Status: ACTIVE | Noted: 2024-07-17

## 2024-07-17 PROBLEM — G31.84 MILD COGNITIVE IMPAIRMENT: Status: ACTIVE | Noted: 2024-07-17

## 2024-07-17 PROBLEM — E87.1 HYPONATREMIA: Status: ACTIVE | Noted: 2024-07-17

## 2024-07-17 LAB
CHOLEST SERPL-MCNC: 148 MG/DL (ref 120–199)
CHOLEST/HDLC SERPL: 2.3 {RATIO} (ref 2–5)
ESTIMATED AVG GLUCOSE: 103 MG/DL (ref 68–131)
HBA1C MFR BLD: 5.2 % (ref 4–5.6)
HDLC SERPL-MCNC: 65 MG/DL (ref 40–75)
HDLC SERPL: 43.9 % (ref 20–50)
LDLC SERPL CALC-MCNC: 72.2 MG/DL (ref 63–159)
NONHDLC SERPL-MCNC: 83 MG/DL
OHS QRS DURATION: 74 MS
OHS QTC CALCULATION: 457 MS
TRIGL SERPL-MCNC: 54 MG/DL (ref 30–150)

## 2024-07-17 PROCEDURE — G0378 HOSPITAL OBSERVATION PER HR: HCPCS

## 2024-07-17 PROCEDURE — 25000003 PHARM REV CODE 250

## 2024-07-17 PROCEDURE — 72141 MRI NECK SPINE W/O DYE: CPT | Mod: TC

## 2024-07-17 PROCEDURE — 70551 MRI BRAIN STEM W/O DYE: CPT | Mod: TC

## 2024-07-17 PROCEDURE — 99223 1ST HOSP IP/OBS HIGH 75: CPT | Mod: AI,,, | Performed by: PSYCHIATRY & NEUROLOGY

## 2024-07-17 PROCEDURE — 96372 THER/PROPH/DIAG INJ SC/IM: CPT | Mod: 59

## 2024-07-17 PROCEDURE — 25000003 PHARM REV CODE 250: Performed by: STUDENT IN AN ORGANIZED HEALTH CARE EDUCATION/TRAINING PROGRAM

## 2024-07-17 PROCEDURE — 72141 MRI NECK SPINE W/O DYE: CPT | Mod: 26,,, | Performed by: RADIOLOGY

## 2024-07-17 PROCEDURE — 80061 LIPID PANEL: CPT | Performed by: STUDENT IN AN ORGANIZED HEALTH CARE EDUCATION/TRAINING PROGRAM

## 2024-07-17 PROCEDURE — 83036 HEMOGLOBIN GLYCOSYLATED A1C: CPT

## 2024-07-17 PROCEDURE — 99285 EMERGENCY DEPT VISIT HI MDM: CPT | Mod: 25

## 2024-07-17 PROCEDURE — 63600175 PHARM REV CODE 636 W HCPCS

## 2024-07-17 PROCEDURE — 70551 MRI BRAIN STEM W/O DYE: CPT | Mod: 26,,, | Performed by: RADIOLOGY

## 2024-07-17 PROCEDURE — 25500020 PHARM REV CODE 255: Performed by: EMERGENCY MEDICINE

## 2024-07-17 RX ORDER — SODIUM CHLORIDE 0.9 % (FLUSH) 0.9 %
10 SYRINGE (ML) INJECTION
Status: DISCONTINUED | OUTPATIENT
Start: 2024-07-17 | End: 2024-07-18 | Stop reason: HOSPADM

## 2024-07-17 RX ORDER — POLYETHYLENE GLYCOL 3350 17 G/17G
17 POWDER, FOR SOLUTION ORAL DAILY
Status: DISCONTINUED | OUTPATIENT
Start: 2024-07-18 | End: 2024-07-17

## 2024-07-17 RX ORDER — CALCIUM CARBONATE 200(500)MG
500 TABLET,CHEWABLE ORAL 3 TIMES DAILY PRN
Status: DISCONTINUED | OUTPATIENT
Start: 2024-07-17 | End: 2024-07-18 | Stop reason: HOSPADM

## 2024-07-17 RX ORDER — HEPARIN SODIUM 5000 [USP'U]/ML
5000 INJECTION, SOLUTION INTRAVENOUS; SUBCUTANEOUS EVERY 8 HOURS
Status: DISCONTINUED | OUTPATIENT
Start: 2024-07-17 | End: 2024-07-18 | Stop reason: HOSPADM

## 2024-07-17 RX ORDER — AMOXICILLIN 250 MG
1 CAPSULE ORAL 2 TIMES DAILY PRN
Status: DISCONTINUED | OUTPATIENT
Start: 2024-07-17 | End: 2024-07-18 | Stop reason: HOSPADM

## 2024-07-17 RX ORDER — CLOPIDOGREL BISULFATE 75 MG/1
75 TABLET ORAL DAILY
Status: DISCONTINUED | OUTPATIENT
Start: 2024-07-17 | End: 2024-07-18 | Stop reason: HOSPADM

## 2024-07-17 RX ORDER — NAPROXEN SODIUM 220 MG/1
81 TABLET, FILM COATED ORAL DAILY
Status: DISCONTINUED | OUTPATIENT
Start: 2024-07-17 | End: 2024-07-18 | Stop reason: HOSPADM

## 2024-07-17 RX ORDER — POLYETHYLENE GLYCOL 3350 17 G/17G
17 POWDER, FOR SOLUTION ORAL 2 TIMES DAILY PRN
Status: DISCONTINUED | OUTPATIENT
Start: 2024-07-17 | End: 2024-07-18 | Stop reason: HOSPADM

## 2024-07-17 RX ORDER — ASPIRIN 81 MG/1
81 TABLET ORAL DAILY
Status: CANCELLED | OUTPATIENT
Start: 2024-07-17

## 2024-07-17 RX ORDER — ATORVASTATIN CALCIUM 40 MG/1
80 TABLET, FILM COATED ORAL DAILY
Status: DISCONTINUED | OUTPATIENT
Start: 2024-07-17 | End: 2024-07-18 | Stop reason: HOSPADM

## 2024-07-17 RX ADMIN — ATORVASTATIN CALCIUM 80 MG: 40 TABLET, FILM COATED ORAL at 01:07

## 2024-07-17 RX ADMIN — ASPIRIN 81 MG CHEWABLE TABLET 81 MG: 81 TABLET CHEWABLE at 01:07

## 2024-07-17 RX ADMIN — IOHEXOL 75 ML: 350 INJECTION, SOLUTION INTRAVENOUS at 12:07

## 2024-07-17 RX ADMIN — HEPARIN SODIUM 5000 UNITS: 5000 INJECTION INTRAVENOUS; SUBCUTANEOUS at 09:07

## 2024-07-17 RX ADMIN — HEPARIN SODIUM 5000 UNITS: 5000 INJECTION INTRAVENOUS; SUBCUTANEOUS at 01:07

## 2024-07-17 RX ADMIN — CLOPIDOGREL BISULFATE 75 MG: 75 TABLET ORAL at 01:07

## 2024-07-17 NOTE — ASSESSMENT & PLAN NOTE
- Last TTE 10/2023: EF 65%. LV diastolic dysfxn. No RWMA.  - Denies SOB, LE swelling  - Not on GDMT  - Repeat echo ordered  - Monitor

## 2024-07-17 NOTE — ASSESSMENT & PLAN NOTE
- Hgb 11.9, Hct 36.6, MCV 95 on admit. (baseline hgb 12-13)  - No obvious bleeding. Pt denies hematuria, hematochezia, melena.   - Monitor with routine labs

## 2024-07-17 NOTE — ED NOTES
Spoke to Stroke Team- states patient with STROKE on MRI but not a STROKE CODE- patient still placed immediately into ED Bed/ ER Physician aware=

## 2024-07-17 NOTE — PATIENT INSTRUCTIONS
MANAGING VASCULAR RISK FACTORS  A personal history of disorders that affect the cardiovascular system (e.g., hypertension, high cholesterol, diabetes, heart disease) can have a negative impact on brain functioning especially over many years. Therefore, it is very important for this patient to maintain good control over his/her risk factors. The following is recommended:  Take all medications as prescribed and follow-up with recommendations above.  Get regular physical exercise to the extent that it is possible. Family may need to structure this into their loved one's day or week and develop a transportation plan.  Eat a well-balanced diet and following the MIND diet (see handout) has been shown to be most brain protective.   Check your blood pressure, cholesterol levels, blood sugar, and others as appropriate.    PRACTICE GOOD COGNITIVE HYGIENE  Engage in regular exercise, which increases alertness and arousal and can improve attention and focus.  Consider lower impact exercises, such as yoga or light walking. Try to exercise for at least 150 minutes per week  Get a good night's sleep, as this can enhance alertness and cognition.  Eat healthy foods and balanced meals. It is notable that research indicates certain nutrients may aid in brain function, such as B vitamins (especially B6, B12, and folic acid), antioxidants (such as vitamins C and E, and beta carotene), and Omega-3 fatty acids. Here are some common tips for diet (Adopted from Armani et al, [place], 2018):  Eat primarily plant-based foods, such as fruits and vegetables, whole grains, legumes   (beans) and nuts.  Limit refined carbohydrates (white pasta, bread, rice).  Replace butter with healthy fats such as olive oil.  Use herbs and spices instead of salt to flavor foods.  Limit red meat and processed meats to no more than a few times a month.  Avoid sugary sodas, bakery goods, and sweets.  Eat fish and poultry at least twice a week.  Keep your brain  active. Find activities to stay mentally active, such as reading, games (cards, checkers), puzzles (crosswords, Sudoku, jig saw), crafts (models, woodworking), gardening, or participating in activities in the community.  Stay socially engaged. Continue staying active with your family and friends.    RESOURCE BOOKS & POD CASTS  Consider purchasing the following books on brain health:   High-Octane Brain: 5 Science-Based Steps to Sharpen Your Memory and Reduce Your Risk of Alzheimer's by Maggy Paulino, PhD, ABPP-CN.   Keep Your Wits About You: The Science of Brain Maintenance As You Age by Susana Sanchez, PhD.   The Brain Health Book: Using the Power of Neuroscience to Improve Your Life by Yinka Kiser, PhD, ABPP-CN.     Consider listening to Brain Beat, a pod cast series by the National Academy of Neuropsych[employer] Foundation featuring discussions with experts on brain health and functioning.     BRAIN TRAINING APPS  · BrainHQ (https://www.Bettyvision.RoyalCactus/) - BrainHQ has more than two dozen brain-training exercises organized into six categories: Attention, Brain Speed, Memory, People Skills, Intelligence, and Navigation. It allows you to fit brain exercises into your busy life, and access brain training on most internet-connected devices. Plus, each exercise continuously adapts to your unique performance. So you train at the right level for you.     · Mind Games (https://www.mindgames.com/) - Mind Games is a great collection of games based in part on principles derived from cognitive tasks to help you practice different mental skills. This includes a handful of free games. Additionally, there are a number of trial games included that can be played 3 times. All games include your score history and a graph of your progress. Using some principles of standardized testing, your scores are also converted to a standard scale so that you can see where you need work and excel. The training center does the work for you by picking  the perfect mix of exercises to keep you engaged.     · Elevate (https://TELA Bio.com/) - Elevate was selected by [employer] as Analisa of the Year! Elevate is a brain training program designed to improve focus, speaking abilities, processing speed, memory, math skills, and more. Each person is provided with a personalized training program that adjusts over time to maximize results. Theoretically, the more you train with Elevate, the more you'll improve critical cognitive skills that are proven to boost productivity, earning power, and self-confidence. Users who train at least 3 times per week have reported dramatic gains and increased confidence. In-analisa purchases.     · Peak (https://www.CarRentalsMarket.net/) - Reach Peak performance with over 40 unique games, each one developed by neuroscientists and game experts to challenge your cognitive skills and push you further. Use , the  for your brain, to find the right workout for you at the right time. Choose from 's best recommendations to push your skills to the max. Or take contextual workouts like Coffee Break if you're short on time.  will help you track your progress using in-depth insights and keep you going when you need it most. Play for free or upgrade to Pro and get the best brain training experience available. In-analisa purchases.     OTHER SUGGESTIONS  Games and Apps like Sudoku; Crossword Puzzles; Word Find; Memory Games; Logic Games; Solitaire; and Hidden Object Mysteries. Find some you like and play them. It will exercise many of the skills necessary to improve function.    COGNITIVE TIPS AND STRATEGIES  The following tips and strategies are provided to help assist in daily activities:      Attention: Remember that inattention and lack of focus are major culprits to forgetting information so be sure and practice paying attention for adequate learning of information. If you rely on passive attention to remembering something (e.g., yeah,  uh-huh approach), you'll find you cannot recall it later. I recommend the following to improve attention, which may aid in later recall:  1. Reduce distractions in the area as much as possible  2. Look at the person as they are speaking to you.   3. Paraphrase as they are speaking  4. Write down important pieces of information   5. Ask them to repeat if you zone out.  6. Have them simplify and reduce information that you need to attend to during conversation.  7. Have visual cues to remind you if you need to do something later.     Processing Speed:  1. Using multiple modalities (e.g., listening, writing notes, asking questions, recording) to learn new information is likely to allow additional time for processing, thus improving memory for the material.   2. Allowing sufficient time to complete tasks will reduce frustration and help to ensure completion.     Executive Functionin. Don't attempt to multi-task.  Separate tasks so that each can be completed one at a time  2. Consider using a calendar/day planner, as that may be effective to help you plan and stay on track.  Color-coding specific tasks by importance may add additional benefit to your planner  3. Break down large projects into smaller tasks and write down the steps to completing the task.  Taking notes while reading can help with recall.     Storing Information: Use the below strategies to help you further enhance how information is stored  1. Rehearse - Immediately after seeing/hearing something, try to recall it.  Wait a few minutes, then check again.  Gradually lengthen the intervals between rehearsals.  2. Repetition of learned material is critical to ensure storage of information to be learned. Self-test at home to ensure learning.  3. Write down important information to improve your attention and focus and to have something to look back on when you need to recall it.  4. Make sure the person doesn't rattle off, but presents in a clear,  logical, and unhurried manner.      Recalling Information:  1. Jog your memory - Lose something?  Think back to when you last had it.  What did you do next?  And after that?  Mentally walk yourself through each activity that followed.  Prodding your memory this way may enable you to recall the location of the missing item.  2. Use a cue - Symbolic reminders (the proverbial string around the finger) are helpful.  So too are memos, timers, calendar notes, etc.--keep them in visible, appropriate place  3. Get organized - Have fixed locations for all important papers, key phone numbers, medications, keys, wallet, glasses, tools, etc.  4. Develop routines - Routines can anchor memories so they do not drift away.       Word Finding:   Not being able to find a word when you need it is a common and very annoying problem. It is not strictly a memory issue, but more a filing and retrieval issue. You know the word or name you want, but it cannot be found in your brain file. It is like having all the files in your file cabinet emptied on the floor. Every piece of information is there but finding it can be a challenge.   One strategy that may help is working with a speech pathologist/therapist to learn techniques to decrease word finding problems. Some of those strategies/techniques include the following:  Use circumlocutions. Describe the object you're trying to name instead of naming it.  Recite you're A, B, Cs. Go through the alphabet to see if a letter triggers finding the word.  Picture it. Try to visualize the spelling or writing of the word.  Relax. The harder you try to force yourself to come up with the word, the more frustrated you get and the worse you function.   Write it down. In situations where using correct words is critical, such as communicating on the radio while flying, write down the key words so that they are in front of you if needed.  Use word association. For words or names you continually misfile  and can't find, try to come up with a word association, something that reminds you of the word or name.  Write a script. Try scripting something important that you want to say. Either write it out or practice it beforehand, so that you get it right.  Play word games. Doing crossword puzzles and playing word finding games may help your brain become more efficient at filing and retrieving words.

## 2024-07-17 NOTE — H&P
[facility] Emergency Dept  Vascular Neurology  [facility]  History & Physical    Inpatient consult to Vascular (Stroke) Neurology  Consult performed by: Hamzah Walker MD  Consult ordered by: Brian Wong MD        Assessment/Plan:     * Cerebellar stroke, acute  Ms Jacky is a 78F with PMHx pertinent for chronic remote infarcts to left basal ganglia, left thalamus, and right cerebellum with residual left sided tremor and gait disturbance, diastolic HFpEF, aortic regurgitation s/p TAVR, CKD IIIa, menopause on HRT, graves disease, hx HTN, and HLD who presents to [facility] ED 7/17/24 referred by Dr Shin (Neurology) for outpt MRI revealing acute left cerebellar infarct. Per chart review, pt with worsening symptoms reported to outpt neurology on 7/1 though today states that she feels at baseline and denies acute changes/worsening of symptoms. NIHSS 1 for ataxia (baseline for LUE). CTA H/N negative for LVO/severe stenosis. Out of window for TNK. Admitted to vascular neurology for stroke work up.        Antithrombotics for secondary stroke prevention: Antiplatelets: Aspirin: 81 mg daily  Clopidogrel: 75 mg daily    Statins for secondary stroke prevention and hyperlipidemia, if present:   Statins: Atorvastatin- 80 mg daily  Substitute atorvastatin for home crestor.    Aggressive risk factor modification: HLD     Rehab efforts: The patient has been evaluated by a stroke team provider and the therapy needs have been fully considered based off the presenting complaints and exam findings. The following therapy evaluations are needed: PT evaluate and treat, OT evaluate and treat, SLP evaluate and treat    Diagnostics ordered/pending: HgbA1C to assess blood glucose levels, TTE to assess cardiac function/status     VTE prophylaxis: Heparin 5000 units SQ every 8 hours    BP parameters: Infarct: No intervention, pt beyond window of permissive HTN. SBP goal <180        Menopause  - on home HRT  - hold HRT at  this time. Monitor for withdrawal symptoms.    Anemia  - Hgb 11.9, Hct 36.6, MCV 95 on admit. (baseline hgb 12-13)  - No obvious bleeding. Pt denies hematuria, hematochezia, melena.   - Monitor with routine labs    Hyponatremia  - Na 135 on admit. Prior Na of 135 in 5/2024.   - Stable. Monitor with routine labs.    History of stroke with residual effects  - Pt with remote history of infarcts to left basal ganglia, left thalamus, and right cerebellum with residual tremor and gait disturbance. Pt  also with history of falls though denies recent fall/accident/injury.   - Does not use any DME  - PT/OT eval and treat    At high risk for falls  - gait instability in the setting of prior stroke  - pt with history of falls. Does not use DME  - PT/OT eval and treat    Chronic diastolic CHF (congestive heart failure), NYHA class 1  - Last TTE 10/2023: EF 65%. LV diastolic dysfxn. No RWMA.  - Denies SOB, LE swelling  - Not on GDMT  - Repeat echo ordered  - Monitor    Essential tremor  - Pt with baseline LUE tremor 2/2 prior stroke   - PT/OT eval and treat    Hyperlipidemia  - Last lipid panel 11/2023: cholesterol 191, TG 66, HDL 78, LDL 99.8  - substitute equivalent statin while inpt  - Repeat lipid panel ordered    Stage 3a chronic kidney disease  - BUN 16, Cr 1.0, eGFR 57 on admit (baseline Cr 1, eGFR ~ 57). Currently at baseline.   - Monitor with routine labs.   - Avoid NSAIDs, nephrotoxins. Renally dose meds.        STROKE DOCUMENTATION          NIH Scale:  1a. Level of Consciousness: 0-->Alert, keenly responsive  1b. LOC Questions: 0-->Answers both questions correctly  1c. LOC Commands: 0-->Performs both tasks correctly  2. Best Gaze: 0-->Normal  3. Visual: 0-->No visual loss  4. Facial Palsy: 0-->Normal symmetrical movements  5a. Motor Arm, Left: 0-->No drift, limb holds 90 (or 45) degrees for full 10 secs  5b. Motor Arm, Right: 0-->No drift, limb holds 90 (or 45) degrees for full 10 secs  6a. Motor Leg, Left: 0-->No  "drift, leg holds 30 degree position for full 5 secs  6b. Motor Leg, Right: 0-->No drift, leg holds 30 degree position for full 5 secs  7. Limb Ataxia: 1-->Present in one limb (pt with baseline tremor to LUE)  8. Sensory: 0-->Normal, no sensory loss  9. Best Language: 0-->No aphasia, normal  10. Dysarthria: 0-->Normal  11. Extinction and Inattention (formerly Neglect): 0-->No abnormality  Total (NIH Stroke Scale): 1     Modified Brewster Score: 3  Oliverio Coma Scale:    ABCD2 Score:    [id number] Score:   HAS -BLED Score:   ICH Score:   Hunt & Gonzales Classification:      Thrombolysis Candidate? No, Out of window - Symptom onset > 4.5 hours    Delays to Thrombolysis?  Not Applicable    Interventional Revascularization Candidate?   Is the patient eligible for mechanical endovascular reperfusion (EMIGDIO)?  No; No large vessel occlusion identified on imaging , No; no significant neurologic deficit (NIHSS <6) , and No; at this time symptoms not suggestive of large vessel occlusion    Delays to Thrombectomy? Not Applicable    Hemorrhagic change of an Ischemic Stroke: Does this patient have an ischemic stroke with hemorrhagic changes? No     Subjective:     History of Present Illness:  Ms Sarah Rico (Judy) is a 78 year old female with PMHx of chronic remote infarcts to left basal ganglia, left thalamus, and right cerebellum with residual left-sided tremor and gait disturbance, diastolic HFpEF (EF 65%), aortic regurgitation s/p TAVR, CKD IIIa, menopause on HRT, graves disease, HTN, HLD, and sciatica who presents to [facility] [place] 7/17/24 per recommendation from outpt Neurologist Dr Shin for outpt MRI brain revealing of acute left cerebellar stroke.     Per chart review, MRI brain and C-spine wo contrast was ordered outpt on 7/1/24 by Dr Shin due to acute worsening of her hand tremor and recent a mechanical fall. Pt had both MRIs completed 7/17/24 which revealed small acute/recent infarction to left cerebellum as well as known " left basal ganglia and left thalamus; also noted is punctate remote hemorrhages to right cerebellum and chronic changes. MRI C-spine notable for C5-C6 moderate to severe canal stenosis with mild flattening of C-spine cord and multilevel moderate-severe neural foraminal narrowing. Patient was informed by Dr Shin to present to ED for further evaluation. CTA H/N negative for LVO/severe stenosis. NIHSS 1 for baseline LUE ataxia with finger-nose. Out of window for TNK.     She reported that she has not noticed a significant change in her baseline tremor or gait since her spinal surgery in 2017, though  (Pablito Rico [phone]) states that he has noticed significant change in strength and mobility over the last 6 months. She describes her gait as mildly unsteady and states that phsyical therapy has been helpful with her gait in the past and she does not use any DME. Endorses that she is independent with ADLs (cleaning, bathing, dressing, eating). Pt denies recent fevers/chills, HA, lightheadedness/dizziness, CP, SOB, Abd pain, N/V, dysuria/hematuria, constipation/diarrhea, hematochezia/melena, b/b incontinence, new weakness, numbness/tingling, edema, recent falls/accidents/injuries, sick contacts.          Past Medical History:   Diagnosis Date    Asthma     Ataxia     Cervical stenosis of spinal canal     Chronic heart failure with preserved ejection fraction (HFpEF)     diastolic    Chronic sinusitis, unspecified     CKD (chronic kidney disease), stage III     Essential tremor     left hand    Graves disease     ESSENTIAL TREMORS    Herpes labialis     History of aortic stenosis     History of back surgery     Hormone replacement therapy (HRT)     Hyperlipidemia     Hypertension     Infection of spine     Menopause 1996    Pelvic floor dysfunction     s/p surgical correction 2018    Stress incontinence (female) (male)     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     Graves disease     Past  Surgical History:   Procedure Laterality Date    AUGMENTATION OF BREAST Bilateral     BACK SURGERY      BALLOON SINUPLASTY OF PARANASAL SINUS Bilateral 2022    Procedure: SINUPLASTY, USING BALLOON  frontal ans sphenoid;  Surgeon: Samantha Ridley MD;  Location: [facility];  Service: ENT;  Laterality: Bilateral;    BREAST BIOPSY Right 2022    atypical lobular hyperplasia    CARDIAC VALVE REPLACEMENT  2016    - aortic valve stenosis / COW  VALVE    FUNCTIONAL ENDOSCOPIC SINUS SURGERY (FESS) USING COMPUTER-ASSISTED NAVIGATION N/A 2022    Procedure: FESS, USING COMPUTER-ASSISTED NAVIGATION - MAXILLARY, ETHMOIDS, FRONTAL;  Surgeon: Samantha Ridley MD;  Location: [facility] OR;  Service: ENT;  Laterality: N/A;    HIP REPLACEMENT ARTHROPLASTY Left     INSERTION OF MIDURETHRAL SLING N/A 2021    Procedure: SLING, MIDURETHRAL;  Surgeon: Hodan Goldberg MD;  Location: [facility];  Service: OB/GYN;  Laterality: N/A;    JOINT REPLACEMENT      LUMBAR FUSION      NASAL ENDOSCOPY N/A 2022    Procedure: ENDOSCOPY, NOSE - SPHENOIDOTOMY;  Surgeon: Samantha Ridley MD;  Location: [facility];  Service: ENT;  Laterality: N/A;     Social History     Tobacco Use    Smoking status: Former     Current packs/day: 0.00     Average packs/day: 1 pack/day for 23.0 years (23.0 ttl pk-yrs)     Types: Cigarettes     Start date:      Quit date:      Years since quittin.5    Smokeless tobacco: Never   Substance Use Topics    Alcohol use: Not Currently     Alcohol/week: 2.0 standard drinks of alcohol     Types: 1 Glasses of wine, 1 Shots of liquor per week     Comment: weekends     Drug use: Never     Review of patient's allergies indicates:   Allergen Reactions    Penicillins Rash and Hives       Medications: I have reviewed the current medication administration record.    (Not in a hospital admission)      Review of Systems   Constitutional:  Negative for chills and fever.   HENT:  Negative for  trouble swallowing.    Eyes:  Negative for visual disturbance.   Respiratory:  Negative for shortness of breath.    Cardiovascular:  Negative for chest pain and leg swelling.   Gastrointestinal:  Negative for abdominal pain, blood in stool, constipation, diarrhea, nausea and vomiting.   Genitourinary:  Negative for dysuria.   Neurological:  Negative for dizziness, facial asymmetry, speech difficulty, weakness, light-headedness, numbness and headaches.        Baseline tremor to LUE   Psychiatric/Behavioral:  Negative for agitation.      Objective:     Vital Signs (Most Recent):  Temp: 97.3 °F (36.3 °C) (07/17/24 1159)  Pulse: 78 (07/17/24 1159)  Resp: 20 (07/17/24 1159)  BP: (!) 142/78 (07/17/24 1159)  SpO2: 97 % (07/17/24 1159)    Vital Signs Range (Last 24H):  Temp:  [97.3 °F (36.3 °C)]   Pulse:  [78]   Resp:  [20]   BP: (142)/(78)   SpO2:  [97 %]        Physical Exam  Constitutional:       General: She is not in acute distress.  HENT:      Head: Normocephalic and atraumatic.      Mouth/Throat:      Mouth: Mucous membranes are moist.      Pharynx: Oropharynx is clear.   Eyes:      General: No scleral icterus.  Cardiovascular:      Rate and Rhythm: Normal rate and regular rhythm.      Pulses: Normal pulses.   Pulmonary:      Effort: Pulmonary effort is normal. No respiratory distress.   Abdominal:      General: Abdomen is flat. There is no distension.   Musculoskeletal:      Cervical back: Normal range of motion.      Right lower leg: No edema.      Left lower leg: No edema.   Skin:     General: Skin is warm and dry.   Neurological:      Mental Status: She is alert.      Comments: Please see neurological exam below   Psychiatric:         Behavior: Behavior normal.              Neurological Exam:   LOC: alert  Attention Span: Good   Language: No aphasia  Articulation: No dysarthria  Orientation: Person, Place, Time   Visual Fields: Full  EOM (CN III, IV, VI): Full/intact  Pupils (CN II, III): PERRL  Facial Sensation  "(CN V): Normal  Facial Movement (CN VII): Symmetric facial expression    Reflexes: 2+ throughout  + left hand gomes, no ankle clonus, no babinski  Motor: Arm left  Normal 5/5  Leg left  Normal 5/5  Arm right  Normal 5/5  Leg right Normal 5/5  Cerebellum: Upper Extremity Appendicular Ataxia (Finger Nose Finger)  Left  Sensation: Intact to light touch, temperature and vibration  Tone: Normal tone throughout      Laboratory:  CMP:   Recent Labs   Lab 07/17/24  0840   CALCIUM 9.2   ALBUMIN 4.0   PROT 7.1   *   K 4.2   CO2 23      BUN 16   CREATININE 1.0   ALKPHOS 32*   ALT 22   AST 21   BILITOT 0.5     CBC:   Recent Labs   Lab 07/17/24  0840   WBC 5.69   RBC 3.84*   HGB 11.9*   HCT 36.6*      MCV 95   MCH 31.0   MCHC 32.5     Lipid Panel: No results for input(s): "CHOL", "LDLCALC", "HDL", "TRIG" in the last 168 hours.    Coagulation: No results for input(s): "PT", "INR", "APTT" in the last 168 hours.    Hgb A1C: No results for input(s): "HGBA1C" in the last 168 hours.    TSH:   Recent Labs   Lab 07/17/24  0840   TSH 1.224       Diagnostic Results:  Brain imaging:  MRI Brain wo contrast 7/17/24:   Impression:  Small sized acute/recent infarction left cerebellum without mass effect or hemorrhagic conversion  Interval remote left basal gangliar hemorrhagic infarction.  There is diffusion hyperintensity in the left caudate component cannot exclude subtle area of a subacute aged infarction at this level  Punctate remote hemorrhages right cerebellum and left thalamus..  No evidence for recent parenchymal hemorrhage  Generalized cerebral volume loss with patchy and confluent T2 FLAIR signal abnormality supratentorial white matter while nonspecific concerning for chronic ischemic change  Prominence lateral and 3rd ventricles somewhat disproportionate to the degree of volume loss though similar to prior component of normal pressure hydrocephalus to be considered.    MRI C-spine wo contrast " 7/17/24:  Impression:  Multilevel degenerative change and spondylolisthesis, similar compared to prior.  Findings most pronounced at C5-6 where moderate to severe canal stenosis and mild flattening of the cervical spinal cord result.  No focal cord signal abnormality.  Multilevel moderate and severe neural foraminal narrowing.    Vessel Imaging:  CT & CTA H/N 7/17/24:   Impression:  CTA head: Atherosclerotic plaquing cavernous segments of the ICAs without significant focal stenosis  No proximal high-grade stenosis or large vessel occlusion.  2-3 mm right communicating segment ICA outpouching suggestive for infundibulum with small aneurysm not excluded.  This could be further evaluated with MRA head imaging.     CTA neck: Atherosclerotic disease carotid bifurcations and proximal ICAs with less than 50% proximal ICA stenosis by NASCET criteria  Spondylo degenerative change cervical spine please see MRI spine report from earlier same day for further details     CT head: Small hypodensity within the left cerebellum corresponds to recent infarction seen on MRI.  No significant mass effect or hemorrhagic conversion    Cardiac Evaluation: Repeat echo ordered   Last TTE 10/18/2023:    Left Ventricle: The left ventricle is normal in size. Normal wall thickness. There is concentric remodeling. Normal wall motion. There is normal systolic function. Ejection fraction by visual approximation is 65%. Diastolic function cannot be reliably determined in the presence of significant mitral annular calcification.    Left Atrium: Left atrium is mildly dilated.    Right Ventricle: Normal right ventricular cavity size. Wall thickness is normal. Right ventricle wall motion  is normal. Systolic function is normal.    Aortic Valve: There is a transcatheter valve in the aortic position. It appears to be an Schmidt Anderson valve. The effective orifice area by VTI is 1.11 cm². Aortic valve peak velocity is 2.31 m/s. Mean gradient is 12 mmHg.  The dimensionless index is 0.39. LVOT diameter 1.9 cm. There is no significant regurgitation.    Mitral Valve: There is moderate mitral annular calcification present. There is mild stenosis. The mean pressure gradient across the mitral valve is 3 mmHg at a heart rate of 55 bpm. There is mild to moderate regurgitation.    Tricuspid Valve: There is mild regurgitation.    Pulmonary Artery: The estimated pulmonary artery systolic pressure is 30 mmHg.    IVC/SVC: Normal venous pressure at 3 mmHg.        Hamzah Walker MD  [facility]  Department of Vascular Neurology   [facility] - Emergency Dept

## 2024-07-17 NOTE — ASSESSMENT & PLAN NOTE
Antithrombotics for secondary stroke prevention: Antiplatelets: Aspirin: 81 mg daily  Clopidogrel: 75 mg daily    Statins for secondary stroke prevention and hyperlipidemia, if present:   Statins: Atorvastatin- 80 mg daily  Substitute atorvastatin for home crestor.    Aggressive risk factor modification: HLD     Rehab efforts: The patient has been evaluated by a stroke team provider and the therapy needs have been fully considered based off the presenting complaints and exam findings. The following therapy evaluations are needed: PT evaluate and treat, OT evaluate and treat, SLP evaluate and treat    Diagnostics ordered/pending: HgbA1C to assess blood glucose levels, TTE to assess cardiac function/status     VTE prophylaxis: Heparin 5000 units SQ every 8 hours    BP parameters: Infarct: No intervention, pt beyond window of permissive HTN. SBP goal <180

## 2024-07-17 NOTE — ASSESSMENT & PLAN NOTE
- gait instability in the setting of prior stroke  - pt with history of falls. Does not use DME  - PT/OT eval and treat   No

## 2024-07-17 NOTE — ASSESSMENT & PLAN NOTE
- Pt with remote history of infarcts to left basal ganglia, left thalamus, and right cerebellum with residual tremor and gait disturbance. Pt  also with history of falls though denies recent fall/accident/injury.   - Does not use any DME  - PT/OT eval and treat

## 2024-07-17 NOTE — ED PROVIDER NOTES
Chief Complaint   Acute Stroke on MRI (Brian Shin MD/ //  7/17/24 10:30 AM/Note/- got urgent text from Rads/- patient has acute L cerebellar stroke/- spoke to Dr. Dao/- will bring to ER now, vessel studies, echo, antithromb, tele, etc.///)      History Of Present Illness   Sarah Rico is a 78 y.o. female presenting with acute stroke on MRI.  The patient had an MRI ordered by her general neurologist this morning, in an acute cerebellar stroke was found.  Patient states she has had some mild balance issues for years, but nothing new.  She was referred urgently to the ED. No acute complaints today.      Review of patient's allergies indicates:   Allergen Reactions    Penicillins Rash and Hives       No current facility-administered medications on file prior to encounter.     Current Outpatient Medications on File Prior to Encounter   Medication Sig Dispense Refill    XOLAIR 300 mg/2 mL Syrg Inject into the skin.      ASCORBATE CALCIUM, VITAMIN C, ORAL Take 500 mg by mouth.      aspirin (ECOTRIN) 81 MG EC tablet Take 81 mg by mouth once daily.      cholecalciferol, vitamin D3, (VITAMIN D3) 50 mcg (2,000 unit) Tab Take 2,000 Units by mouth.      clotrimazole (LOTRIMIN) 1 % cream APPLY TOPICALLY 2 (TWO) TIMES DAILY. TO AFFECTED TOENAILS. 45 g 1    COMPOUND HORMONE REPLACEMENT Take by mouth once daily. ESTROGEN CREAM -- [facility]      estradioL (ESTRACE) 2 MG tablet Take 1 tablet (2 mg total) by mouth once daily. 90 tablet 3    MAGNESIUM CARBONATE ORAL Take 1 tablet by mouth once daily. 200MG      omalizumab (XOLAIR) 75 mg/0.5 mL injection       PREMARIN vaginal cream PLACE 0.5 G VAGINALLY ONCE DAILY. 90 g 0    progesterone (PROMETRIUM) 200 MG capsule Take 1 capsule by mouth 30-60 minutes before bed every night 90 capsule 3    propranoloL (INDERAL LA) 120 MG 24 hr capsule TAKE 1 CAPSULE BY MOUTH ONCE DAILY 90 capsule 3    rosuvastatin (CRESTOR) 40 MG Tab TAKE 1 TABLET BY MOUTH EVERY DAY 90  tablet 3    UNABLE TO FIND medication name: Testosterone Cream in versabase 20mg/mL Apply 1/4mL to labia minora nightly 22.5 mL 1    valACYclovir (VALTREX) 1000 MG tablet valacyclovir 1 gram tablet         Past History   As per HPI and below:  Past Medical History:   Diagnosis Date    Asthma     Ataxia     Cervical stenosis of spinal canal     Chronic heart failure with preserved ejection fraction (HFpEF)     diastolic    Chronic sinusitis, unspecified     CKD (chronic kidney disease), stage III     Essential tremor     left hand    Graves disease     ESSENTIAL TREMORS    Herpes labialis     History of aortic stenosis     History of back surgery     Hormone replacement therapy (HRT)     Hyperlipidemia     Hypertension     Infection of spine     Menopause 1996    Pelvic floor dysfunction     s/p surgical correction 2018    Stress incontinence (female) (male)     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     Graves disease     Past Surgical History:   Procedure Laterality Date    AUGMENTATION OF BREAST Bilateral     BACK SURGERY      BALLOON SINUPLASTY OF PARANASAL SINUS Bilateral 08/23/2022    Procedure: SINUPLASTY, USING BALLOON  frontal ans sphenoid;  Surgeon: Samantha Ridley MD;  Location: [facility] OR;  Service: ENT;  Laterality: Bilateral;    BREAST BIOPSY Right 08/29/2022    atypical lobular hyperplasia    CARDIAC VALVE REPLACEMENT  05/2016    - aortic valve stenosis / COW  VALVE    FUNCTIONAL ENDOSCOPIC SINUS SURGERY (FESS) USING COMPUTER-ASSISTED NAVIGATION N/A 08/23/2022    Procedure: FESS, USING COMPUTER-ASSISTED NAVIGATION - MAXILLARY, ETHMOIDS, FRONTAL;  Surgeon: Samantha Ridley MD;  Location: [facility] OR;  Service: ENT;  Laterality: N/A;    HIP REPLACEMENT ARTHROPLASTY Left 2016    INSERTION OF MIDURETHRAL SLING N/A 11/01/2021    Procedure: SLING, MIDURETHRAL;  Surgeon: Hodan Goldberg MD;  Location: [facility];  Service: OB/GYN;  Laterality: N/A;    JOINT REPLACEMENT      LUMBAR FUSION       "NASAL ENDOSCOPY N/A 2022    Procedure: ENDOSCOPY, NOSE - SPHENOIDOTOMY;  Surgeon: Samantha Ridley MD;  [facility];  Service: ENT;  Laterality: N/A;       Social History     Tobacco Use    Smoking status: Former     Current packs/day: 0.00     Average packs/day: 1 pack/day for 23.0 years (23.0 ttl pk-yrs)     Types: Cigarettes     Start date:      Quit date:      Years since quittin.5    Smokeless tobacco: Never   Substance Use Topics    Alcohol use: Not Currently     Alcohol/week: 2.0 standard drinks of alcohol     Types: 1 Glasses of wine, 1 Shots of liquor per week     Comment: weekends     Drug use: Never       Family History   Problem Relation Name Age of Onset    Colon cancer Father      Leukemia Mother      Hypothyroidism Sister      Breast cancer Neg Hx      Ovarian cancer Neg Hx      Stroke Neg Hx      Diabetes Neg Hx         Physical Exam     Vitals:    24 1159 24 1304 24 1305   BP: (!) 142/78 (!) 162/70    BP Location: Left arm     Pulse: 78  62   Resp: 20  19   Temp: 97.3 °F (36.3 °C)     TempSrc: Oral     SpO2: 97%  98%   Weight: 51.7 kg (114 lb)     Height: 5' 3" (1.6 m)       Appearance: No acute distress.  Skin: No rashes seen.  Good turgor.  No abrasions.  No ecchymoses.  Chest: Clear to auscultation bilaterally.  Good air movement.  No wheezes.  No rhonchi.  Cardiovascular: Regular rate and rhythm.  No murmurs. No gallops. No rubs.  Abdomen: Soft.  Not distended.  Nontender.  No guarding.  No rebound.  Neurologic: Motor intact.  Sensation intact.  Cerebellar intact, no ataxia.  Cranial nerves intact.  Mental Status:  Alert and oriented x 3.  Appropriate, conversant.      Initial MDM   Acute stroke seen on MRI today.  Patient also had some blood work done this morning.  I have discussed the case with the stroke team.  They will see the patient to determine what additional studies they would like.  Anticipate admission.      Medications Given "     Medications   atorvastatin tablet 80 mg (80 mg Oral Given 7/17/24 1344)   aspirin chewable tablet 81 mg (81 mg Oral Given 7/17/24 1343)   clopidogreL tablet 75 mg (75 mg Oral Given 7/17/24 1344)   sodium chloride 0.9% flush 10 mL (has no administration in time range)   heparin (porcine) injection 5,000 Units (5,000 Units Subcutaneous Given 7/17/24 1344)   senna-docusate 8.6-50 mg per tablet 1 tablet (has no administration in time range)   calcium carbonate 200 mg calcium (500 mg) chewable tablet 500 mg (has no administration in time range)   polyethylene glycol packet 17 g (has no administration in time range)   iohexoL (OMNIPAQUE 350) injection 75 mL (75 mLs Intravenous Given 7/17/24 1245)       Results and Course     Labs Reviewed   LIPID PANEL   HEMOGLOBIN A1C       Imaging Results              CTA STROKE MULTI-PHASE (Final result)  Result time 07/17/24 13:24:06      Final result by Twin Ernst DO (07/17/24 13:24:06)                   Impression:      CTA head: Atherosclerotic plaquing cavernous segments of the ICAs without significant focal stenosis    No proximal high-grade stenosis or large vessel occlusion.    2-3 mm right communicating segment ICA outpouching suggestive for infundibulum with small aneurysm not excluded.  This could be further evaluated with MRA head imaging..    CTA neck: Atherosclerotic disease carotid bifurcations and proximal ICAs with less than 50% proximal ICA stenosis by NASCET criteria    Spondylo degenerative change cervical spine please see MRI spine report from earlier same day for further details    CT head: Small hypodensity within the left cerebellum corresponds to recent infarction seen on MRI.  No significant mass effect or hemorrhagic conversion    Clinical correlation and follow-up advised.      Electronically signed by: Twin Ernst DO  Date:    07/17/2024  Time:    13:24               Narrative:    EXAMINATION:  CTA STROKE MULTI-PHASE    CLINICAL  HISTORY:  Stroke, follow up;    TECHNIQUE:  5 mm axial images of the head pre  contrast with 0.625 mm axial CTA images of the head neck post-contrast.  Coronal and sagittal MPR and MIP imaging was performed 75 ml of Omnipaque 350 contrast was injected intravenously please note study was performed in multiphase technique with 3 arterial passes through the head.  Rapid AI proprietary software with large vessel occlusion and intracranial hemorrhage algorithm performed    COMPARISON:  MRI brain 07/17/2024 earlier today    FINDINGS:  CT head with and  without contrast: No evidence for acute intracranial hemorrhage.  There is small region of hypoattenuation within the left cerebellum corresponding to area of acute/recent infarction seen on MRI.  No evidence for significant mass effect or hemorrhagic conversion    There is encephalomalacia within the left basal ganglia corresponds to focus seen on MRI.  In addition there is small remote left thalamic hypodensities compatible with remote lacunar-type infarct.  Punctate hyperdensity left thalamus corresponds to remote microhemorrhage seen on MRI.    Severe opacification of the left maxillary antra with thickening sinus walls concerning for chronic sinus disease with moderate to severe opacification left sphenoid sinus and moderate opacification right maxillary antra    CTA head:    Anterior circulation: The bilateral distal cervical, petrous, cavernous, and supraclinoid segments of the ICAs are patent there is heavy atherosclerotic plaquing in the cavernous and supraclinoid segments of the ICAs with mild narrowing.  No focal high-grade stenosis there is an 8 punctate outpouching along the right communicating segment of the ICA measuring 1-2 mm concerning for aneurysm versus infundibulum.    The anterior and middle cerebral arteries are patent without focal stenosis or aneurysm.    Posterior circulation: Distal vertebral arteries, basilar artery and posterior cerebral  arteries are patent without significant focal stenosis or aneurysm.    CTA neck: Atherosclerotic plaquing arch and origin great vessels without significant focal stenosis.    The origin of the vertebral arteries from the respective subclavian arteries are within normal limits.  The vertebral arteries are patent throughout their course without focal stenosis or occlusion.    Right carotid: The right common carotid artery, carotid bifurcation and extracranial portions of the internal carotid artery are patent without significant focal stenosis.    Left carotid: The left common carotid artery, carotid bifurcation and extracranial portions of the internal carotid arteries are patent without significant focal stenosis.    Atherosclerotic plaquing of the carotid bifurcations and proximal ICAs with less than 50% proximal ICA stenosis by NASCET criteria..    Pharynx/larynx: Evaluation distorted by scatter artifact from dental metal and motion allowing for artifacts the nasopharynx, oropharynx, hypopharynx larynx and proximal trachea are grossly within normal limits    Glands: No focal parotid, submandibular or thyroid lesion..    No evidence for adenopathy throughout the neck by size criteria.    Spondylo degenerative change cervical spine with grade 1 stepwise anterolisthesis of C2 on C3 through C4 on C5 with grade 1 retrolisthesis of C5 on C6.  No evidence for acute fracture cervical spine    Several punctate micro nodules within the right upper lobe largest measuring 3 mm image 108 series 5.  Clinical correlation and follow-up recommended.                                      ED Course as of 07/17/24 1500   Wed Jul 17, 2024   1207 Discussed with stroke team [DC]   1309 Stroke team plans admission [DC]      ED Course User Index  [DC] Brian Wong MD               MDM, Impression and Plan   78 y.o. female with acute cerebellar stroke.  Stroke team to admit.         Final diagnoses:  [I63.9] Acute cerebrovascular  accident (CVA) of cerebellum (Primary)        ED Disposition Condition    Observation                   Brian Wong MD  07/17/24 3072

## 2024-07-17 NOTE — HPI
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     Per chart review, MRI brain and C-spine wo contrast was ordered outpt on 7/1/24 by Dr Shin due to acute worsening of her hand tremor and recent a mechanical fall. Pt had both MRIs completed 7/17/24 which revealed small acute/recent infarction to left cerebellum as well as known left basal ganglia and left thalamus; also noted is punctate remote hemorrhages to right cerebellum and chronic changes. MRI C-spine notable for C5-C6 moderate to severe canal stenosis with mild flattening of C-spine cord and multilevel moderate-severe neural foraminal narrowing. Patient was informed by Dr Shin to present to ED for further evaluation. CTA H/N negative for LVO/severe stenosis. NIHSS 1 for baseline LUE ataxia with finger-nose. Out of window for TNK.     She reported that she has not noticed a significant change in her baseline tremor or gait since her spinal surgery in 2017, though  (Pablito Rico [phone]) states that he has noticed significant change in strength and mobility over the last 6 months. She describes her gait as mildly unsteady and states that phsyical therapy has been helpful with her gait in the past and she does not use any DME. Endorses that she is independent with ADLs (cleaning, bathing, dressing, eating). Pt denies recent fevers/chills, HA, lightheadedness/dizziness, CP, SOB, Abd pain, N/V, dysuria/hematuria, constipation/diarrhea, hematochezia/melena, b/b incontinence, new weakness, numbness/tingling, edema, recent " falls/accidents/injuries, sick contacts.

## 2024-07-17 NOTE — NURSING TRANSFER
Nursing Transfer Note      7/17/2024   4:30 PM    Nurse giving handoff: Brenda  Nurse receiving handoff: Joy    Reason patient is being transferred:  NPU    Transfer From: ED    Transfer via stretcher    Transfer with cardiac monitoring    Order for Tele Monitor? Yes      4eyes on Skin: yes    Medicines sent: n/a    Any special needs or follow-up needed: n/a    Patient belongings transferred with patient: Yes    Chart send with patient: Yes    Notified: spouse    Upon arrival to floor: cardiac monitor applied, patient oriented to room, call bell in reach, and bed in lowest position        Nurses Note -- 4 Eyes      7/17/2024   4:31 PM      Skin assessed during: Admit      [x] No Altered Skin Integrity Present    []Prevention Measures Documented      [] Yes- Altered Skin Integrity Present or Discovered   [] LDA Added if Not in Epic (Describe Wound)   [] New Altered Skin Integrity was Present on Admit and Documented in LDA   [] Wound Image Taken    Wound Care Consulted? No    Attending Nurse:  Joy Castro RN/Staff Member:   SIGRID Agee

## 2024-07-17 NOTE — ED NOTES
Pt to ED for acute stroke. Pt had MRI earlier today and was sent to ED by neuro. Pt has no deficits noted. Slight tremor at baseline.      LOC: The patient is awake, alert and aware of environment with an appropriate affect, the patient is oriented x 3 and speaking appropriately.  APPEARANCE: Patient resting comfortably and in no acute distress, patient is clean and well groomed, patient's clothing is properly fastened.  SKIN: The skin is warm and dry, color consistent with ethnicity, patient has normal skin turgor and moist mucus membranes, skin intact, no breakdown or bruising noted.  MUSCULOSKELETAL: Patient moving all extremities spontaneously, no obvious swelling or deformities noted.  RESPIRATORY: Airway is open and patent, respirations are spontaneous, patient has a normal effort and rate, no accessory muscle use noted.  CARDIAC: Patient has a normal rate and regular rhythm, no periphreal edema noted, capillary refill < 3 seconds.  ABDOMEN: Soft and non tender to palpation, no distention noted, normoactive bowel sounds present in all four quadrants.  NEUROLOGIC:  facial expression is symmetrical, patient moving all extremities spontaneously, normal sensation in all extremities when touched with a finger.  Follows all commands appropriately.

## 2024-07-17 NOTE — ASSESSMENT & PLAN NOTE
- Last lipid panel 11/2023: cholesterol 191, TG 66, HDL 78, LDL 99.8  - substitute equivalent statin while inpt  - Repeat lipid panel ordered

## 2024-07-17 NOTE — ASSESSMENT & PLAN NOTE
- BUN 16, Cr 1.0, eGFR 57 on admit (baseline Cr 1, eGFR ~ 57). Currently at baseline.   - Monitor with routine labs.   - Avoid NSAIDs, nephrotoxins. Renally dose meds.

## 2024-07-17 NOTE — TELEPHONE ENCOUNTER
- got urgent text from Rads  - patient has acute L cerebellar stroke  - spoke to Dr. Dao  - will bring to ER now, vessel studies, echo, antithromb, tele, etc.    1) Spoke to patient personally, will come to Er when  gets home (he's at gym)  2) Left message for

## 2024-07-17 NOTE — SUBJECTIVE & OBJECTIVE
Past Medical History:   Diagnosis Date    Asthma     Ataxia     Cervical stenosis of spinal canal     Chronic heart failure with preserved ejection fraction (HFpEF)     diastolic    Chronic sinusitis, unspecified     CKD (chronic kidney disease), stage III     Essential tremor     left hand    Graves disease     ESSENTIAL TREMORS    Herpes labialis     History of aortic stenosis     History of back surgery     Hormone replacement therapy (HRT)     Hyperlipidemia     Hypertension     Infection of spine     Menopause 1996    Pelvic floor dysfunction     s/p surgical correction 2018    Stress incontinence (female) (male)     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     Graves disease     Past Surgical History:   Procedure Laterality Date    AUGMENTATION OF BREAST Bilateral     BACK SURGERY      BALLOON SINUPLASTY OF PARANASAL SINUS Bilateral 08/23/2022    Procedure: SINUPLASTY, USING BALLOON  frontal ans sphenoid;  Surgeon: Samantha Ridley MD;  Location: [facility] OR;  Service: ENT;  Laterality: Bilateral;    BREAST BIOPSY Right 08/29/2022    atypical lobular hyperplasia    CARDIAC VALVE REPLACEMENT  05/2016    - aortic valve stenosis / COW  VALVE    FUNCTIONAL ENDOSCOPIC SINUS SURGERY (FESS) USING COMPUTER-ASSISTED NAVIGATION N/A 08/23/2022    Procedure: FESS, USING COMPUTER-ASSISTED NAVIGATION - MAXILLARY, ETHMOIDS, FRONTAL;  Surgeon: Samantha Ridley MD;  Location: [facility] OR;  Service: ENT;  Laterality: N/A;    HIP REPLACEMENT ARTHROPLASTY Left 2016    INSERTION OF MIDURETHRAL SLING N/A 11/01/2021    Procedure: SLING, MIDURETHRAL;  Surgeon: Hodan Goldberg MD;  Location: [facility];  Service: OB/GYN;  Laterality: N/A;    JOINT REPLACEMENT      LUMBAR FUSION      NASAL ENDOSCOPY N/A 08/23/2022    Procedure: ENDOSCOPY, NOSE - SPHENOIDOTOMY;  Surgeon: Samantha Ridley MD;  Location: [facility];  Service: ENT;  Laterality: N/A;     Social History     Tobacco Use    Smoking status: Former     Current  packs/day: 0.00     Average packs/day: 1 pack/day for 23.0 years (23.0 ttl pk-yrs)     Types: Cigarettes     Start date:      Quit date:      Years since quittin.5    Smokeless tobacco: Never   Substance Use Topics    Alcohol use: Not Currently     Alcohol/week: 2.0 standard drinks of alcohol     Types: 1 Glasses of wine, 1 Shots of liquor per week     Comment: weekends     Drug use: Never     Review of patient's allergies indicates:   Allergen Reactions    Penicillins Rash and Hives       Medications: I have reviewed the current medication administration record.    (Not in a hospital admission)      Review of Systems   Constitutional:  Negative for chills and fever.   HENT:  Negative for trouble swallowing.    Eyes:  Negative for visual disturbance.   Respiratory:  Negative for shortness of breath.    Cardiovascular:  Negative for chest pain and leg swelling.   Gastrointestinal:  Negative for abdominal pain, blood in stool, constipation, diarrhea, nausea and vomiting.   Genitourinary:  Negative for dysuria.   Neurological:  Negative for dizziness, facial asymmetry, speech difficulty, weakness, light-headedness, numbness and headaches.        Baseline tremor to LUE   Psychiatric/Behavioral:  Negative for agitation.      Objective:     Vital Signs (Most Recent):  Temp: 97.3 °F (36.3 °C) (24 1159)  Pulse: 78 (24 1159)  Resp: 20 (24 1159)  BP: (!) 142/78 (24 1159)  SpO2: 97 % (24 1159)    Vital Signs Range (Last 24H):  Temp:  [97.3 °F (36.3 °C)]   Pulse:  [78]   Resp:  [20]   BP: (142)/(78)   SpO2:  [97 %]        Physical Exam  Constitutional:       General: She is not in acute distress.  HENT:      Head: Normocephalic and atraumatic.      Mouth/Throat:      Mouth: Mucous membranes are moist.      Pharynx: Oropharynx is clear.   Eyes:      General: No scleral icterus.  Cardiovascular:      Rate and Rhythm: Normal rate and regular rhythm.      Pulses: Normal pulses.  "  Pulmonary:      Effort: Pulmonary effort is normal. No respiratory distress.   Abdominal:      General: Abdomen is flat. There is no distension.   Musculoskeletal:      Cervical back: Normal range of motion.      Right lower leg: No edema.      Left lower leg: No edema.   Skin:     General: Skin is warm and dry.   Neurological:      Mental Status: She is alert.      Comments: Please see neurological exam below   Psychiatric:         Behavior: Behavior normal.              Neurological Exam:   LOC: alert  Attention Span: Good   Language: No aphasia  Articulation: No dysarthria  Orientation: Person, Place, Time   Visual Fields: Full  EOM (CN III, IV, VI): Full/intact  Pupils (CN II, III): PERRL  Facial Sensation (CN V): Normal  Facial Movement (CN VII): Symmetric facial expression    Reflexes: 2+ throughout  + left hand gomes, no ankle clonus, no babinski  Motor: Arm left  Normal 5/5  Leg left  Normal 5/5  Arm right  Normal 5/5  Leg right Normal 5/5  Cerebellum: Upper Extremity Appendicular Ataxia (Finger Nose Finger)  Left  Sensation: Intact to light touch, temperature and vibration  Tone: Normal tone throughout      Laboratory:  CMP:   Recent Labs   Lab 07/17/24  0840   CALCIUM 9.2   ALBUMIN 4.0   PROT 7.1   *   K 4.2   CO2 23      BUN 16   CREATININE 1.0   ALKPHOS 32*   ALT 22   AST 21   BILITOT 0.5     CBC:   Recent Labs   Lab 07/17/24  0840   WBC 5.69   RBC 3.84*   HGB 11.9*   HCT 36.6*      MCV 95   MCH 31.0   MCHC 32.5     Lipid Panel: No results for input(s): "CHOL", "LDLCALC", "HDL", "TRIG" in the last 168 hours.    Coagulation: No results for input(s): "PT", "INR", "APTT" in the last 168 hours.    Hgb A1C: No results for input(s): "HGBA1C" in the last 168 hours.    TSH:   Recent Labs   Lab 07/17/24  0840   TSH 1.224       Diagnostic Results:  Brain imaging:  MRI Brain wo contrast 7/17/24:   Impression:  Small sized acute/recent infarction left cerebellum without mass effect or " hemorrhagic conversion  Interval remote left basal gangliar hemorrhagic infarction.  There is diffusion hyperintensity in the left caudate component cannot exclude subtle area of a subacute aged infarction at this level  Punctate remote hemorrhages right cerebellum and left thalamus..  No evidence for recent parenchymal hemorrhage  Generalized cerebral volume loss with patchy and confluent T2 FLAIR signal abnormality supratentorial white matter while nonspecific concerning for chronic ischemic change  Prominence lateral and 3rd ventricles somewhat disproportionate to the degree of volume loss though similar to prior component of normal pressure hydrocephalus to be considered.    MRI C-spine wo contrast 7/17/24:  Impression:  Multilevel degenerative change and spondylolisthesis, similar compared to prior.  Findings most pronounced at C5-6 where moderate to severe canal stenosis and mild flattening of the cervical spinal cord result.  No focal cord signal abnormality.  Multilevel moderate and severe neural foraminal narrowing.    Vessel Imaging:  CT & CTA H/N 7/17/24:   Impression:  CTA head: Atherosclerotic plaquing cavernous segments of the ICAs without significant focal stenosis  No proximal high-grade stenosis or large vessel occlusion.  2-3 mm right communicating segment ICA outpouching suggestive for infundibulum with small aneurysm not excluded.  This could be further evaluated with MRA head imaging.     CTA neck: Atherosclerotic disease carotid bifurcations and proximal ICAs with less than 50% proximal ICA stenosis by NASCET criteria  Spondylo degenerative change cervical spine please see MRI spine report from earlier same day for further details     CT head: Small hypodensity within the left cerebellum corresponds to recent infarction seen on MRI.  No significant mass effect or hemorrhagic conversion    Cardiac Evaluation: Repeat echo ordered   Last TTE 10/18/2023:    Left Ventricle: The left ventricle is  normal in size. Normal wall thickness. There is concentric remodeling. Normal wall motion. There is normal systolic function. Ejection fraction by visual approximation is 65%. Diastolic function cannot be reliably determined in the presence of significant mitral annular calcification.    Left Atrium: Left atrium is mildly dilated.    Right Ventricle: Normal right ventricular cavity size. Wall thickness is normal. Right ventricle wall motion  is normal. Systolic function is normal.    Aortic Valve: There is a transcatheter valve in the aortic position. It appears to be an Schmidt Anderson valve. The effective orifice area by VTI is 1.11 cm². Aortic valve peak velocity is 2.31 m/s. Mean gradient is 12 mmHg. The dimensionless index is 0.39. LVOT diameter 1.9 cm. There is no significant regurgitation.    Mitral Valve: There is moderate mitral annular calcification present. There is mild stenosis. The mean pressure gradient across the mitral valve is 3 mmHg at a heart rate of 55 bpm. There is mild to moderate regurgitation.    Tricuspid Valve: There is mild regurgitation.    Pulmonary Artery: The estimated pulmonary artery systolic pressure is 30 mmHg.    IVC/SVC: Normal venous pressure at 3 mmHg.

## 2024-07-18 ENCOUNTER — TELEPHONE (OUTPATIENT)
Dept: NEUROLOGY | Facility: CLINIC | Age: 79
End: 2024-07-18
Payer: MEDICARE

## 2024-07-18 ENCOUNTER — OFFICE VISIT (OUTPATIENT)
Dept: NEUROLOGY | Facility: CLINIC | Age: 79
End: 2024-07-18
Payer: MEDICARE

## 2024-07-18 VITALS
DIASTOLIC BLOOD PRESSURE: 81 MMHG | BODY MASS INDEX: 26.15 KG/M2 | SYSTOLIC BLOOD PRESSURE: 147 MMHG | HEART RATE: 73 BPM | RESPIRATION RATE: 16 BRPM | OXYGEN SATURATION: 94 % | TEMPERATURE: 98 F | HEIGHT: 55 IN | WEIGHT: 113 LBS

## 2024-07-18 DIAGNOSIS — G31.84 MILD COGNITIVE IMPAIRMENT: Primary | ICD-10-CM

## 2024-07-18 LAB
ALBUMIN SERPL BCP-MCNC: 3.4 G/DL (ref 3.5–5.2)
ALP SERPL-CCNC: 28 U/L (ref 55–135)
ALT SERPL W/O P-5'-P-CCNC: 14 U/L (ref 10–44)
ANION GAP SERPL CALC-SCNC: 9 MMOL/L (ref 8–16)
APTT PPP: 29.1 SEC (ref 21–32)
ASCENDING AORTA: 3.27 CM
AST SERPL-CCNC: 17 U/L (ref 10–40)
AV INDEX (PROSTH): 0.53
AV MEAN GRADIENT: 11 MMHG
AV PEAK GRADIENT: 20 MMHG
AV VALVE AREA BY VELOCITY RATIO: 1.51 CM²
AV VALVE AREA: 1.72 CM²
AV VELOCITY RATIO: 0.47
BASOPHILS # BLD AUTO: 0.04 K/UL (ref 0–0.2)
BASOPHILS NFR BLD: 1 % (ref 0–1.9)
BILIRUB SERPL-MCNC: 0.5 MG/DL (ref 0.1–1)
BSA FOR ECHO PROCEDURE: 1.32 M2
BUN SERPL-MCNC: 17 MG/DL (ref 8–23)
CALCIUM SERPL-MCNC: 8.6 MG/DL (ref 8.7–10.5)
CHLORIDE SERPL-SCNC: 105 MMOL/L (ref 95–110)
CO2 SERPL-SCNC: 20 MMOL/L (ref 23–29)
CREAT SERPL-MCNC: 0.8 MG/DL (ref 0.5–1.4)
CV ECHO LV RWT: 0.46 CM
DIFFERENTIAL METHOD BLD: ABNORMAL
DOP CALC AO PEAK VEL: 2.23 M/S
DOP CALC AO VTI: 47.54 CM
DOP CALC LVOT AREA: 3.2 CM2
DOP CALC LVOT DIAMETER: 2.03 CM
DOP CALC LVOT PEAK VEL: 1.04 M/S
DOP CALC LVOT STROKE VOLUME: 81.71 CM3
DOP CALCLVOT PEAK VEL VTI: 25.26 CM
E WAVE DECELERATION TIME: 545.24 MSEC
E/A RATIO: 0.88
E/E' RATIO: 25.4 M/S
ECHO LV POSTERIOR WALL: 0.81 CM (ref 0.6–1.1)
EOSINOPHIL # BLD AUTO: 0.2 K/UL (ref 0–0.5)
EOSINOPHIL NFR BLD: 5.1 % (ref 0–8)
ERYTHROCYTE [DISTWIDTH] IN BLOOD BY AUTOMATED COUNT: 13.4 % (ref 11.5–14.5)
EST. GFR  (NO RACE VARIABLE): >60 ML/MIN/1.73 M^2
FRACTIONAL SHORTENING: 29 % (ref 28–44)
GLUCOSE SERPL-MCNC: 91 MG/DL (ref 70–110)
HCT VFR BLD AUTO: 33.2 % (ref 37–48.5)
HGB BLD-MCNC: 11.5 G/DL (ref 12–16)
HR MV ECHO: 76 BPM
IMM GRANULOCYTES # BLD AUTO: 0 K/UL (ref 0–0.04)
IMM GRANULOCYTES NFR BLD AUTO: 0 % (ref 0–0.5)
INR PPP: 0.9 (ref 0.8–1.2)
INTERVENTRICULAR SEPTUM: 1.06 CM (ref 0.6–1.1)
LA MAJOR: 5.94 CM
LA MINOR: 5.7 CM
LA WIDTH: 3.31 CM
LEFT ATRIUM SIZE: 3.81 CM
LEFT ATRIUM VOLUME INDEX MOD: 43.6 ML/M2
LEFT ATRIUM VOLUME INDEX: 49.9 ML/M2
LEFT ATRIUM VOLUME MOD: 54.51 CM3
LEFT ATRIUM VOLUME: 62.36 CM3
LEFT INTERNAL DIMENSION IN SYSTOLE: 2.5 CM (ref 2.1–4)
LEFT VENTRICLE DIASTOLIC VOLUME INDEX: 41.82 ML/M2
LEFT VENTRICLE DIASTOLIC VOLUME: 52.28 ML
LEFT VENTRICLE MASS INDEX: 76 G/M2
LEFT VENTRICLE SYSTOLIC VOLUME INDEX: 17.8 ML/M2
LEFT VENTRICLE SYSTOLIC VOLUME: 22.23 ML
LEFT VENTRICULAR INTERNAL DIMENSION IN DIASTOLE: 3.54 CM (ref 3.5–6)
LEFT VENTRICULAR MASS: 95.43 G
LV LATERAL E/E' RATIO: 21.17 M/S
LV SEPTAL E/E' RATIO: 31.75 M/S
LYMPHOCYTES # BLD AUTO: 0.9 K/UL (ref 1–4.8)
LYMPHOCYTES NFR BLD: 20.6 % (ref 18–48)
MAGNESIUM SERPL-MCNC: 1.8 MG/DL (ref 1.6–2.6)
MCH RBC QN AUTO: 32.2 PG (ref 27–31)
MCHC RBC AUTO-ENTMCNC: 34.6 G/DL (ref 32–36)
MCV RBC AUTO: 93 FL (ref 82–98)
MONOCYTES # BLD AUTO: 0.5 K/UL (ref 0.3–1)
MONOCYTES NFR BLD: 11.4 % (ref 4–15)
MV MEAN GRADIENT: 5 MMHG
MV PEAK A VEL: 1.45 M/S
MV PEAK E VEL: 1.27 M/S
MV STENOSIS PRESSURE HALF TIME: 158.12 MS
MV VALVE AREA P 1/2 METHOD: 1.39 CM2
NEUTROPHILS # BLD AUTO: 2.6 K/UL (ref 1.8–7.7)
NEUTROPHILS NFR BLD: 61.9 % (ref 38–73)
NRBC BLD-RTO: 0 /100 WBC
PHOSPHATE SERPL-MCNC: 4 MG/DL (ref 2.7–4.5)
PISA TR MAX VEL: 2.23 M/S
PLATELET # BLD AUTO: 176 K/UL (ref 150–450)
PMV BLD AUTO: 9.6 FL (ref 9.2–12.9)
POTASSIUM SERPL-SCNC: 3.6 MMOL/L (ref 3.5–5.1)
PROT SERPL-MCNC: 5.9 G/DL (ref 6–8.4)
PROTHROMBIN TIME: 10.3 SEC (ref 9–12.5)
RA MAJOR: 4.58 CM
RA PRESSURE ESTIMATED: 3 MMHG
RA WIDTH: 3.37 CM
RBC # BLD AUTO: 3.57 M/UL (ref 4–5.4)
RIGHT VENTRICLE DIASTOLIC BASEL DIMENSION: 3.7 CM
RV TB RVSP: 5 MMHG
SINUS: 2.7 CM
SODIUM SERPL-SCNC: 134 MMOL/L (ref 136–145)
STJ: 1.63 CM
TDI LATERAL: 0.06 M/S
TDI SEPTAL: 0.04 M/S
TDI: 0.05 M/S
TR MAX PG: 20 MMHG
TRICUSPID ANNULAR PLANE SYSTOLIC EXCURSION: 2.08 CM
TV REST PULMONARY ARTERY PRESSURE: 23 MMHG
WBC # BLD AUTO: 4.12 K/UL (ref 3.9–12.7)
Z-SCORE OF LEFT VENTRICULAR DIMENSION IN END DIASTOLE: -1.82
Z-SCORE OF LEFT VENTRICULAR DIMENSION IN END SYSTOLE: -0.42

## 2024-07-18 PROCEDURE — 84100 ASSAY OF PHOSPHORUS: CPT

## 2024-07-18 PROCEDURE — 97116 GAIT TRAINING THERAPY: CPT

## 2024-07-18 PROCEDURE — 80053 COMPREHEN METABOLIC PANEL: CPT

## 2024-07-18 PROCEDURE — 92523 SPEECH SOUND LANG COMPREHEN: CPT

## 2024-07-18 PROCEDURE — 83735 ASSAY OF MAGNESIUM: CPT

## 2024-07-18 PROCEDURE — 85730 THROMBOPLASTIN TIME PARTIAL: CPT

## 2024-07-18 PROCEDURE — 92610 EVALUATE SWALLOWING FUNCTION: CPT

## 2024-07-18 PROCEDURE — 99499 UNLISTED E&M SERVICE: CPT | Mod: S$PBB,,, | Performed by: CLINICAL NEUROPSYCHOLOGIST

## 2024-07-18 PROCEDURE — G0378 HOSPITAL OBSERVATION PER HR: HCPCS

## 2024-07-18 PROCEDURE — 25000003 PHARM REV CODE 250: Performed by: STUDENT IN AN ORGANIZED HEALTH CARE EDUCATION/TRAINING PROGRAM

## 2024-07-18 PROCEDURE — 36415 COLL VENOUS BLD VENIPUNCTURE: CPT

## 2024-07-18 PROCEDURE — 96372 THER/PROPH/DIAG INJ SC/IM: CPT

## 2024-07-18 PROCEDURE — 97161 PT EVAL LOW COMPLEX 20 MIN: CPT

## 2024-07-18 PROCEDURE — 63600175 PHARM REV CODE 636 W HCPCS

## 2024-07-18 PROCEDURE — 25000003 PHARM REV CODE 250

## 2024-07-18 PROCEDURE — 85025 COMPLETE CBC W/AUTO DIFF WBC: CPT

## 2024-07-18 PROCEDURE — 85610 PROTHROMBIN TIME: CPT

## 2024-07-18 RX ORDER — CLOPIDOGREL BISULFATE 75 MG/1
75 TABLET ORAL DAILY
Qty: 19 TABLET | Refills: 0 | Status: SHIPPED | OUTPATIENT
Start: 2024-07-19 | End: 2024-08-07

## 2024-07-18 RX ADMIN — CLOPIDOGREL BISULFATE 75 MG: 75 TABLET ORAL at 09:07

## 2024-07-18 RX ADMIN — ASPIRIN 81 MG CHEWABLE TABLET 81 MG: 81 TABLET CHEWABLE at 09:07

## 2024-07-18 RX ADMIN — HEPARIN SODIUM 5000 UNITS: 5000 INJECTION INTRAVENOUS; SUBCUTANEOUS at 05:07

## 2024-07-18 RX ADMIN — ATORVASTATIN CALCIUM 80 MG: 40 TABLET, FILM COATED ORAL at 09:07

## 2024-07-18 NOTE — ASSESSMENT & PLAN NOTE
- Last TTE 10/2023: EF 65%. LV diastolic dysfxn. No RWMA.    Echo Saline Bubble? No    Result Date: 7/18/2024    Left Ventricle: The left ventricle is normal in size. Normal wall   thickness. There is concentric remodeling. There is normal systolic   function with a visually estimated ejection fraction of 60 - 65%. Grade II   diastolic dysfunction.    Right Ventricle: Normal right ventricular cavity size. There is mild   hypertrophy. Systolic function is normal.    Left Atrium: Left atrium is severely dilated.    Aortic Valve: There is a transcatheter valve replacement in the aortic   position. It is reported to be a Schmidt valve.    Mitral Valve: There is moderate anterior leaflet sclerosis. There is   moderate posterior mitral annular calcification present. There is moderate   stenosis. The mean pressure gradient across the mitral valve is 5 mmHg at   a heart rate of 76 bpm.    Pulmonary Artery: The estimated pulmonary artery systolic pressure is   23 mmHg.    IVC/SVC: Normal venous pressure at 3 mmHg.         - Monitor

## 2024-07-18 NOTE — DISCHARGE SUMMARY
"[facility] - Neurosurgery ([facility])  Vascular Neurology  Comprehensive Stroke Center  Discharge Summary     Summary:     Admit Date: 7/17/2024 12:01 PM    Discharge Date and Time:  07/18/2024 2:20 PM    Attending Physician: No att. 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     Per chart review, MRI brain and C-spine wo contrast was ordered outpt on 7/1/24 by Dr Shin due to acute worsening of her hand tremor and recent a mechanical fall. Pt had both MRIs completed 7/17/24 which revealed small acute/recent infarction to left cerebellum as well as known left basal ganglia and left thalamus; also noted is punctate remote hemorrhages to right cerebellum and chronic changes. MRI C-spine notable for C5-C6 moderate to severe canal stenosis with mild flattening of C-spine cord and multilevel moderate-severe neural foraminal narrowing. Patient was informed by Dr Shin to present to ED for further evaluation. CTA H/N negative for LVO/severe stenosis. NIHSS 1 for baseline LUE ataxia with finger-nose. Out of window for TNK.     She reported that she has not noticed a significant change in her baseline tremor or gait since her spinal surgery in 2017, though  (Pablito Rico [phone]) states that he has noticed significant change in strength and mobility over the last 6 months. She describes her gait as mildly unsteady and states that phsyical therapy has been helpful with her gait in the past and she does not " use any DME. Endorses that she is independent with ADLs (cleaning, bathing, dressing, eating). Pt denies recent fevers/chills, HA, lightheadedness/dizziness, CP, SOB, Abd pain, N/V, dysuria/hematuria, constipation/diarrhea, hematochezia/melena, b/b incontinence, new weakness, numbness/tingling, edema, recent falls/accidents/injuries, sick contacts.    Hospital Course (synopsis of major diagnoses, care, treatment, and services provided during the course of the hospital stay): Patient was admitted to Vascular Neurology for stroke work-up. CTA unremarkable. TTE completed and showing diastolic dysfunction, severely dilated LA, and mitral valve disease - etiology of CVA cardioembolic. Educated on the increased risk of stroke when taking oral hormone replacement therapy, patient verbalized understanding but prefers to continue her HRT regimen. Patient with stable exam and medically ready for discharge. Referrals to PT and Vascular Neurology ordered. Outpatient event monitor ordered as well.     Goals of Care Treatment Preferences:  Code Status: Full Code      Stroke Etiology: Ischemic Cardioembolic Mechanical or bioprosthetic heart valve    STROKE DOCUMENTATION         NIH Scale:  1a. Level of Consciousness: 0-->Alert, keenly responsive  1b. LOC Questions: 0-->Answers both questions correctly  1c. LOC Commands: 0-->Performs both tasks correctly  2. Best Gaze: 0-->Normal  3. Visual: 0-->No visual loss  4. Facial Palsy: 0-->Normal symmetrical movements  5a. Motor Arm, Left: 0-->No drift, limb holds 90 (or 45) degrees for full 10 secs  5b. Motor Arm, Right: 0-->No drift, limb holds 90 (or 45) degrees for full 10 secs  6a. Motor Leg, Left: 0-->No drift, leg holds 30 degree position for full 5 secs  6b. Motor Leg, Right: 0-->No drift, leg holds 30 degree position for full 5 secs  7. Limb Ataxia: 1-->Present in one limb  8. Sensory: 0-->Normal, no sensory loss  9. Best Language: 0-->No aphasia, normal  10. Dysarthria:  0-->Normal  11. Extinction and Inattention (formerly Neglect): 0-->No abnormality  Total (NIH Stroke Scale): 1        Modified Giles Score: 1  Jelm Coma Scale:15     Assessment/Plan:     Diagnostic Results:    Brain/Vessel Imaging   7/17/2024 CTA MP  CTA head:   - Atherosclerotic plaquing cavernous segments of the ICAs without significant focal stenosis  - No proximal high-grade stenosis or large vessel occlusion.  - 2-3 mm right communicating segment ICA outpouching suggestive for infundibulum with small aneurysm not excluded.  This could be further evaluated with MRA head imaging..  CTA neck:   - Atherosclerotic disease carotid bifurcations and proximal ICAs with less than 50% proximal ICA stenosis by NASCET criteria  - Spondylo degenerative change cervical spine please see MRI spine report from earlier same day for further details  - CT head: Small hypodensity within the left cerebellum corresponds to recent infarction seen on MRI.  No significant mass effect or hemorrhagic conversion     7/17/2024 MRI Brain without contrast  - Small sized acute/recent infarction left cerebellum without mass effect or hemorrhagic conversion  - Interval remote left basal gangliar hemorrhagic infarction.  There is diffusion hyperintensity in the left caudate component cannot exclude subtle area of a subacute aged infarction at this level  - Punctate remote hemorrhages right cerebellum and left thalamus..  No evidence for recent parenchymal hemorrhage  - Generalized cerebral volume loss with patchy and confluent T2 FLAIR signal abnormality supratentorial white matter while nonspecific concerning for chronic ischemic change  - Prominence lateral and 3rd ventricles somewhat disproportionate to the degree of volume loss though similar to prior component of normal pressure hydrocephalus to be considered.     Cardiac Imaging   7/18/2024 TTE    Left Ventricle: The left ventricle is normal in size. Normal wall thickness. There is  concentric remodeling. There is normal systolic function with a visually estimated ejection fraction of 60 - 65%. Grade II diastolic dysfunction.    Right Ventricle: Normal right ventricular cavity size. There is mild hypertrophy. Systolic function is normal.    Left Atrium: Left atrium is severely dilated.    Aortic Valve: There is a transcatheter valve replacement in the aortic position. It is reported to be a Schmidt valve.    Mitral Valve: There is moderate anterior leaflet sclerosis. There is moderate posterior mitral annular calcification present. There is moderate stenosis. The mean pressure gradient across the mitral valve is 5 mmHg at a heart rate of 76 bpm.    Pulmonary Artery: The estimated pulmonary artery systolic pressure is 23 mmHg.    IVC/SVC: Normal venous pressure at 3 mmHg.    Interventions: None    Complications: None    Disposition: Home or Self Care    Final Active Diagnoses:    Diagnosis Date Noted POA    PRINCIPAL PROBLEM:  Cerebellar stroke, acute [I63.9] 07/17/2024 Yes    History of stroke with residual effects [I69.30] 07/17/2024 Not Applicable    Hyponatremia [E87.1] 07/17/2024 Yes    Anemia [D64.9] 07/17/2024 Yes    Menopause [Z78.0] 07/17/2024 Yes    At high risk for falls [Z91.81] 05/09/2024 Not Applicable    Chronic diastolic heart failure [I50.32] 10/18/2023 Yes    Stage 3a chronic kidney disease [N18.31] 07/22/2021 Yes    Hyperlipidemia [E78.5] 07/22/2021 Yes    Graves' disease [E05.00] 07/22/2021 Yes    Essential hypertension [I10] 07/02/2018 Yes    Essential tremor [G25.0] 02/28/2018 Yes    S/P TAVR (transcatheter aortic valve replacement) [Z95.2] 05/25/2016 Not Applicable      Problems Resolved During this Admission:     No new Assessment & Plan notes have been filed under this hospital service since the last note was generated.  Service: Vascular Neurology      Recommendations:     Post-discharge complication risks: None    Stroke Education given to: patient    Follow-up in  Stroke Clinic in 4-6 weeks.     Discharge Plan:  Antithrombotics: Aspirin 81mg, Clopidogrel 75mg  Statin: Rosuvastatin 40mg  Aggresive risk factor modification:  Hypertension  HRT    Follow Up:      Patient Instructions:      Ambulatory referral/consult to Physical/Occupational Therapy   Standing Status: Future   Referral Priority: Routine Referral Type: Physical Medicine   Referral Reason: Specialty Services Required   Number of Visits Requested: 1     Ambulatory referral/consult to Vascular Neurology   Standing Status: Future   Referral Priority: Routine Referral Type: Consultation   Referral Reason: Specialty Services Required   Requested Specialty: Vascular Neurology   Number of Visits Requested: 1     Cardiac event monitor   Standing Status: Future Standing Exp. Date: 07/18/25     Order Specific Question Answer Comments   Cardiac Event Monitor Looping Recorder    Release to patient Immediate        Medications:  Reconciled Home Medications:      Medication List        START taking these medications      clopidogreL 75 mg tablet  Commonly known as: PLAVIX  Take 1 tablet (75 mg total) by mouth once daily. for 19 days  Start taking on: July 19, 2024            CONTINUE taking these medications      ASCORBATE CALCIUM (VITAMIN C) ORAL  Take 500 mg by mouth.     aspirin 81 MG EC tablet  Commonly known as: ECOTRIN  Take 81 mg by mouth once daily.     cholecalciferol (vitamin D3) 50 mcg (2,000 unit) Tab  Commonly known as: VITAMIN D3  Take 2,000 Units by mouth.     clotrimazole 1 % cream  Commonly known as: LOTRIMIN  APPLY TOPICALLY 2 (TWO) TIMES DAILY. TO AFFECTED TOENAILS.     COMPOUND HORMONE REPLACEMENT  Take by mouth once daily. ESTROGEN CREAM -- [facility]     estradioL 2 MG tablet  Commonly known as: ESTRACE  Take 1 tablet (2 mg total) by mouth once daily.     MAGNESIUM CARBONATE ORAL  Take 1 tablet by mouth once daily. 200MG     PREMARIN vaginal cream  Generic drug: conjugated estrogens  PLACE 0.5 G  VAGINALLY ONCE DAILY.     progesterone 200 MG capsule  Commonly known as: PROMETRIUM  Take 1 capsule by mouth 30-60 minutes before bed every night     propranoloL 120 MG 24 hr capsule  Commonly known as: INDERAL LA  TAKE 1 CAPSULE BY MOUTH ONCE DAILY     rosuvastatin 40 MG Tab  Commonly known as: CRESTOR  TAKE 1 TABLET BY MOUTH EVERY DAY     UNABLE TO FIND  medication name: Testosterone Cream in versabase 20mg/mL Apply 1/4mL to labia minora nightly     valACYclovir 1000 MG tablet  Commonly known as: VALTREX  valacyclovir 1 gram tablet     XOLAIR 300 mg/2 mL Syrg  Generic drug: omalizumab  Inject into the skin.              Paul Lehman MD  Comprehensive Stroke Center  Department of Vascular Neurology   [facility] Neurosurgery [facility])

## 2024-07-18 NOTE — CARE UPDATE
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 Per chart review, pt with worsening symptoms reported to outpt neurology on 7/1 though today states that she feels at baseline and denies acute changes/worsening of symptoms. NIHSS 1 for ataxia (baseline for LUE). CTA H/N negative for LVO/severe stenosis. Out of window for TNK. Admitted to vascular neurology for stroke work up.        History of stroke with residual effects    Hyponatremia    Anemia    Menopause    At high risk for falls    Chronic diastolic heart failure     EF 65% (2023)      Stage 3a chronic kidney disease    Hyperlipidemia    Graves' disease    Essential hypertension    Essential tremor    S/P TAVR (transcatheter aortic valve replacement)        Aubree Araujo NP  Unit Based [facility]

## 2024-07-18 NOTE — PT/OT/SLP EVAL
"Physical Therapy Evaluation and Discharge Note    Patient Name:  Sarah Rico   MRN:  [id number]    Recommendations:     Discharge Recommendations: Low Intensity Therapy  Discharge Equipment Recommendations: none   Barriers to discharge: None    Assessment:     Sarah Rico is a 78 y.o. female admitted with a medical diagnosis of Cerebellar stroke, acute. .  At this time, patient is functioning at their prior level of function and does not require further acute PT services. However, pt does demonstrate chronic balance deficits for which she would benefit from returning to OP PT to address.    Recent Surgery: * No surgery found *      Plan:     During this hospitalization, patient does not require further acute PT services.  Please re-consult if situation changes.      Subjective     Chief Complaint: balance deficits at baseline  Patient/Family Comments/goals: "I need to go back to PT. Every time I stop my balance slowly goes back to how it was"  Pain/Comfort:  Pain Rating 1: 0/10  Pain Rating Post-Intervention 1: 0/10    Patients cultural, spiritual, Judaism conflicts given the current situation: no    Living Environment:  Pt lives w/ spouse in a 1st floor [facility] w/ 3-4 [facility] L/R.  Prior to admission, patients level of function was independent; one recent fall.  Equipment used at home: grab bar, cane, quad.  DME owned (not currently used):  [employer] .  Upon discharge, patient will have assistance from spouse.    Objective:     Communicated with RN prior to session.  Patient found HOB elevated with telemetry upon PT entry to room. Co-evaluation w/ OT 2/2 suspected pt complexity and requirement of assistance from 2 skilled therapists to maximize treatment potential and maintain pt safety     General Precautions: Standard, fall    Orthopedic Precautions:N/A   Braces: N/A  Respiratory Status: Room air    Exams:  Cognitive Exam:  Patient is oriented to Person, Place, Time, and Situation  Sensation:    -       " Intact  RLE ROM: WFL  RLE Strength: WFL  LLE ROM: WFL  LLE Strength: WFL    Functional Mobility:  Bed Mobility:     Supine to Sit: independence  Transfers:     Sit to Stand:  independence with no AD  Toileting: independence with no AD  Gait: 300' w/ SBA-CGA for dynamic balance w/ dual task    Functional Gait Assessment:   1. Gait on level surface =  2   (3) Normal: less than 5.5 sec, no A.D., no imbalance, normal gait pattern, deviates< 6in   (2) Mild impairment: 7-5.6 sec, uses A.D., mild gait deviations, or deviates 6-10 in   (1) Moderate impairment: > 7 sec, slow speed, imbalance, deviates 10-15 in.   (0) Severe impairment: needs assist, deviates >15 in, reach/touch wall  2. Change in Gait Speed = 1   (3) Normal: smooth change w/o loss of balance or gait deviation, deviates < 6 in, significant difference between speeds   (2) Mild impairment: changes speed, but demonstrates mild gait deviations, deviates 6-10 in, OR no deviations but unable to significantly speed, OR uses A.D.   (1) Moderate impairment: minor changes to speed, OR changes speed w/ significant deviations, deviates 10-15 in, OR  Changes speed , but loses balance & recovers   (0) Severe impairment: cannot change speed, deviates >15 in, or loses balance & needs assist  3. Gait with horizontal head turns  = 0   (3) Normal: no change in gait, deviates <6 in   (2) Mild impairment: slight change in speed, deviates 6-10 in, OR uses A.D.   (1) Moderate impairment: moderate change in speed, deviates 10-15 in   (0) Severe impairment: severe disruption of gait, deviates >15in  4. Gait with vertical head turns = 2   (3) Normal: no change in gait, deviates <6 in   (2) Mild impairment: slight change in speed, deviates 6-10 in OR uses A.D.   (1) Moderate impairment: moderate change in speed, deviates 10-15 in   (0) Severe impairment: severe disruption of gait, deviates >15 in  5. Gait with pivot turns = 2   (3) Normal: performs safely in 3 sec, no LOB   (2) Mild  impairment: performs in >3 sec & no LOB, OR turns safely & requires several steps to regain LOB   (1) Moderate impairment: turns slow, OR requires several small steps for balance following turn & stop   (0) Severe impairment: cannot turn safely, needs assist  6. Step over obstacle = 1   (3) Normal: steps over 2 stacked boxes w/o change in speed or LOB   (2) Mild impairment: able to step over 1 box w/o change in speed or LOB   (1) Moderate impairment: steps over 1 box but must slow down, may require VC   (0) Severe impairment: cannot perform w/o assist  7. Gait with Narrow MARGARITO = 0   (3) Normal: 10 steps no staggering   (2) Mild impairment: 7-9 steps   (1) Moderate impairment: 4-7 steps   (0) Severe impairment: < 4 steps or cannot perform w/o assist  8. Gait with eyes closed = 0   (3) Normal: < 7 sec, no A.D., no LOB, normal gait pattern, deviates <6 in   (2) Mild impairment: 7.1-9 sec, mild gait deviations, deviates 6-10 in   (1) Moderate impairment: > 9 sec, abnormal pattern, LOB, deviates 10-15 in   (0) Severe impairment: cannot perform w/o assist, LOB, deviates >15in  9. Ambulating Backwards = 1   (3) Normal: no A.D., no LOB, normal gait pattern, deviates <6in   (2) Mild impairment: uses A.D., slower speed, mild gait deviations, deviates 6-10 in   (1) Moderate impairment: slow speed, abnormal gait pattern, LOB, deviates 10-15 in   (0) Severe impairment: severe gait deviations or LOB, deviates >15in  10. Steps = 1   (3) Normal: alternating feet, no rail   (2) Mild Impairment: alternating feet, uses rail   (1) Moderate impairment: step-to, uses rail   (0) Severe impairment: cannot perform safely    Score 10/30     Score: <20/30 fall risk in older adults      AM-PAC 6 CLICK MOBILITY  Total Score:22       Treatment and Education:  Pt educated on PT assessment and plan to d/c orders 2/2 no current needs for skilled PT. Pt instructed to slowly progress mobility each day towards PLOF without attempting to jump right into  previous daily routine immediately upon d/c 2/2 general deconditioning from being in hospital. Pt in agreement w/ POC and verbalized understanding w/ plan to slowly progress to normal daily activities at home and inform MD team if pt experiences any difficulty progressing back to PLOF while here or upon d/c in order for PT to be re-consulted/orders placed for OP PT as needed.     AM-PAC 6 CLICK MOBILITY  Total Score:22     Patient left up in chair with all lines intact, call button in reach, and RN notified.    GOALS:   Multidisciplinary Problems       Physical Therapy Goals       Not on file                    History:     Past Medical History:   Diagnosis Date    Asthma     Ataxia     Cervical stenosis of spinal canal     Chronic heart failure with preserved ejection fraction (HFpEF)     diastolic    Chronic sinusitis, unspecified     CKD (chronic kidney disease), stage III     Essential tremor     left hand    Graves disease     ESSENTIAL TREMORS    Herpes labialis     History of aortic stenosis     History of back surgery     Hormone replacement therapy (HRT)     Hyperlipidemia     Hypertension     Infection of spine     Menopause 1996    Pelvic floor dysfunction     s/p surgical correction 2018    Stress incontinence (female) (male)     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     Graves disease       Past Surgical History:   Procedure Laterality Date    AUGMENTATION OF BREAST Bilateral     BACK SURGERY      BALLOON SINUPLASTY OF PARANASAL SINUS Bilateral 08/23/2022    Procedure: SINUPLASTY, USING BALLOON  frontal ans sphenoid;  Surgeon: Samantha Ridley MD;  Location: [facility];  Service: ENT;  Laterality: Bilateral;    BREAST BIOPSY Right 08/29/2022    atypical lobular hyperplasia    CARDIAC VALVE REPLACEMENT  05/2016    - aortic valve stenosis / COW  VALVE    FUNCTIONAL ENDOSCOPIC SINUS SURGERY (FESS) USING COMPUTER-ASSISTED NAVIGATION N/A 08/23/2022    Procedure: FESS, USING COMPUTER-ASSISTED  NAVIGATION - MAXILLARY, ETHMOIDS, FRONTAL;  Surgeon: Samantha Ridley MD;  [facility];  Service: ENT;  Laterality: N/A;    HIP REPLACEMENT ARTHROPLASTY Left 2016    INSERTION OF MIDURETHRAL SLING N/A 11/01/2021    Procedure: SLING, MIDURETHRAL;  Surgeon: Hodan Goldberg MD;  Location: [facility];  Service: OB/GYN;  Laterality: N/A;    JOINT REPLACEMENT      LUMBAR FUSION      NASAL ENDOSCOPY N/A 08/23/2022    Procedure: ENDOSCOPY, NOSE - SPHENOIDOTOMY;  Surgeon: Samantha Ridley MD;  Location: [facility];  Service: ENT;  Laterality: N/A;       Time Tracking:     PT Received On: 07/18/24  PT Start Time: 0834     PT Stop Time: 0857  PT Total Time (min): 23 min     Billable Minutes: Evaluation 15 and Gait Training 8      07/18/2024

## 2024-07-18 NOTE — PROGRESS NOTES
NEUROPSYCHOLOGICAL EVALUATION FEEDBACK    Sarah Rico attended a feedback session today and was accompanied by her , Pablito. We discussed the results of the neuropsychological evaluation and I gave time to discuss questions and concerns. For full evaluation details, please see the note from this provider dated 07/09/2024. A copy of the report was provided via "[employer]" but a second copy was printed and provided to them today. 45 minutes.     Problem List Items Addressed This Visit          Neuro    Mild cognitive impairment - Primary         Nida Burnette, PhD, ABPP  Board Certified in Clinical Neuropsychology   [facility] - Department of Neurology

## 2024-07-18 NOTE — HOSPITAL COURSE
Patient was admitted to Vascular Neurology for stroke work-up. CTA unremarkable. TTE completed and showing diastolic dysfunction, severely dilated LA, and mitral valve disease - etiology of CVA cardioembolic. Educated on the increased risk of stroke when taking oral hormone replacement therapy, patient verbalized understanding but prefers to continue her HRT regimen. Patient with stable exam and medically ready for discharge. Referrals to PT and Vascular Neurology ordered. Outpatient event monitor ordered as well.

## 2024-07-18 NOTE — TELEPHONE ENCOUNTER
Incoming call recvd from pt's  who reports Dr Shin sent pt to the ED for another stroke. Pt was admitted for observation and is awaiting w/c for d/c home. Pablito would like to r/s with Dr Shin. Message sent to staff for follow up requested in person prior to 7/31.

## 2024-07-18 NOTE — TELEPHONE ENCOUNTER
----- Message from Sarah Duckworth RN sent at 7/18/2024  1:08 PM CDT -----  Regarding: RE: reschedule follow up  Great. Thanks!  ----- Message -----  From: Nathalie Rico MA  Sent: 7/18/2024  12:55 PM CDT  To: Sarah Duckworth RN; Brian Shin MD  Subject: RE: reschedule follow up                         Okay.. cool I have a add on date I can offer them. I will give Pablito a call .  ----- Message -----  From: Sarah Duckworth RN  Sent: 7/18/2024  12:54 PM CDT  To: Brian Shin MD; Nathalie Rico MA  Subject: RE: reschedule follow up                         So sorry!! I definitely got distracted by my multitasking. There are leaving town July 31 and want to be seen BEFORE that date.     I did not review my typing. (Maybe since I am a grown woman, I should write words and not use less than signs.)    Thanks!    Sarah  ----- Message -----  From: Nathalie Rico MA  Sent: 7/18/2024  12:34 PM CDT  To: Sarah Duckworth RN; Brian Shin MD  Subject: RE: reschedule follow up                         [facility]- do you want to add on 7/31. You are already at your max of 9 patients. Just let me know, if you would like to add on this day.     Thanks,     Dom  ----- Message -----  From: Sarah Duckworth RN  Sent: 7/18/2024  12:18 PM CDT  To: Aleida Ojeda  Subject: reschedule follow up                             Hi Dom,    Pt was sent to ED for acute vs recent infarct yesterday and spent a night in [facility]. Therefore, Pablito called to get r/s with [facility].    Wants in person > 7/31    Arvind,    Sarah

## 2024-07-18 NOTE — SUBJECTIVE & OBJECTIVE
Neurologic Chief Complaint: L cerebellar CVA    Subjective:     Interval History: Patient is seen for follow-up neurological assessment and treatment recommendations: DIAZ    HPI, Past Medical, Family, and Social History remains the same as documented in the initial encounter.     Review of Systems   Neurological:  Negative for facial asymmetry, speech difficulty, weakness, light-headedness and headaches.   All other systems reviewed and are negative.    Scheduled Meds:  Continuous Infusions:  PRN Meds:    Objective:     Vital Signs (Most Recent):  Temp: 98.2 °F (36.8 °C) (07/18/24 0718)  Pulse: 73 (07/18/24 0718)  Resp: 16 (07/18/24 0718)  BP: (!) 147/81 (07/18/24 0718)  SpO2: (!) 94 % (07/18/24 0718)  BP Location: Left arm    Vital Signs Range (Last 24H):  Temp:  [97.9 °F (36.6 °C)-98.7 °F (37.1 °C)]   Pulse:  [59-75]   Resp:  [16-18]   BP: (147-176)/(77-96)   SpO2:  [94 %-98 %]   BP Location: Left arm       Physical Exam  Vitals and nursing note reviewed.   Constitutional:       Appearance: Normal appearance. She is normal weight.   HENT:      Head: Normocephalic and atraumatic.   Pulmonary:      Effort: Pulmonary effort is normal. No respiratory distress.   Musculoskeletal:      Right lower leg: No edema.      Left lower leg: No edema.   Skin:     General: Skin is warm and dry.   Neurological:      Mental Status: She is alert.        Neurological Exam:   LOC: alert  Attention Span: Good   Language: No aphasia  Articulation: No dysarthria  Orientation: Person, Place, Time   Facial Movement (CN VII): Symmetric facial expression    Motor: Arm left  Normal 5/5  Leg left  Normal 5/5  Arm right  Normal 5/5  Leg right Normal 5/5  LUE ataxia  Sensation: Intact to light touch, temperature and vibration    Laboratory:  CMP:   Recent Labs   Lab 07/18/24  0257   CALCIUM 8.6*   ALBUMIN 3.4*   PROT 5.9*   *   K 3.6   CO2 20*      BUN 17   CREATININE 0.8   ALKPHOS 28*   ALT 14   AST 17   BILITOT 0.5     CBC:    Recent Labs   Lab 07/18/24  0257   WBC 4.12   RBC 3.57*   HGB 11.5*   HCT 33.2*      MCV 93   MCH 32.2*   MCHC 34.6     Lipid Panel:   Recent Labs   Lab 07/17/24  1417   CHOL 148   LDLCALC 72.2   HDL 65   TRIG 54     Hgb A1C:   Recent Labs   Lab 07/17/24  1417   HGBA1C 5.2     TSH:   Recent Labs   Lab 07/17/24  0840   TSH 1.224       Diagnostic Results     Brain/Vessel Imaging   7/17/2024 CTA MP  CTA head:   - Atherosclerotic plaquing cavernous segments of the ICAs without significant focal stenosis  - No proximal high-grade stenosis or large vessel occlusion.  - 2-3 mm right communicating segment ICA outpouching suggestive for infundibulum with small aneurysm not excluded.  This could be further evaluated with MRA head imaging..  CTA neck:   - Atherosclerotic disease carotid bifurcations and proximal ICAs with less than 50% proximal ICA stenosis by NASCET criteria  - Spondylo degenerative change cervical spine please see MRI spine report from earlier same day for further details  - CT head: Small hypodensity within the left cerebellum corresponds to recent infarction seen on MRI.  No significant mass effect or hemorrhagic conversion     7/17/2024 MRI Brain without contrast  - Small sized acute/recent infarction left cerebellum without mass effect or hemorrhagic conversion  - Interval remote left basal gangliar hemorrhagic infarction.  There is diffusion hyperintensity in the left caudate component cannot exclude subtle area of a subacute aged infarction at this level  - Punctate remote hemorrhages right cerebellum and left thalamus..  No evidence for recent parenchymal hemorrhage  - Generalized cerebral volume loss with patchy and confluent T2 FLAIR signal abnormality supratentorial white matter while nonspecific concerning for chronic ischemic change  - Prominence lateral and 3rd ventricles somewhat disproportionate to the degree of volume loss though similar to prior component of normal pressure  hydrocephalus to be considered.    Cardiac Imaging   7/18/2024 TTE    Left Ventricle: The left ventricle is normal in size. Normal wall thickness. There is concentric remodeling. There is normal systolic function with a visually estimated ejection fraction of 60 - 65%. Grade II diastolic dysfunction.    Right Ventricle: Normal right ventricular cavity size. There is mild hypertrophy. Systolic function is normal.    Left Atrium: Left atrium is severely dilated.    Aortic Valve: There is a transcatheter valve replacement in the aortic position. It is reported to be a Schimdt valve.    Mitral Valve: There is moderate anterior leaflet sclerosis. There is moderate posterior mitral annular calcification present. There is moderate stenosis. The mean pressure gradient across the mitral valve is 5 mmHg at a heart rate of 76 bpm.    Pulmonary Artery: The estimated pulmonary artery systolic pressure is 23 mmHg.    IVC/SVC: Normal venous pressure at 3 mmHg.

## 2024-07-18 NOTE — PROGRESS NOTES
Regan Ibrahim - Neurosurgery ([facility])  Vascular Neurology  Comprehensive Stroke Center  Progress Note    Assessment/Plan:     * Cerebellar stroke, acute  Ms Jacky is a 78F with PMHx pertinent for chronic remote infarcts to left basal ganglia, left thalamus, and right cerebellum with residual left sided tremor and gait disturbance, diastolic HFpEF, aortic regurgitation s/p TAVR, CKD IIIa, menopause on HRT, graves disease, hx HTN, and HLD who presents to [facility] ED 7/17/24 referred by Dr Shin (Neurology) for outpt MRI revealing acute left cerebellar infarct. Per chart review, pt with worsening symptoms reported to outpt neurology on 7/1 though today states that she feels at baseline and denies acute changes/worsening of symptoms. NIHSS 1 for ataxia (baseline for LUE). CTA H/N negative for LVO/severe stenosis. Out of window for TNK. Admitted to vascular neurology for stroke work up.     Antithrombotics for secondary stroke prevention: Antiplatelets: Aspirin: 81 mg daily  Clopidogrel: 75 mg daily    Statins for secondary stroke prevention and hyperlipidemia, if present:   Statins: Atorvastatin- 80 mg daily  Substitute atorvastatin for home crestor.    Aggressive risk factor modification: HLD     Rehab efforts: The patient has been evaluated by a stroke team provider and the therapy needs have been fully considered based off the presenting complaints and exam findings. The following therapy evaluations are needed: PT evaluate and treat, OT evaluate and treat, SLP evaluate and treat    Diagnostics ordered/pending: None     VTE prophylaxis: Heparin 5000 units SQ every 8 hours    BP parameters: Infarct: No intervention, pt beyond window of permissive HTN. SBP goal <180        Menopause  - on home HRT  - hold HRT at this time. Monitor for withdrawal symptoms.    Anemia  - Hgb 11.9, Hct 36.6, MCV 95 on admit. (baseline hgb 12-13)  - No obvious bleeding. Pt denies hematuria, hematochezia, melena.   - Monitor with routine  labs    Hyponatremia  - Na 135 on admit. Prior Na of 135 in 5/2024.   - Stable. Monitor with routine labs.    History of stroke with residual effects  - Pt with remote history of infarcts to left basal ganglia, left thalamus, and right cerebellum with residual tremor and gait disturbance. Pt  also with history of falls though denies recent fall/accident/injury.   - Does not use any DME  - PT/OT eval and treat    At high risk for falls  - gait instability in the setting of prior stroke  - pt with history of falls. Does not use DME  - PT/OT eval and treat    Chronic diastolic heart failure  - Last TTE 10/2023: EF 65%. LV diastolic dysfxn. No RWMA.    Echo Saline Bubble? No    Result Date: 7/18/2024    Left Ventricle: The left ventricle is normal in size. Normal wall   thickness. There is concentric remodeling. There is normal systolic   function with a visually estimated ejection fraction of 60 - 65%. Grade II   diastolic dysfunction.    Right Ventricle: Normal right ventricular cavity size. There is mild   hypertrophy. Systolic function is normal.    Left Atrium: Left atrium is severely dilated.    Aortic Valve: There is a transcatheter valve replacement in the aortic   position. It is reported to be a Schmidt valve.    Mitral Valve: There is moderate anterior leaflet sclerosis. There is   moderate posterior mitral annular calcification present. There is moderate   stenosis. The mean pressure gradient across the mitral valve is 5 mmHg at   a heart rate of 76 bpm.    Pulmonary Artery: The estimated pulmonary artery systolic pressure is   23 mmHg.    IVC/SVC: Normal venous pressure at 3 mmHg.         - Monitor    Essential tremor  - Pt with baseline LUE tremor 2/2 prior stroke   - PT/OT eval and treat    Hyperlipidemia  Lipitor while inpatient (home Crestor not on formulary)    Stage 3a chronic kidney disease  Creatine stable for now. BMP reviewed- noted Estimated Creatinine Clearance: 35.9 mL/min (based on SCr of 0.8  mg/dL). according to latest data. Based on current GFR, CKD stage is stage 3 - GFR 30-59.  Monitor UOP and serial BMP and adjust therapy as needed. Renally dose meds. Avoid nephrotoxic medications and procedures         Patient was admitted to Vascular Neurology for stroke work-up. CTA unremarkable. TTE completed and showing diastolic dysfunction, severely dilated LA, and mitral valve disease - etiology of CVA cardioembolic. Patient with stable exam and medically ready for discharge. Referrals to PT and Vascular Neurology ordered.     STROKE DOCUMENTATION        NIH Scale:  1a. Level of Consciousness: 0-->Alert, keenly responsive  1b. LOC Questions: 0-->Answers both questions correctly  1c. LOC Commands: 0-->Performs both tasks correctly  2. Best Gaze: 0-->Normal  3. Visual: 0-->No visual loss  4. Facial Palsy: 0-->Normal symmetrical movements  5a. Motor Arm, Left: 0-->No drift, limb holds 90 (or 45) degrees for full 10 secs  5b. Motor Arm, Right: 0-->No drift, limb holds 90 (or 45) degrees for full 10 secs  6a. Motor Leg, Left: 0-->No drift, leg holds 30 degree position for full 5 secs  6b. Motor Leg, Right: 0-->No drift, leg holds 30 degree position for full 5 secs  7. Limb Ataxia: 1-->Present in one limb  8. Sensory: 0-->Normal, no sensory loss  9. Best Language: 0-->No aphasia, normal  10. Dysarthria: 0-->Normal  11. Extinction and Inattention (formerly Neglect): 0-->No abnormality  Total (NIH Stroke Scale): 1       Modified Werner Score: 1  [place] Coma Scale:15   ABCD2 Score:    [id number] Score:   HAS -BLED Score:   ICH Score:   Hunt & Gonzales Classification:      Hemorrhagic change of an Ischemic Stroke: Does this patient have an ischemic stroke with hemorrhagic changes? No     Neurologic Chief Complaint: L cerebellar CVA    Subjective:     Interval History: Patient is seen for follow-up neurological assessment and treatment recommendations: DIAZ    HPI, Past Medical, Family, and Social History remains the  same as documented in the initial encounter.     Review of Systems   Neurological:  Negative for facial asymmetry, speech difficulty, weakness, light-headedness and headaches.   All other systems reviewed and are negative.    Scheduled Meds:  Continuous Infusions:  PRN Meds:    Objective:     Vital Signs (Most Recent):  Temp: 98.2 °F (36.8 °C) (07/18/24 0718)  Pulse: 73 (07/18/24 0718)  Resp: 16 (07/18/24 0718)  BP: (!) 147/81 (07/18/24 0718)  SpO2: (!) 94 % (07/18/24 0718)  BP Location: Left arm    Vital Signs Range (Last 24H):  Temp:  [97.9 °F (36.6 °C)-98.7 °F (37.1 °C)]   Pulse:  [59-75]   Resp:  [16-18]   BP: (147-176)/(77-96)   SpO2:  [94 %-98 %]   BP Location: Left arm       Physical Exam  Vitals and nursing note reviewed.   Constitutional:       Appearance: Normal appearance. She is normal weight.   HENT:      Head: Normocephalic and atraumatic.   Pulmonary:      Effort: Pulmonary effort is normal. No respiratory distress.   Musculoskeletal:      Right lower leg: No edema.      Left lower leg: No edema.   Skin:     General: Skin is warm and dry.   Neurological:      Mental Status: She is alert.        Neurological Exam:   LOC: alert  Attention Span: Good   Language: No aphasia  Articulation: No dysarthria  Orientation: Person, Place, Time   Facial Movement (CN VII): Symmetric facial expression    Motor: Arm left  Normal 5/5  Leg left  Normal 5/5  Arm right  Normal 5/5  Leg right Normal 5/5  LUE ataxia  Sensation: Intact to light touch, temperature and vibration    Laboratory:  CMP:   Recent Labs   Lab 07/18/24  0257   CALCIUM 8.6*   ALBUMIN 3.4*   PROT 5.9*   *   K 3.6   CO2 20*      BUN 17   CREATININE 0.8   ALKPHOS 28*   ALT 14   AST 17   BILITOT 0.5     CBC:   Recent Labs   Lab 07/18/24  0257   WBC 4.12   RBC 3.57*   HGB 11.5*   HCT 33.2*      MCV 93   MCH 32.2*   MCHC 34.6     Lipid Panel:   Recent Labs   Lab 07/17/24  1417   CHOL 148   LDLCALC 72.2   HDL 65   TRIG 54     Hgb A1C:    Recent Labs   Lab 07/17/24  1417   HGBA1C 5.2     TSH:   Recent Labs   Lab 07/17/24  0840   TSH 1.224       Diagnostic Results     Brain/Vessel Imaging   7/17/2024 CTA MP  CTA head:   - Atherosclerotic plaquing cavernous segments of the ICAs without significant focal stenosis  - No proximal high-grade stenosis or large vessel occlusion.  - 2-3 mm right communicating segment ICA outpouching suggestive for infundibulum with small aneurysm not excluded.  This could be further evaluated with MRA head imaging..  CTA neck:   - Atherosclerotic disease carotid bifurcations and proximal ICAs with less than 50% proximal ICA stenosis by NASCET criteria  - Spondylo degenerative change cervical spine please see MRI spine report from earlier same day for further details  - CT head: Small hypodensity within the left cerebellum corresponds to recent infarction seen on MRI.  No significant mass effect or hemorrhagic conversion     7/17/2024 MRI Brain without contrast  - Small sized acute/recent infarction left cerebellum without mass effect or hemorrhagic conversion  - Interval remote left basal gangliar hemorrhagic infarction.  There is diffusion hyperintensity in the left caudate component cannot exclude subtle area of a subacute aged infarction at this level  - Punctate remote hemorrhages right cerebellum and left thalamus..  No evidence for recent parenchymal hemorrhage  - Generalized cerebral volume loss with patchy and confluent T2 FLAIR signal abnormality supratentorial white matter while nonspecific concerning for chronic ischemic change  - Prominence lateral and 3rd ventricles somewhat disproportionate to the degree of volume loss though similar to prior component of normal pressure hydrocephalus to be considered.    Cardiac Imaging   7/18/2024 TTE    Left Ventricle: The left ventricle is normal in size. Normal wall thickness. There is concentric remodeling. There is normal systolic function with a visually estimated  ejection fraction of 60 - 65%. Grade II diastolic dysfunction.    Right Ventricle: Normal right ventricular cavity size. There is mild hypertrophy. Systolic function is normal.    Left Atrium: Left atrium is severely dilated.    Aortic Valve: There is a transcatheter valve replacement in the aortic position. It is reported to be a Schmidt valve.    Mitral Valve: There is moderate anterior leaflet sclerosis. There is moderate posterior mitral annular calcification present. There is moderate stenosis. The mean pressure gradient across the mitral valve is 5 mmHg at a heart rate of 76 bpm.    Pulmonary Artery: The estimated pulmonary artery systolic pressure is 23 mmHg.    IVC/SVC: Normal venous pressure at 3 mmHg.    Paul Lehman MD  Comprehensive Stroke Center  Department of Vascular Neurology   [facility])

## 2024-07-18 NOTE — PLAN OF CARE
Problem: Occupational Therapy  Goal: Occupational Therapy Goal  Description: Goals to be met by: 7/19/24     Patient will increase functional independence with ADLs by performing:    Step transfer with Maximum Assistance    Outcome: Met

## 2024-07-18 NOTE — PLAN OF CARE
Problem: Adult Inpatient Plan of Care  Goal: Plan of Care Review  Outcome: Met  Goal: Patient-Specific Goal (Individualized)  Outcome: Met  Goal: Absence of Hospital-Acquired Illness or Injury  Outcome: Met  Goal: Optimal Comfort and Wellbeing  Outcome: Met  Goal: Readiness for Transition of Care  Outcome: Met     Problem: Infection  Goal: Absence of Infection Signs and Symptoms  Outcome: Met     Problem: Stroke, Ischemic (Includes Transient Ischemic Attack)  Goal: Optimal Coping  Outcome: Met  Goal: Effective Bowel Elimination  Outcome: Met  Goal: Optimal Cerebral Tissue Perfusion  Outcome: Met  Goal: Optimal Cognitive Function  Outcome: Met  Goal: Improved Communication Skills  Outcome: Met  Goal: Optimal Functional Ability  Outcome: Met  Goal: Optimal Nutrition Intake  Outcome: Met  Goal: Effective Oxygenation and Ventilation  Outcome: Met  Goal: Improved Sensorimotor Function  Outcome: Met  Goal: Safe and Effective Swallow  Outcome: Met  Goal: Effective Urinary Elimination  Outcome: Met

## 2024-07-18 NOTE — ASSESSMENT & PLAN NOTE
Creatine stable for now. BMP reviewed- noted Estimated Creatinine Clearance: 35.9 mL/min (based on SCr of 0.8 mg/dL). according to latest data. Based on current GFR, CKD stage is stage 3 - GFR 30-59.  Monitor UOP and serial BMP and adjust therapy as needed. Renally dose meds. Avoid nephrotoxic medications and procedures

## 2024-07-18 NOTE — ASSESSMENT & PLAN NOTE
Ms Rico is a 78F with PMHx pertinent for chronic remote infarcts to left basal ganglia, left thalamus, and right cerebellum with residual left sided tremor and gait disturbance, diastolic HFpEF, aortic regurgitation s/p TAVR, CKD IIIa, menopause on HRT, graves disease, hx HTN, and HLD who [place] [facility] [place] referred by Dr Shin (Neurology) for outpt MRI revealing acute left cerebellar infarct. Per chart review, pt with worsening symptoms reported to outpt neurology on 7/1 though today states that she feels at baseline and denies acute changes/worsening of symptoms. NIHSS 1 for ataxia (baseline for LUE). CTA H/N negative for LVO/severe stenosis. Out of window for TNK. Admitted to vascular neurology for stroke work up.     Antithrombotics for secondary stroke prevention: Antiplatelets: Aspirin: 81 mg daily  Clopidogrel: 75 mg daily    Statins for secondary stroke prevention and hyperlipidemia, if present:   Statins: Atorvastatin- 80 mg daily  Substitute atorvastatin for home crestor.    Aggressive risk factor modification: HLD     Rehab efforts: The patient has been evaluated by a stroke team provider and the therapy needs have been fully considered based off the presenting complaints and exam findings. The following therapy evaluations are needed: PT evaluate and treat, OT evaluate and treat, SLP evaluate and treat    Diagnostics ordered/pending: None     VTE prophylaxis: Heparin 5000 units SQ every 8 hours    BP parameters: Infarct: No intervention, pt beyond window of permissive HTN. SBP goal <180

## 2024-07-18 NOTE — PT/OT/SLP EVAL
Occupational Therapy   Evaluation and Discharge Note    Name: Sarah Rico  MRN: [id number]  Admitting Diagnosis: Cerebellar stroke, acute  Recent Surgery: * No surgery found *      Recommendations:     Discharge Recommendations: No Therapy Indicated  Discharge Equipment Recommendations: none  Barriers to discharge:  None    Assessment:     aSrah Rico is a 78 y.o. female with a medical diagnosis of Cerebellar stroke, acute. At this time, patient is functioning at their prior level of function and does not require further acute OT services.     Plan:     During this hospitalization, patient does not require further acute OT services.  Please re-consult if situation changes.    Plan of Care Reviewed with: patient    Subjective     Chief Complaint: Patient wanting to DC   Patient/Family Comments/goals: DC     Occupational Profile:  Living Environment: Pt lives w/ spouse in a 1st floor condo w/ 3-4 JOSE L/RHR.   Previous level of function: Independent   Roles and Routines: Unknown   Equipment Used at home: grab bar, cane, quad  Assistance upon Discharge:      Pain/Comfort:  Pain Rating 1: 0/10    Patients cultural, spiritual, Nondenominational conflicts given the current situation: no    Objective:     Communicated with: RN prior to session.  Patient found supine with telemetry upon OT entry to room.    General Precautions: Standard, fall  Orthopedic Precautions: N/A  Braces: N/A  Respiratory Status: Room air     Occupational Performance:    Bed Mobility:    Patient completed Rolling/Turning to Left with  independence  Patient completed Scooting/Bridging with independence  Patient completed Supine to Sit with independence    Functional Mobility/Transfers:  Patient completed Sit <> Stand Transfer with independence  with  no assistive device   Functional Mobility: Patient completed 300+ feet of functional ambulation with SBA-CGA; patient with one LOB requiring assist for balance restoration.   Patient  ascended/descended 4 stairs with CGA     Activities of Daily Living:  Upper Body Dressing: independence to don gown as robe  Lower Body Dressing: independence to don socks   Toileting: independence to complete at commode     Cognitive/Visual Perceptual:  Cognitive/Psychosocial Skills:     -       Oriented to: Person, Place, Time, and Situation   -       Follows Commands/attention:Follows multistep  commands  -       Communication: clear/fluent  -       Safety awareness/insight to disability: intact   Visual/Perceptual:      -Intact      Physical Exam:  Sensation:    -       Intact  Upper Extremity Range of Motion:     -       Right Upper Extremity: WFL  -       Left Upper Extremity: WFL  Upper Extremity Strength:    -       Right Upper Extremity: WFL  -       Left Upper Extremity: WFL   Strength:    -       Right Upper Extremity: WFL  -       Left Upper Extremity: WFL  Fine Motor Coordination:    -       Intact    Treatment & Education:  Co-evaluation completed due to patient medical instability and to ensure patient safety. Provided education regarding role of OT, POC, & discharge recommendations.  Pt with no further questions/concerns at this time. OT provided education on home recommendations and fall prevention in preparation for D/C.     Patient left supine with all lines intact and call button in reach    GOALS:   Multidisciplinary Problems       Occupational Therapy Goals       Not on file              Multidisciplinary Problems (Resolved)          Problem: Occupational Therapy    Goal Priority Disciplines Outcome Interventions   Occupational Therapy Goal   (Resolved)     OT, PT/OT Met    Description: Goals to be met by: 7/19/24     Patient will increase functional independence with ADLs by performing:    Step transfer with Maximum Assistance                         History:     Past Medical History:   Diagnosis Date    Asthma     Ataxia     Cervical stenosis of spinal canal     Chronic heart failure with  preserved ejection fraction (HFpEF)     diastolic    Chronic sinusitis, unspecified     CKD (chronic kidney disease), stage III     Essential tremor     left hand    Graves disease     ESSENTIAL TREMORS    Herpes labialis     History of aortic stenosis     History of back surgery     Hormone replacement therapy (HRT)     Hyperlipidemia     Hypertension     Infection of spine     Menopause 1996    Pelvic floor dysfunction     s/p surgical correction 2018    Stress incontinence (female) (male)     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     Graves disease         Past Surgical History:   Procedure Laterality Date    AUGMENTATION OF BREAST Bilateral     BACK SURGERY      BALLOON SINUPLASTY OF PARANASAL SINUS Bilateral 08/23/2022    Procedure: SINUPLASTY, USING BALLOON  frontal ans sphenoid;  Surgeon: Samantha Ridley MD;  Location: [facility];  Service: ENT;  Laterality: Bilateral;    BREAST BIOPSY Right 08/29/2022    atypical lobular hyperplasia    CARDIAC VALVE REPLACEMENT  05/2016    - aortic valve stenosis / COW  VALVE    FUNCTIONAL ENDOSCOPIC SINUS SURGERY (FESS) USING COMPUTER-ASSISTED NAVIGATION N/A 08/23/2022    Procedure: FESS, USING COMPUTER-ASSISTED NAVIGATION - MAXILLARY, ETHMOIDS, FRONTAL;  Surgeon: Samantha Ridley MD;  Location: [facility];  Service: ENT;  Laterality: N/A;    HIP REPLACEMENT ARTHROPLASTY Left 2016    INSERTION OF MIDURETHRAL SLING N/A 11/01/2021    Procedure: SLING, MIDURETHRAL;  Surgeon: Hodan Goldberg MD;  Location: [facility];  Service: OB/GYN;  Laterality: N/A;    JOINT REPLACEMENT      LUMBAR FUSION      NASAL ENDOSCOPY N/A 08/23/2022    Procedure: ENDOSCOPY, NOSE - SPHENOIDOTOMY;  Surgeon: Samantha Ridley MD;  Location: [facility];  Service: ENT;  Laterality: N/A;       Time Tracking:     OT Date of Treatment: 07/18/24  OT Start Time: 0827  OT Stop Time: 0858  OT Total Time (min): 31 min    Billable Minutes:Evaluation 10  Self Care/Home Management 10  Therapeutic  Activity 10    7/18/2024

## 2024-07-18 NOTE — PT/OT/SLP EVAL
Speech Language Pathology Evaluation  Cognitive Communication/ Swallow eval/ Discharge Note  Patient Name:  Sarah Rico   MRN:  [id number]  Admitting Diagnosis: Cerebellar stroke, acute    Recommendations:     Recommendations:                General Recommendations:  Follow-up not indicated  Diet recommendations:  Regular Diet - IDDSI Level 7, Thin liquids - IDDSI Level 0   Aspiration Precautions: Standard aspiration precautions   General Precautions: Standard, fall  Communication strategies:  provide increased time to answer    Assessment:     Sarah Rico is a 78 y.o. female with an SLP diagnosis of  mild Cognitive-Linguistic Impairment.  She presents with mild delays and increased time needed for task.    History:     Past Medical History:   Diagnosis Date    Asthma     Ataxia     Cervical stenosis of spinal canal     Chronic heart failure with preserved ejection fraction (HFpEF)     diastolic    Chronic sinusitis, unspecified     CKD (chronic kidney disease), stage III     Essential tremor     left hand    Graves disease     ESSENTIAL TREMORS    Herpes labialis     History of aortic stenosis     History of back surgery     Hormone replacement therapy (HRT)     Hyperlipidemia     Hypertension     Infection of spine     Menopause 1996    Pelvic floor dysfunction     s/p surgical correction 2018    Stress incontinence (female) (male)     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     Graves disease       Past Surgical History:   Procedure Laterality Date    AUGMENTATION OF BREAST Bilateral     BACK SURGERY      BALLOON SINUPLASTY OF PARANASAL SINUS Bilateral 08/23/2022    Procedure: SINUPLASTY, USING BALLOON  frontal ans sphenoid;  Surgeon: Samantha Ridley MD;  Location: [facility];  Service: ENT;  Laterality: Bilateral;    BREAST BIOPSY Right 08/29/2022    atypical lobular hyperplasia    CARDIAC VALVE REPLACEMENT  05/2016    - aortic valve stenosis / COW  VALVE    FUNCTIONAL ENDOSCOPIC  "SINUS SURGERY (FESS) USING COMPUTER-ASSISTED NAVIGATION N/A 08/23/2022    Procedure: FESS, USING COMPUTER-ASSISTED NAVIGATION - MAXILLARY, ETHMOIDS, FRONTAL;  Surgeon: Samantha Ridley MD;  Location: [facility];  Service: ENT;  Laterality: N/A;    HIP REPLACEMENT ARTHROPLASTY Left 2016    INSERTION OF MIDURETHRAL SLING N/A 11/01/2021    Procedure: SLING, MIDURETHRAL;  Surgeon: Hodan Goldberg MD;  Location: [facility];  Service: OB/GYN;  Laterality: N/A;    JOINT REPLACEMENT      LUMBAR FUSION      NASAL ENDOSCOPY N/A 08/23/2022    Procedure: ENDOSCOPY, NOSE - SPHENOIDOTOMY;  Surgeon: Samantha Ridley MD;  Location: [facility];  Service: ENT;  Laterality: N/A;       Social History: Patient lives with her .    Prior Intubation HX:  n/a    Modified Barium Swallow: n/a    Prior diet: regular/thin.    Occupation/hobbies/homemaking: retired.      Subjective     " My  says I have been slow" " I don't think I have cognitive problems"  Patient goals: to go home     Pain/Comfort:  Pain Rating 1: 0/10  Pain Rating Post-Intervention 1: 0/10    Respiratory Status: Room air    Objective:     Cognitive Status:    Pt ox3 with good recall of recent events. She recalled up to 5 digits and 4 words immediately. After a delay, she recalled 0/3 words and with cues 2/3 words. Verbal responses to hypothetical problem were accurate and pt able to generate multiple solutions. She compared/contrasted items and completed concrete categorizational task with 100% acc. Pt needed min cues for abstract categorizational tasks. She sequenced up to 4 items given increased time. She named 7 items during word fluency task when 15 is wfl. Mild delays in responding.       Receptive Language:   Comprehension:   Pt responded to complex y/n questions with 100% acc. She followed complex commands given repetition and increased time.     Pragmatics:    wfl    Expressive Language:  Verbal:    Pt able to express her basic wants and needs without " "difficulty.       Motor Speech:  WFL    Voice:   WFL    Visual-Spatial:  WFL    Reading:   WFL     Written Expression:   WFL         Oral Musculature Evaluation  Oral Musculature: WFL  Dentition: present and adequate  Mandibular Strength and Mobility: WFL  Oral Labial Strength and Mobility: WFL  Lingual Strength and Mobility: WFL  Voice Prior to PO Intake: clear    Bedside Swallow Eval:   Consistencies Assessed:  Thin liquids cup sips  Solids cracker      Oral Phase:   WFL    Pharyngeal Phase:   no overt clinical signs/symptoms of pharyngeal dysphagia    Compensatory Strategies  None    Treatment: Pt seen sitting up in a chair with no family present. Nursing cleared pt for session. Pt denied any changes in her cognition. She reports that her  stated she has been getting "slower" over the past year but stated that it is not her cognition. Pt noted to have mild cognitive deficits. Discussed option of outpt speech tx but pt reported she did not not need tx and was not interested. Discussed that she can always ask for speech tx orders from her primary doctor after discharge. Pt expressed understanding.     Goals:   Multidisciplinary Problems       SLP Goals          Problem: SLP    Goal Priority Disciplines Outcome   SLP Goal     SLP                        Plan:     Patient to be seen:      Plan of Care expires:     Plan of Care reviewed with:  patient   SLP Follow-Up:  No       Discharge recommendations:  No Therapy Indicated   Barriers to Discharge:  None      Time Tracking:     SLP Treatment Date:   07/18/24  Speech Start Time:  0933  Speech Stop Time:  0950     Speech Total Time (min):  17 min    Billable Minutes: Eval 9  and Eval Swallow and Oral Function 8    07/18/2024       "

## 2024-07-19 ENCOUNTER — PATIENT OUTREACH (OUTPATIENT)
Dept: ADMINISTRATIVE | Facility: CLINIC | Age: 79
End: 2024-07-19
Payer: MEDICARE

## 2024-07-24 ENCOUNTER — PATIENT OUTREACH (OUTPATIENT)
Dept: EMERGENCY MEDICINE | Facility: HOSPITAL | Age: 79
End: 2024-07-24

## 2024-07-25 ENCOUNTER — OFFICE VISIT (OUTPATIENT)
Dept: NEUROLOGY | Facility: CLINIC | Age: 79
End: 2024-07-25
Payer: MEDICARE

## 2024-07-25 DIAGNOSIS — R41.3 OTHER AMNESIA: Primary | ICD-10-CM

## 2024-07-25 DIAGNOSIS — G25.0 ESSENTIAL TREMOR: ICD-10-CM

## 2024-07-25 DIAGNOSIS — M48.02 CERVICAL SPINAL STENOSIS: ICD-10-CM

## 2024-07-25 DIAGNOSIS — R42 VERTIGO: ICD-10-CM

## 2024-07-25 PROCEDURE — 99214 OFFICE O/P EST MOD 30 MIN: CPT | Mod: S$PBB,,, | Performed by: PSYCHIATRY & NEUROLOGY

## 2024-07-25 PROCEDURE — G2211 COMPLEX E/M VISIT ADD ON: HCPCS | Mod: S$PBB,,, | Performed by: PSYCHIATRY & NEUROLOGY

## 2024-07-25 RX ORDER — DONEPEZIL HYDROCHLORIDE 10 MG/1
TABLET, FILM COATED ORAL
Qty: 30 TABLET | Refills: 11 | Status: SHIPPED | OUTPATIENT
Start: 2024-07-25

## 2024-07-25 NOTE — PROGRESS NOTES
"Name: Sarah Rico  MRN: [id number]   CSN: [id number]            Chief Complaint: tremor    Interval History:  - here again before trip  - had last visit postponed due to acute strokes on MRI, admitted and reversible cuases managed  - on max medical therapy now  - has more stable cognition and tremor, but starting to recognize more gaps herself  - willing to take medication for memory    - reviewed NP results (Dr. Short), [facility] noted      From 7/1/2024   - here for urgent visit  - has worsened significantly in recent weeks  - in ER last week with fall and head trauma, negative HCT for acute - but clear new stroke(s) since the last MRI from 2022  -  helps with the history, has had more forgetfulness in general, "conflating" events, poor recall of events  - is also having some issues with recognition of things in front of her (gives examples of not recognizing jey slow with expecting tartart   - still driving, mostly to Gym and around town - says she doesn't want to give up driving yet          From Dec 2023:  - hand tremor is worse, head tremor is worse (mostly yes yes)  - more imbalance, had a bad fall and hit face about 2 months ago (PT helped with balance in the past)  - no other issues with [employer] recently   - prefer no new drugs for now  - willing ot do pt  - discussed amantadine and ldopa    From April 2023:  - trial of topamax last visit   - did not tolerate due to side effects   - no falls   - did PT for imbalance     - on propranolol 120 mg daily   - got better with PT from a balance standpoint, vestibular rehab   - still incorporates balance exercises at home   - tremor is affecting qol but living with it     From Dec 2022  - intolerable side effects with primidone, now off of it    - referral to nsgy with cervical spine ddd, held off on seeing them   - brain mri  "Left caudate and left thalamic remote lacunar type infarcts.  Left thalamic and right cerebellar remote microhemorrhages may " "be hypertensive in etiology.  Bilateral tiny cerebellar remote infarcts. "  - on propranolol 120 mg daily   - gabapentin caused nausea in the past   - does epley at home for BPPV   - denies history of kidney stones or glaucoma   - tremor affects her whenever shes eating, holding a plate or glass  - no improvement with EtOH       From Sept 2022: Sarah Rico is a R HANDED 78 y.o. female with a medical issues significant for essential tremor, lumbar radiculopathy, CKDIII, graves disease, HTN, HLD, who presents for tremors. Accompanied by spouse. Currently on propranolol 120 mg capsule XR 1 capsule BID. This does not cause lightheaded or dizziness. Previously on atenolol but didn't feel this helped, switched to propranolol. Helps somewhat. Tremor is in her left hand, present for about 8 years, around the same time she was diagnosed with graves disease. Whenever she is almost due for her next dose of propranolol, tremors are worse. She has not tried any other medications for the tremor. Only in the left hand, no tremor in the right hand. Gradual onset 8 years ago, noticeable after a few months. Head tremor, involuntary, yes-yes. Tremor with posture and action, no tremor at rest. Had back surgery ( fusion from T10 to sacrum) 5 years ago in 2017, took a while for her to get balance back. Now able to stand on one leg but still with some difficulty. No falls. Denies shuffling. She doesn't think that she walks great. Tends to veer to the left or right with longer distances. Has a hard time keeping up with other people, this started after her back surgery.   Thinks head tremor stops whenever she lies down in bed.  notices it in certain positions. She has noticed that she walks with a wider based to keep her balance.     She had cervical MRI in 2018/2019.     If she turns to the left, gets vertigo. Improves with epley maneuver.     She has not noticed that tremor improves with etoh. Does not drink much. "     Family History: grandmother had a head tremor, mother possibly had tremor in hands?     Neuroleptic Exposure: none     Nonmotor/Premotor ROS:  Anosmia: hyposmia -- attributes to allergies   Dysarthria/Hypophonia: more hoarse   Dysphagia/Sialorrhea: none   Depression: none   Cognitive slowing: infrequently forgets names   Hallucinations: none   Urinary changes: history of incontinence for many years (at least 7), follows with Dr. Goldberg  Constipation: none   Orthostasis:    Falls: none   Micrographia: none  Sleep issues:  -RBD: screams in sleep       Review of Systems:   Review of Systems   Constitutional:  Negative for chills, fever and malaise/fatigue.   HENT:  Negative for hearing loss.    Eyes:  Negative for blurred vision and double vision.   Respiratory:  Negative for cough, shortness of breath and stridor.    Cardiovascular:  Negative for chest pain and leg swelling.   Gastrointestinal:  Negative for constipation, diarrhea and nausea.   Genitourinary:  Negative for frequency and urgency.   Musculoskeletal:  Negative for falls.   Skin:  Negative for itching and rash.   Neurological:  Positive for tremors. Negative for dizziness, loss of consciousness and weakness.   Psychiatric/Behavioral:  Negative for hallucinations and memory loss.        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    Social History: The patient  reports that she quit smoking about 38 years ago. Her smoking use  included cigarettes. She started smoking about 61 years ago. She has a 23 pack-year smoking history. She has never used smokeless tobacco. She reports that she does not currently use alcohol after a past usage of about 2.0 standard drinks of alcohol per week. She reports that she does not use drugs.    Family History: Their family history includes Colon cancer in her father; Hypothyroidism in her sister; Leukemia in her mother.    Allergies: Penicillins     Meds:   Current Outpatient Medications on File Prior to Visit   Medication Sig Dispense Refill    ASCORBATE CALCIUM, VITAMIN C, ORAL Take 500 mg by mouth.      aspirin (ECOTRIN) 81 MG EC tablet Take 81 mg by mouth once daily.      cholecalciferol, vitamin D3, (VITAMIN D3) 50 mcg (2,000 unit) Tab Take 2,000 Units by mouth.      clopidogreL (PLAVIX) 75 mg tablet Take 1 tablet (75 mg total) by mouth once daily. for 19 days 19 tablet 0    clotrimazole (LOTRIMIN) 1 % cream APPLY TOPICALLY 2 (TWO) TIMES DAILY. TO AFFECTED TOENAILS. 45 g 1    COMPOUND HORMONE REPLACEMENT Take by mouth once daily. ESTROGEN CREAM -- MED [facility]      estradioL (ESTRACE) 2 MG tablet Take 1 tablet (2 mg total) by mouth once daily. 90 tablet 3    MAGNESIUM CARBONATE ORAL Take 1 tablet by mouth once daily. 200MG      PREMARIN vaginal cream PLACE 0.5 G VAGINALLY ONCE DAILY. 90 g 0    propranoloL (INDERAL LA) 120 MG 24 hr capsule TAKE 1 CAPSULE BY MOUTH ONCE DAILY 90 capsule 3    rosuvastatin (CRESTOR) 40 MG Tab TAKE 1 TABLET BY MOUTH EVERY DAY 90 tablet 3    UNABLE TO FIND medication name: Testosterone Cream in versabase 20mg/mL Apply 1/4mL to labia minora nightly 22.5 mL 1    valACYclovir (VALTREX) 1000 MG tablet valacyclovir 1 gram tablet      XOLAIR 300 mg/2 mL Syrg Inject into the skin.       No current facility-administered medications on file prior to visit.       Exam:  LMP  (LMP Unknown) Comment:    1996    * Deep tendon reflexes  3+ and symmetric throughout   Abnormal jaw  jerk    Left > R gomes    Babinski equivocal on the R, possibly upgoing on the R?    * Specialized movement exam  No hypophonic speech.    No facial masking.   No cogwheel rigidity.     No bradykinesia.   Resting tremor of L hand, dystonic posturing    Tremor worse in L hand in wing-beating position    Overshoot with L hand    Intermittent tremor of R pinky as well    No other dystonia, chorea, athetosis, myoclonus, or tics.   No motor impersistence.   No shortened stride length.   Spastic/ataxic gait? L foot inversion, some circumduction, some improvement with walking backwards ? Dystonic?    No postural instability.   Yes-yes head tremor, titubation      Laboratory/Radiological:  - Results:  Admission on 07/17/2024, Discharged on 07/18/2024   Component Date Value Ref Range Status    Cholesterol 07/17/2024 148  120 - 199 mg/dL Final    Triglycerides 07/17/2024 54  30 - 150 mg/dL Final    HDL 07/17/2024 65  40 - 75 mg/dL Final    LDL Cholesterol 07/17/2024 72.2  63.0 - 159.0 mg/dL Final    HDL/Cholesterol Ratio 07/17/2024 43.9  20.0 - 50.0 % Final    Total Cholesterol/HDL Ratio 07/17/2024 2.3  2.0 - 5.0 Final    Non-HDL Cholesterol 07/17/2024 83  mg/dL Final    RA Width 07/18/2024 3.37  cm Final    LA volume (mod) 07/18/2024 54.51  cm3 Final    Left Atrium Major Axis 07/18/2024 5.94  cm Final    Left Atrium Minor Axis 07/18/2024 5.70  cm Final    RA Major Axis 07/18/2024 4.58  cm Final    LV Diastolic Volume 07/18/2024 52.28  mL Final    LV Systolic Volume 07/18/2024 22.23  mL Final    MV Peak A Arnold 07/18/2024 1.45  m/s Final    MV stenosis pressure 1/2 time 07/18/2024 158.12  ms Final    TR Max Arnold 07/18/2024 2.23  m/s Final    MV Peak E Arnold 07/18/2024 1.27  m/s Final    Ao VTI 07/18/2024 47.54  cm Final    Ao peak arnold 07/18/2024 2.23  m/s Final    LVOT peak VTI 07/18/2024 25.26  cm Final    LVOT peak arnold 07/18/2024 1.04  m/s Final    LVOT diameter 07/18/2024 2.03  cm Final    E wave deceleration time 07/18/2024  545.24  msec Final    AV mean gradient 07/18/2024 11  mmHg Final    RV- marquez basal diam 07/18/2024 3.7  cm Final    TAPSE 07/18/2024 2.08  cm Final    LA size 07/18/2024 3.81  cm Final    Ascending aorta 07/18/2024 3.27  cm Final    STJ 07/18/2024 1.63  cm Final    Sinus 07/18/2024 2.70  cm Final    LVIDs 07/18/2024 2.50  2.1 - 4.0 cm Final    Posterior Wall 07/18/2024 0.81  0.6 - 1.1 cm Final    IVS 07/18/2024 1.06  0.6 - 1.1 cm Final    LVIDd 07/18/2024 3.54  3.5 - 6.0 cm Final    TDI LATERAL 07/18/2024 0.06  m/s Final    LA WIDTH 07/18/2024 3.31  cm Final    TDI SEPTAL 07/18/2024 0.04  m/s Final    LV LATERAL E/E' RATIO 07/18/2024 21.17  m/s Final    LV SEPTAL E/E' RATIO 07/18/2024 31.75  m/s Final    FS 07/18/2024 29  28 - 44 % Final    LA volume 07/18/2024 62.36  cm3 Final    LV mass 07/18/2024 95.43  g Final    ZLVIDD 07/18/2024 -1.82   Final    ZLVIDS 07/18/2024 -0.42   Final    Left Ventricle Relative Wall Thick* 07/18/2024 0.46  cm Final    AV valve area 07/18/2024 1.72  cm² Final    AV Velocity Ratio 07/18/2024 0.47   Final    AV index (prosthetic) 07/18/2024 0.53   Final    MV valve area p 1/2 method 07/18/2024 1.39  cm2 Final    E/A ratio 07/18/2024 0.88   Final    Mean e' 07/18/2024 0.05  m/s Final    LVOT area 07/18/2024 3.2  cm2 Final    LVOT stroke volume 07/18/2024 81.71  cm3 Final    AV peak gradient 07/18/2024 20  mmHg Final    E/E' ratio 07/18/2024 25.40  m/s Final    LV Systolic Volume Index 07/18/2024 17.8  mL/m2 Final    LV Diastolic Volume Index 07/18/2024 41.82  mL/m2 Final    LA Volume Index 07/18/2024 49.9  mL/m2 Final    LV Mass Index 07/18/2024 76  g/m2 Final    Triscuspid Valve Regurgitation Pea* 07/18/2024 20  mmHg Final    LA Volume Index (Mod) 07/18/2024 43.6  mL/m2 Final    KENN by Velocity Ratio 07/18/2024 1.51  cm² Final    BSA 07/18/2024 1.32  m2 Final    TV resting pulmonary artery pressu* 07/18/2024 23  mmHg Final    RV TB RVSP 07/18/2024 5  mmHg Final    Est. RA pres 07/18/2024  3  mmHg Final    Mitral Valve Heart Rate 07/18/2024 76  bpm Final    MV mean gradient 07/18/2024 5  mmHg Final    Hemoglobin A1C 07/17/2024 5.2  4.0 - 5.6 % Final    Estimated Avg Glucose 07/17/2024 103  68 - 131 mg/dL Final    Sodium 07/18/2024 134 (L)  136 - 145 mmol/L Final    Potassium 07/18/2024 3.6  3.5 - 5.1 mmol/L Final    Chloride 07/18/2024 105  95 - 110 mmol/L Final    CO2 07/18/2024 20 (L)  23 - 29 mmol/L Final    Glucose 07/18/2024 91  70 - 110 mg/dL Final    BUN 07/18/2024 17  8 - 23 mg/dL Final    Creatinine 07/18/2024 0.8  0.5 - 1.4 mg/dL Final    Calcium 07/18/2024 8.6 (L)  8.7 - 10.5 mg/dL Final    Total Protein 07/18/2024 5.9 (L)  6.0 - 8.4 g/dL Final    Albumin 07/18/2024 3.4 (L)  3.5 - 5.2 g/dL Final    Total Bilirubin 07/18/2024 0.5  0.1 - 1.0 mg/dL Final    Alkaline Phosphatase 07/18/2024 28 (L)  55 - 135 U/L Final    AST 07/18/2024 17  10 - 40 U/L Final    ALT 07/18/2024 14  10 - 44 U/L Final    eGFR 07/18/2024 >60.0  >60 mL/min/1.73 m^2 Final    Anion Gap 07/18/2024 9  8 - 16 mmol/L Final    Magnesium 07/18/2024 1.8  1.6 - 2.6 mg/dL Final    Phosphorus 07/18/2024 4.0  2.7 - 4.5 mg/dL Final    WBC 07/18/2024 4.12  3.90 - 12.70 K/uL Final    RBC 07/18/2024 3.57 (L)  4.00 - 5.40 M/uL Final    Hemoglobin 07/18/2024 11.5 (L)  12.0 - 16.0 g/dL Final    Hematocrit 07/18/2024 33.2 (L)  37.0 - 48.5 % Final    MCV 07/18/2024 93  82 - 98 fL Final    MCH 07/18/2024 32.2 (H)  27.0 - 31.0 pg Final    MCHC 07/18/2024 34.6  32.0 - 36.0 g/dL Final    RDW 07/18/2024 13.4  11.5 - 14.5 % Final    Platelets 07/18/2024 176  150 - 450 K/uL Final    MPV 07/18/2024 9.6  9.2 - 12.9 fL Final    Immature Granulocytes 07/18/2024 0.0  0.0 - 0.5 % Final    Gran # (ANC) 07/18/2024 2.6  1.8 - 7.7 K/uL Final    Immature Grans (Abs) 07/18/2024 0.00  0.00 - 0.04 K/uL Final    Lymph # 07/18/2024 0.9 (L)  1.0 - 4.8 K/uL Final    Mono # 07/18/2024 0.5  0.3 - 1.0 K/uL Final    Eos # 07/18/2024 0.2  0.0 - 0.5 K/uL Final    Baso #  07/18/2024 0.04  0.00 - 0.20 K/uL Final    nRBC 07/18/2024 0  0 /100 WBC Final    Gran % 07/18/2024 61.9  38.0 - 73.0 % Final    Lymph % 07/18/2024 20.6  18.0 - 48.0 % Final    Mono % 07/18/2024 11.4  4.0 - 15.0 % Final    Eosinophil % 07/18/2024 5.1  0.0 - 8.0 % Final    Basophil % 07/18/2024 1.0  0.0 - 1.9 % Final    Differential Method 07/18/2024 Automated   Final    aPTT 07/18/2024 29.1  21.0 - 32.0 sec Final    Prothrombin Time 07/18/2024 10.3  9.0 - 12.5 sec Final    INR 07/18/2024 0.9  0.8 - 1.2 Final   Orders Only on 07/17/2024   Component Date Value Ref Range Status    QRS Duration 07/17/2024 74  ms Final    [facility] QTC Calculation 07/17/2024 457  ms Final   Lab Visit on 07/17/2024   Component Date Value Ref Range Status    Vitamin B-12 07/17/2024 594  180 - 914 ng/L Final    Thiamine 07/17/2024 79  38 - 122 ug/L Final    Homocysteine 07/17/2024 <1.0 (L)  4.0 - 15.5 umol/L Final    Methlymalonic Acid 07/17/2024 0.25  <0.40 umol/L Final    WBC 07/17/2024 5.69  3.90 - 12.70 K/uL Final    RBC 07/17/2024 3.84 (L)  4.00 - 5.40 M/uL Final    Hemoglobin 07/17/2024 11.9 (L)  12.0 - 16.0 g/dL Final    Hematocrit 07/17/2024 36.6 (L)  37.0 - 48.5 % Final    MCV 07/17/2024 95  82 - 98 fL Final    MCH 07/17/2024 31.0  27.0 - 31.0 pg Final    MCHC 07/17/2024 32.5  32.0 - 36.0 g/dL Final    RDW 07/17/2024 13.5  11.5 - 14.5 % Final    Platelets 07/17/2024 205  150 - 450 K/uL Final    MPV 07/17/2024 9.4  9.2 - 12.9 fL Final    Immature Granulocytes 07/17/2024 0.4  0.0 - 0.5 % Final    Gran # (ANC) 07/17/2024 3.9  1.8 - 7.7 K/uL Final    Immature Grans (Abs) 07/17/2024 0.02  0.00 - 0.04 K/uL Final    Lymph # 07/17/2024 0.8 (L)  1.0 - 4.8 K/uL Final    Mono # 07/17/2024 0.6  0.3 - 1.0 K/uL Final    Eos # 07/17/2024 0.3  0.0 - 0.5 K/uL Final    Baso # 07/17/2024 0.06  0.00 - 0.20 K/uL Final    nRBC 07/17/2024 0  0 /100 WBC Final    Gran % 07/17/2024 68.6  38.0 - 73.0 % Final    Lymph % 07/17/2024 14.8 (L)  18.0 - 48.0 % Final     Mono % 07/17/2024 10.5  4.0 - 15.0 % Final    Eosinophil % 07/17/2024 4.6  0.0 - 8.0 % Final    Basophil % 07/17/2024 1.1  0.0 - 1.9 % Final    Differential Method 07/17/2024 Automated   Final    Sodium 07/17/2024 135 (L)  136 - 145 mmol/L Final    Potassium 07/17/2024 4.2  3.5 - 5.1 mmol/L Final    Chloride 07/17/2024 104  95 - 110 mmol/L Final    CO2 07/17/2024 23  23 - 29 mmol/L Final    Glucose 07/17/2024 105  70 - 110 mg/dL Final    BUN 07/17/2024 16  8 - 23 mg/dL Final    Creatinine 07/17/2024 1.0  0.5 - 1.4 mg/dL Final    Calcium 07/17/2024 9.2  8.7 - 10.5 mg/dL Final    Total Protein 07/17/2024 7.1  6.0 - 8.4 g/dL Final    Albumin 07/17/2024 4.0  3.5 - 5.2 g/dL Final    Total Bilirubin 07/17/2024 0.5  0.1 - 1.0 mg/dL Final    Alkaline Phosphatase 07/17/2024 32 (L)  55 - 135 U/L Final    AST 07/17/2024 21  10 - 40 U/L Final    ALT 07/17/2024 22  10 - 44 U/L Final    eGFR 07/17/2024 57 (A)  >60 mL/min/1.73 m^2 Final    Anion Gap 07/17/2024 8  8 - 16 mmol/L Final    Ammonia 07/17/2024 20  10 - 50 umol/L Final    TSH 07/17/2024 1.224  0.400 - 4.000 uIU/mL Final   Lab Visit on 05/09/2024   Component Date Value Ref Range Status    Specimen UA 05/09/2024 Urine, Unspecified   Final    Color, UA 05/09/2024 Yellow  Yellow, Straw, Katie Final    Appearance, UA 05/09/2024 Hazy (A)  Clear Final    pH, UA 05/09/2024 6.0  5.0 - 8.0 Final    Specific Gravity, UA 05/09/2024 1.025  1.005 - 1.030 Final    Protein, UA 05/09/2024 Trace (A)  Negative Final    Glucose, UA 05/09/2024 Negative  Negative Final    Ketones, UA 05/09/2024 Negative  Negative Final    Bilirubin (UA) 05/09/2024 Negative  Negative Final    Occult Blood UA 05/09/2024 Negative  Negative Final    Nitrite, UA 05/09/2024 Negative  Negative Final    Leukocytes, UA 05/09/2024 Negative  Negative Final   Lab Visit on 05/09/2024   Component Date Value Ref Range Status    Hepatitis C Ab 05/09/2024 Non-reactive  Non-reactive Final    WBC 05/09/2024 6.18  3.90 - 12.70  K/uL Final    RBC 05/09/2024 4.22  4.00 - 5.40 M/uL Final    Hemoglobin 05/09/2024 13.2  12.0 - 16.0 g/dL Final    Hematocrit 05/09/2024 38.9  37.0 - 48.5 % Final    MCV 05/09/2024 92  82 - 98 fL Final    MCH 05/09/2024 31.3 (H)  27.0 - 31.0 pg Final    MCHC 05/09/2024 33.9  32.0 - 36.0 g/dL Final    RDW 05/09/2024 12.9  11.5 - 14.5 % Final    Platelets 05/09/2024 231  150 - 450 K/uL Final    MPV 05/09/2024 9.4  9.2 - 12.9 fL Final    Immature Granulocytes 05/09/2024 0.3  0.0 - 0.5 % Final    Gran # (ANC) 05/09/2024 4.4  1.8 - 7.7 K/uL Final    Immature Grans (Abs) 05/09/2024 0.02  0.00 - 0.04 K/uL Final    Lymph # 05/09/2024 0.9 (L)  1.0 - 4.8 K/uL Final    Mono # 05/09/2024 0.6  0.3 - 1.0 K/uL Final    Eos # 05/09/2024 0.2  0.0 - 0.5 K/uL Final    Baso # 05/09/2024 0.05  0.00 - 0.20 K/uL Final    nRBC 05/09/2024 0  0 /100 WBC Final    Gran % 05/09/2024 71.2  38.0 - 73.0 % Final    Lymph % 05/09/2024 15.0 (L)  18.0 - 48.0 % Final    Mono % 05/09/2024 9.9  4.0 - 15.0 % Final    Eosinophil % 05/09/2024 2.8  0.0 - 8.0 % Final    Basophil % 05/09/2024 0.8  0.0 - 1.9 % Final    Differential Method 05/09/2024 Automated   Final    Sodium 05/09/2024 135 (L)  136 - 145 mmol/L Final    Potassium 05/09/2024 4.6  3.5 - 5.1 mmol/L Final    Chloride 05/09/2024 102  95 - 110 mmol/L Final    CO2 05/09/2024 24  23 - 29 mmol/L Final    Glucose 05/09/2024 82  70 - 110 mg/dL Final    BUN 05/09/2024 24 (H)  8 - 23 mg/dL Final    Creatinine 05/09/2024 1.0  0.5 - 1.4 mg/dL Final    Calcium 05/09/2024 9.7  8.7 - 10.5 mg/dL Final    Total Protein 05/09/2024 7.4  6.0 - 8.4 g/dL Final    Albumin 05/09/2024 4.2  3.5 - 5.2 g/dL Final    Total Bilirubin 05/09/2024 0.6  0.1 - 1.0 mg/dL Final    Alkaline Phosphatase 05/09/2024 31 (L)  55 - 135 U/L Final    AST 05/09/2024 24  10 - 40 U/L Final    ALT 05/09/2024 22  10 - 44 U/L Final    eGFR 05/09/2024 57.3 (A)  >60 mL/min/1.73 m^2 Final    Anion Gap 05/09/2024 9  8 - 16 mmol/L Final       -  Independent review of images:      - Independent review of consultant's notes: Lety    ASSESSMENT/PLAN:  Tremor   - currently on propranolol 120 mg XR BID with minimal to mild improvement in tremor (would expect significantly more improvement with this high of a dose of propranolol)  - did not tolerate primidone   - some dystonic component -- dystonic head tremor, mild dystonic posturing of L hand and inversion of L foot. Asymmetric tremor, no MRI findings to indicate cerebellar or thalamic outflow tremor -- could consider dystonic tremor med, amantadine vs baclofen   - failed topamax, primidone and gabapentin   - no new meds       2. Gait abnormality  - has been attributed to lumbar DDD in the past however gait is not classic of lumbar DDD  - PT    3. Cervical spinal stenosis   - continue to monitor gait changes   - PT now     4. New issue of cognitive impairment.  - added donepezil, MCI range BUT strong support at home    5. New issue of stroke  - max med therapy as she is on  - PT/OT          Brian Shin MD, MPH  Division of Movement and Memory Disorders  [facility]    This is a patient with a serious and complex neurologic diagnosis whose overall, ongoing care is being managed and monitored by me and our Neurology clinic.   As such, since 2024,  is the appropriate add-on code to accompany the other E/M billing for this visit.    Orders Placed This Encounter    donepeziL (ARICEPT) 10 MG tablet

## 2024-07-26 ENCOUNTER — PATIENT MESSAGE (OUTPATIENT)
Dept: NEUROLOGY | Facility: HOSPITAL | Age: 79
End: 2024-07-26
Payer: MEDICARE

## 2024-07-29 ENCOUNTER — OFFICE VISIT (OUTPATIENT)
Dept: PODIATRY | Facility: CLINIC | Age: 79
End: 2024-07-29
Payer: MEDICARE

## 2024-07-29 VITALS
BODY MASS INDEX: 26.18 KG/M2 | HEIGHT: 55 IN | SYSTOLIC BLOOD PRESSURE: 130 MMHG | WEIGHT: 113.13 LBS | DIASTOLIC BLOOD PRESSURE: 80 MMHG | HEART RATE: 64 BPM

## 2024-07-29 DIAGNOSIS — L90.9 PLANTAR FAT PAD ATROPHY: Chronic | ICD-10-CM

## 2024-07-29 DIAGNOSIS — N18.31 STAGE 3A CHRONIC KIDNEY DISEASE: ICD-10-CM

## 2024-07-29 DIAGNOSIS — M77.42 METATARSALGIA OF BOTH FEET: Primary | Chronic | ICD-10-CM

## 2024-07-29 DIAGNOSIS — L84 CORN OR CALLUS: ICD-10-CM

## 2024-07-29 DIAGNOSIS — M77.41 METATARSALGIA OF BOTH FEET: Primary | Chronic | ICD-10-CM

## 2024-07-29 DIAGNOSIS — Z98.890 HISTORY OF FOOT SURGERY: ICD-10-CM

## 2024-07-29 PROCEDURE — 99212 OFFICE O/P EST SF 10 MIN: CPT | Mod: 25,S$PBB,, | Performed by: PODIATRIST

## 2024-07-29 PROCEDURE — 99999 PR PBB SHADOW E&M-EST. PATIENT-LVL III: CPT | Mod: PBBFAC,,, | Performed by: PODIATRIST

## 2024-07-29 PROCEDURE — 99213 OFFICE O/P EST LOW 20 MIN: CPT | Mod: PBBFAC,PN,25 | Performed by: PODIATRIST

## 2024-07-29 PROCEDURE — 11719 TRIM NAIL(S) ANY NUMBER: CPT | Mod: Q9,59,S$PBB, | Performed by: PODIATRIST

## 2024-07-29 PROCEDURE — 11056 PARNG/CUTG B9 HYPRKR LES 2-4: CPT | Mod: Q9,PBBFAC,PN | Performed by: PODIATRIST

## 2024-07-29 RX ORDER — SULFAMETHOXAZOLE AND TRIMETHOPRIM 800; 160 MG/1; MG/1
1 TABLET ORAL
COMMUNITY
Start: 2024-07-09

## 2024-07-29 NOTE — PROGRESS NOTES
"PODIATRY NOTE       CHIEF CONCERN:   Callouses (Foot Exam Callouses (Stage 3a chronic kidney disease))         MEDICAL DECISION MAKING:      Problem List Items Addressed This Visit       Stage 3a chronic kidney disease    Relevant Orders    Routine Foot Care     Other Visit Diagnoses       Metatarsalgia of both feet  (Chronic)   -  Primary    Plantar fat pad atrophy  (Chronic)       History of foot surgery        Corn or callus        Relevant Orders    Routine Foot Care              I counseled the patient on the patient's conditions, their implications and medical management.    Shoe and activity modification as needed for relief.  Continue extra depth shoes to accommodate hammertoes.  No barefoot walking.   Continue budin splint for hammertoe as needed.  Offloading pads as needed.    Continue urea cream daily to calluses.     Call or return to clinic prn if these symptoms worsen or fail to improve as anticipated.       Routine Foot Care    Date/Time: 7/29/2024 2:30 PM    Performed by: Merary Roblero DPM  Authorized by: Merary Roblero DPM    Time out: Immediately prior to procedure a "time out" was called to verify the correct patient, procedure, equipment, support staff and site/side marked as required.    Hyperkeratotic Skin Lesions?: Yes    Number of trimmed lesions:  2  Location(s):  Left 2nd Metatarsal Head and Right 2nd Metatarsal Head    Nail Care Type:  Trim (no nails trimmed)  Patient tolerance:  Patient tolerated the procedure well with no immediate complications     With patient's permission,  Utilizing a #15 scalpel, I trimmed the corns and calluses at the above mentioned location.      The patient will continue to monitor the areas daily, inspect the feet, wear protective shoe gear when ambulatory, and moisturizer to maintain skin integrity.        HISTORY OF PRESENT ILLNESS:  Sarah Rico is a 78 y.o. female, with history of CKD,  with concerns regarding: painful calluses sub metatarsal " heads, chronic.      She has history of bunionectomy bilateral.   Two surgeries on the right and one left foot surgery in [place].   left 2nd toe overlaps the left hallux.  Using paddings to offload calluses.   She gets routine salon pedicure.      PCP:   Charo Lane MD   Last encounter:  upcoming appointment 8/6/2024        Patient Active Problem List   Diagnosis    Stress incontinence of urine    Voiding dysfunction    Rectocele, female    Chronic constipation    Vaginal atrophy    Graves' disease    Stage 3a chronic kidney disease    Hyperlipidemia    Essential tremor    Essential hypertension    Insomnia    S/P TAVR (transcatheter aortic valve replacement)    Lumbar radiculopathy    S/P hip replacement    Pelvic floor weakness    Decreased functional mobility and endurance    Disorder of muscle    Preoperative cardiovascular examination    Status post anterior & posterior colporrhaphy w/ MUS 11/1    Incomplete bladder emptying    Pelvic floor tension    Pelvic floor dysfunction    Chronic pansinusitis    Atypical lobular hyperplasia (ALH) of right breast    Dyspareunia due to medical condition in female    Vertigo    BPPV (benign paroxysmal positional vertigo)    Aortic atherosclerosis    Cervical spinal stenosis    Ataxia    Chronic diastolic heart failure    Nocturia    Encounter for medical examination to establish care    Healthcare maintenance    Annual physical exam    Mild intermittent asthma    At high risk for falls    Mild cognitive impairment    Cerebellar stroke, acute    History of stroke with residual effects    Hyponatremia    Anemia    Menopause           Current Outpatient Medications on File Prior to Visit   Medication Sig Dispense Refill    ASCORBATE CALCIUM, VITAMIN C, ORAL Take 500 mg by mouth.      aspirin (ECOTRIN) 81 MG EC tablet Take 81 mg by mouth once daily.      cholecalciferol, vitamin D3, (VITAMIN D3) 50 mcg (2,000 unit) Tab Take 2,000 Units by mouth.      clopidogreL  (PLAVIX) 75 mg tablet Take 1 tablet (75 mg total) by mouth once daily. for 19 days 19 tablet 0    clotrimazole (LOTRIMIN) 1 % cream APPLY TOPICALLY 2 (TWO) TIMES DAILY. TO AFFECTED TOENAILS. 45 g 1    COMPOUND HORMONE REPLACEMENT Take by mouth once daily. ESTROGEN CREAM -- [facility]      donepeziL (ARICEPT) 10 MG tablet Take 1/2 tab po QHS for one month, then one tablet daily. 30 tablet 11    estradioL (ESTRACE) 2 MG tablet Take 1 tablet (2 mg total) by mouth once daily. 90 tablet 3    MAGNESIUM CARBONATE ORAL Take 1 tablet by mouth once daily. 200MG      PREMARIN vaginal cream PLACE 0.5 G VAGINALLY ONCE DAILY. 90 g 0    progesterone (PROMETRIUM) 200 MG capsule Take 1 capsule by mouth 30-60 minutes before bed every night 90 capsule 3    propranoloL (INDERAL LA) 120 MG 24 hr capsule TAKE 1 CAPSULE BY MOUTH ONCE DAILY 90 capsule 3    rosuvastatin (CRESTOR) 40 MG Tab TAKE 1 TABLET BY MOUTH EVERY DAY 90 tablet 3    sulfamethoxazole-trimethoprim 800-160mg (BACTRIM DS) 800-160 mg Tab Take 1 tablet by mouth.      UNABLE TO FIND medication name: Testosterone Cream in versabase 20mg/mL Apply 1/4mL to labia minora nightly 22.5 mL 1    valACYclovir (VALTREX) 1000 MG tablet valacyclovir 1 gram tablet      XOLAIR 300 mg/2 mL Syrg Inject into the skin.       No current facility-administered medications on file prior to visit.           Review of patient's allergies indicates:   Allergen Reactions    Penicillins Rash and Hives               ROS:   General ROS: negative for - chills, fever or night sweats  Respiratory ROS: no cough, shortness of breath, or wheezing  Cardiovascular ROS: no chest pain or dyspnea on exertion  Musculoskeletal ROS: positive for - pain in foot - bilateral  Neurological ROS: negative for - impaired coordination/balance and numbness/tingling  Dermatological ROS: negative for pruritus and rash      EXAM:     Vitals:    07/29/24 1422   BP: 130/80   Pulse: 64   Weight: 51.3 kg (113 lb 1.5 oz)    Height: 4' (1.219 m)            General:  Alert and Oriented x 3;  No acute distress      Bilateral  Lower extremity exam:    Vascular:   Dorsalis Pedis:  present   Posterior Tibial:  present  Capillary refill time:  3 seconds  Temperature of toes cool to touch  Edema:  Trace and non-pitting       Neurological:     Sharp touch:  normal  Light touch: normal  Tinels Sign:  Absent  Mulders Click:   Absent        Dermatological:   Skin: thin, moist and appropriate for age; calluses sub metatarsal heads  Wounds/Ulcers:  Absent  Bruising:  Absent  Erythema:  Absent  Hair:  decreased  ++fat pad atrophy      Musculoskeletal:   Metatarsophalangeal range of motion:   diminished range of motion  Subtalar joint range of motion: full range of motion  Ankle joint range of motion:  full range of motion  Bunions:  Present  Hammertoes: Present; left 2nd mainly, overlapping hallux.

## 2024-08-10 DIAGNOSIS — Z78.0 MENOPAUSE: ICD-10-CM

## 2024-08-10 NOTE — TELEPHONE ENCOUNTER
Refill Routing Note   Medication(s) are not appropriate for processing by [facility] for the following reason(s):        Drug-disease interaction    [facility] action(s):  Defer        Medication Therapy Plan: Drug-Disease: progesterone and Stroke; Cerebellar stroke, acute; Acute cerebrovascular accident (CVA) of cerebellum      Appointments  past 12m or future 3m with PCP    Date Provider   Last Visit   8/7/2023 Rekha Madrid MD   Next Visit   Visit date not found Rekha Madrid MD   ED visits in past 90 days: 1        Note composed:8:19 AM 08/10/2024

## 2024-08-11 RX ORDER — PROGESTERONE 200 MG/1
CAPSULE ORAL
Qty: 90 CAPSULE | Refills: 0 | Status: SHIPPED | OUTPATIENT
Start: 2024-08-11

## 2024-08-12 PROBLEM — Z00.00 HEALTHCARE MAINTENANCE: Status: RESOLVED | Noted: 2024-05-09 | Resolved: 2024-08-12

## 2024-08-12 PROBLEM — Z00.00 ANNUAL PHYSICAL EXAM: Status: RESOLVED | Noted: 2024-05-09 | Resolved: 2024-08-12

## 2024-08-12 PROBLEM — Z00.00 ENCOUNTER FOR MEDICAL EXAMINATION TO ESTABLISH CARE: Status: RESOLVED | Noted: 2024-05-09 | Resolved: 2024-08-12

## 2024-08-28 ENCOUNTER — TELEPHONE (OUTPATIENT)
Dept: INTERNAL MEDICINE | Facility: CLINIC | Age: 79
End: 2024-08-28
Payer: MEDICARE

## 2024-08-30 ENCOUNTER — PATIENT MESSAGE (OUTPATIENT)
Dept: NEUROLOGY | Facility: CLINIC | Age: 79
End: 2024-08-30
Payer: MEDICARE

## 2024-08-30 DIAGNOSIS — I63.89 OTHER CEREBRAL INFARCTION: Primary | ICD-10-CM

## 2024-09-09 ENCOUNTER — OFFICE VISIT (OUTPATIENT)
Dept: PODIATRY | Facility: CLINIC | Age: 79
End: 2024-09-09
Payer: MEDICARE

## 2024-09-09 VITALS
WEIGHT: 113.13 LBS | DIASTOLIC BLOOD PRESSURE: 65 MMHG | SYSTOLIC BLOOD PRESSURE: 133 MMHG | BODY MASS INDEX: 26.18 KG/M2 | HEIGHT: 55 IN | HEART RATE: 60 BPM

## 2024-09-09 DIAGNOSIS — L84 PRE-ULCERATIVE CORN OR CALLOUS: Chronic | ICD-10-CM

## 2024-09-09 DIAGNOSIS — M77.41 METATARSALGIA OF BOTH FEET: Primary | Chronic | ICD-10-CM

## 2024-09-09 DIAGNOSIS — L90.9 PLANTAR FAT PAD ATROPHY: Chronic | ICD-10-CM

## 2024-09-09 DIAGNOSIS — M77.42 METATARSALGIA OF BOTH FEET: Primary | Chronic | ICD-10-CM

## 2024-09-09 PROCEDURE — 99213 OFFICE O/P EST LOW 20 MIN: CPT | Mod: PBBFAC,PN | Performed by: PODIATRIST

## 2024-09-09 PROCEDURE — 99213 OFFICE O/P EST LOW 20 MIN: CPT | Mod: S$PBB,,, | Performed by: PODIATRIST

## 2024-09-09 PROCEDURE — 99999 PR PBB SHADOW E&M-EST. PATIENT-LVL III: CPT | Mod: PBBFAC,,, | Performed by: PODIATRIST

## 2024-09-12 ENCOUNTER — HOSPITAL ENCOUNTER (OUTPATIENT)
Dept: RADIOLOGY | Facility: OTHER | Age: 79
Discharge: HOME | End: 2024-09-12
Attending: PSYCHIATRY & NEUROLOGY
Payer: MEDICARE

## 2024-09-12 DIAGNOSIS — I63.89 OTHER CEREBRAL INFARCTION: ICD-10-CM

## 2024-09-12 PROCEDURE — 70551 MRI BRAIN STEM W/O DYE: CPT | Mod: TC

## 2024-09-13 NOTE — PROGRESS NOTES
PODIATRY NOTE       CHIEF CONCERN:   Callouses and Nail Care (Chronic kidney disease stage 3/5/9/24 Dr Lane PCP)         MEDICAL DECISION MAKING:      Problem List Items Addressed This Visit    None  Visit Diagnoses       Metatarsalgia of both feet  (Chronic)   -  Primary    Plantar fat pad atrophy  (Chronic)       Pre-ulcerative corn or callous  (Chronic)               I counseled the patient on the patient's conditions, their implications and medical management.    Shoe and activity modification as needed for relief.  Continue extra depth shoes to accommodate hammertoes.  No barefoot walking.   Continue budin splint for hammertoe as needed.  Offloading pads as needed.    Continue urea cream daily to calluses.   Calluses trimmed.   Call or return to clinic prn if these symptoms worsen or fail to improve as anticipated.         I spent a total of 20 minutes on the day of the visit.  This includes face to face time and non-face to face time preparing to see the patient (eg, review of tests), obtaining and/or reviewing separately obtained history, documenting clinical information in the electronic or other health record, independently interpreting results and communicating results to the patient/family/caregiver, or care coordinator.          HISTORY OF PRESENT ILLNESS:  Sarah Rico is a 78 y.o. female, with history of CKD,  with concerns regarding: painful calluses sub metatarsal heads, chronic.      She has history of bunionectomy bilateral.   Two surgeries on the right and one left foot surgery in [place].   left 2nd toe overlaps the left hallux.  Using paddings to offload calluses.   She gets routine salon pedicure.      PCP:   Charo Lane MD           Patient Active Problem List   Diagnosis    Stress incontinence of urine    Voiding dysfunction    Rectocele, female    Chronic constipation    Vaginal atrophy    Graves' disease    Stage 3a chronic kidney disease    Hyperlipidemia    Essential  tremor    Essential hypertension    Insomnia    S/P TAVR (transcatheter aortic valve replacement)    Lumbar radiculopathy    S/P hip replacement    Pelvic floor weakness    Decreased functional mobility and endurance    Disorder of muscle    Preoperative cardiovascular examination    Status post anterior & posterior colporrhaphy w/ MUS 11/1    Incomplete bladder emptying    Pelvic floor tension    Pelvic floor dysfunction    Chronic pansinusitis    Atypical lobular hyperplasia (ALH) of right breast    Dyspareunia due to medical condition in female    Vertigo    BPPV (benign paroxysmal positional vertigo)    Aortic atherosclerosis    Cervical spinal stenosis    Ataxia    Chronic diastolic heart failure    Nocturia    Mild intermittent asthma    At high risk for falls    Mild cognitive impairment    Cerebellar stroke, acute    History of stroke with residual effects    Hyponatremia    Anemia    Menopause           Current Outpatient Medications on File Prior to Visit   Medication Sig Dispense Refill    ASCORBATE CALCIUM, VITAMIN C, ORAL Take 500 mg by mouth.      aspirin (ECOTRIN) 81 MG EC tablet Take 81 mg by mouth once daily.      cholecalciferol, vitamin D3, (VITAMIN D3) 50 mcg (2,000 unit) Tab Take 2,000 Units by mouth.      clopidogreL (PLAVIX) 75 mg tablet Take 1 tablet (75 mg total) by mouth once daily. for 19 days 19 tablet 0    clotrimazole (LOTRIMIN) 1 % cream APPLY TOPICALLY 2 (TWO) TIMES DAILY. TO AFFECTED TOENAILS. 45 g 1    COMPOUND HORMONE REPLACEMENT Take by mouth once daily. ESTROGEN CREAM -- [facility]      donepeziL (ARICEPT) 10 MG tablet Take 1/2 tab po QHS for one month, then one tablet daily. 30 tablet 11    estradioL (ESTRACE) 2 MG tablet Take 1 tablet (2 mg total) by mouth once daily. 90 tablet 3    MAGNESIUM CARBONATE ORAL Take 1 tablet by mouth once daily. 200MG      PREMARIN vaginal cream PLACE 0.5 G VAGINALLY ONCE DAILY. 90 g 0    progesterone (PROMETRIUM) 200 MG capsule TAKE 1  CAPSULE BY MOUTH 30-60 MINUTES BEFORE BED EVERY NIGHT 90 capsule 0    propranoloL (INDERAL LA) 120 MG 24 hr capsule TAKE 1 CAPSULE BY MOUTH ONCE DAILY 90 capsule 3    rosuvastatin (CRESTOR) 40 MG Tab TAKE 1 TABLET BY MOUTH EVERY DAY 90 tablet 3    sulfamethoxazole-trimethoprim 800-160mg (BACTRIM DS) 800-160 mg Tab Take 1 tablet by mouth.      UNABLE TO FIND medication name: Testosterone Cream in versabase 20mg/mL Apply 1/4mL to labia minora nightly 22.5 mL 1    valACYclovir (VALTREX) 1000 MG tablet valacyclovir 1 gram tablet      XOLAIR 300 mg/2 mL Syrg Inject into the skin.       No current facility-administered medications on file prior to visit.           Review of patient's allergies indicates:   Allergen Reactions    Penicillins Rash and Hives               ROS:   General ROS: negative for - chills, fever or night sweats  Respiratory ROS: no cough, shortness of breath, or wheezing  Cardiovascular ROS: no chest pain or dyspnea on exertion  Musculoskeletal ROS: positive for - pain in foot - bilateral  Neurological ROS: negative for - impaired coordination/balance and numbness/tingling  Dermatological ROS: negative for pruritus and rash      EXAM:     Vitals:    09/09/24 0907   BP: 133/65   Pulse: 60   Weight: 51.3 kg (113 lb 1.5 oz)   Height: 4' (1.219 m)            General:  Alert and Oriented x 3;  No acute distress      Bilateral  Lower extremity exam:    Vascular:   Dorsalis Pedis:  present   Posterior Tibial:  present  Capillary refill time:  3 seconds  Temperature of toes cool to touch  Edema:  Trace and non-pitting       Neurological:     Sharp touch:  normal  Light touch: normal  Tinels Sign:  Absent  Mulders Click:   Absent        Dermatological:   Skin: thin, moist and appropriate for age; calluses sub metatarsal heads  Wounds/Ulcers:  Absent  Bruising:  Absent  Erythema:  Absent  Hair:  decreased  ++fat pad atrophy      Musculoskeletal:   Metatarsophalangeal range of motion:   diminished range of  motion  Subtalar joint range of motion: full range of motion  Ankle joint range of motion:  full range of motion  Bunions:  Present  Hammertoes: Present; left 2nd mainly, overlapping hallux.

## 2024-09-16 DIAGNOSIS — Z95.2 S/P TAVR (TRANSCATHETER AORTIC VALVE REPLACEMENT): Primary | ICD-10-CM

## 2024-10-10 ENCOUNTER — OFFICE VISIT (OUTPATIENT)
Dept: PODIATRY | Facility: CLINIC | Age: 79
End: 2024-10-10
Payer: MEDICARE

## 2024-10-10 VITALS
DIASTOLIC BLOOD PRESSURE: 80 MMHG | SYSTOLIC BLOOD PRESSURE: 136 MMHG | WEIGHT: 113.13 LBS | BODY MASS INDEX: 26.18 KG/M2 | HEART RATE: 61 BPM | HEIGHT: 55 IN

## 2024-10-10 DIAGNOSIS — M77.42 METATARSALGIA OF BOTH FEET: Primary | ICD-10-CM

## 2024-10-10 DIAGNOSIS — B35.1 DERMATOPHYTOSIS OF NAIL: ICD-10-CM

## 2024-10-10 DIAGNOSIS — L84 CORN OR CALLUS: Chronic | ICD-10-CM

## 2024-10-10 DIAGNOSIS — L90.9 PLANTAR FAT PAD ATROPHY: Chronic | ICD-10-CM

## 2024-10-10 DIAGNOSIS — M77.41 METATARSALGIA OF BOTH FEET: Primary | ICD-10-CM

## 2024-10-10 PROCEDURE — 99213 OFFICE O/P EST LOW 20 MIN: CPT | Mod: PBBFAC,PN | Performed by: PODIATRIST

## 2024-10-10 PROCEDURE — 99999 PR PBB SHADOW E&M-EST. PATIENT-LVL III: CPT | Mod: PBBFAC,,, | Performed by: PODIATRIST

## 2024-10-10 PROCEDURE — 99213 OFFICE O/P EST LOW 20 MIN: CPT | Mod: S$PBB,,, | Performed by: PODIATRIST

## 2024-10-10 NOTE — PROGRESS NOTES
PODIATRY NOTE       CHIEF CONCERN:   Follow-up (Callouses (Stage 3a chronic kidney diseases))         MEDICAL DECISION MAKING:      Problem List Items Addressed This Visit    None  Visit Diagnoses       Metatarsalgia of both feet    -  Primary    Plantar fat pad atrophy  (Chronic)       Corn or callus  (Chronic)       Dermatophytosis of nail                  I counseled the patient on the patient's conditions, their implications and medical management.    Shoe and activity modification as needed for relief.  Continue extra depth shoes to accommodate hammertoes.  No barefoot walking.   Continue budin splint for hammertoe as needed.  Offloading pads as needed.  Continue urea cream daily to calluses.   Calluses trimmed.   Call or return to clinic prn if these symptoms worsen or fail to improve as anticipated.         I spent a total of 20 minutes on the day of the visit.  This includes face to face time and non-face to face time preparing to see the patient (eg, review of tests), obtaining and/or reviewing separately obtained history, documenting clinical information in the electronic or other health record, independently interpreting results and communicating results to the patient/family/caregiver, or care coordinator.          HISTORY OF PRESENT ILLNESS:  Sarah Rico is a 79 y.o. female, with history of CKD,  with concerns regarding: painful calluses sub metatarsal heads, chronic.      She has history of bunionectomy bilateral.   Two surgeries on the right and one left foot surgery in [place].     left 2nd toe overlaps the left hallux.  Using paddings to offload calluses. And Budin splint    PCP:   Charo Lane MD           Patient Active Problem List   Diagnosis    Stress incontinence of urine    Voiding dysfunction    Rectocele, female    Chronic constipation    Vaginal atrophy    Graves' disease    Stage 3a chronic kidney disease    Hyperlipidemia    Essential tremor    Essential hypertension     Insomnia    S/P TAVR (transcatheter aortic valve replacement)    Lumbar radiculopathy    S/P hip replacement    Pelvic floor weakness    Decreased functional mobility and endurance    Disorder of muscle    Preoperative cardiovascular examination    Status post anterior & posterior colporrhaphy w/ MUS 11/1    Incomplete bladder emptying    Pelvic floor tension    Pelvic floor dysfunction    Chronic pansinusitis    Atypical lobular hyperplasia (ALH) of right breast    Dyspareunia due to medical condition in female    Vertigo    BPPV (benign paroxysmal positional vertigo)    Aortic atherosclerosis    Cervical spinal stenosis    Ataxia    Chronic diastolic heart failure    Nocturia    Mild intermittent asthma    At high risk for falls    Mild cognitive impairment    Cerebellar stroke, acute    History of stroke with residual effects    Hyponatremia    Anemia    Menopause           Current Outpatient Medications on File Prior to Visit   Medication Sig Dispense Refill    ASCORBATE CALCIUM, VITAMIN C, ORAL Take 500 mg by mouth.      aspirin (ECOTRIN) 81 MG EC tablet Take 81 mg by mouth once daily.      cholecalciferol, vitamin D3, (VITAMIN D3) 50 mcg (2,000 unit) Tab Take 2,000 Units by mouth.      clotrimazole (LOTRIMIN) 1 % cream APPLY TOPICALLY 2 (TWO) TIMES DAILY. TO AFFECTED TOENAILS. 45 g 1    COMPOUND HORMONE REPLACEMENT Take by mouth once daily. ESTROGEN CREAM -- [facility]      donepeziL (ARICEPT) 10 MG tablet Take 1/2 tab po QHS for one month, then one tablet daily. 30 tablet 11    estradioL (ESTRACE) 2 MG tablet Take 1 tablet (2 mg total) by mouth once daily. 90 tablet 3    MAGNESIUM CARBONATE ORAL Take 1 tablet by mouth once daily. 200MG      PREMARIN vaginal cream PLACE 0.5 G VAGINALLY ONCE DAILY. 90 g 0    progesterone (PROMETRIUM) 200 MG capsule TAKE 1 CAPSULE BY MOUTH 30-60 MINUTES BEFORE BED EVERY NIGHT 90 capsule 0    propranoloL (INDERAL LA) 120 MG 24 hr capsule TAKE 1 CAPSULE BY MOUTH ONCE DAILY  90 capsule 3    rosuvastatin (CRESTOR) 40 MG Tab TAKE 1 TABLET BY MOUTH EVERY DAY 90 tablet 3    sulfamethoxazole-trimethoprim 800-160mg (BACTRIM DS) 800-160 mg Tab Take 1 tablet by mouth.      UNABLE TO FIND medication name: Testosterone Cream in versabase 20mg/mL Apply 1/4mL to labia minora nightly 22.5 mL 1    valACYclovir (VALTREX) 1000 MG tablet valacyclovir 1 gram tablet      XOLAIR 300 mg/2 mL Syrg Inject into the skin.      clopidogreL (PLAVIX) 75 mg tablet Take 1 tablet (75 mg total) by mouth once daily. for 19 days 19 tablet 0     No current facility-administered medications on file prior to visit.           Review of patient's allergies indicates:   Allergen Reactions    Penicillins Rash and Hives               ROS:   General ROS: negative for - chills, fever or night sweats  Respiratory ROS: no cough, shortness of breath, or wheezing  Cardiovascular ROS: no chest pain or dyspnea on exertion  Musculoskeletal ROS: positive for - pain in foot - bilateral  Neurological ROS: negative for - impaired coordination/balance and numbness/tingling  Dermatological ROS: negative for pruritus and rash      EXAM:     Vitals:    10/10/24 1214   BP: 136/80   Pulse: 61   Weight: 51.3 kg (113 lb 1.5 oz)   Height: 4' (1.219 m)            General:  Alert and Oriented x 3;  No acute distress      Bilateral  Lower extremity exam:    Vascular:   Dorsalis Pedis:  present   Posterior Tibial:  present  Capillary refill time:  3 seconds  Temperature of toes cool to touch  Edema:  Trace and non-pitting       Neurological:     Sharp touch:  normal  Light touch: normal  Tinels Sign:  Absent  Mulders Click:   Absent        Dermatological:   Skin: thin, moist and appropriate for age; calluses sub metatarsal heads  Wounds/Ulcers:  Absent  Bruising:  Absent  Erythema:  Absent  Hair:  decreased  ++fat pad atrophy      Musculoskeletal:   Metatarsophalangeal range of motion:   diminished range of motion  Subtalar joint range of motion: full  range of motion  Ankle joint range of motion:  full range of motion  Bunions:  Present  Hammertoes: Present; left 2nd mainly, overlapping hallux.

## (undated) DEVICE — SOL PVP-I SCRUB 7.5% 4OZ

## (undated) DEVICE — CONTAINER SPECIMEN OR STER 4OZ

## (undated) DEVICE — SPONGE NEURO 1/2 X 2

## (undated) DEVICE — SEE MEDLINE ITEM 156930

## (undated) DEVICE — SUT PROLENE 3-0 FS-1 MONO18

## (undated) DEVICE — SOL NACL STRL BOTTLE 1000ML

## (undated) DEVICE — SUT VICRYL 0 27 CT-2

## (undated) DEVICE — POSITIONER IV ARMBOARD FOAM

## (undated) DEVICE — POSITIONER HEAD DONUT 9IN FOAM

## (undated) DEVICE — Device

## (undated) DEVICE — BLADE SURG STAINLESS STEEL #15

## (undated) DEVICE — SOL STRL WATER INJ 1000ML BG

## (undated) DEVICE — PAD PREP 50/CA

## (undated) DEVICE — BLLN SEEKER FRONTAL 7X17MM

## (undated) DEVICE — SUT VICRYL CTD 2-0 GI 27 SH

## (undated) DEVICE — SUT PDS II 2-0 CT-2 VIL

## (undated) DEVICE — GLOVE BIOGEL SKINSENSE PI 7.0

## (undated) DEVICE — ADHESIVE DERMABOND ADVANCED

## (undated) DEVICE — PACK LAPSCP/PELVSCPY III TIBRN

## (undated) DEVICE — UNDERGLOVES BIOGEL PI SZ 7 LF

## (undated) DEVICE — TOWEL OR DISP STRL BLUE 4/PK

## (undated) DEVICE — DRAPE UNDER BUTTOCKS WITH POUCH

## (undated) DEVICE — SEE L#120831

## (undated) DEVICE — TRACKER ENT INSTRUMENT

## (undated) DEVICE — VIDEO RENTAL SERVICE

## (undated) DEVICE — SYR IRRIGATION BULB STER 60ML

## (undated) DEVICE — SYR 30CC LUER LOCK

## (undated) DEVICE — KIT INFLATOR BLLN 18

## (undated) DEVICE — SINU-FOAM STAMMBERGER

## (undated) DEVICE — SET CYSTO IRRIGATION UNIV SPIK

## (undated) DEVICE — JELLY SURGILUBE 5GR

## (undated) DEVICE — SCRUB 10% POVIDONE IODINE 4OZ

## (undated) DEVICE — KIT URINE METER 350ML STAT LOC

## (undated) DEVICE — SKIN MARKER DEVON 160

## (undated) DEVICE — DRAPE STERI INSTRUMENT 1018

## (undated) DEVICE — BLLN SEEKER SPHENOID 6X17MM

## (undated) DEVICE — SOL NS 1000CC

## (undated) DEVICE — ELECTRODE REM PLYHSV RETURN 9

## (undated) DEVICE — GOWN NONREINF SET-IN SLV XL

## (undated) DEVICE — TRAY DRY SKIN SCRUB PREP

## (undated) DEVICE — SOL NACL 0.9% INJ 500ML BG

## (undated) DEVICE — HANDPIECE REFLUX ULTRA 45

## (undated) DEVICE — HANDPIECE HYDRODEBRIDER

## (undated) DEVICE — TIP YANKAUERS BULB NO VENT

## (undated) DEVICE — SHEATH CLN ENDOSCRB 4MM

## (undated) DEVICE — TRACKER PATIENT NON INVASIVE